# Patient Record
Sex: FEMALE | Race: WHITE | Employment: OTHER | URBAN - METROPOLITAN AREA
[De-identification: names, ages, dates, MRNs, and addresses within clinical notes are randomized per-mention and may not be internally consistent; named-entity substitution may affect disease eponyms.]

---

## 2017-01-25 ENCOUNTER — PATIENT MESSAGE (OUTPATIENT)
Dept: INTERNAL MEDICINE CLINIC | Age: 62
End: 2017-01-25

## 2017-01-25 DIAGNOSIS — E11.9 TYPE 2 DIABETES MELLITUS WITHOUT COMPLICATION, WITH LONG-TERM CURRENT USE OF INSULIN (HCC): Primary | ICD-10-CM

## 2017-01-25 DIAGNOSIS — Z79.4 TYPE 2 DIABETES MELLITUS WITHOUT COMPLICATION, WITH LONG-TERM CURRENT USE OF INSULIN (HCC): Primary | ICD-10-CM

## 2017-01-26 RX ORDER — ESTRADIOL 0.1 MG/G
CREAM VAGINAL
Qty: 42.5 G | Refills: 3 | Status: SHIPPED | OUTPATIENT
Start: 2017-01-26 | End: 2017-08-08 | Stop reason: ALTCHOICE

## 2017-01-31 ENCOUNTER — OFFICE VISIT (OUTPATIENT)
Dept: INTERNAL MEDICINE CLINIC | Age: 62
End: 2017-01-31

## 2017-01-31 VITALS
BODY MASS INDEX: 37.56 KG/M2 | WEIGHT: 220 LBS | HEIGHT: 64 IN | HEART RATE: 84 BPM | DIASTOLIC BLOOD PRESSURE: 68 MMHG | SYSTOLIC BLOOD PRESSURE: 122 MMHG

## 2017-01-31 DIAGNOSIS — F33.42 RECURRENT MAJOR DEPRESSIVE DISORDER, IN FULL REMISSION (HCC): ICD-10-CM

## 2017-01-31 DIAGNOSIS — E78.2 MIXED HYPERLIPIDEMIA: ICD-10-CM

## 2017-01-31 DIAGNOSIS — Z79.4 TYPE 2 DIABETES MELLITUS WITHOUT COMPLICATION, WITH LONG-TERM CURRENT USE OF INSULIN (HCC): Primary | ICD-10-CM

## 2017-01-31 DIAGNOSIS — E11.9 TYPE 2 DIABETES MELLITUS WITHOUT COMPLICATION, WITH LONG-TERM CURRENT USE OF INSULIN (HCC): Primary | ICD-10-CM

## 2017-01-31 DIAGNOSIS — I10 ESSENTIAL HYPERTENSION: ICD-10-CM

## 2017-01-31 PROCEDURE — 99214 OFFICE O/P EST MOD 30 MIN: CPT | Performed by: INTERNAL MEDICINE

## 2017-01-31 RX ORDER — CYCLOBENZAPRINE HCL 10 MG
10 TABLET ORAL NIGHTLY PRN
Qty: 30 TABLET | Refills: 5 | Status: SHIPPED | OUTPATIENT
Start: 2017-01-31 | End: 2017-08-26 | Stop reason: SDUPTHER

## 2017-02-01 ENCOUNTER — PATIENT MESSAGE (OUTPATIENT)
Dept: INTERNAL MEDICINE CLINIC | Age: 62
End: 2017-02-01

## 2017-02-01 LAB
ESTIMATED AVERAGE GLUCOSE: 182.9 MG/DL
HBA1C MFR BLD: 8 %

## 2017-02-06 RX ORDER — ATENOLOL 25 MG/1
TABLET ORAL
Qty: 30 TABLET | Refills: 5 | Status: SHIPPED | OUTPATIENT
Start: 2017-02-06 | End: 2017-09-25 | Stop reason: SDUPTHER

## 2017-03-31 RX ORDER — DULOXETIN HYDROCHLORIDE 60 MG/1
CAPSULE, DELAYED RELEASE ORAL
Qty: 30 CAPSULE | Refills: 5 | Status: SHIPPED | OUTPATIENT
Start: 2017-03-31 | End: 2017-09-23 | Stop reason: SDUPTHER

## 2017-04-03 RX ORDER — INSULIN GLARGINE 100 [IU]/ML
INJECTION, SOLUTION SUBCUTANEOUS
Qty: 15 PEN | Refills: 3 | Status: SHIPPED | OUTPATIENT
Start: 2017-04-03 | End: 2017-08-08 | Stop reason: SDUPTHER

## 2017-04-03 RX ORDER — LIRAGLUTIDE 6 MG/ML
INJECTION SUBCUTANEOUS
Qty: 9 PEN | Refills: 3 | Status: SHIPPED | OUTPATIENT
Start: 2017-04-03 | End: 2017-08-10 | Stop reason: SDUPTHER

## 2017-04-07 ENCOUNTER — TELEPHONE (OUTPATIENT)
Dept: INTERNAL MEDICINE CLINIC | Age: 62
End: 2017-04-07

## 2017-04-10 ENCOUNTER — OFFICE VISIT (OUTPATIENT)
Dept: INTERNAL MEDICINE CLINIC | Age: 62
End: 2017-04-10

## 2017-04-10 VITALS
OXYGEN SATURATION: 96 % | HEART RATE: 78 BPM | HEIGHT: 64 IN | BODY MASS INDEX: 37.94 KG/M2 | DIASTOLIC BLOOD PRESSURE: 76 MMHG | WEIGHT: 222.2 LBS | SYSTOLIC BLOOD PRESSURE: 150 MMHG

## 2017-04-10 DIAGNOSIS — R07.89 CHEST PRESSURE: Primary | ICD-10-CM

## 2017-04-10 DIAGNOSIS — M79.89 SWELLING OF BOTH HANDS: ICD-10-CM

## 2017-04-10 LAB
A/G RATIO: 1.7 (ref 1.1–2.2)
ALBUMIN SERPL-MCNC: 4.3 G/DL (ref 3.4–5)
ALP BLD-CCNC: 128 U/L (ref 40–129)
ALT SERPL-CCNC: 23 U/L (ref 10–40)
ANION GAP SERPL CALCULATED.3IONS-SCNC: 17 MMOL/L (ref 3–16)
AST SERPL-CCNC: 20 U/L (ref 15–37)
BILIRUB SERPL-MCNC: 0.5 MG/DL (ref 0–1)
BUN BLDV-MCNC: 13 MG/DL (ref 7–20)
CALCIUM SERPL-MCNC: 9.4 MG/DL (ref 8.3–10.6)
CHLORIDE BLD-SCNC: 101 MMOL/L (ref 99–110)
CO2: 23 MMOL/L (ref 21–32)
CREAT SERPL-MCNC: 0.7 MG/DL (ref 0.6–1.2)
CREATININE URINE: 77.1 MG/DL (ref 28–259)
GFR AFRICAN AMERICAN: >60
GFR NON-AFRICAN AMERICAN: >60
GLOBULIN: 2.5 G/DL
GLUCOSE BLD-MCNC: 197 MG/DL (ref 70–99)
HCT VFR BLD CALC: 46.4 % (ref 36–48)
HEMOGLOBIN: 14.9 G/DL (ref 12–16)
MCH RBC QN AUTO: 28.1 PG (ref 26–34)
MCHC RBC AUTO-ENTMCNC: 32.2 G/DL (ref 31–36)
MCV RBC AUTO: 87.3 FL (ref 80–100)
PDW BLD-RTO: 14.6 % (ref 12.4–15.4)
PLATELET # BLD: 309 K/UL (ref 135–450)
PMV BLD AUTO: 9.1 FL (ref 5–10.5)
POTASSIUM SERPL-SCNC: 4.6 MMOL/L (ref 3.5–5.1)
PROTEIN PROTEIN: 6 MG/DL
RBC # BLD: 5.32 M/UL (ref 4–5.2)
SODIUM BLD-SCNC: 141 MMOL/L (ref 136–145)
TOTAL PROTEIN: 6.8 G/DL (ref 6.4–8.2)
WBC # BLD: 6.4 K/UL (ref 4–11)

## 2017-04-10 PROCEDURE — 93000 ELECTROCARDIOGRAM COMPLETE: CPT | Performed by: INTERNAL MEDICINE

## 2017-04-10 PROCEDURE — 99214 OFFICE O/P EST MOD 30 MIN: CPT | Performed by: INTERNAL MEDICINE

## 2017-04-26 RX ORDER — CELECOXIB 200 MG/1
CAPSULE ORAL
Qty: 60 CAPSULE | Refills: 4 | Status: SHIPPED | OUTPATIENT
Start: 2017-04-26 | End: 2017-10-01 | Stop reason: SDUPTHER

## 2017-04-26 RX ORDER — ATORVASTATIN CALCIUM 20 MG/1
TABLET, FILM COATED ORAL
Qty: 30 TABLET | Refills: 4 | Status: SHIPPED | OUTPATIENT
Start: 2017-04-26 | End: 2017-09-25 | Stop reason: SDUPTHER

## 2017-04-26 RX ORDER — CANAGLIFLOZIN 300 MG/1
TABLET, FILM COATED ORAL
Qty: 30 TABLET | Refills: 4 | Status: SHIPPED | OUTPATIENT
Start: 2017-04-26 | End: 2017-08-08 | Stop reason: ALTCHOICE

## 2017-05-02 ENCOUNTER — OFFICE VISIT (OUTPATIENT)
Dept: INTERNAL MEDICINE CLINIC | Age: 62
End: 2017-05-02

## 2017-05-02 VITALS
SYSTOLIC BLOOD PRESSURE: 122 MMHG | DIASTOLIC BLOOD PRESSURE: 70 MMHG | HEART RATE: 80 BPM | BODY MASS INDEX: 38.41 KG/M2 | WEIGHT: 225 LBS | HEIGHT: 64 IN

## 2017-05-02 DIAGNOSIS — E11.9 TYPE 2 DIABETES MELLITUS WITHOUT COMPLICATION, WITH LONG-TERM CURRENT USE OF INSULIN (HCC): Primary | ICD-10-CM

## 2017-05-02 DIAGNOSIS — E78.2 MIXED HYPERLIPIDEMIA: ICD-10-CM

## 2017-05-02 DIAGNOSIS — Z79.4 TYPE 2 DIABETES MELLITUS WITHOUT COMPLICATION, WITH LONG-TERM CURRENT USE OF INSULIN (HCC): Primary | ICD-10-CM

## 2017-05-02 DIAGNOSIS — I10 ESSENTIAL HYPERTENSION: ICD-10-CM

## 2017-05-02 PROCEDURE — 99214 OFFICE O/P EST MOD 30 MIN: CPT | Performed by: INTERNAL MEDICINE

## 2017-05-03 LAB
ESTIMATED AVERAGE GLUCOSE: 185.8 MG/DL
HBA1C MFR BLD: 8.1 %

## 2017-05-18 ENCOUNTER — TELEPHONE (OUTPATIENT)
Dept: INTERNAL MEDICINE CLINIC | Age: 62
End: 2017-05-18

## 2017-05-25 ENCOUNTER — OFFICE VISIT (OUTPATIENT)
Dept: INTERNAL MEDICINE CLINIC | Age: 62
End: 2017-05-25

## 2017-05-25 VITALS
TEMPERATURE: 98.2 F | HEART RATE: 112 BPM | WEIGHT: 218.8 LBS | BODY MASS INDEX: 37.36 KG/M2 | HEIGHT: 64 IN | SYSTOLIC BLOOD PRESSURE: 110 MMHG | DIASTOLIC BLOOD PRESSURE: 68 MMHG

## 2017-05-25 DIAGNOSIS — M79.10 MUSCLE ACHE: ICD-10-CM

## 2017-05-25 DIAGNOSIS — R35.0 URINARY FREQUENCY: ICD-10-CM

## 2017-05-25 DIAGNOSIS — E11.9 TYPE 2 DIABETES MELLITUS WITHOUT COMPLICATION, WITH LONG-TERM CURRENT USE OF INSULIN (HCC): Primary | ICD-10-CM

## 2017-05-25 DIAGNOSIS — R68.83 CHILLS: ICD-10-CM

## 2017-05-25 DIAGNOSIS — Z79.4 TYPE 2 DIABETES MELLITUS WITHOUT COMPLICATION, WITH LONG-TERM CURRENT USE OF INSULIN (HCC): Primary | ICD-10-CM

## 2017-05-25 LAB
BILIRUBIN, POC: ABNORMAL
BLOOD URINE, POC: ABNORMAL
CLARITY, POC: ABNORMAL
COLOR, POC: ABNORMAL
GLUCOSE BLD-MCNC: 166 MG/DL
GLUCOSE URINE, POC: >=1000
KETONES, POC: ABNORMAL
LEUKOCYTE EST, POC: ABNORMAL
NITRITE, POC: POSITIVE
PH, POC: 5.5
PROTEIN, POC: ABNORMAL
SPECIFIC GRAVITY, POC: 1.02
UROBILINOGEN, POC: 0.2

## 2017-05-25 PROCEDURE — 82962 GLUCOSE BLOOD TEST: CPT | Performed by: INTERNAL MEDICINE

## 2017-05-25 PROCEDURE — 81002 URINALYSIS NONAUTO W/O SCOPE: CPT | Performed by: INTERNAL MEDICINE

## 2017-05-25 PROCEDURE — 99214 OFFICE O/P EST MOD 30 MIN: CPT | Performed by: INTERNAL MEDICINE

## 2017-05-25 RX ORDER — NITROFURANTOIN 25; 75 MG/1; MG/1
100 CAPSULE ORAL 2 TIMES DAILY
Qty: 10 CAPSULE | Refills: 0 | Status: SHIPPED | OUTPATIENT
Start: 2017-05-25 | End: 2017-05-30

## 2017-05-27 LAB
ORGANISM: ABNORMAL
URINE CULTURE, ROUTINE: ABNORMAL

## 2017-05-30 RX ORDER — OMEPRAZOLE 40 MG/1
CAPSULE, DELAYED RELEASE ORAL
Qty: 30 CAPSULE | Refills: 4 | Status: SHIPPED | OUTPATIENT
Start: 2017-05-30 | End: 2017-10-26 | Stop reason: SDUPTHER

## 2017-05-30 RX ORDER — FLUTICASONE PROPIONATE 50 MCG
2 SPRAY, SUSPENSION (ML) NASAL DAILY
Qty: 3 BOTTLE | Refills: 3 | Status: SHIPPED | OUTPATIENT
Start: 2017-05-30 | End: 2022-05-12 | Stop reason: SDUPTHER

## 2017-06-01 ENCOUNTER — TELEPHONE (OUTPATIENT)
Dept: INTERNAL MEDICINE CLINIC | Age: 62
End: 2017-06-01

## 2017-06-02 DIAGNOSIS — R82.90 ABNORMAL URINALYSIS: Primary | ICD-10-CM

## 2017-06-04 LAB
ORGANISM: ABNORMAL
URINE CULTURE, ROUTINE: ABNORMAL

## 2017-06-05 RX ORDER — CIPROFLOXACIN 250 MG/1
250 TABLET, FILM COATED ORAL 2 TIMES DAILY
Qty: 10 TABLET | Refills: 0 | Status: SHIPPED | OUTPATIENT
Start: 2017-06-05 | End: 2017-06-10

## 2017-06-08 ENCOUNTER — TELEPHONE (OUTPATIENT)
Dept: INTERNAL MEDICINE CLINIC | Age: 62
End: 2017-06-08

## 2017-06-13 ENCOUNTER — TELEPHONE (OUTPATIENT)
Dept: RHEUMATOLOGY | Age: 62
End: 2017-06-13

## 2017-06-13 DIAGNOSIS — R30.0 DYSURIA: Primary | ICD-10-CM

## 2017-06-13 PROCEDURE — 81002 URINALYSIS NONAUTO W/O SCOPE: CPT | Performed by: INTERNAL MEDICINE

## 2017-06-14 DIAGNOSIS — R30.0 DYSURIA: Primary | ICD-10-CM

## 2017-06-14 LAB
BILIRUBIN URINE: NEGATIVE
BLOOD, URINE: NEGATIVE
CLARITY: CLEAR
COLOR: YELLOW
GLUCOSE URINE: NEGATIVE MG/DL
KETONES, URINE: NEGATIVE MG/DL
LEUKOCYTE ESTERASE, URINE: NEGATIVE
MICROSCOPIC EXAMINATION: NORMAL
NITRITE, URINE: NEGATIVE
PH UA: 6
PROTEIN UA: NEGATIVE MG/DL
SPECIFIC GRAVITY UA: 1.02
URINE TYPE: NORMAL
UROBILINOGEN, URINE: 0.2 E.U./DL

## 2017-06-16 LAB — URINE CULTURE, ROUTINE: NORMAL

## 2017-06-28 RX ORDER — ZOLPIDEM TARTRATE 5 MG/1
TABLET ORAL
Qty: 30 TABLET | Refills: 1 | OUTPATIENT
Start: 2017-06-28 | End: 2017-06-30 | Stop reason: SDUPTHER

## 2017-07-05 ENCOUNTER — TELEPHONE (OUTPATIENT)
Dept: INTERNAL MEDICINE CLINIC | Age: 62
End: 2017-07-05

## 2017-07-05 DIAGNOSIS — M25.562 ACUTE PAIN OF LEFT KNEE: Primary | ICD-10-CM

## 2017-07-05 RX ORDER — ZOLPIDEM TARTRATE 5 MG/1
TABLET ORAL
Qty: 30 TABLET | Refills: 1 | Status: SHIPPED | OUTPATIENT
Start: 2017-07-05 | End: 2017-08-28 | Stop reason: SDUPTHER

## 2017-07-17 ENCOUNTER — OFFICE VISIT (OUTPATIENT)
Dept: ORTHOPEDIC SURGERY | Age: 62
End: 2017-07-17

## 2017-07-17 VITALS
HEART RATE: 83 BPM | HEIGHT: 64 IN | DIASTOLIC BLOOD PRESSURE: 79 MMHG | BODY MASS INDEX: 38.41 KG/M2 | SYSTOLIC BLOOD PRESSURE: 134 MMHG | WEIGHT: 225 LBS

## 2017-07-17 DIAGNOSIS — M25.561 PAIN IN BOTH KNEES, UNSPECIFIED CHRONICITY: Primary | ICD-10-CM

## 2017-07-17 DIAGNOSIS — M25.562 PAIN IN BOTH KNEES, UNSPECIFIED CHRONICITY: Primary | ICD-10-CM

## 2017-07-17 DIAGNOSIS — E11.42 DIABETIC POLYNEUROPATHY ASSOCIATED WITH TYPE 2 DIABETES MELLITUS (HCC): ICD-10-CM

## 2017-07-17 DIAGNOSIS — M17.0 BILATERAL PRIMARY OSTEOARTHRITIS OF KNEE: ICD-10-CM

## 2017-07-17 PROCEDURE — 73564 X-RAY EXAM KNEE 4 OR MORE: CPT | Performed by: ORTHOPAEDIC SURGERY

## 2017-07-17 PROCEDURE — 99203 OFFICE O/P NEW LOW 30 MIN: CPT | Performed by: ORTHOPAEDIC SURGERY

## 2017-07-18 RX ORDER — GABAPENTIN 300 MG/1
300 CAPSULE ORAL 3 TIMES DAILY
Qty: 90 CAPSULE | Refills: 1 | Status: SHIPPED | OUTPATIENT
Start: 2017-07-18 | End: 2017-09-22 | Stop reason: SDUPTHER

## 2017-07-20 RX ORDER — CYCLOBENZAPRINE HCL 10 MG
TABLET ORAL
COMMUNITY
Start: 2017-06-13 | End: 2017-08-28 | Stop reason: SDUPTHER

## 2017-08-08 ENCOUNTER — OFFICE VISIT (OUTPATIENT)
Dept: INTERNAL MEDICINE CLINIC | Age: 62
End: 2017-08-08

## 2017-08-08 VITALS
DIASTOLIC BLOOD PRESSURE: 68 MMHG | SYSTOLIC BLOOD PRESSURE: 122 MMHG | BODY MASS INDEX: 38.68 KG/M2 | HEART RATE: 84 BPM | WEIGHT: 226.6 LBS | HEIGHT: 64 IN

## 2017-08-08 DIAGNOSIS — Z79.4 TYPE 2 DIABETES MELLITUS WITHOUT COMPLICATION, WITH LONG-TERM CURRENT USE OF INSULIN (HCC): Primary | ICD-10-CM

## 2017-08-08 DIAGNOSIS — Z12.31 ENCOUNTER FOR SCREENING MAMMOGRAM FOR BREAST CANCER: ICD-10-CM

## 2017-08-08 DIAGNOSIS — F33.41 RECURRENT MAJOR DEPRESSIVE DISORDER, IN PARTIAL REMISSION (HCC): ICD-10-CM

## 2017-08-08 DIAGNOSIS — E11.9 TYPE 2 DIABETES MELLITUS WITHOUT COMPLICATION, WITH LONG-TERM CURRENT USE OF INSULIN (HCC): Primary | ICD-10-CM

## 2017-08-08 DIAGNOSIS — E78.2 MIXED HYPERLIPIDEMIA: ICD-10-CM

## 2017-08-08 DIAGNOSIS — I10 ESSENTIAL HYPERTENSION: ICD-10-CM

## 2017-08-08 DIAGNOSIS — Z12.11 SCREENING FOR COLON CANCER: ICD-10-CM

## 2017-08-08 PROCEDURE — 99214 OFFICE O/P EST MOD 30 MIN: CPT | Performed by: INTERNAL MEDICINE

## 2017-08-09 LAB
ESTIMATED AVERAGE GLUCOSE: 177.2 MG/DL
HBA1C MFR BLD: 7.8 %

## 2017-08-10 RX ORDER — LIRAGLUTIDE 6 MG/ML
INJECTION SUBCUTANEOUS
Qty: 9 PEN | Refills: 3 | Status: SHIPPED | OUTPATIENT
Start: 2017-08-10 | End: 2017-12-28 | Stop reason: SDUPTHER

## 2017-08-11 ENCOUNTER — OFFICE VISIT (OUTPATIENT)
Dept: NEUROLOGY | Age: 62
End: 2017-08-11

## 2017-08-11 VITALS
HEART RATE: 90 BPM | DIASTOLIC BLOOD PRESSURE: 86 MMHG | HEIGHT: 64 IN | SYSTOLIC BLOOD PRESSURE: 144 MMHG | BODY MASS INDEX: 38.93 KG/M2 | WEIGHT: 228 LBS

## 2017-08-11 DIAGNOSIS — M79.2 NEUROPATHIC PAIN: Primary | ICD-10-CM

## 2017-08-11 DIAGNOSIS — E11.42 TYPE 2 DIABETES MELLITUS WITH POLYNEUROPATHY (HCC): ICD-10-CM

## 2017-08-11 PROCEDURE — 99244 OFF/OP CNSLTJ NEW/EST MOD 40: CPT | Performed by: PSYCHIATRY & NEUROLOGY

## 2017-08-28 RX ORDER — ZOLPIDEM TARTRATE 5 MG/1
5 TABLET ORAL NIGHTLY PRN
Qty: 30 TABLET | Refills: 5 | OUTPATIENT
Start: 2017-08-28 | End: 2018-02-28 | Stop reason: SDUPTHER

## 2017-08-28 RX ORDER — CYCLOBENZAPRINE HCL 10 MG
TABLET ORAL
Qty: 30 TABLET | Refills: 2 | Status: SHIPPED | OUTPATIENT
Start: 2017-08-28 | End: 2017-12-03 | Stop reason: SDUPTHER

## 2017-09-05 ENCOUNTER — TELEPHONE (OUTPATIENT)
Dept: INTERNAL MEDICINE CLINIC | Age: 62
End: 2017-09-05

## 2017-09-06 ENCOUNTER — TELEPHONE (OUTPATIENT)
Dept: INTERNAL MEDICINE CLINIC | Age: 62
End: 2017-09-06

## 2017-09-07 RX ORDER — PEN NEEDLE, DIABETIC 30 GX5/16"
1 NEEDLE, DISPOSABLE MISCELLANEOUS DAILY
Qty: 100 EACH | Refills: 3 | Status: CANCELLED | OUTPATIENT
Start: 2017-09-07

## 2017-09-12 ENCOUNTER — PROCEDURE VISIT (OUTPATIENT)
Dept: NEUROLOGY | Age: 62
End: 2017-09-12

## 2017-09-12 ENCOUNTER — OFFICE VISIT (OUTPATIENT)
Dept: NEUROLOGY | Age: 62
End: 2017-09-12

## 2017-09-12 VITALS
SYSTOLIC BLOOD PRESSURE: 134 MMHG | HEIGHT: 64 IN | HEART RATE: 64 BPM | WEIGHT: 228 LBS | BODY MASS INDEX: 38.93 KG/M2 | DIASTOLIC BLOOD PRESSURE: 70 MMHG

## 2017-09-12 DIAGNOSIS — E11.42 TYPE 2 DIABETES MELLITUS WITH POLYNEUROPATHY (HCC): Primary | ICD-10-CM

## 2017-09-12 DIAGNOSIS — M79.2 NEUROPATHIC PAIN: ICD-10-CM

## 2017-09-12 PROCEDURE — 99213 OFFICE O/P EST LOW 20 MIN: CPT | Performed by: PSYCHIATRY & NEUROLOGY

## 2017-09-12 PROCEDURE — 95910 NRV CNDJ TEST 7-8 STUDIES: CPT | Performed by: PSYCHIATRY & NEUROLOGY

## 2017-09-12 PROCEDURE — 95886 MUSC TEST DONE W/N TEST COMP: CPT | Performed by: PSYCHIATRY & NEUROLOGY

## 2017-09-22 RX ORDER — GABAPENTIN 300 MG/1
CAPSULE ORAL
Qty: 90 CAPSULE | Refills: 0 | Status: SHIPPED | OUTPATIENT
Start: 2017-09-22 | End: 2017-10-24 | Stop reason: SDUPTHER

## 2017-09-25 RX ORDER — DULOXETIN HYDROCHLORIDE 60 MG/1
CAPSULE, DELAYED RELEASE ORAL
Qty: 30 CAPSULE | Refills: 5 | Status: SHIPPED | OUTPATIENT
Start: 2017-09-25 | End: 2018-04-02 | Stop reason: SDUPTHER

## 2017-10-02 RX ORDER — CELECOXIB 200 MG/1
CAPSULE ORAL
Qty: 60 CAPSULE | Refills: 5 | Status: SHIPPED | OUTPATIENT
Start: 2017-10-02 | End: 2018-04-20

## 2017-10-16 ENCOUNTER — TELEPHONE (OUTPATIENT)
Dept: OTHER | Facility: CLINIC | Age: 62
End: 2017-10-16

## 2017-10-24 RX ORDER — GABAPENTIN 300 MG/1
CAPSULE ORAL
Qty: 90 CAPSULE | Refills: 0 | Status: SHIPPED | OUTPATIENT
Start: 2017-10-24 | End: 2017-11-24 | Stop reason: SDUPTHER

## 2017-10-26 RX ORDER — OMEPRAZOLE 40 MG/1
CAPSULE, DELAYED RELEASE ORAL
Qty: 30 CAPSULE | Refills: 3 | Status: SHIPPED | OUTPATIENT
Start: 2017-10-26 | End: 2018-02-28 | Stop reason: SDUPTHER

## 2017-11-07 ENCOUNTER — OFFICE VISIT (OUTPATIENT)
Dept: INTERNAL MEDICINE CLINIC | Age: 62
End: 2017-11-07

## 2017-11-07 VITALS
HEIGHT: 64 IN | SYSTOLIC BLOOD PRESSURE: 136 MMHG | DIASTOLIC BLOOD PRESSURE: 80 MMHG | WEIGHT: 235.6 LBS | HEART RATE: 80 BPM | BODY MASS INDEX: 40.22 KG/M2

## 2017-11-07 DIAGNOSIS — Z79.4 TYPE 2 DIABETES MELLITUS WITH DIABETIC POLYNEUROPATHY, WITH LONG-TERM CURRENT USE OF INSULIN (HCC): Primary | ICD-10-CM

## 2017-11-07 DIAGNOSIS — Z11.59 NEED FOR HEPATITIS C SCREENING TEST: ICD-10-CM

## 2017-11-07 DIAGNOSIS — I10 ESSENTIAL HYPERTENSION: ICD-10-CM

## 2017-11-07 DIAGNOSIS — E11.42 TYPE 2 DIABETES MELLITUS WITH DIABETIC POLYNEUROPATHY, WITH LONG-TERM CURRENT USE OF INSULIN (HCC): Primary | ICD-10-CM

## 2017-11-07 DIAGNOSIS — E78.2 MIXED HYPERLIPIDEMIA: ICD-10-CM

## 2017-11-07 DIAGNOSIS — Z12.31 ENCOUNTER FOR SCREENING MAMMOGRAM FOR BREAST CANCER: ICD-10-CM

## 2017-11-07 LAB
CHOLESTEROL, TOTAL: 151 MG/DL (ref 0–199)
HDLC SERPL-MCNC: 56 MG/DL (ref 40–60)
HEPATITIS C ANTIBODY INTERPRETATION: NORMAL
LDL CHOLESTEROL CALCULATED: 52 MG/DL
TRIGL SERPL-MCNC: 217 MG/DL (ref 0–150)
VLDLC SERPL CALC-MCNC: 43 MG/DL

## 2017-11-07 PROCEDURE — 3045F PR MOST RECENT HEMOGLOBIN A1C LEVEL 7.0-9.0%: CPT | Performed by: INTERNAL MEDICINE

## 2017-11-07 PROCEDURE — G8484 FLU IMMUNIZE NO ADMIN: HCPCS | Performed by: INTERNAL MEDICINE

## 2017-11-07 PROCEDURE — 3014F SCREEN MAMMO DOC REV: CPT | Performed by: INTERNAL MEDICINE

## 2017-11-07 PROCEDURE — G0444 DEPRESSION SCREEN ANNUAL: HCPCS | Performed by: INTERNAL MEDICINE

## 2017-11-07 PROCEDURE — G8427 DOCREV CUR MEDS BY ELIG CLIN: HCPCS | Performed by: INTERNAL MEDICINE

## 2017-11-07 PROCEDURE — 1036F TOBACCO NON-USER: CPT | Performed by: INTERNAL MEDICINE

## 2017-11-07 PROCEDURE — 3017F COLORECTAL CA SCREEN DOC REV: CPT | Performed by: INTERNAL MEDICINE

## 2017-11-07 PROCEDURE — G8417 CALC BMI ABV UP PARAM F/U: HCPCS | Performed by: INTERNAL MEDICINE

## 2017-11-07 PROCEDURE — 99214 OFFICE O/P EST MOD 30 MIN: CPT | Performed by: INTERNAL MEDICINE

## 2017-11-07 ASSESSMENT — PATIENT HEALTH QUESTIONNAIRE - PHQ9
8. MOVING OR SPEAKING SO SLOWLY THAT OTHER PEOPLE COULD HAVE NOTICED. OR THE OPPOSITE, BEING SO FIGETY OR RESTLESS THAT YOU HAVE BEEN MOVING AROUND A LOT MORE THAN USUAL: 1
SUM OF ALL RESPONSES TO PHQ9 QUESTIONS 1 & 2: 5
9. THOUGHTS THAT YOU WOULD BE BETTER OFF DEAD, OR OF HURTING YOURSELF: 0
7. TROUBLE CONCENTRATING ON THINGS, SUCH AS READING THE NEWSPAPER OR WATCHING TELEVISION: 3
3. TROUBLE FALLING OR STAYING ASLEEP: 3
4. FEELING TIRED OR HAVING LITTLE ENERGY: 3
6. FEELING BAD ABOUT YOURSELF - OR THAT YOU ARE A FAILURE OR HAVE LET YOURSELF OR YOUR FAMILY DOWN: 2
SUM OF ALL RESPONSES TO PHQ QUESTIONS 1-9: 18
1. LITTLE INTEREST OR PLEASURE IN DOING THINGS: 3
10. IF YOU CHECKED OFF ANY PROBLEMS, HOW DIFFICULT HAVE THESE PROBLEMS MADE IT FOR YOU TO DO YOUR WORK, TAKE CARE OF THINGS AT HOME, OR GET ALONG WITH OTHER PEOPLE: 1
5. POOR APPETITE OR OVEREATING: 1
2. FEELING DOWN, DEPRESSED OR HOPELESS: 2

## 2017-11-07 NOTE — PATIENT INSTRUCTIONS
For the next two weeks:  1. Eat small frequent, healthy meals; 3-4 times daily  2. Take Humalog prior to your meals. 3. Check your glucose levels 4 times daily. 4. Schedule an appointment with Dr. Darcy Rubio. 5. Return in 2 weeks so we can review how this has gone.     Please call the office with any questions or concerns #[phone]

## 2017-11-07 NOTE — LETTER
[facility] Medicine  [address] [place] [address]  Phone: [phone]  Fax: [phone]    Alok Forte MD        November 7, 2017     Patient: Renetta Nash   YOB: 1955   Date of Visit: 11/7/2017       To Whom It May Concern: It is my medical opinion that Ar Solomon requires a disability parking placard for the following reasons:  She has limited walking ability due to a neurologic condition. Duration of need: permanent    If you have any questions or concerns, please don't hesitate to call.     Sincerely,        Alok Forte MD

## 2017-11-07 NOTE — Clinical Note
Jaime Soni, Ms. Gallardo's glucose levels are poorly controlled and sporadic secondary to dietary indiscretion and inconsistency. She is agreeable to seeing you to discuss behavioral modification. She has co morbid depression. Thanks for your help.  Benito Ellison

## 2017-11-10 ENCOUNTER — TELEPHONE (OUTPATIENT)
Dept: INTERNAL MEDICINE CLINIC | Age: 62
End: 2017-11-10

## 2017-11-10 NOTE — TELEPHONE ENCOUNTER
I would like to get Dr. Dietra Gilford input on this after she is seen by Dr. Jose L Bowser. We can revisit her depression at her follow-up visit as well.

## 2017-11-10 NOTE — TELEPHONE ENCOUNTER
Pt calling, for results of A1C. Results where given. Pt states when she was here Tuesday she forgot to ask about possibly getting something in addition to her Cymbalta for her depression. Please advise.

## 2017-11-27 RX ORDER — GABAPENTIN 300 MG/1
CAPSULE ORAL
Qty: 90 CAPSULE | Refills: 0 | Status: SHIPPED | OUTPATIENT
Start: 2017-11-27 | End: 2017-12-28 | Stop reason: SDUPTHER

## 2017-12-04 ENCOUNTER — OFFICE VISIT (OUTPATIENT)
Dept: PSYCHOLOGY | Age: 62
End: 2017-12-04

## 2017-12-04 DIAGNOSIS — F33.1 MODERATE EPISODE OF RECURRENT MAJOR DEPRESSIVE DISORDER (HCC): Primary | ICD-10-CM

## 2017-12-04 PROCEDURE — 90791 PSYCH DIAGNOSTIC EVALUATION: CPT | Performed by: PSYCHOLOGIST

## 2017-12-04 RX ORDER — CYCLOBENZAPRINE HCL 10 MG
TABLET ORAL
Qty: 30 TABLET | Refills: 5 | Status: SHIPPED | OUTPATIENT
Start: 2017-12-04 | End: 2018-12-15 | Stop reason: SDUPTHER

## 2017-12-04 ASSESSMENT — PATIENT HEALTH QUESTIONNAIRE - PHQ9
5. POOR APPETITE OR OVEREATING: 3
SUM OF ALL RESPONSES TO PHQ9 QUESTIONS 1 & 2: 5
6. FEELING BAD ABOUT YOURSELF - OR THAT YOU ARE A FAILURE OR HAVE LET YOURSELF OR YOUR FAMILY DOWN: 2
1. LITTLE INTEREST OR PLEASURE IN DOING THINGS: 2
10. IF YOU CHECKED OFF ANY PROBLEMS, HOW DIFFICULT HAVE THESE PROBLEMS MADE IT FOR YOU TO DO YOUR WORK, TAKE CARE OF THINGS AT HOME, OR GET ALONG WITH OTHER PEOPLE: 2
8. MOVING OR SPEAKING SO SLOWLY THAT OTHER PEOPLE COULD HAVE NOTICED. OR THE OPPOSITE, BEING SO FIGETY OR RESTLESS THAT YOU HAVE BEEN MOVING AROUND A LOT MORE THAN USUAL: 0
2. FEELING DOWN, DEPRESSED OR HOPELESS: 3
4. FEELING TIRED OR HAVING LITTLE ENERGY: 3
7. TROUBLE CONCENTRATING ON THINGS, SUCH AS READING THE NEWSPAPER OR WATCHING TELEVISION: 2
9. THOUGHTS THAT YOU WOULD BE BETTER OFF DEAD, OR OF HURTING YOURSELF: 0
SUM OF ALL RESPONSES TO PHQ QUESTIONS 1-9: 18
3. TROUBLE FALLING OR STAYING ASLEEP: 3
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
## 2017-12-04 NOTE — Clinical Note
Do you mind taking a look over this note and potentially make recommendation(s) to Dr. Topete Orf re: medications. Alternatively, if you'd like to see her in person, I think she would be short term management. Thank you!

## 2017-12-04 NOTE — PATIENT INSTRUCTIONS
1. Go to the pool 1 time per week for 45 minutes. Change into your pool clothes immediately upon returning home. 2. Follow-up with Dr. Jalyn Escalante in 3 weeks, or sooner, if necessary.

## 2017-12-12 PROBLEM — F33.1 MODERATE EPISODE OF RECURRENT MAJOR DEPRESSIVE DISORDER (HCC): Status: ACTIVE | Noted: 2017-12-12

## 2017-12-26 ENCOUNTER — TELEPHONE (OUTPATIENT)
Dept: RHEUMATOLOGY | Age: 62
End: 2017-12-26

## 2017-12-27 RX ORDER — BUPROPION HYDROCHLORIDE 150 MG/1
TABLET, EXTENDED RELEASE ORAL
Qty: 60 TABLET | Refills: 3 | Status: SHIPPED | OUTPATIENT
Start: 2017-12-27 | End: 2018-04-30 | Stop reason: SDUPTHER

## 2017-12-28 RX ORDER — GABAPENTIN 300 MG/1
CAPSULE ORAL
Qty: 90 CAPSULE | Refills: 0 | Status: SHIPPED | OUTPATIENT
Start: 2017-12-28 | End: 2018-01-25 | Stop reason: SDUPTHER

## 2017-12-28 RX ORDER — INSULIN GLARGINE 100 [IU]/ML
INJECTION, SOLUTION SUBCUTANEOUS
Qty: 15 PEN | Refills: 2 | Status: SHIPPED | OUTPATIENT
Start: 2017-12-28 | End: 2018-03-25 | Stop reason: SDUPTHER

## 2017-12-28 RX ORDER — LIRAGLUTIDE 6 MG/ML
INJECTION SUBCUTANEOUS
Qty: 9 PEN | Refills: 3 | Status: SHIPPED | OUTPATIENT
Start: 2017-12-28 | End: 2018-04-30 | Stop reason: SDUPTHER

## 2018-01-18 ENCOUNTER — OFFICE VISIT (OUTPATIENT)
Dept: PSYCHOLOGY | Age: 63
End: 2018-01-18

## 2018-01-18 DIAGNOSIS — F33.1 MODERATE EPISODE OF RECURRENT MAJOR DEPRESSIVE DISORDER (HCC): Primary | ICD-10-CM

## 2018-01-18 PROCEDURE — 90832 PSYTX W PT 30 MINUTES: CPT | Performed by: PSYCHOLOGIST

## 2018-01-18 ASSESSMENT — PATIENT HEALTH QUESTIONNAIRE - PHQ9
SUM OF ALL RESPONSES TO PHQ QUESTIONS 1-9: 12
8. MOVING OR SPEAKING SO SLOWLY THAT OTHER PEOPLE COULD HAVE NOTICED. OR THE OPPOSITE, BEING SO FIGETY OR RESTLESS THAT YOU HAVE BEEN MOVING AROUND A LOT MORE THAN USUAL: 3
4. FEELING TIRED OR HAVING LITTLE ENERGY: 3
3. TROUBLE FALLING OR STAYING ASLEEP: 2
6. FEELING BAD ABOUT YOURSELF - OR THAT YOU ARE A FAILURE OR HAVE LET YOURSELF OR YOUR FAMILY DOWN: 3
2. FEELING DOWN, DEPRESSED OR HOPELESS: 0
5. POOR APPETITE OR OVEREATING: 1
10. IF YOU CHECKED OFF ANY PROBLEMS, HOW DIFFICULT HAVE THESE PROBLEMS MADE IT FOR YOU TO DO YOUR WORK, TAKE CARE OF THINGS AT HOME, OR GET ALONG WITH OTHER PEOPLE: 1
SUM OF ALL RESPONSES TO PHQ9 QUESTIONS 1 & 2: 0
7. TROUBLE CONCENTRATING ON THINGS, SUCH AS READING THE NEWSPAPER OR WATCHING TELEVISION: 0
9. THOUGHTS THAT YOU WOULD BE BETTER OFF DEAD, OR OF HURTING YOURSELF: 0
1. LITTLE INTEREST OR PLEASURE IN DOING THINGS: 0

## 2018-01-18 NOTE — PATIENT INSTRUCTIONS
1. Keep up the good work following the plate method  2. 2 times per week (probably the weekends), go for a swim. 3. Follow-up with Dr. Jimmy Silveira in 4 weeks, or sooner, if necessary.

## 2018-01-18 NOTE — PROGRESS NOTES
Behavioral Health Consultation  Silvio Wynne, Ph.D.  Psychologist  1/18/2018  11:29 AM      Time spent with Patient: 30 minutes  This is patient's second  [facility] appointment. Reason for Consult:    Chief Complaint   Patient presents with    Depression     Referring Provider: Ryan Treadwell MD  [email or web address]  [place], [address]    Feedback given to PCP. S:  Pt seen for f/u of depression and diabetes. Reported mood and sxs have been [place]. \" Doing diabetes education classes, starting to put things into practice. Reported she is loosely following the plate method to reduce starch intake. Has not exercised yet bc of the cold weather. Did report desire to go off insulin completely to achieve rapid weight loss before trip to [place] w granddaughter this spring. [facility] strongly discouraged this bx and provided some education on the dangerousness of this form of weight loss. Pt stated understanding, but that she might do this anyway. [facility] encouraged f/u w PCP Alana Dinh to discuss other methods. Regarding depression, pt reported \"feeling the fog lifting\" since starting on wellbutrin. Reported she is her 's only social interaction, so she feels guilty leaving the house aside from work. Acknowledges this makes weight loss and recovery from depression difficult.      O:  MSE:    Appearance    alert, cooperative  Appetite normal  Sleep disturbance Yes  Fatigue Yes  Loss of pleasure No  Impulsive behavior No  Speech    spontaneous, normal rate and normal volume  Mood    Euthymic  Affect    normal affect  Thought Content    intact  Thought Process    goal directed and coherent  Associations    logical connections  Insight    Fair  Judgment    Fair  Orientation    oriented to person, place, time, and general circumstances  Memory    recent and remote memory intact  Attention/Concentration    intact  Morbid ideation No  Suicide Assessment    no suicidal ideation    History:    Medications:   Current Outpatient  Marital status:      Spouse name: N/A    Number of children: N/A    Years of education: N/A     Occupational History    Not on file. Social History Main Topics    Smoking status: Never Smoker    Smokeless tobacco: Never Used    Alcohol use 0.0 oz/week      Comment: Rare     Drug use: No    Sexual activity: Yes     Partners: Male     Other Topics Concern    Not on file     Social History Narrative    No narrative on file     TOBACCO:   reports that she has never smoked. She has never used smokeless tobacco.  ETOH:   reports that she drinks alcohol. Family History:   Family History   Problem Relation Age of Onset    Cancer Mother      A:  Administered PHQ-9 (see below). Patient endorses moderate symptoms of depression. Denied SI/HI. However, pt did not endorse key depressive sxs. As such, present measure is not indicative of clinical depression. PHQ Scores 1/18/2018 12/4/2017 11/7/2017   PHQ2 Score 0 5 5   PHQ9 Score 12 18 18     Interpretation of Total Score Depression Severity: 1-4 = Minimal depression, 5-9 = Mild depression, 10-14 = Moderate depression, 15-19 = Moderately severe depression, 20-27 = Severe depression      Diagnosis:    Major depressive disorder; recurrent and moderate, in partial remission  R/O anxiety disorder (SLIME or PTSD?)      Diagnosis Date    Asthma     Depression     Diabetes mellitus ([facility] [address]     Hypertension     Hypothyroidism     Obesity     Osteoarthritis     Paradoxical vocal fold motion disorder      Problems with primary support group and Other psychosocial and environmental problems     Plan:  Pt interventions:  Discussed and set plan for behavioral activation, Provided education, Discussed and problem-solved barriers in adhering to behavioral change plan and Motivational Interviewing to increase patient confidence and compliance with adhering to behavioral change plan        Documentation was done using voice recognition dragon software.   Every

## 2018-01-25 RX ORDER — GABAPENTIN 300 MG/1
CAPSULE ORAL
Qty: 90 CAPSULE | Refills: 1 | Status: SHIPPED | OUTPATIENT
Start: 2018-01-25 | End: 2018-04-02 | Stop reason: SDUPTHER

## 2018-02-12 LAB
CREATININE URINE: NORMAL MG/DL
MICROALBUMIN/CREAT 24H UR: 15.9 MG/G{CREAT}

## 2018-02-23 ENCOUNTER — TELEPHONE (OUTPATIENT)
Dept: INTERNAL MEDICINE CLINIC | Age: 63
End: 2018-02-23

## 2018-02-23 DIAGNOSIS — Z12.39 SCREENING FOR BREAST CANCER: ICD-10-CM

## 2018-02-23 DIAGNOSIS — E11.42 TYPE 2 DIABETES MELLITUS WITH DIABETIC POLYNEUROPATHY, WITH LONG-TERM CURRENT USE OF INSULIN (HCC): ICD-10-CM

## 2018-02-23 DIAGNOSIS — Z79.4 TYPE 2 DIABETES MELLITUS WITH DIABETIC POLYNEUROPATHY, WITH LONG-TERM CURRENT USE OF INSULIN (HCC): ICD-10-CM

## 2018-02-23 NOTE — TELEPHONE ENCOUNTER
Dr. Any Holland: This patient has an upcoming appointment with you for Diabetes and Hyperlipidemia. In planning for that visit I have completed the following pre-visit planning:     Pre-Visit Planning Checklist:  Patient contacted: yes  Verified patient by name and date of birth: yes    Health Maintenance items reviewed:    Colon Cancer Screening:  Fit Test discussed. Patient reports that she lost kit that was given to her at her previous appointment in August and would like to receive another kit at her upcoming appointment. Breast Cancer Screening:  Mammogram order pended  Pneumonia Vaccination:  patient would like to discuss at next visit. Patient would like to receive vaccine at her upcoming appointment    Labs and procedures pended:  Hemoglobin A1C , Urine Microalbumin, Renal Panel  and Mammography   Labs and procedures discussed with patient: yes  Reminded patient to check with their insurance company about coverage for lab tests and lab location: yes    Preliminary Medication Reconciliation: was performed. Reminded patient to bring medications to appointment: no    Reminded patient to arrive early: yes    Other notes: Patient will have lab work completed in the office prior to her upcoming appointment. Please complete the med-reconciliation and sign the appropriate labs as soon as possible.       Gideon Clark  Pre-Services Specialist

## 2018-02-28 DIAGNOSIS — Z79.4 TYPE 2 DIABETES MELLITUS WITH DIABETIC POLYNEUROPATHY, WITH LONG-TERM CURRENT USE OF INSULIN (HCC): ICD-10-CM

## 2018-02-28 DIAGNOSIS — E11.42 TYPE 2 DIABETES MELLITUS WITH DIABETIC POLYNEUROPATHY, WITH LONG-TERM CURRENT USE OF INSULIN (HCC): ICD-10-CM

## 2018-02-28 LAB
ALBUMIN SERPL-MCNC: 4.3 G/DL (ref 3.4–5)
ANION GAP SERPL CALCULATED.3IONS-SCNC: 13 MMOL/L (ref 3–16)
BUN BLDV-MCNC: 10 MG/DL (ref 7–20)
CALCIUM SERPL-MCNC: 9.1 MG/DL (ref 8.3–10.6)
CHLORIDE BLD-SCNC: 97 MMOL/L (ref 99–110)
CO2: 26 MMOL/L (ref 21–32)
CREAT SERPL-MCNC: 0.7 MG/DL (ref 0.6–1.2)
GFR AFRICAN AMERICAN: >60
GFR NON-AFRICAN AMERICAN: >60
GLUCOSE BLD-MCNC: 191 MG/DL (ref 70–99)
PHOSPHORUS: 4.4 MG/DL (ref 2.5–4.9)
POTASSIUM SERPL-SCNC: 4.8 MMOL/L (ref 3.5–5.1)
SODIUM BLD-SCNC: 136 MMOL/L (ref 136–145)

## 2018-03-01 LAB
ESTIMATED AVERAGE GLUCOSE: 174.3 MG/DL
HBA1C MFR BLD: 7.7 %

## 2018-03-01 RX ORDER — ZOLPIDEM TARTRATE 5 MG/1
TABLET ORAL
Qty: 30 TABLET | Refills: 4 | Status: SHIPPED | OUTPATIENT
Start: 2018-03-01 | End: 2018-07-29

## 2018-03-05 ENCOUNTER — OFFICE VISIT (OUTPATIENT)
Dept: INTERNAL MEDICINE CLINIC | Age: 63
End: 2018-03-05

## 2018-03-05 VITALS
BODY MASS INDEX: 40.63 KG/M2 | DIASTOLIC BLOOD PRESSURE: 80 MMHG | HEART RATE: 92 BPM | HEIGHT: 64 IN | WEIGHT: 238 LBS | SYSTOLIC BLOOD PRESSURE: 142 MMHG | OXYGEN SATURATION: 96 %

## 2018-03-05 DIAGNOSIS — E11.42 TYPE 2 DIABETES MELLITUS WITH DIABETIC POLYNEUROPATHY, WITH LONG-TERM CURRENT USE OF INSULIN (HCC): ICD-10-CM

## 2018-03-05 DIAGNOSIS — E78.2 MIXED HYPERLIPIDEMIA: ICD-10-CM

## 2018-03-05 DIAGNOSIS — I10 ESSENTIAL HYPERTENSION: ICD-10-CM

## 2018-03-05 DIAGNOSIS — Z79.4 TYPE 2 DIABETES MELLITUS WITH DIABETIC POLYNEUROPATHY, WITH LONG-TERM CURRENT USE OF INSULIN (HCC): ICD-10-CM

## 2018-03-05 DIAGNOSIS — M54.16 LUMBAR RADICULOPATHY: Primary | ICD-10-CM

## 2018-03-05 DIAGNOSIS — R35.0 URINARY FREQUENCY: ICD-10-CM

## 2018-03-05 LAB
BILIRUBIN, POC: NORMAL
BLOOD URINE, POC: NORMAL
CLARITY, POC: NORMAL
COLOR, POC: NORMAL
GLUCOSE URINE, POC: NORMAL
KETONES, POC: NORMAL
LEUKOCYTE EST, POC: NORMAL
NITRITE, POC: NORMAL
PH, POC: 6
PROTEIN, POC: NORMAL
SPECIFIC GRAVITY, POC: 1.02
UROBILINOGEN, POC: 0.2

## 2018-03-05 PROCEDURE — 3045F PR MOST RECENT HEMOGLOBIN A1C LEVEL 7.0-9.0%: CPT | Performed by: INTERNAL MEDICINE

## 2018-03-05 PROCEDURE — 81002 URINALYSIS NONAUTO W/O SCOPE: CPT | Performed by: INTERNAL MEDICINE

## 2018-03-05 PROCEDURE — G8427 DOCREV CUR MEDS BY ELIG CLIN: HCPCS | Performed by: INTERNAL MEDICINE

## 2018-03-05 PROCEDURE — 3017F COLORECTAL CA SCREEN DOC REV: CPT | Performed by: INTERNAL MEDICINE

## 2018-03-05 PROCEDURE — 3014F SCREEN MAMMO DOC REV: CPT | Performed by: INTERNAL MEDICINE

## 2018-03-05 PROCEDURE — G8484 FLU IMMUNIZE NO ADMIN: HCPCS | Performed by: INTERNAL MEDICINE

## 2018-03-05 PROCEDURE — 99214 OFFICE O/P EST MOD 30 MIN: CPT | Performed by: INTERNAL MEDICINE

## 2018-03-05 PROCEDURE — 1036F TOBACCO NON-USER: CPT | Performed by: INTERNAL MEDICINE

## 2018-03-05 PROCEDURE — G8417 CALC BMI ABV UP PARAM F/U: HCPCS | Performed by: INTERNAL MEDICINE

## 2018-03-05 RX ORDER — LISINOPRIL 20 MG/1
20 TABLET ORAL DAILY
Qty: 30 TABLET | Refills: 0 | Status: SHIPPED | OUTPATIENT
Start: 2018-03-05 | End: 2018-03-19 | Stop reason: SDUPTHER

## 2018-03-06 ENCOUNTER — TELEPHONE (OUTPATIENT)
Dept: INTERNAL MEDICINE CLINIC | Age: 63
End: 2018-03-06

## 2018-03-08 RX ORDER — LORAZEPAM 1 MG/1
TABLET ORAL
Qty: 1 TABLET | Refills: 0 | Status: SHIPPED | OUTPATIENT
Start: 2018-03-08 | End: 2018-03-08

## 2018-03-15 ENCOUNTER — TELEPHONE (OUTPATIENT)
Dept: INTERNAL MEDICINE CLINIC | Age: 63
End: 2018-03-15

## 2018-03-15 DIAGNOSIS — M48.062 SPINAL STENOSIS OF LUMBAR REGION WITH NEUROGENIC CLAUDICATION: Primary | ICD-10-CM

## 2018-03-19 ENCOUNTER — OFFICE VISIT (OUTPATIENT)
Dept: INTERNAL MEDICINE CLINIC | Age: 63
End: 2018-03-19

## 2018-03-19 VITALS
DIASTOLIC BLOOD PRESSURE: 80 MMHG | HEIGHT: 64 IN | WEIGHT: 235 LBS | SYSTOLIC BLOOD PRESSURE: 134 MMHG | HEART RATE: 88 BPM | BODY MASS INDEX: 40.12 KG/M2

## 2018-03-19 DIAGNOSIS — L91.8 SKIN TAG: ICD-10-CM

## 2018-03-19 DIAGNOSIS — I10 ESSENTIAL HYPERTENSION: Primary | ICD-10-CM

## 2018-03-19 LAB
ALBUMIN SERPL-MCNC: 4.7 G/DL (ref 3.4–5)
ANION GAP SERPL CALCULATED.3IONS-SCNC: 20 MMOL/L (ref 3–16)
BUN BLDV-MCNC: 12 MG/DL (ref 7–20)
CALCIUM SERPL-MCNC: 9.9 MG/DL (ref 8.3–10.6)
CHLORIDE BLD-SCNC: 99 MMOL/L (ref 99–110)
CO2: 26 MMOL/L (ref 21–32)
CREAT SERPL-MCNC: 0.7 MG/DL (ref 0.6–1.2)
GFR AFRICAN AMERICAN: >60
GFR NON-AFRICAN AMERICAN: >60
GLUCOSE BLD-MCNC: 106 MG/DL (ref 70–99)
PHOSPHORUS: 3.9 MG/DL (ref 2.5–4.9)
POTASSIUM SERPL-SCNC: 4.8 MMOL/L (ref 3.5–5.1)
SODIUM BLD-SCNC: 145 MMOL/L (ref 136–145)

## 2018-03-19 PROCEDURE — 3017F COLORECTAL CA SCREEN DOC REV: CPT | Performed by: INTERNAL MEDICINE

## 2018-03-19 PROCEDURE — G8427 DOCREV CUR MEDS BY ELIG CLIN: HCPCS | Performed by: INTERNAL MEDICINE

## 2018-03-19 PROCEDURE — 1036F TOBACCO NON-USER: CPT | Performed by: INTERNAL MEDICINE

## 2018-03-19 PROCEDURE — G8417 CALC BMI ABV UP PARAM F/U: HCPCS | Performed by: INTERNAL MEDICINE

## 2018-03-19 PROCEDURE — 11200 RMVL SKIN TAGS UP TO&INC 15: CPT | Performed by: INTERNAL MEDICINE

## 2018-03-19 PROCEDURE — 3014F SCREEN MAMMO DOC REV: CPT | Performed by: INTERNAL MEDICINE

## 2018-03-19 PROCEDURE — 99213 OFFICE O/P EST LOW 20 MIN: CPT | Performed by: INTERNAL MEDICINE

## 2018-03-19 PROCEDURE — G8484 FLU IMMUNIZE NO ADMIN: HCPCS | Performed by: INTERNAL MEDICINE

## 2018-03-19 RX ORDER — LISINOPRIL 20 MG/1
20 TABLET ORAL DAILY
Qty: 30 TABLET | Refills: 5 | Status: SHIPPED | OUTPATIENT
Start: 2018-03-19 | End: 2018-10-05 | Stop reason: SDUPTHER

## 2018-03-19 NOTE — PROGRESS NOTES
Chief Complaint   Patient presents with    Hypertension       HPI:  Susy Ridxavier is a 57 y.o. (: 1955) here today   for Hypertension    HTN:  Patient presenting for 2 week follow up of response to lisinopril 20mg. This was added 2 weeks ago due to blood pressure being under sub optimal control in the office. She states she is having no issues with taking the medication. She is tolerating it well. She denies associated symptoms suggestive of uncontrolled HTN. She is also requests removal of a skin tag located on her abdomen. It has been present for months. Social History   Substance Use Topics    Smoking status: Never Smoker    Smokeless tobacco: Never Used    Alcohol use 0.0 oz/week      Comment: Rare         ROS:  CV: Neg for chest pain  RESP: + for dyspnea, stable          OBJECTIVE:    Ht 5' 4\" (1.626 m)   Wt 235 lb (106.6 kg)   BMI 40.34 kg/m²   BP Readings from Last 2 Encounters:   18 (!) 142/80   17 136/80     Wt Readings from Last 3 Encounters:   18 235 lb (106.6 kg)   18 238 lb (108 kg)   17 235 lb 9.6 oz (106.9 kg)       GEN: WN/WD, NAD  CV: regular rate and JMEBOC,9/ systolic murmur at the left sternal border  Resp: normal effort, clear auscultation bilaterally  No peripheral edema   Abd: soft, nontender to palpation. Skin: There is a pink papule the size of a pencil eraser located on the left abdomen.       Lab Results   Component Value Date    CREATININE 0.7 2018    BUN 10 2018     2018    K 4.8 2018    CL 97 (L) 2018    CO2 26 2018      Lab Results   Component Value Date    TSH 2.02 2016      Lab Results   Component Value Date    LABA1C 7.7 2018     Lab Results   Component Value Date    .3 2018      Lab Results   Component Value Date    CHOL 151 2017    CHOL 171 10/28/2016    CHOL 167 2016     Lab Results   Component Value Date    TRIG 217 (H) 2017 TRIG 158 (H) 10/28/2016    TRIG 183 (H) 02/02/2016     Lab Results   Component Value Date    HDL 56 11/07/2017    HDL 65 (H) 10/28/2016    HDL 65 (H) 02/02/2016     Lab Results   Component Value Date    LDLCALC 52 11/07/2017    LDLCALC 74 10/28/2016    LDLCALC 65 02/02/2016     Lab Results   Component Value Date    LABVLDL 43 11/07/2017    LABVLDL 32 10/28/2016    LABVLDL 37 02/02/2016     No results found for: CHOLHDLRATIO       ASSESSMENT/PLAN:    1. Essential hypertension  Well controlled since the addition of lisinopril. I will monitor her potassium and creatinine today with blood work. Continue lisinopril.  - Renal Function Panel    2. Skin tag  She requested skin tag removal today. - 55097 - [place] REMOVAL OF SKIN TAGS, UP TO 15  Verbal informed consent was obtained. Risks including bleeding were discussed. Skin was prepped with alcohol swab. Anesthesia was achieved with 0.5 mL of 2% lidocaine. Using scissors, the skin tag was excised. Hemostasis was achieved with pressure. A bandage was applied. The procedure was tolerated well and without complication. RTO 3 months      Scribe attestation: Verónica Mcdaniel MA, am scribing for and in the presence of Leah Upton MD. Electronically signed by Imani Barrera MA on 3/19/2018 at 9:45 AM      Provider attestation: Daisy Ramirez MD  personally performed the services described in this documentation, as scribed by the user listed above in my presence, and it is both accurate and complete. I agree with the Chief Complaint, ROS, and Past Histories independently gathered by the clinical support staff and the remaining scribed note accurately describes my personal service to the patient.     3/19/2018    10:06 AM

## 2018-03-23 ENCOUNTER — OFFICE VISIT (OUTPATIENT)
Dept: ORTHOPEDIC SURGERY | Age: 63
End: 2018-03-23

## 2018-03-23 VITALS
HEIGHT: 64 IN | WEIGHT: 235 LBS | BODY MASS INDEX: 40.12 KG/M2 | TEMPERATURE: 97.8 F | SYSTOLIC BLOOD PRESSURE: 161 MMHG | HEART RATE: 78 BPM | DIASTOLIC BLOOD PRESSURE: 99 MMHG

## 2018-03-23 DIAGNOSIS — M48.062 SPINAL STENOSIS OF LUMBAR REGION WITH NEUROGENIC CLAUDICATION: ICD-10-CM

## 2018-03-23 DIAGNOSIS — E11.42 DIABETIC POLYNEUROPATHY ASSOCIATED WITH TYPE 2 DIABETES MELLITUS (HCC): Primary | ICD-10-CM

## 2018-03-23 DIAGNOSIS — E66.09 EXOGENOUS OBESITY: ICD-10-CM

## 2018-03-23 PROCEDURE — 3014F SCREEN MAMMO DOC REV: CPT | Performed by: ORTHOPAEDIC SURGERY

## 2018-03-23 PROCEDURE — G8427 DOCREV CUR MEDS BY ELIG CLIN: HCPCS | Performed by: ORTHOPAEDIC SURGERY

## 2018-03-23 PROCEDURE — G8417 CALC BMI ABV UP PARAM F/U: HCPCS | Performed by: ORTHOPAEDIC SURGERY

## 2018-03-23 PROCEDURE — G8484 FLU IMMUNIZE NO ADMIN: HCPCS | Performed by: ORTHOPAEDIC SURGERY

## 2018-03-23 PROCEDURE — 99243 OFF/OP CNSLTJ NEW/EST LOW 30: CPT | Performed by: ORTHOPAEDIC SURGERY

## 2018-03-23 PROCEDURE — 3017F COLORECTAL CA SCREEN DOC REV: CPT | Performed by: ORTHOPAEDIC SURGERY

## 2018-03-23 NOTE — LETTER
DEIDRE INJECTION ORDER    Date:  3/23/18      Patient: Nicho Wei     YOB: 1955    Patient Home Phone: [phone] (home)    Diagnosis: M48.062    Levels: L4-5    Side: Please check  [x]LT     []RT     []RORY     []Midline    Location of Injection: Please check  []Cervical IL Deidre   [x]Lumbar Transforaminal DEIDRE  []Cervical TF Deidre              []Lumbar Interlaminar DEIDRE  []Cervical Facet Injection  [] Lumbar Facet Injection    []Cervical Medial Branch Block []Lumbar Medial Branch Block  []Other:                                              []Lumbar Interlaminar DEIDRE                                                 [] SI Injection       Service Provider or Service Location: Please check  [][facility]/Dr. Rylee Patino    [][facility]  [x]Dr. Mariaelena Rocha    []Dr. Huey Bryant   []Other:     Ordering Physician: Dr Herber Samuel  Signature: Electronically signed by       Comments or Special Instructions: Allergies:    Allergies   Allergen Reactions    Actos [Pioglitazone Hydrochloride]      Causes mouth blisters    Albuterol      YEAST INFECTION IN MOUTH    Dilaudid [Hydromorphone Hcl]     Fluticasone-Salmeterol      YEAST INFECTIONS IN MOUTH    Glyburide      Causes mouth blisters    Metformin      Causes mouth blisters    Symbicort [Budesonide-Formoterol Fumarate]      Causes mouth blisters       First Insurance:  Payor: Yosi Lundberg / Plan: Bonny Holder  / Product Type: *No Product type* /                                ______________________________________________________________________  To be filled in by Scheduling:  Scheduled Date:    Scheduled Time:  Procedure Code:  Pre Cert Information:

## 2018-03-23 NOTE — PROGRESS NOTES
daily 60 tablet 3    cyclobenzaprine (FLEXERIL) 10 MG tablet TAKE ONE TABLET BY MOUTH ONCE NIGHTLY AS NEEDED FOR MUSCLE SPASMS 30 tablet 5    celecoxib (CELEBREX) 200 MG capsule TAKE ONE CAPSULE BY MOUTH TWICE A DAY 60 capsule 5    DULoxetine (CYMBALTA) 60 MG extended release capsule TAKE ONE CAPSULE BY MOUTH DAILY 30 capsule 5    atorvastatin (LIPITOR) 20 MG tablet TAKE ONE TABLET BY MOUTH DAILY 30 tablet 5    Insulin Pen Needle (PEN NEEDLES) 32G X 4 MM MISC USE DAILY 100 each 3    HUMALOG KWIKPEN 100 UNIT/ML pen INJECT 15 UNITS SUBCUTANEOUSLY THREE TIMES A DAY BEFORE MEALS 15 Pen 3    atenolol (TENORMIN) 25 MG tablet TAKE ONE TABLET BY MOUTH DAILY 30 tablet 5    Insulin Pen Needle 31G X 8 MM MISC 1 each by Does not apply route daily 100 each 3    fluticasone (FLONASE) 50 MCG/ACT nasal spray 2 sprays by Nasal route daily 3 Bottle 3    Lancets MISC 1 each by Does not apply route daily 1 3xs daily 100 each 3     No current facility-administered medications for this visit. @FLOW([id number]])@    Physical Exam:    Ht 5' 4\" (1.626 m)   Wt 235 lb (106.6 kg)   BMI 40.34 kg/m²     Pain Score:  (back)    Constitutional: the patient appears to be alert and oriented to person place and time. Neuro-Muscular exam:     Cervical spine: Range of motion is full and there is no tenderness. Upper extremities:  Demonstrate a full free range of motion of the shoulders, elbows, wrists and hands. No gross asymmetry. Motor function is 5/5, sensory intact, and DTR's are 1-2+ bilaterally. Sensory exam is normal.  Pulses are 1+ bilaterally. Shoulders: No evidence of any winging or atrophy. Impingement sign is negative bilaterally. Apprehension sign is negative bilaterally. Elbows: show no evidence of any asymmetry. There is no evidence of any effusion. Range of motion is full with no varus valgus laxity. Wrists and hands: show no evidence of any swelling or asymmetry.  Digits maintain a full range of POC neg     Urobilinogen, UA 0.2     Leukocytes, UA neg     Nitrite, UA neg        RADIOGRAPHS: AP lateral lumbar spine films with flexion extension views taken today in the office demonstrate a grade 1 degenerative spondylolisthesis of L4 on L5. There is mild osteopenia of the bone. There is moderate facet arthropathy bilaterally at L4 5 L5-S1. MRI lumbar spine is reviewed and demonstrates a grade 1 degenerative spondylolisthesis of L4 on L5. There is moderate concentric stenosis of the canal as a result. No results found.   Reviewed by Dr. Tabitha Kenney:      Omid Lopez MD [facility]ySaint [facility]  [facility]  3/23/2018

## 2018-03-26 RX ORDER — INSULIN GLARGINE 100 [IU]/ML
INJECTION, SOLUTION SUBCUTANEOUS
Qty: 15 PEN | Refills: 1 | Status: SHIPPED | OUTPATIENT
Start: 2018-03-26 | End: 2018-05-18 | Stop reason: SDUPTHER

## 2018-03-26 RX ORDER — PEN NEEDLE, DIABETIC 32GX 5/32"
NEEDLE, DISPOSABLE MISCELLANEOUS
Qty: 100 EACH | Refills: 1 | Status: SHIPPED | OUTPATIENT
Start: 2018-03-26 | End: 2018-05-14 | Stop reason: SDUPTHER

## 2018-04-02 RX ORDER — DULOXETIN HYDROCHLORIDE 60 MG/1
CAPSULE, DELAYED RELEASE ORAL
Qty: 30 CAPSULE | Refills: 5 | Status: SHIPPED | OUTPATIENT
Start: 2018-04-02 | End: 2018-10-05 | Stop reason: SDUPTHER

## 2018-04-02 RX ORDER — ATORVASTATIN CALCIUM 20 MG/1
TABLET, FILM COATED ORAL
Qty: 30 TABLET | Refills: 5 | Status: SHIPPED | OUTPATIENT
Start: 2018-04-02 | End: 2018-10-05 | Stop reason: SDUPTHER

## 2018-04-06 ENCOUNTER — OFFICE VISIT (OUTPATIENT)
Dept: ORTHOPEDIC SURGERY | Age: 63
End: 2018-04-06

## 2018-04-06 VITALS
SYSTOLIC BLOOD PRESSURE: 130 MMHG | HEART RATE: 100 BPM | HEIGHT: 64 IN | WEIGHT: 235.01 LBS | BODY MASS INDEX: 40.12 KG/M2 | DIASTOLIC BLOOD PRESSURE: 84 MMHG

## 2018-04-06 DIAGNOSIS — E11.42 DIABETIC POLYNEUROPATHY ASSOCIATED WITH TYPE 2 DIABETES MELLITUS (HCC): ICD-10-CM

## 2018-04-06 DIAGNOSIS — M43.16 SPONDYLOLISTHESIS OF LUMBAR REGION: Primary | ICD-10-CM

## 2018-04-06 DIAGNOSIS — M48.061 LUMBAR STENOSIS WITHOUT NEUROGENIC CLAUDICATION: ICD-10-CM

## 2018-04-06 DIAGNOSIS — M53.3 SACROILIAC JOINT DYSFUNCTION OF BOTH SIDES: ICD-10-CM

## 2018-04-06 PROCEDURE — 3017F COLORECTAL CA SCREEN DOC REV: CPT | Performed by: PHYSICAL MEDICINE & REHABILITATION

## 2018-04-06 PROCEDURE — 99244 OFF/OP CNSLTJ NEW/EST MOD 40: CPT | Performed by: PHYSICAL MEDICINE & REHABILITATION

## 2018-04-06 PROCEDURE — G8427 DOCREV CUR MEDS BY ELIG CLIN: HCPCS | Performed by: PHYSICAL MEDICINE & REHABILITATION

## 2018-04-06 PROCEDURE — 3014F SCREEN MAMMO DOC REV: CPT | Performed by: PHYSICAL MEDICINE & REHABILITATION

## 2018-04-06 PROCEDURE — G8417 CALC BMI ABV UP PARAM F/U: HCPCS | Performed by: PHYSICAL MEDICINE & REHABILITATION

## 2018-04-13 ENCOUNTER — TELEPHONE (OUTPATIENT)
Dept: ORTHOPEDIC SURGERY | Age: 63
End: 2018-04-13

## 2018-04-20 ENCOUNTER — HOSPITAL ENCOUNTER (OUTPATIENT)
Dept: PAIN MANAGEMENT | Age: 63
Discharge: HOME | End: 2018-04-20
Admitting: PHYSICAL MEDICINE & REHABILITATION

## 2018-04-20 VITALS
OXYGEN SATURATION: 97 % | RESPIRATION RATE: 16 BRPM | TEMPERATURE: 96.4 F | DIASTOLIC BLOOD PRESSURE: 81 MMHG | SYSTOLIC BLOOD PRESSURE: 139 MMHG | HEART RATE: 86 BPM

## 2018-04-20 LAB
GLUCOSE BLD-MCNC: 129 MG/DL (ref 70–99)
PERFORMED ON: ABNORMAL

## 2018-04-20 ASSESSMENT — PAIN - FUNCTIONAL ASSESSMENT
PAIN_FUNCTIONAL_ASSESSMENT: 0-10
PAIN_FUNCTIONAL_ASSESSMENT: 0-10

## 2018-04-20 ASSESSMENT — PAIN DESCRIPTION - DESCRIPTORS: DESCRIPTORS: ACHING;SHARP;SHOOTING;THROBBING

## 2018-04-22 DIAGNOSIS — E66.09 EXOGENOUS OBESITY: ICD-10-CM

## 2018-04-22 DIAGNOSIS — M48.062 SPINAL STENOSIS OF LUMBAR REGION WITH NEUROGENIC CLAUDICATION: ICD-10-CM

## 2018-04-22 DIAGNOSIS — E11.42 DIABETIC POLYNEUROPATHY ASSOCIATED WITH TYPE 2 DIABETES MELLITUS (HCC): ICD-10-CM

## 2018-04-23 RX ORDER — INSULIN LISPRO 100 [IU]/ML
INJECTION, SOLUTION INTRAVENOUS; SUBCUTANEOUS
Qty: 15 PEN | Refills: 1 | Status: SHIPPED | OUTPATIENT
Start: 2018-04-23 | End: 2018-07-03 | Stop reason: ALTCHOICE

## 2018-04-30 RX ORDER — LIRAGLUTIDE 6 MG/ML
INJECTION SUBCUTANEOUS
Qty: 9 PEN | Refills: 3 | Status: SHIPPED | OUTPATIENT
Start: 2018-04-30 | End: 2018-09-13 | Stop reason: SDUPTHER

## 2018-05-01 ENCOUNTER — HOSPITAL ENCOUNTER (OUTPATIENT)
Dept: OTHER | Age: 63
Discharge: HOME | End: 2018-05-31
Attending: ORTHOPAEDIC SURGERY | Admitting: ORTHOPAEDIC SURGERY

## 2018-05-03 ENCOUNTER — TELEPHONE (OUTPATIENT)
Dept: ORTHOPEDIC SURGERY | Age: 63
End: 2018-05-03

## 2018-05-08 ENCOUNTER — TELEPHONE (OUTPATIENT)
Dept: ORTHOPEDIC SURGERY | Age: 63
End: 2018-05-08

## 2018-05-14 RX ORDER — PEN NEEDLE, DIABETIC 31 GX5/16"
NEEDLE, DISPOSABLE MISCELLANEOUS
Qty: 100 EACH | Refills: 3 | Status: SHIPPED | OUTPATIENT
Start: 2018-05-14 | End: 2018-12-15 | Stop reason: SDUPTHER

## 2018-05-15 ENCOUNTER — OFFICE VISIT (OUTPATIENT)
Dept: ENDOCRINOLOGY | Age: 63
End: 2018-05-15

## 2018-05-15 VITALS
DIASTOLIC BLOOD PRESSURE: 70 MMHG | WEIGHT: 242.4 LBS | SYSTOLIC BLOOD PRESSURE: 140 MMHG | OXYGEN SATURATION: 99 % | HEIGHT: 64 IN | BODY MASS INDEX: 41.38 KG/M2 | HEART RATE: 96 BPM

## 2018-05-15 DIAGNOSIS — E78.2 MIXED HYPERLIPIDEMIA: ICD-10-CM

## 2018-05-15 DIAGNOSIS — E11.42 TYPE 2 DIABETES MELLITUS WITH POLYNEUROPATHY (HCC): Primary | ICD-10-CM

## 2018-05-15 DIAGNOSIS — I10 ESSENTIAL HYPERTENSION: ICD-10-CM

## 2018-05-15 DIAGNOSIS — Z78.0 POSTMENOPAUSAL: ICD-10-CM

## 2018-05-15 DIAGNOSIS — E11.42 DIABETIC POLYNEUROPATHY ASSOCIATED WITH TYPE 2 DIABETES MELLITUS (HCC): ICD-10-CM

## 2018-05-15 DIAGNOSIS — F33.41 RECURRENT MAJOR DEPRESSIVE DISORDER, IN PARTIAL REMISSION (HCC): ICD-10-CM

## 2018-05-15 PROCEDURE — 99244 OFF/OP CNSLTJ NEW/EST MOD 40: CPT | Performed by: INTERNAL MEDICINE

## 2018-05-15 RX ORDER — EMPAGLIFLOZIN, METFORMIN HYDROCHLORIDE 10; 1000 MG/1; MG/1
TABLET, EXTENDED RELEASE ORAL
Qty: 7 TABLET | Refills: 0 | COMMUNITY
Start: 2018-05-15 | End: 2018-09-21 | Stop reason: SDUPTHER

## 2018-05-15 RX ORDER — FLASH GLUCOSE SENSOR
KIT MISCELLANEOUS
Qty: 1 DEVICE | Refills: 1 | Status: SHIPPED | OUTPATIENT
Start: 2018-05-15

## 2018-05-15 RX ORDER — FLASH GLUCOSE SENSOR
KIT MISCELLANEOUS
Qty: 4 EACH | Refills: 3 | Status: SHIPPED | OUTPATIENT
Start: 2018-05-15 | End: 2018-09-18 | Stop reason: SDUPTHER

## 2018-05-15 ASSESSMENT — PATIENT HEALTH QUESTIONNAIRE - PHQ9
2. FEELING DOWN, DEPRESSED OR HOPELESS: 1
1. LITTLE INTEREST OR PLEASURE IN DOING THINGS: 0
SUM OF ALL RESPONSES TO PHQ9 QUESTIONS 1 & 2: 1
SUM OF ALL RESPONSES TO PHQ QUESTIONS 1-9: 1

## 2018-05-15 ASSESSMENT — ENCOUNTER SYMPTOMS: VISUAL CHANGE: 0

## 2018-05-16 ENCOUNTER — HOSPITAL ENCOUNTER (OUTPATIENT)
Dept: PAIN MANAGEMENT | Age: 63
Discharge: HOME | End: 2018-05-16
Attending: PHYSICAL MEDICINE & REHABILITATION | Admitting: PHYSICAL MEDICINE & REHABILITATION

## 2018-05-16 VITALS
RESPIRATION RATE: 16 BRPM | DIASTOLIC BLOOD PRESSURE: 76 MMHG | TEMPERATURE: 96.1 F | HEIGHT: 64 IN | BODY MASS INDEX: 39.27 KG/M2 | WEIGHT: 230 LBS | OXYGEN SATURATION: 97 % | HEART RATE: 88 BPM | SYSTOLIC BLOOD PRESSURE: 126 MMHG

## 2018-05-16 LAB
GLUCOSE BLD-MCNC: 108 MG/DL (ref 70–99)
PERFORMED ON: ABNORMAL

## 2018-05-16 ASSESSMENT — PAIN - FUNCTIONAL ASSESSMENT: PAIN_FUNCTIONAL_ASSESSMENT: 0-10

## 2018-05-16 ASSESSMENT — PAIN DESCRIPTION - DESCRIPTORS: DESCRIPTORS: ACHING;SHARP

## 2018-05-22 ENCOUNTER — TELEPHONE (OUTPATIENT)
Dept: ENDOCRINOLOGY | Age: 63
End: 2018-05-22

## 2018-05-23 ENCOUNTER — TELEPHONE (OUTPATIENT)
Dept: ENDOCRINOLOGY | Age: 63
End: 2018-05-23

## 2018-05-23 DIAGNOSIS — Z79.4 TYPE 2 DIABETES MELLITUS WITH DIABETIC POLYNEUROPATHY, WITH LONG-TERM CURRENT USE OF INSULIN (HCC): ICD-10-CM

## 2018-05-23 DIAGNOSIS — E11.42 TYPE 2 DIABETES MELLITUS WITH DIABETIC POLYNEUROPATHY, WITH LONG-TERM CURRENT USE OF INSULIN (HCC): ICD-10-CM

## 2018-05-23 PROCEDURE — 95251 CONT GLUC MNTR ANALYSIS I&R: CPT | Performed by: INTERNAL MEDICINE

## 2018-05-25 ENCOUNTER — TELEPHONE (OUTPATIENT)
Dept: ORTHOPEDIC SURGERY | Age: 63
End: 2018-05-25

## 2018-05-25 ENCOUNTER — OFFICE VISIT (OUTPATIENT)
Dept: ORTHOPEDIC SURGERY | Age: 63
End: 2018-05-25

## 2018-05-25 VITALS
DIASTOLIC BLOOD PRESSURE: 84 MMHG | SYSTOLIC BLOOD PRESSURE: 130 MMHG | HEIGHT: 64 IN | WEIGHT: 229.94 LBS | BODY MASS INDEX: 39.26 KG/M2

## 2018-05-25 DIAGNOSIS — M48.061 LUMBAR FORAMINAL STENOSIS: ICD-10-CM

## 2018-05-25 DIAGNOSIS — M54.16 LUMBAR RADICULOPATHY: ICD-10-CM

## 2018-05-25 DIAGNOSIS — M43.16 SPONDYLOLISTHESIS OF LUMBAR REGION: Primary | ICD-10-CM

## 2018-05-25 PROCEDURE — 3017F COLORECTAL CA SCREEN DOC REV: CPT | Performed by: PHYSICAL MEDICINE & REHABILITATION

## 2018-05-25 PROCEDURE — 99213 OFFICE O/P EST LOW 20 MIN: CPT | Performed by: PHYSICAL MEDICINE & REHABILITATION

## 2018-05-25 PROCEDURE — G8427 DOCREV CUR MEDS BY ELIG CLIN: HCPCS | Performed by: PHYSICAL MEDICINE & REHABILITATION

## 2018-05-25 PROCEDURE — 1036F TOBACCO NON-USER: CPT | Performed by: PHYSICAL MEDICINE & REHABILITATION

## 2018-05-25 PROCEDURE — G8417 CALC BMI ABV UP PARAM F/U: HCPCS | Performed by: PHYSICAL MEDICINE & REHABILITATION

## 2018-06-05 ENCOUNTER — TELEPHONE (OUTPATIENT)
Dept: INTERNAL MEDICINE CLINIC | Age: 63
End: 2018-06-05

## 2018-06-07 NOTE — TELEPHONE ENCOUNTER
Attempted to contact patient on 6/7/2018. Result: left message on the patient's voicemail asking patient to return my call. Pre-Visit planning not completed.      Kennth [place]  Patient Services Specialist  [phone]

## 2018-06-13 ENCOUNTER — TELEPHONE (OUTPATIENT)
Dept: ENDOCRINOLOGY | Age: 63
End: 2018-06-13

## 2018-06-22 ENCOUNTER — PAT TELEPHONE (OUTPATIENT)
Dept: PREADMISSION TESTING | Age: 63
End: 2018-06-22

## 2018-06-27 ENCOUNTER — TELEPHONE (OUTPATIENT)
Dept: ORTHOPEDIC SURGERY | Age: 63
End: 2018-06-27

## 2018-07-02 ENCOUNTER — HOSPITAL ENCOUNTER (OUTPATIENT)
Dept: PAIN MANAGEMENT | Age: 63
Discharge: HOME | End: 2018-07-02
Attending: PHYSICAL MEDICINE & REHABILITATION | Admitting: PHYSICAL MEDICINE & REHABILITATION

## 2018-07-02 VITALS
SYSTOLIC BLOOD PRESSURE: 141 MMHG | HEART RATE: 94 BPM | DIASTOLIC BLOOD PRESSURE: 79 MMHG | RESPIRATION RATE: 16 BRPM | OXYGEN SATURATION: 99 %

## 2018-07-02 DIAGNOSIS — E11.42 TYPE 2 DIABETES MELLITUS WITH DIABETIC POLYNEUROPATHY (HCC): ICD-10-CM

## 2018-07-02 LAB
GLUCOSE BLD-MCNC: 144 MG/DL (ref 70–99)
PERFORMED ON: ABNORMAL

## 2018-07-03 ENCOUNTER — OFFICE VISIT (OUTPATIENT)
Dept: INTERNAL MEDICINE CLINIC | Age: 63
End: 2018-07-03

## 2018-07-03 ENCOUNTER — OFFICE VISIT (OUTPATIENT)
Dept: ENDOCRINOLOGY | Age: 63
End: 2018-07-03

## 2018-07-03 VITALS
HEART RATE: 84 BPM | WEIGHT: 223.2 LBS | HEIGHT: 64 IN | DIASTOLIC BLOOD PRESSURE: 78 MMHG | BODY MASS INDEX: 38.1 KG/M2 | SYSTOLIC BLOOD PRESSURE: 122 MMHG

## 2018-07-03 VITALS
HEART RATE: 100 BPM | WEIGHT: 225 LBS | BODY MASS INDEX: 38.62 KG/M2 | SYSTOLIC BLOOD PRESSURE: 124 MMHG | DIASTOLIC BLOOD PRESSURE: 72 MMHG | OXYGEN SATURATION: 98 %

## 2018-07-03 DIAGNOSIS — I10 ESSENTIAL HYPERTENSION: ICD-10-CM

## 2018-07-03 DIAGNOSIS — E11.42 TYPE 2 DIABETES MELLITUS WITH DIABETIC POLYNEUROPATHY, WITH LONG-TERM CURRENT USE OF INSULIN (HCC): Primary | ICD-10-CM

## 2018-07-03 DIAGNOSIS — E78.2 MIXED HYPERLIPIDEMIA: ICD-10-CM

## 2018-07-03 DIAGNOSIS — Z78.0 POSTMENOPAUSAL: ICD-10-CM

## 2018-07-03 DIAGNOSIS — Z13.31 POSITIVE DEPRESSION SCREENING: ICD-10-CM

## 2018-07-03 DIAGNOSIS — E11.42 TYPE 2 DIABETES MELLITUS WITH POLYNEUROPATHY (HCC): Primary | ICD-10-CM

## 2018-07-03 DIAGNOSIS — F33.42 RECURRENT MAJOR DEPRESSIVE DISORDER, IN FULL REMISSION (HCC): ICD-10-CM

## 2018-07-03 DIAGNOSIS — Z79.4 TYPE 2 DIABETES MELLITUS WITH DIABETIC POLYNEUROPATHY, WITH LONG-TERM CURRENT USE OF INSULIN (HCC): Primary | ICD-10-CM

## 2018-07-03 LAB — HBA1C MFR BLD: 7.5 %

## 2018-07-03 PROCEDURE — G8427 DOCREV CUR MEDS BY ELIG CLIN: HCPCS | Performed by: INTERNAL MEDICINE

## 2018-07-03 PROCEDURE — G8417 CALC BMI ABV UP PARAM F/U: HCPCS | Performed by: INTERNAL MEDICINE

## 2018-07-03 PROCEDURE — 99214 OFFICE O/P EST MOD 30 MIN: CPT | Performed by: INTERNAL MEDICINE

## 2018-07-03 PROCEDURE — 3017F COLORECTAL CA SCREEN DOC REV: CPT | Performed by: INTERNAL MEDICINE

## 2018-07-03 PROCEDURE — 83036 HEMOGLOBIN GLYCOSYLATED A1C: CPT | Performed by: INTERNAL MEDICINE

## 2018-07-03 PROCEDURE — 2022F DILAT RTA XM EVC RTNOPTHY: CPT | Performed by: INTERNAL MEDICINE

## 2018-07-03 PROCEDURE — 1036F TOBACCO NON-USER: CPT | Performed by: INTERNAL MEDICINE

## 2018-07-03 PROCEDURE — 3045F PR MOST RECENT HEMOGLOBIN A1C LEVEL 7.0-9.0%: CPT | Performed by: INTERNAL MEDICINE

## 2018-07-03 PROCEDURE — 99213 OFFICE O/P EST LOW 20 MIN: CPT | Performed by: INTERNAL MEDICINE

## 2018-07-03 PROCEDURE — G8431 POS CLIN DEPRES SCRN F/U DOC: HCPCS | Performed by: INTERNAL MEDICINE

## 2018-07-03 RX ORDER — CELECOXIB 200 MG/1
200 CAPSULE ORAL 2 TIMES DAILY PRN
Qty: 180 CAPSULE | Refills: 3 | Status: SHIPPED | OUTPATIENT
Start: 2018-07-03 | End: 2019-07-21 | Stop reason: SDUPTHER

## 2018-07-03 ASSESSMENT — ENCOUNTER SYMPTOMS: VISUAL CHANGE: 0

## 2018-07-03 NOTE — PROGRESS NOTES
Subjective:      Patient ID: Genna Bee is a 57 y.o. female. Chief Complaint   Patient presents with    Diabetes    Hypertension    Hyperlipidemia    Back Pain     got her 2nd epidural yesterday    Shaking     on her left side, a lot of the time she doesn't notice it but other people do, will occasionally go to the right arm, they don't last very long    Discuss Medications     Celebrex with Voltaren Gel vs Diclofenac tablets        HPI  T2DM: she is taking her medication as directed. Since her last visit she saw Dr. Hoda Coronado. Her medications were adjusted, and she is on continuous glucose monitor. She reports glucose ranging in the mid 100's. She is having occasional low glucose levels overnight into the 40's. She is currently taking Jardiance-metformin, glargine, and Victoza. HTN: The patient is tolerating blood pressure medication well and taking them as directed. BP control outside of the office is reported as not monitored. No symptoms concerning for end organ damage are present. HLD: she is taking atorvastatin as directed and tolerating well. Depression: Symptoms are well controlled. Wellbutrin and Cymbalta are helpful. [address] [facility] [address]  She has had 2ESI's for low back pain. She feels she is improving. Social History   Substance Use Topics    Smoking status: Never Smoker    Smokeless tobacco: Never Used    Alcohol use No        Review of Systems  CV: Neg for chest pain  RESP: neg for dyspnea   GI: Reg BM's  : denies urinary problems  NEURO: +intermittent tremor in her upper extremities; mostly LUE  Objective:   Physical Exam  /78   Pulse 84   Ht 5' 4\" (1.626 m)   Wt 223 lb 3.2 oz (101.2 kg)   BMI 38.31 kg/m²    GEN: WN/WD, NAD  CV: regular rate and rhythm, no murmurs rubs or gallops  Resp: normal effort, clear auscultation bilaterally  No peripheral edema   Abd: soft, nontender to palpation.    NEURO: Subtle intention tremor of the BUE; no rigidity, no resting tremor, no

## 2018-07-03 NOTE — PROGRESS NOTES
Bella Horn is a 57 y.o. female is seen  for evaluation and management of type2  Diabetes---since last visit with me she has been checking her finger lard glucose more regularly and taking her medications regularly and has noticed frequent hypoglycemia specially a few hypoglycemic episodes occurred in the middle of night. She has brought are [employer] which I downloaded and reviewed the glycemic pattern and excursion with her in detail today     . Bella Horn was diagnosed with Diabetes mellitus in 2008 aprox   Pt always had issues with hypoglycemia since her childhood so she was under constant surveillance until she was diagnosed with diabetes  . Bella Horn got diabetic education in the past.  Comorbid conditions: Neuropathy    Current diabetic medications include: basaglar and victoza and humalog   She lives a very busy life she works as a director of an [employer] and has a sedentary lifestyle and very busy work hours where she forgets to check her blood sugars before meals and sometimes even omits taking meal boluses. Currently she is planning a trip to [facility] in 2 weeks with her granddaughter  INTERIM:    Diabetes   She presents for her initial diabetic visit. She has type 2 diabetes mellitus. No MedicAlert identification noted. The initial diagnosis of diabetes was made 10 years ago. Her disease course has been worsening. Hypoglycemia symptoms include sweats and tremors. Pertinent negatives for diabetes include no foot ulcerations, no polydipsia, no polyphagia, no polyuria, no visual change, no weakness and no weight loss. There are no hypoglycemic complications. Symptoms are worsening. Diabetic complications include peripheral neuropathy. Risk factors for coronary artery disease include post-menopausal, hypertension, diabetes mellitus and dyslipidemia. Current diabetic treatment includes insulin injections. She is compliant with treatment some of the time.  Her weight is increasing FOR SLEEP APNEA     Social History     Social History    Marital status:      Spouse name: N/A    Number of children: N/A    Years of education: N/A     Occupational History    Not on file.      Social History Main Topics    Smoking status: Never Smoker    Smokeless tobacco: Never Used    Alcohol use No    Drug use: No    Sexual activity: Yes     Partners: Male     Other Topics Concern    Not on file     Social History Narrative    No narrative on file     Family History   Problem Relation Age of Onset    Cancer Mother     Obesity Sister      Current Outpatient Prescriptions   Medication Sig Dispense Refill    celecoxib (CELEBREX) 200 MG capsule Take 1 capsule by mouth 2 times daily as needed for Pain 180 capsule 3    insulin glargine (BASAGLAR KWIKPEN) 100 UNIT/ML injection pen Inject 70 Units into the skin nightly      Continuous Blood Gluc  (FREESTYLE JEREMIAS READER) ENE Use for checking glucose 1 Device 1    Continuous Blood Gluc Sensor (FREESTYLE JEREMIAS SENSOR SYSTEM) MISC To be applied weekly 4 each 3    SYNJARDY XR  MG TB24 Take one tab daily 7 tablet 0    Insulin Pen Needle (PEN NEEDLES 31GX5/16\") 31G X 8 MM MISC USE ONCE DAILY 100 each 3    buPROPion (WELLBUTRIN SR) 150 MG extended release tablet Take 1 tablet by mouth 2 times daily 60 tablet 2    VICTOZA 18 MG/3ML SOPN SC injection DIAL AND INJECT SUBCUTANEOUSLY 1.8MG DAILY 9 pen 3    DULoxetine (CYMBALTA) 60 MG extended release capsule TAKE ONE CAPSULE BY MOUTH DAILY 30 capsule 5    atorvastatin (LIPITOR) 20 MG tablet TAKE ONE TABLET BY MOUTH DAILY 30 tablet 5    gabapentin (NEURONTIN) 300 MG capsule TAKE ONE CAPSULE BY MOUTH THREE TIMES A DAY 90 capsule 2    lisinopril (PRINIVIL;ZESTRIL) 20 MG tablet Take 1 tablet by mouth daily 30 tablet 5    VOLTAREN 1 % GEL APPLY 4 GRAMS TO AFFECTED AREA(S) FOUR TIMES DAILY 500 g 2    zolpidem (AMBIEN) 5 MG tablet TAKE ONE TABLET BY MOUTH EVERY NIGHT AT BEDTIME 30 tablet 4 her blood sugars run really high. Will switch Victoza to Trulicity to improve compliance after her blood glucose stabilize. We also discussed carb counting which she currently is not doing. She will work on these aspects    She is advised to call it after one week of reduction in the dose of basal insulin and I will download her Freestyle date and make further recommendation she is aware of hypoglycemia and has excellent hypoglycemia management skills which were discussed again in detail today she was offered to have glucagon emergency kit as well  Hypoglycemia protocol reviewed in detail with patient Patient was advised to carry glucose tablets she has long-standing history of hypoglycemic episodes so she is fairly attuned to the symptoms and how to correct her hypoglycemia. She notes that since her epidural shot her blood sugars are actually getting better as opposed to running high    Patient was advised that sending of her fingerstick blood glucose logs is crucial in management of her  diabetes. I will adjust the  dose of insulin according to sent data. Health Maintenance   - Tomas Simmons was advised to have annual dilated eye exam     Last Eye Exam: Up to date on eye exam was told she has no retinopathy as per patient   Last Podiatry Exam:  Feet examined by me  May 2018 she has mild peripheral neuropathy   Lipids: Last LDL 52 in November 2017   Microalbumin/Creatinine Ratio: normal February 2018    . Education: Reviewed ABCs of diabetes management (respective goals in parentheses): A1C (<7), blood pressure (<130/80), and cholesterol (LDL <100). -. Compliance at present is estimated to be poor. Efforts to improve compliance (if necessary) will be directed at dietary modifications: advised again .  -. Follow up: I recommend diabetes care be 3 months. 2. Diabetic polyneuropathy associated with type 2 diabetes mellitus ([facility] [address]  She is on gabapentin as per primary care physician    3. Postmenopausal  She had complete hysterectomy done at age 37 for severe endometriosis. She had been on hormone replacement therapy until age of 46. We discussed prevention of osteoporosis in detail today  - DEXA Bone Density Axial Skeleton; Future    4. Recurrent major depressive disorder, in partial remission [facility])  She is on medication as per primary care physician    5. Essential hypertension  Blood pressure was elevated today she feels it was\" white coat hypertension and anxiety of seeing a new doctor. She was advised to check it at home and call our office back or her primary care and if she notes the blood pressure stays high. She denies any chest pain denies any shortness of breath denies any headache    6. Mixed hyperlipidemia  She is on Lipitor and her last LDL is 52 in November 2017. We will check  at follow-up      Reviewed and/or ordered clinical lab results yes   Reviewed and/or ordered radiology tests Yes  Reviewed and/or ordered other diagnostic tests yes   Made a decision to obtain old records yes   Reviewed and summarized old records yes     Tomas Simmons was counseled regarding symptoms of current diagnosis, course and complications of disease if inadequately treated, side effects of medications, diagnosis, treatment options, and prognosis, risks, benefits, complications, and alternatives of treatment, labs, imaging and other studies and treatment targets and goals. She understands instructions and counseling  Total time spent counseling 60 + min  , more than 50 % of this was spent on face to face counseling    These diagnosis were discussed and reviewed with the patient including the advantages of drug therapy. She was counseled at this visit on the following: diabetes complication prevention and foot care. Return in about 3 months (around 10/3/2018).

## 2018-07-05 RX ORDER — OMEPRAZOLE 40 MG/1
CAPSULE, DELAYED RELEASE ORAL
Qty: 30 CAPSULE | Refills: 2 | Status: SHIPPED | OUTPATIENT
Start: 2018-07-05 | End: 2018-10-05 | Stop reason: SDUPTHER

## 2018-07-19 RX ORDER — BUPROPION HYDROCHLORIDE 150 MG/1
150 TABLET, EXTENDED RELEASE ORAL 2 TIMES DAILY
Qty: 60 TABLET | Refills: 2 | Status: SHIPPED | OUTPATIENT
Start: 2018-07-19 | End: 2018-10-05 | Stop reason: SDUPTHER

## 2018-07-23 RX ORDER — GABAPENTIN 300 MG/1
CAPSULE ORAL
Qty: 90 CAPSULE | Refills: 1 | Status: SHIPPED | OUTPATIENT
Start: 2018-07-23 | End: 2018-10-05 | Stop reason: SDUPTHER

## 2018-07-31 DIAGNOSIS — F51.01 PRIMARY INSOMNIA: Primary | ICD-10-CM

## 2018-07-31 RX ORDER — ZOLPIDEM TARTRATE 5 MG/1
5 TABLET ORAL NIGHTLY PRN
Qty: 30 TABLET | Refills: 2 | OUTPATIENT
Start: 2018-07-31 | End: 2018-10-05 | Stop reason: SDUPTHER

## 2018-09-06 RX ORDER — INSULIN GLARGINE 100 [IU]/ML
INJECTION, SOLUTION SUBCUTANEOUS
Qty: 145 PEN | Refills: 2 | Status: SHIPPED | OUTPATIENT
Start: 2018-09-06 | End: 2019-03-17 | Stop reason: SDUPTHER

## 2018-09-13 RX ORDER — LIRAGLUTIDE 6 MG/ML
INJECTION SUBCUTANEOUS
Qty: 9 PEN | Refills: 5 | Status: SHIPPED | OUTPATIENT
Start: 2018-09-13 | End: 2018-09-18 | Stop reason: SDUPTHER

## 2018-09-18 ENCOUNTER — OFFICE VISIT (OUTPATIENT)
Dept: ENDOCRINOLOGY | Age: 63
End: 2018-09-18

## 2018-09-18 VITALS
BODY MASS INDEX: 38.1 KG/M2 | HEART RATE: 89 BPM | SYSTOLIC BLOOD PRESSURE: 130 MMHG | DIASTOLIC BLOOD PRESSURE: 76 MMHG | WEIGHT: 223.2 LBS | HEIGHT: 64 IN

## 2018-09-18 DIAGNOSIS — E78.2 MIXED HYPERLIPIDEMIA: ICD-10-CM

## 2018-09-18 DIAGNOSIS — I10 ESSENTIAL HYPERTENSION: ICD-10-CM

## 2018-09-18 DIAGNOSIS — E11.42 TYPE 2 DIABETES MELLITUS WITH POLYNEUROPATHY (HCC): ICD-10-CM

## 2018-09-18 PROCEDURE — 99214 OFFICE O/P EST MOD 30 MIN: CPT | Performed by: INTERNAL MEDICINE

## 2018-09-18 PROCEDURE — 1036F TOBACCO NON-USER: CPT | Performed by: INTERNAL MEDICINE

## 2018-09-18 PROCEDURE — 2022F DILAT RTA XM EVC RTNOPTHY: CPT | Performed by: INTERNAL MEDICINE

## 2018-09-18 PROCEDURE — 3045F PR MOST RECENT HEMOGLOBIN A1C LEVEL 7.0-9.0%: CPT | Performed by: INTERNAL MEDICINE

## 2018-09-18 PROCEDURE — 3017F COLORECTAL CA SCREEN DOC REV: CPT | Performed by: INTERNAL MEDICINE

## 2018-09-18 PROCEDURE — G8427 DOCREV CUR MEDS BY ELIG CLIN: HCPCS | Performed by: INTERNAL MEDICINE

## 2018-09-18 PROCEDURE — G8417 CALC BMI ABV UP PARAM F/U: HCPCS | Performed by: INTERNAL MEDICINE

## 2018-09-18 RX ORDER — FLASH GLUCOSE SENSOR
KIT MISCELLANEOUS
Qty: 4 EACH | Refills: 3 | Status: SHIPPED | OUTPATIENT
Start: 2018-09-18 | End: 2019-05-19 | Stop reason: SDUPTHER

## 2018-09-18 ASSESSMENT — ENCOUNTER SYMPTOMS: VISUAL CHANGE: 0

## 2018-09-18 NOTE — PROGRESS NOTES
inhibitor/angiotensin II receptor blocker is being taken. She does not see a podiatrist.Eye exam is current. Since last visit she has not been compliant with checking her fingerstick glucose     Weight trend: stable  Prior visit with dietician: no  Current diet: on average, 3 meals per day  Current exercise: rare    Has there been any hospitalization, surgery or major illness since the last visit? No   Has there been any new school, family or social problems since the last visit? No  Has there been any new family history of members with diabetes, heart disease, stroke, or endocrine related problems since the last visit? No    Past Medical History:   Diagnosis Date    Asthma     vocal fold     Depression     Diabetes mellitus (HCC)     Hyperlipidemia     Hypertension     Hypothyroidism     Obesity     Osteoarthritis     Paradoxical vocal fold motion disorder     Postmenopausal 5/15/2018      Patient Active Problem List   Diagnosis    Asthma    HTN (hypertension)    Diabetes mellitus ([address]    Hyperlipidemia    Insomnia    Depression    Neuropathic pain    Type 2 diabetes mellitus with polyneuropathy (HCC)    Moderate episode of recurrent major depressive disorder ([address]    Diabetic polyneuropathy associated with type 2 diabetes mellitus ([address]    Exogenous obesity    Spinal stenosis of lumbar region with neurogenic claudication    Postmenopausal     Past Surgical History:   Procedure Laterality Date    CARPAL TUNNEL RELEASE      RORY    HYSTERECTOMY, TOTAL ABDOMINAL      Age 37    THROAT SURGERY      FOR SLEEP APNEA     Social History     Social History    Marital status:      Spouse name: N/A    Number of children: N/A    Years of education: N/A     Occupational History    Not on file.      Social History Main Topics    Smoking status: Never Smoker    Smokeless tobacco: Never Used    Alcohol use No    Drug use: No    Sexual activity: Yes     Partners: Male     Other Topics SPASMS 30 tablet 5    fluticasone (FLONASE) 50 MCG/ACT nasal spray 2 sprays by Nasal route daily 3 Bottle 3    Lancets MISC 1 each by Does not apply route daily 1 3xs daily 100 each 3    ONE TOUCH ULTRA TEST strip TEST FOUR TIMES A DAY AND AS NEEDED 100 strip 4    gabapentin (NEURONTIN) 300 MG capsule TAKE ONE CAPSULE BY MOUTH THREE TIMES A DAY 90 capsule 1     No current facility-administered medications for this visit.       Allergies   Allergen Reactions    Actos [Pioglitazone Hydrochloride]      Causes mouth blisters    Albuterol      YEAST INFECTION IN MOUTH    Dilaudid [Hydromorphone Hcl]     Fluticasone-Salmeterol      YEAST INFECTIONS IN MOUTH    Glyburide      Causes mouth blisters    Lortab [Hydrocodone-Acetaminophen]      headache    Metformin      Causes mouth blisters    Symbicort [Budesonide-Formoterol Fumarate]      Causes mouth blisters     Family Status   Relation Status    Mother  at age 68    Father  at age 32    Sister (Not Specified)       Review of Systems  Constitutional: no fatigue, no fever, no recent weight gain, no recent weight loss, no changes in appetite  Eyes: no eye pain, no change in vision, no eye redness, no eye irritation, no double vision  Ears, nose, throat: no nasal congestion, no sore throat, no earache, no decrease in hearing, no hoarseness, no dry mouth, no sinus problems, no difficulty swallowing, no neck lumps, no dental problems, no mouth sores, no ringing in ears  Pulmonary: no shortness of breath, no wheezing, no dyspnea on exertion, no cough  Cardiovascular: no chest pain, no lower extremity edema, no orthopnea, no intermittent leg claudication, no palpitations  Gastrointestinal: no abdominal pain, no nausea, no vomiting, no diarrhea, no constipation, no dysphagia, no heartburn, no bloating  Genitourinary: no dysuria, no urinary incontinence, no urinary hesitancy, no urinary frequency, no feelings of urinary urgency, no nocturia  Musculoskeletal: no joint swelling, no joint stiffness, no joint pain, no muscle cramps, no muscle pain, no bone pain, no fractures  Integument/Breast: hair loss, but no skin rashes, no skin lesions, no itching, no dry skin, no breast pain, no breast mass, no skin hives, no skin discoloration, no nipple discharge  Neurological: no numbness, no tingling, no weakness, no confusion, no headaches, no dizziness, no fainting, no tremors, no decrease in memory, no balance problems  Psychiatric: no anxiety, no depression, no insomnia, no change in personality, no emotional problems, no stress  Hematologic/Lymphatic: no tendency for easy bleeding, no swollen lymph nodes, no tendency for easy bruising  Immunology: no seasonal allergies, no frequent infections, no frequent illnesses  Endocrine: no temperature intolerance no hirsutism, no hot flashes    OBJECTIVE:  /76   Pulse 89   Ht 5' 4\" (1.626 m)   Wt 223 lb 3.2 oz (101.2 kg)   BMI 38.31 kg/m²    Wt Readings from Last 3 Encounters:   09/18/18 223 lb 3.2 oz (101.2 kg)   07/03/18 225 lb (102.1 kg)   07/03/18 223 lb 3.2 oz (101.2 kg)       Constitutional: no acute distress, well appearing and well nourished  Psychiatric: oriented to person, place and time, judgement and insight and normal, recent and remote memory and intact and mood and affect are normal  Skin: skin and subcutaneous tissue is normal without mass, normal turgor  Head and Face: examination of head and face revealed no abnormalities  Eyes: no lid or conjunctival swelling, erythema or discharge  Ears/Nose: external inspection of ears and nose revealed no abnormalities, hearing is grossly normal  Oropharynx/Mouth/Face: lips, tongue and gums are normal with no lesions, the voice quality was normal  Neck: neck is supple and symmetric, with midline trachea and no masses, thyroid is normal  Pulmonary: no increased work of breathing or signs of respiratory distress, lungs are clear to Health Maintenance   - Cornelious Leventhal was advised to have annual dilated eye exam     Last Eye Exam: Up to date on eye exam was told she has no retinopathy as per patient   Last Podiatry Exam:  Feet examined by me  May 2018 she has mild peripheral neuropathy   Lipids: Last LDL 52 in November 2017   Microalbumin/Creatinine Ratio: normal February 2018    . Education: Reviewed ABCs of diabetes management (respective goals in parentheses): A1C (<7), blood pressure (<130/80), and cholesterol (LDL <100). -. Compliance at present is estimated to be poor. Efforts to improve compliance (if necessary) will be directed at dietary modifications: advised again .  -. Follow up: I recommend diabetes care be 3 months. 2. Diabetic polyneuropathy associated with type 2 diabetes mellitus ([address]  She is on gabapentin as per primary care physician    3. Postmenopausal  She had complete hysterectomy done at age 37 for severe endometriosis. She had been on hormone replacement therapy until age of 46. We discussed prevention of osteoporosis in detail today  - DEXA Bone Density Axial Skeleton; Future    4. Recurrent major depressive disorder, in partial remission [facility])  She is on medication as per primary care physician    5. Essential hypertension  Blood pressure was elevated today she feels it was\" white coat hypertension and anxiety of seeing a new doctor. She was advised to check it at home and call our office back or her primary care and if she notes the blood pressure stays high. She denies any chest pain denies any shortness of breath denies any headache    6. Mixed hyperlipidemia  She is on Lipitor and her last LDL is 52 in November 2017.   We will check  at follow-up      Reviewed and/or ordered clinical lab results yes   Reviewed and/or ordered radiology tests Yes  Reviewed and/or ordered other diagnostic tests yes   Made a decision to obtain old records yes   Reviewed and summarized old records yes     Chanda Linn

## 2018-09-21 ENCOUNTER — TELEPHONE (OUTPATIENT)
Dept: FAMILY MEDICINE CLINIC | Age: 63
End: 2018-09-21

## 2018-09-21 DIAGNOSIS — Z12.11 SCREENING FOR COLON CANCER: ICD-10-CM

## 2018-09-21 DIAGNOSIS — Z12.39 SCREENING FOR BREAST CANCER: Primary | ICD-10-CM

## 2018-09-21 NOTE — TELEPHONE ENCOUNTER
Dr. Irwin Bloch 10/05/18:    Notes: technically due for 3 month A1C, but she saw her Endo 3 days ago & the open lab orders she wrote for her at that time-she doesn't want drawn yet, but closer to the next ov w/ her. Pt due for colonoscopy, mammogram & pneumonia. Wasn't opposed to any. I will mail out a FIT test, as she lost her last 1. Order pending. Informed her to bring it to the office the day of her appt & it'll be sent off to the lab. Remind her of the mammogram order that will be placed & needs to be done. There are also labs in Care Everywhere [facility]. This patient has an upcoming appointment with you for Diabetes, Hyperlipidemia and Hypertension. In planning for that visit I have completed the following pre-visit planning:     Pre-Visit Planning Checklist:  Patient contacted: yes  Verified patient by name and date of birth: yes    Health Maintenance items reviewed:    Colon Cancer Screening:  Fit Test discussed, instructions reviewed, and kit mailed to patient  Breast Cancer Screening:  Mammogram scheduled for patient on REMIND PT OF ORDER PLACED TO TODAY & TO SCHEDULE   Pneumonia Vaccination:  patient would like to discuss at next visit. Labs and procedures pended: Non-Fasting Mammography   Labs and procedures discussed with patient: yes  Reminded patient to check with their insurance company about coverage for lab tests and lab location: yes    Preliminary Medication Reconciliation: was performed. Reminded patient to arrive early: yes    Please complete the med-reconciliation and sign the appropriate labs as soon as possible.       Otilia Gtz MA  Patient Services Specialist  [phone]

## 2018-10-05 ENCOUNTER — OFFICE VISIT (OUTPATIENT)
Dept: INTERNAL MEDICINE CLINIC | Age: 63
End: 2018-10-05
Payer: COMMERCIAL

## 2018-10-05 VITALS
WEIGHT: 217.2 LBS | HEART RATE: 98 BPM | SYSTOLIC BLOOD PRESSURE: 140 MMHG | HEIGHT: 64 IN | DIASTOLIC BLOOD PRESSURE: 82 MMHG | BODY MASS INDEX: 37.08 KG/M2

## 2018-10-05 DIAGNOSIS — F51.01 PRIMARY INSOMNIA: ICD-10-CM

## 2018-10-05 DIAGNOSIS — E11.42 TYPE 2 DIABETES MELLITUS WITH POLYNEUROPATHY (HCC): Primary | ICD-10-CM

## 2018-10-05 DIAGNOSIS — I10 ESSENTIAL HYPERTENSION: ICD-10-CM

## 2018-10-05 DIAGNOSIS — E78.2 MIXED HYPERLIPIDEMIA: ICD-10-CM

## 2018-10-05 LAB — HBA1C MFR BLD: 7.2 %

## 2018-10-05 PROCEDURE — 1036F TOBACCO NON-USER: CPT | Performed by: INTERNAL MEDICINE

## 2018-10-05 PROCEDURE — 3017F COLORECTAL CA SCREEN DOC REV: CPT | Performed by: INTERNAL MEDICINE

## 2018-10-05 PROCEDURE — G8427 DOCREV CUR MEDS BY ELIG CLIN: HCPCS | Performed by: INTERNAL MEDICINE

## 2018-10-05 PROCEDURE — G8417 CALC BMI ABV UP PARAM F/U: HCPCS | Performed by: INTERNAL MEDICINE

## 2018-10-05 PROCEDURE — 2022F DILAT RTA XM EVC RTNOPTHY: CPT | Performed by: INTERNAL MEDICINE

## 2018-10-05 PROCEDURE — G8484 FLU IMMUNIZE NO ADMIN: HCPCS | Performed by: INTERNAL MEDICINE

## 2018-10-05 PROCEDURE — 3045F PR MOST RECENT HEMOGLOBIN A1C LEVEL 7.0-9.0%: CPT | Performed by: INTERNAL MEDICINE

## 2018-10-05 PROCEDURE — 99214 OFFICE O/P EST MOD 30 MIN: CPT | Performed by: INTERNAL MEDICINE

## 2018-10-05 PROCEDURE — 83036 HEMOGLOBIN GLYCOSYLATED A1C: CPT | Performed by: INTERNAL MEDICINE

## 2018-10-05 RX ORDER — ZOLPIDEM TARTRATE 5 MG/1
5 TABLET ORAL NIGHTLY PRN
Qty: 30 TABLET | Refills: 0 | Status: SHIPPED | OUTPATIENT
Start: 2018-10-05 | End: 2018-12-15 | Stop reason: SDUPTHER

## 2018-10-05 RX ORDER — GABAPENTIN 300 MG/1
CAPSULE ORAL
Qty: 90 CAPSULE | Refills: 5 | Status: SHIPPED | OUTPATIENT
Start: 2018-10-05 | End: 2019-04-17 | Stop reason: SDUPTHER

## 2018-10-05 RX ORDER — LISINOPRIL 20 MG/1
20 TABLET ORAL DAILY
Qty: 30 TABLET | Refills: 5 | Status: SHIPPED | OUTPATIENT
Start: 2018-10-05 | End: 2019-04-15 | Stop reason: SDUPTHER

## 2018-10-05 RX ORDER — OMEPRAZOLE 40 MG/1
CAPSULE, DELAYED RELEASE ORAL
Qty: 30 CAPSULE | Refills: 5 | Status: SHIPPED | OUTPATIENT
Start: 2018-10-05 | End: 2019-06-12 | Stop reason: SDUPTHER

## 2018-10-05 RX ORDER — ATORVASTATIN CALCIUM 20 MG/1
TABLET, FILM COATED ORAL
Qty: 30 TABLET | Refills: 5 | Status: SHIPPED | OUTPATIENT
Start: 2018-10-05 | End: 2019-04-15 | Stop reason: SDUPTHER

## 2018-10-05 RX ORDER — DULOXETIN HYDROCHLORIDE 60 MG/1
CAPSULE, DELAYED RELEASE ORAL
Qty: 30 CAPSULE | Refills: 5 | Status: SHIPPED | OUTPATIENT
Start: 2018-10-05 | End: 2019-04-15 | Stop reason: SDUPTHER

## 2018-10-05 RX ORDER — BUPROPION HYDROCHLORIDE 150 MG/1
150 TABLET, EXTENDED RELEASE ORAL 2 TIMES DAILY
Qty: 60 TABLET | Refills: 5 | Status: SHIPPED | OUTPATIENT
Start: 2018-10-05 | End: 2019-04-15 | Stop reason: SDUPTHER

## 2018-11-19 ENCOUNTER — TELEPHONE (OUTPATIENT)
Dept: ORTHOPEDIC SURGERY | Age: 63
End: 2018-11-19

## 2018-11-19 ENCOUNTER — OFFICE VISIT (OUTPATIENT)
Dept: ORTHOPEDIC SURGERY | Age: 63
End: 2018-11-19
Payer: COMMERCIAL

## 2018-11-19 VITALS
HEIGHT: 64 IN | SYSTOLIC BLOOD PRESSURE: 130 MMHG | WEIGHT: 217.15 LBS | BODY MASS INDEX: 37.07 KG/M2 | DIASTOLIC BLOOD PRESSURE: 84 MMHG

## 2018-11-19 DIAGNOSIS — M54.16 LUMBAR RADICULOPATHY: ICD-10-CM

## 2018-11-19 DIAGNOSIS — M43.16 SPONDYLOLISTHESIS OF LUMBAR REGION: ICD-10-CM

## 2018-11-19 DIAGNOSIS — M48.061 LUMBAR FORAMINAL STENOSIS: Primary | ICD-10-CM

## 2018-11-19 PROCEDURE — 99214 OFFICE O/P EST MOD 30 MIN: CPT | Performed by: PHYSICAL MEDICINE & REHABILITATION

## 2018-11-19 PROCEDURE — G8417 CALC BMI ABV UP PARAM F/U: HCPCS | Performed by: PHYSICAL MEDICINE & REHABILITATION

## 2018-11-19 PROCEDURE — G8427 DOCREV CUR MEDS BY ELIG CLIN: HCPCS | Performed by: PHYSICAL MEDICINE & REHABILITATION

## 2018-11-19 PROCEDURE — 4040F PNEUMOC VAC/ADMIN/RCVD: CPT | Performed by: PHYSICAL MEDICINE & REHABILITATION

## 2018-11-19 PROCEDURE — G8400 PT W/DXA NO RESULTS DOC: HCPCS | Performed by: PHYSICAL MEDICINE & REHABILITATION

## 2018-11-19 PROCEDURE — G8484 FLU IMMUNIZE NO ADMIN: HCPCS | Performed by: PHYSICAL MEDICINE & REHABILITATION

## 2018-11-19 PROCEDURE — 1036F TOBACCO NON-USER: CPT | Performed by: PHYSICAL MEDICINE & REHABILITATION

## 2018-11-19 PROCEDURE — 1123F ACP DISCUSS/DSCN MKR DOCD: CPT | Performed by: PHYSICAL MEDICINE & REHABILITATION

## 2018-11-19 PROCEDURE — 1101F PT FALLS ASSESS-DOCD LE1/YR: CPT | Performed by: PHYSICAL MEDICINE & REHABILITATION

## 2018-11-19 PROCEDURE — 1090F PRES/ABSN URINE INCON ASSESS: CPT | Performed by: PHYSICAL MEDICINE & REHABILITATION

## 2018-11-19 PROCEDURE — 3017F COLORECTAL CA SCREEN DOC REV: CPT | Performed by: PHYSICAL MEDICINE & REHABILITATION

## 2018-11-19 NOTE — LETTER
 Fluticasone-Salmeterol      YEAST INFECTIONS IN MOUTH    Glyburide      Causes mouth blisters    Lortab [Hydrocodone-Acetaminophen]      headache    Metformin      Causes mouth blisters    Symbicort [Budesonide-Formoterol Fumarate]      Causes mouth blisters

## 2018-11-19 NOTE — PROGRESS NOTES
feet.  · Gait & station: She has a difficult time transitioning from sit to stand. She does not ambulate with any assistive device. She has a slow namita and forward flexed posture  · Additional Examinations:  · RIGHT LOWER EXTREMITY: Inspection/examination of the right lower extremity does not show any tenderness, deformity or injury. Range of motion is normal and pain-free. There is no gross instability. There are no rashes, ulcerations or lesions. Strength and tone are normal. No atrophy or abnormal movements are noted. · LEFT LOWER EXTREMITY:  Inspection/examination of the left lower extremity does not show any tenderness, deformity or injury. Range of motion is normal and pain-free. There is no gross instability. There are no rashes, ulcerations or lesions. Strength and tone are normal. No atrophy or abnormal movements are noted. Diagnostic Testing:    MR Lumbar spine shows 3/14/18  Multilevel degenerative changes of the lumbar spine, worst at L4-L5.       Grade 1 degenerative anterolisthesis, disc bulge, and severe facet joint   degenerative changes at L4-L5 contributes to severe spinal canal stenosis and   moderate right neural foraminal narrowing. Results for orders placed or performed in visit on 10/05/18   POCT glycosylated hemoglobin (Hb A1C)   Result Value Ref Range    Hemoglobin A1C 7.2 %     Impression:       1. Lumbar foraminal stenosis    2. Lumbar radiculopathy    3. Spondylolisthesis of lumbar region        Plan:  Clinical Course: Above diagnoses are worsening    I discussed the diagnosis and the treatment options with Blas Driver today. In Summary:  The various treatment options were outlined and discussed with Blas Driver including:  Conservative care options: physical therapy, ice, medications, bracing, and activity modification. The indications for therapeutic injections. The indications for additional imaging/laboratory studies.   The indications for (possible

## 2018-11-21 ENCOUNTER — TELEPHONE (OUTPATIENT)
Dept: ORTHOPEDIC SURGERY | Age: 63
End: 2018-11-21

## 2018-11-21 NOTE — TELEPHONE ENCOUNTER
DOS   12/03/2018  CPT   27460.50     98735.26    [id number]   NPR  DX   M99.83   M54.16   M43.16  OP SX AUTH  [id number]  VALID   12/03/2018    BILATERAL  LEVELS   L4    PROCEDURE   TRANSFORAMINAL EPIDURAL INJECTION  DR. Maggie Coronel

## 2018-12-03 ENCOUNTER — APPOINTMENT (OUTPATIENT)
Dept: GENERAL RADIOLOGY | Age: 63
End: 2018-12-03
Attending: PHYSICAL MEDICINE & REHABILITATION
Payer: COMMERCIAL

## 2018-12-03 ENCOUNTER — HOSPITAL ENCOUNTER (OUTPATIENT)
Age: 63
Setting detail: OUTPATIENT SURGERY
Discharge: HOME | End: 2018-12-03
Attending: PHYSICAL MEDICINE & REHABILITATION | Admitting: PHYSICAL MEDICINE & REHABILITATION
Payer: COMMERCIAL

## 2018-12-03 VITALS
WEIGHT: 209 LBS | SYSTOLIC BLOOD PRESSURE: 139 MMHG | OXYGEN SATURATION: 95 % | DIASTOLIC BLOOD PRESSURE: 78 MMHG | HEART RATE: 92 BPM | RESPIRATION RATE: 18 BRPM | BODY MASS INDEX: 35.68 KG/M2 | HEIGHT: 64 IN

## 2018-12-03 LAB
GLUCOSE BLD-MCNC: 90 MG/DL (ref 70–99)
PERFORMED ON: NORMAL

## 2018-12-03 PROCEDURE — 3209999900 FLUORO FOR SURGICAL PROCEDURES

## 2018-12-03 PROCEDURE — 3610000056 HC PAIN LEVEL 4 BASE (NON-OR): Performed by: PHYSICAL MEDICINE & REHABILITATION

## 2018-12-03 PROCEDURE — 2709999900 HC NON-CHARGEABLE SUPPLY: Performed by: PHYSICAL MEDICINE & REHABILITATION

## 2018-12-03 PROCEDURE — 99152 MOD SED SAME PHYS/QHP 5/>YRS: CPT | Performed by: PHYSICAL MEDICINE & REHABILITATION

## 2018-12-03 PROCEDURE — 2500000003 HC RX 250 WO HCPCS: Performed by: PHYSICAL MEDICINE & REHABILITATION

## 2018-12-03 PROCEDURE — 6360000002 HC RX W HCPCS: Performed by: PHYSICAL MEDICINE & REHABILITATION

## 2018-12-03 RX ORDER — MIDAZOLAM HYDROCHLORIDE 1 MG/ML
INJECTION INTRAMUSCULAR; INTRAVENOUS
Status: COMPLETED | OUTPATIENT
Start: 2018-12-03 | End: 2018-12-03

## 2018-12-03 RX ORDER — LIDOCAINE HYDROCHLORIDE 10 MG/ML
INJECTION, SOLUTION INFILTRATION; PERINEURAL
Status: COMPLETED | OUTPATIENT
Start: 2018-12-03 | End: 2018-12-03

## 2018-12-03 RX ORDER — BUPIVACAINE HYDROCHLORIDE 5 MG/ML
INJECTION, SOLUTION PERINEURAL
Status: COMPLETED | OUTPATIENT
Start: 2018-12-03 | End: 2018-12-03

## 2018-12-03 RX ORDER — METHYLPREDNISOLONE ACETATE 80 MG/ML
INJECTION, SUSPENSION INTRA-ARTICULAR; INTRALESIONAL; INTRAMUSCULAR; SOFT TISSUE
Status: COMPLETED | OUTPATIENT
Start: 2018-12-03 | End: 2018-12-03

## 2018-12-03 ASSESSMENT — PAIN - FUNCTIONAL ASSESSMENT: PAIN_FUNCTIONAL_ASSESSMENT: 0-10

## 2018-12-03 ASSESSMENT — PAIN DESCRIPTION - DESCRIPTORS: DESCRIPTORS: BURNING

## 2018-12-14 ENCOUNTER — OFFICE VISIT (OUTPATIENT)
Dept: INTERNAL MEDICINE CLINIC | Age: 63
End: 2018-12-14
Payer: COMMERCIAL

## 2018-12-14 VITALS
HEART RATE: 80 BPM | WEIGHT: 209.6 LBS | DIASTOLIC BLOOD PRESSURE: 62 MMHG | HEIGHT: 64 IN | SYSTOLIC BLOOD PRESSURE: 100 MMHG | BODY MASS INDEX: 35.78 KG/M2

## 2018-12-14 DIAGNOSIS — Z12.39 BREAST CANCER SCREENING: ICD-10-CM

## 2018-12-14 DIAGNOSIS — M48.062 SPINAL STENOSIS OF LUMBAR REGION WITH NEUROGENIC CLAUDICATION: Primary | ICD-10-CM

## 2018-12-14 DIAGNOSIS — E11.42 TYPE 2 DIABETES MELLITUS WITH POLYNEUROPATHY (HCC): ICD-10-CM

## 2018-12-14 DIAGNOSIS — G89.29 OTHER CHRONIC BACK PAIN: ICD-10-CM

## 2018-12-14 DIAGNOSIS — M54.9 OTHER CHRONIC BACK PAIN: ICD-10-CM

## 2018-12-14 PROCEDURE — 1101F PT FALLS ASSESS-DOCD LE1/YR: CPT | Performed by: NURSE PRACTITIONER

## 2018-12-14 PROCEDURE — 3017F COLORECTAL CA SCREEN DOC REV: CPT | Performed by: NURSE PRACTITIONER

## 2018-12-14 PROCEDURE — 99214 OFFICE O/P EST MOD 30 MIN: CPT | Performed by: NURSE PRACTITIONER

## 2018-12-14 PROCEDURE — 4040F PNEUMOC VAC/ADMIN/RCVD: CPT | Performed by: NURSE PRACTITIONER

## 2018-12-14 PROCEDURE — 2022F DILAT RTA XM EVC RTNOPTHY: CPT | Performed by: NURSE PRACTITIONER

## 2018-12-14 PROCEDURE — G8427 DOCREV CUR MEDS BY ELIG CLIN: HCPCS | Performed by: NURSE PRACTITIONER

## 2018-12-14 PROCEDURE — G8417 CALC BMI ABV UP PARAM F/U: HCPCS | Performed by: NURSE PRACTITIONER

## 2018-12-14 PROCEDURE — G8484 FLU IMMUNIZE NO ADMIN: HCPCS | Performed by: NURSE PRACTITIONER

## 2018-12-14 PROCEDURE — G8400 PT W/DXA NO RESULTS DOC: HCPCS | Performed by: NURSE PRACTITIONER

## 2018-12-14 PROCEDURE — 1090F PRES/ABSN URINE INCON ASSESS: CPT | Performed by: NURSE PRACTITIONER

## 2018-12-14 PROCEDURE — 3045F PR MOST RECENT HEMOGLOBIN A1C LEVEL 7.0-9.0%: CPT | Performed by: NURSE PRACTITIONER

## 2018-12-14 PROCEDURE — 1123F ACP DISCUSS/DSCN MKR DOCD: CPT | Performed by: NURSE PRACTITIONER

## 2018-12-14 PROCEDURE — 1036F TOBACCO NON-USER: CPT | Performed by: NURSE PRACTITIONER

## 2018-12-14 ASSESSMENT — ENCOUNTER SYMPTOMS
BACK PAIN: 1
ABDOMINAL PAIN: 0
COUGH: 0
BLOOD IN STOOL: 0
SHORTNESS OF BREATH: 0
NAUSEA: 0
DIARRHEA: 0
VOMITING: 0
CONSTIPATION: 0
WHEEZING: 0

## 2018-12-15 DIAGNOSIS — F51.01 PRIMARY INSOMNIA: ICD-10-CM

## 2018-12-17 RX ORDER — CYCLOBENZAPRINE HCL 10 MG
TABLET ORAL
Qty: 30 TABLET | Refills: 5 | Status: SHIPPED | OUTPATIENT
Start: 2018-12-17 | End: 2022-05-12 | Stop reason: SDUPTHER

## 2018-12-17 RX ORDER — PEN NEEDLE, DIABETIC 31 GX5/16"
NEEDLE, DISPOSABLE MISCELLANEOUS
Qty: 100 EACH | Refills: 3 | Status: SHIPPED | OUTPATIENT
Start: 2018-12-17 | End: 2019-07-23 | Stop reason: SDUPTHER

## 2018-12-17 RX ORDER — ZOLPIDEM TARTRATE 5 MG/1
TABLET ORAL
Qty: 30 TABLET | Refills: 0 | Status: SHIPPED | OUTPATIENT
Start: 2018-12-17 | End: 2019-03-17

## 2018-12-20 ENCOUNTER — OFFICE VISIT (OUTPATIENT)
Dept: ENDOCRINOLOGY | Age: 63
End: 2018-12-20
Payer: COMMERCIAL

## 2018-12-20 VITALS
DIASTOLIC BLOOD PRESSURE: 72 MMHG | WEIGHT: 211 LBS | OXYGEN SATURATION: 98 % | HEART RATE: 90 BPM | HEIGHT: 64 IN | SYSTOLIC BLOOD PRESSURE: 124 MMHG | BODY MASS INDEX: 36.02 KG/M2

## 2018-12-20 DIAGNOSIS — E66.9 OBESITY (BMI 35.0-39.9 WITHOUT COMORBIDITY): ICD-10-CM

## 2018-12-20 DIAGNOSIS — E11.42 TYPE 2 DIABETES MELLITUS WITH DIABETIC POLYNEUROPATHY, WITH LONG-TERM CURRENT USE OF INSULIN (HCC): Primary | ICD-10-CM

## 2018-12-20 DIAGNOSIS — I10 ESSENTIAL HYPERTENSION: ICD-10-CM

## 2018-12-20 DIAGNOSIS — Z79.4 TYPE 2 DIABETES MELLITUS WITH DIABETIC POLYNEUROPATHY, WITH LONG-TERM CURRENT USE OF INSULIN (HCC): Primary | ICD-10-CM

## 2018-12-20 DIAGNOSIS — E78.2 MIXED HYPERLIPIDEMIA: ICD-10-CM

## 2018-12-20 PROCEDURE — 1090F PRES/ABSN URINE INCON ASSESS: CPT | Performed by: INTERNAL MEDICINE

## 2018-12-20 PROCEDURE — 3045F PR MOST RECENT HEMOGLOBIN A1C LEVEL 7.0-9.0%: CPT | Performed by: INTERNAL MEDICINE

## 2018-12-20 PROCEDURE — 1036F TOBACCO NON-USER: CPT | Performed by: INTERNAL MEDICINE

## 2018-12-20 PROCEDURE — G8417 CALC BMI ABV UP PARAM F/U: HCPCS | Performed by: INTERNAL MEDICINE

## 2018-12-20 PROCEDURE — 1123F ACP DISCUSS/DSCN MKR DOCD: CPT | Performed by: INTERNAL MEDICINE

## 2018-12-20 PROCEDURE — G8400 PT W/DXA NO RESULTS DOC: HCPCS | Performed by: INTERNAL MEDICINE

## 2018-12-20 PROCEDURE — 1101F PT FALLS ASSESS-DOCD LE1/YR: CPT | Performed by: INTERNAL MEDICINE

## 2018-12-20 PROCEDURE — G8484 FLU IMMUNIZE NO ADMIN: HCPCS | Performed by: INTERNAL MEDICINE

## 2018-12-20 PROCEDURE — G8427 DOCREV CUR MEDS BY ELIG CLIN: HCPCS | Performed by: INTERNAL MEDICINE

## 2018-12-20 PROCEDURE — 3017F COLORECTAL CA SCREEN DOC REV: CPT | Performed by: INTERNAL MEDICINE

## 2018-12-20 PROCEDURE — 2022F DILAT RTA XM EVC RTNOPTHY: CPT | Performed by: INTERNAL MEDICINE

## 2018-12-20 PROCEDURE — 4040F PNEUMOC VAC/ADMIN/RCVD: CPT | Performed by: INTERNAL MEDICINE

## 2018-12-20 PROCEDURE — 99215 OFFICE O/P EST HI 40 MIN: CPT | Performed by: INTERNAL MEDICINE

## 2018-12-20 RX ORDER — LIRAGLUTIDE 6 MG/ML
INJECTION, SOLUTION SUBCUTANEOUS
Qty: 1 PEN | Refills: 0 | COMMUNITY
Start: 2018-12-20 | End: 2019-02-12 | Stop reason: CLARIF

## 2018-12-20 ASSESSMENT — ENCOUNTER SYMPTOMS: VISUAL CHANGE: 0

## 2018-12-21 ENCOUNTER — OFFICE VISIT (OUTPATIENT)
Dept: ORTHOPEDIC SURGERY | Age: 63
End: 2018-12-21
Payer: COMMERCIAL

## 2018-12-21 VITALS — HEIGHT: 64 IN | WEIGHT: 211 LBS | BODY MASS INDEX: 36.02 KG/M2

## 2018-12-21 DIAGNOSIS — M48.061 LUMBAR FORAMINAL STENOSIS: ICD-10-CM

## 2018-12-21 DIAGNOSIS — M54.16 LUMBAR RADICULOPATHY: ICD-10-CM

## 2018-12-21 DIAGNOSIS — M48.061 LUMBAR STENOSIS WITHOUT NEUROGENIC CLAUDICATION: Primary | ICD-10-CM

## 2018-12-21 PROCEDURE — 1090F PRES/ABSN URINE INCON ASSESS: CPT | Performed by: PHYSICAL MEDICINE & REHABILITATION

## 2018-12-21 PROCEDURE — 4040F PNEUMOC VAC/ADMIN/RCVD: CPT | Performed by: PHYSICAL MEDICINE & REHABILITATION

## 2018-12-21 PROCEDURE — G8427 DOCREV CUR MEDS BY ELIG CLIN: HCPCS | Performed by: PHYSICAL MEDICINE & REHABILITATION

## 2018-12-21 PROCEDURE — G8484 FLU IMMUNIZE NO ADMIN: HCPCS | Performed by: PHYSICAL MEDICINE & REHABILITATION

## 2018-12-21 PROCEDURE — 99213 OFFICE O/P EST LOW 20 MIN: CPT | Performed by: PHYSICAL MEDICINE & REHABILITATION

## 2018-12-21 PROCEDURE — 1036F TOBACCO NON-USER: CPT | Performed by: PHYSICAL MEDICINE & REHABILITATION

## 2018-12-21 PROCEDURE — 1123F ACP DISCUSS/DSCN MKR DOCD: CPT | Performed by: PHYSICAL MEDICINE & REHABILITATION

## 2018-12-21 PROCEDURE — G8400 PT W/DXA NO RESULTS DOC: HCPCS | Performed by: PHYSICAL MEDICINE & REHABILITATION

## 2018-12-21 PROCEDURE — 3017F COLORECTAL CA SCREEN DOC REV: CPT | Performed by: PHYSICAL MEDICINE & REHABILITATION

## 2018-12-21 PROCEDURE — G8417 CALC BMI ABV UP PARAM F/U: HCPCS | Performed by: PHYSICAL MEDICINE & REHABILITATION

## 2018-12-21 PROCEDURE — 1101F PT FALLS ASSESS-DOCD LE1/YR: CPT | Performed by: PHYSICAL MEDICINE & REHABILITATION

## 2018-12-21 RX ORDER — IBUPROFEN 800 MG/1
800 TABLET ORAL EVERY 6 HOURS PRN
COMMUNITY
End: 2019-08-27

## 2018-12-21 NOTE — PROGRESS NOTES
Follow up: [address] [place] [address]  1955  [id number]      CHIEF COMPLAINT:    Chief Complaint   Patient presents with    Back Pain     f/u R L4 and L L4 TF 12/3. patient explains this past inj was much more painful than the last one, states during and after inj she had severe pain. Had to go to urgent care 12/12/18, she was given steroid shot and pain medication shot as well as perscriptions. HISTORY OF PRESENT ILLNESS:  Ms. Magno Shah is a 70 y.o. female returns for a follow up visit for multiple medical problems. Her current presenting problems are   1. Lumbar stenosis without neurogenic claudication    2. Lumbar foraminal stenosis    3. Lumbar radiculopathy    . As per information/history obtained from the PADT(patient assessment and documentation tool) - She complains of pain in the lower back with radiation to the both legs She rates the pain 3/10 and describes it as aching. Pain is made worse by: movement. She denies side effects from the current pain regimen. Patient reports that since the last follow up visit the physical functioning is better, family/social relationships are better, mood is better and sleep patterns are better, and that the overall functioning is better. Patient denies neurological bowel or bladder. She presents after undergoing bilateral L4 transforaminal epidural steroid injections. She has had relief of her back pain and some leg pain.  She is interested in proceeding with a spinal cord stimulator        Associated signs and symptoms:   Neurogenic bowel or bladder symptoms:  no   Perceived weakness:  no   Difficulty walking:  yes              Past Medical History:   Past Medical History:   Diagnosis Date    Asthma     vocal fold     Depression     Diabetes mellitus ([address]     Hyperlipidemia     Hypertension     Hypothyroidism     Obesity     Osteoarthritis     Paradoxical vocal fold motion disorder     Postmenopausal 5/15/2018      Past Surgical History: Medicine  [facility]             This dictation was performed with a verbal recognition program [facility] ) and it was checked for errors. It is possible that there are still dictated errors within this office note. If so, please bring any errors to my attention for an addendum. All efforts were made to ensure that this office note is accurate.

## 2018-12-26 ENCOUNTER — TELEPHONE (OUTPATIENT)
Dept: ENDOCRINOLOGY | Age: 63
End: 2018-12-26

## 2019-02-12 ENCOUNTER — OFFICE VISIT (OUTPATIENT)
Dept: INTERNAL MEDICINE CLINIC | Age: 64
End: 2019-02-12
Payer: COMMERCIAL

## 2019-02-12 VITALS
SYSTOLIC BLOOD PRESSURE: 110 MMHG | HEART RATE: 88 BPM | DIASTOLIC BLOOD PRESSURE: 72 MMHG | HEIGHT: 64 IN | WEIGHT: 204.4 LBS | BODY MASS INDEX: 34.89 KG/M2

## 2019-02-12 DIAGNOSIS — Z12.39 SCREENING FOR BREAST CANCER: ICD-10-CM

## 2019-02-12 DIAGNOSIS — E78.2 MIXED HYPERLIPIDEMIA: ICD-10-CM

## 2019-02-12 DIAGNOSIS — I10 ESSENTIAL HYPERTENSION: ICD-10-CM

## 2019-02-12 DIAGNOSIS — F33.1 MODERATE EPISODE OF RECURRENT MAJOR DEPRESSIVE DISORDER (HCC): ICD-10-CM

## 2019-02-12 DIAGNOSIS — E11.42 TYPE 2 DIABETES MELLITUS WITH POLYNEUROPATHY (HCC): Primary | ICD-10-CM

## 2019-02-12 DIAGNOSIS — F51.01 PRIMARY INSOMNIA: ICD-10-CM

## 2019-02-12 LAB — HBA1C MFR BLD: 8.6 %

## 2019-02-12 PROCEDURE — 3045F PR MOST RECENT HEMOGLOBIN A1C LEVEL 7.0-9.0%: CPT | Performed by: NURSE PRACTITIONER

## 2019-02-12 PROCEDURE — G8400 PT W/DXA NO RESULTS DOC: HCPCS | Performed by: NURSE PRACTITIONER

## 2019-02-12 PROCEDURE — 1101F PT FALLS ASSESS-DOCD LE1/YR: CPT | Performed by: NURSE PRACTITIONER

## 2019-02-12 PROCEDURE — G8427 DOCREV CUR MEDS BY ELIG CLIN: HCPCS | Performed by: NURSE PRACTITIONER

## 2019-02-12 PROCEDURE — G8484 FLU IMMUNIZE NO ADMIN: HCPCS | Performed by: NURSE PRACTITIONER

## 2019-02-12 PROCEDURE — 4040F PNEUMOC VAC/ADMIN/RCVD: CPT | Performed by: NURSE PRACTITIONER

## 2019-02-12 PROCEDURE — 83036 HEMOGLOBIN GLYCOSYLATED A1C: CPT | Performed by: NURSE PRACTITIONER

## 2019-02-12 PROCEDURE — 1123F ACP DISCUSS/DSCN MKR DOCD: CPT | Performed by: NURSE PRACTITIONER

## 2019-02-12 PROCEDURE — 1036F TOBACCO NON-USER: CPT | Performed by: NURSE PRACTITIONER

## 2019-02-12 PROCEDURE — 99214 OFFICE O/P EST MOD 30 MIN: CPT | Performed by: NURSE PRACTITIONER

## 2019-02-12 PROCEDURE — 1090F PRES/ABSN URINE INCON ASSESS: CPT | Performed by: NURSE PRACTITIONER

## 2019-02-12 PROCEDURE — 2022F DILAT RTA XM EVC RTNOPTHY: CPT | Performed by: NURSE PRACTITIONER

## 2019-02-12 PROCEDURE — G8417 CALC BMI ABV UP PARAM F/U: HCPCS | Performed by: NURSE PRACTITIONER

## 2019-02-12 PROCEDURE — 3017F COLORECTAL CA SCREEN DOC REV: CPT | Performed by: NURSE PRACTITIONER

## 2019-02-12 ASSESSMENT — ENCOUNTER SYMPTOMS
NAUSEA: 0
WHEEZING: 0
VOMITING: 0
SHORTNESS OF BREATH: 0
ABDOMINAL PAIN: 0
COUGH: 0
CONSTIPATION: 0
BLOOD IN STOOL: 0
DIARRHEA: 0

## 2019-02-25 ENCOUNTER — HOSPITAL ENCOUNTER (OUTPATIENT)
Dept: WOMENS IMAGING | Age: 64
Discharge: HOME | End: 2019-02-25
Payer: COMMERCIAL

## 2019-02-25 DIAGNOSIS — Z12.39 SCREENING FOR BREAST CANCER: ICD-10-CM

## 2019-02-25 PROCEDURE — 77063 BREAST TOMOSYNTHESIS BI: CPT

## 2019-02-27 ENCOUNTER — TELEPHONE (OUTPATIENT)
Dept: ORTHOPEDIC SURGERY | Age: 64
End: 2019-02-27

## 2019-03-01 ENCOUNTER — OFFICE VISIT (OUTPATIENT)
Dept: ORTHOPEDIC SURGERY | Age: 64
End: 2019-03-01
Payer: COMMERCIAL

## 2019-03-01 VITALS
BODY MASS INDEX: 34.89 KG/M2 | HEART RATE: 90 BPM | WEIGHT: 204.37 LBS | SYSTOLIC BLOOD PRESSURE: 130 MMHG | DIASTOLIC BLOOD PRESSURE: 86 MMHG | HEIGHT: 64 IN

## 2019-03-01 DIAGNOSIS — M48.061 LUMBAR STENOSIS WITHOUT NEUROGENIC CLAUDICATION: ICD-10-CM

## 2019-03-01 DIAGNOSIS — G89.4 CHRONIC PAIN SYNDROME: ICD-10-CM

## 2019-03-01 DIAGNOSIS — M47.26 OSTEOARTHRITIS OF SPINE WITH RADICULOPATHY, LUMBAR REGION: Primary | ICD-10-CM

## 2019-03-01 PROCEDURE — 1090F PRES/ABSN URINE INCON ASSESS: CPT | Performed by: PHYSICAL MEDICINE & REHABILITATION

## 2019-03-01 PROCEDURE — G8427 DOCREV CUR MEDS BY ELIG CLIN: HCPCS | Performed by: PHYSICAL MEDICINE & REHABILITATION

## 2019-03-01 PROCEDURE — G8484 FLU IMMUNIZE NO ADMIN: HCPCS | Performed by: PHYSICAL MEDICINE & REHABILITATION

## 2019-03-01 PROCEDURE — 1036F TOBACCO NON-USER: CPT | Performed by: PHYSICAL MEDICINE & REHABILITATION

## 2019-03-01 PROCEDURE — 99214 OFFICE O/P EST MOD 30 MIN: CPT | Performed by: PHYSICAL MEDICINE & REHABILITATION

## 2019-03-01 PROCEDURE — 1101F PT FALLS ASSESS-DOCD LE1/YR: CPT | Performed by: PHYSICAL MEDICINE & REHABILITATION

## 2019-03-01 PROCEDURE — G8417 CALC BMI ABV UP PARAM F/U: HCPCS | Performed by: PHYSICAL MEDICINE & REHABILITATION

## 2019-03-01 PROCEDURE — G8400 PT W/DXA NO RESULTS DOC: HCPCS | Performed by: PHYSICAL MEDICINE & REHABILITATION

## 2019-03-01 PROCEDURE — 3017F COLORECTAL CA SCREEN DOC REV: CPT | Performed by: PHYSICAL MEDICINE & REHABILITATION

## 2019-03-01 PROCEDURE — 1123F ACP DISCUSS/DSCN MKR DOCD: CPT | Performed by: PHYSICAL MEDICINE & REHABILITATION

## 2019-03-01 PROCEDURE — 4040F PNEUMOC VAC/ADMIN/RCVD: CPT | Performed by: PHYSICAL MEDICINE & REHABILITATION

## 2019-03-08 ENCOUNTER — TELEPHONE (OUTPATIENT)
Dept: ORTHOPEDIC SURGERY | Age: 64
End: 2019-03-08

## 2019-03-18 RX ORDER — INSULIN GLARGINE 100 [IU]/ML
INJECTION, SOLUTION SUBCUTANEOUS
Qty: 15 PEN | Refills: 3 | Status: SHIPPED | OUTPATIENT
Start: 2019-03-18 | End: 2019-09-09 | Stop reason: SDUPTHER

## 2019-04-15 RX ORDER — ATORVASTATIN CALCIUM 20 MG/1
TABLET, FILM COATED ORAL
Qty: 30 TABLET | Refills: 5 | Status: SHIPPED | OUTPATIENT
Start: 2019-04-15 | End: 2020-01-30 | Stop reason: SDUPTHER

## 2019-04-15 RX ORDER — DULOXETIN HYDROCHLORIDE 60 MG/1
CAPSULE, DELAYED RELEASE ORAL
Qty: 30 CAPSULE | Refills: 5 | Status: SHIPPED | OUTPATIENT
Start: 2019-04-15 | End: 2019-12-09 | Stop reason: SDUPTHER

## 2019-04-15 RX ORDER — LISINOPRIL 20 MG/1
TABLET ORAL
Qty: 30 TABLET | Refills: 5 | Status: SHIPPED | OUTPATIENT
Start: 2019-04-15 | End: 2020-01-07

## 2019-04-15 RX ORDER — BUPROPION HYDROCHLORIDE 150 MG/1
TABLET, EXTENDED RELEASE ORAL
Qty: 60 TABLET | Refills: 5 | Status: SHIPPED | OUTPATIENT
Start: 2019-04-15 | End: 2020-02-04

## 2019-04-16 ENCOUNTER — TELEPHONE (OUTPATIENT)
Dept: INTERNAL MEDICINE CLINIC | Age: 64
End: 2019-04-16

## 2019-04-23 ENCOUNTER — TELEPHONE (OUTPATIENT)
Dept: RHEUMATOLOGY | Age: 64
End: 2019-04-23

## 2019-04-23 NOTE — TELEPHONE ENCOUNTER
PA request received for Victoza 18 mg/3ml pen Injectors however, it looks like the patient may not be taking this medication any more. If patient is taking this medication please add a current script to the med list and send this back to me with a note. Please advise. Thanks.

## 2019-04-23 NOTE — TELEPHONE ENCOUNTER
PA  Submitted for Diclofenac Sodium 1% TD GEL on CMM  Key: [id number] - PA Case ID: [id number] - Rx #: [id number]     Preferred medication is Voltaren 1% Gel. Please contact Pharmacy and have them to run this through as Voltaren gel and not Diclofenac gel. Thanks.

## 2019-04-26 ENCOUNTER — HOSPITAL ENCOUNTER (OUTPATIENT)
Age: 64
Discharge: HOME | End: 2019-04-26
Payer: COMMERCIAL

## 2019-04-26 ENCOUNTER — TELEPHONE (OUTPATIENT)
Dept: ENDOCRINOLOGY | Age: 64
End: 2019-04-26

## 2019-04-26 DIAGNOSIS — G89.4 CHRONIC PAIN SYNDROME: ICD-10-CM

## 2019-04-26 LAB
A/G RATIO: 1.4 (ref 1.1–2.2)
ALBUMIN SERPL-MCNC: 4.3 G/DL (ref 3.4–5)
ALP BLD-CCNC: 135 U/L (ref 40–129)
ALT SERPL-CCNC: 27 U/L (ref 10–40)
ANION GAP SERPL CALCULATED.3IONS-SCNC: 13 MMOL/L (ref 3–16)
AST SERPL-CCNC: 34 U/L (ref 15–37)
BILIRUB SERPL-MCNC: 0.4 MG/DL (ref 0–1)
BUN BLDV-MCNC: 24 MG/DL (ref 7–20)
CALCIUM SERPL-MCNC: 9.7 MG/DL (ref 8.3–10.6)
CHLORIDE BLD-SCNC: 103 MMOL/L (ref 99–110)
CHOLESTEROL, TOTAL: 207 MG/DL (ref 0–199)
CO2: 24 MMOL/L (ref 21–32)
CREAT SERPL-MCNC: 1.8 MG/DL (ref 0.6–1.2)
CREATININE URINE: 171.2 MG/DL (ref 28–259)
GFR AFRICAN AMERICAN: 34
GFR NON-AFRICAN AMERICAN: 28
GLOBULIN: 3 G/DL
GLUCOSE BLD-MCNC: 186 MG/DL (ref 70–99)
HDLC SERPL-MCNC: 51 MG/DL (ref 40–60)
LDL CHOLESTEROL CALCULATED: ABNORMAL MG/DL
LDL CHOLESTEROL DIRECT: 115 MG/DL
MICROALBUMIN UR-MCNC: <1.2 MG/DL
MICROALBUMIN/CREAT UR-RTO: NORMAL MG/G (ref 0–30)
POTASSIUM SERPL-SCNC: 6.2 MMOL/L (ref 3.5–5.1)
SODIUM BLD-SCNC: 140 MMOL/L (ref 136–145)
TOTAL PROTEIN: 7.3 G/DL (ref 6.4–8.2)
TRIGL SERPL-MCNC: 358 MG/DL (ref 0–150)
TSH SERPL DL<=0.05 MIU/L-ACNC: 2.97 UIU/ML (ref 0.27–4.2)
VITAMIN D 25-HYDROXY: 15 NG/ML
VLDLC SERPL CALC-MCNC: ABNORMAL MG/DL

## 2019-04-26 PROCEDURE — 82306 VITAMIN D 25 HYDROXY: CPT

## 2019-04-26 PROCEDURE — 84443 ASSAY THYROID STIM HORMONE: CPT

## 2019-04-26 PROCEDURE — 83036 HEMOGLOBIN GLYCOSYLATED A1C: CPT

## 2019-04-26 PROCEDURE — 80053 COMPREHEN METABOLIC PANEL: CPT

## 2019-04-26 PROCEDURE — 82043 UR ALBUMIN QUANTITATIVE: CPT

## 2019-04-26 PROCEDURE — 36415 COLL VENOUS BLD VENIPUNCTURE: CPT

## 2019-04-26 PROCEDURE — 80061 LIPID PANEL: CPT

## 2019-04-26 PROCEDURE — 82570 ASSAY OF URINE CREATININE: CPT

## 2019-04-27 ENCOUNTER — HOSPITAL ENCOUNTER (EMERGENCY)
Age: 64
Discharge: HOME | End: 2019-04-27
Attending: EMERGENCY MEDICINE
Payer: COMMERCIAL

## 2019-04-27 ENCOUNTER — TELEPHONE (OUTPATIENT)
Dept: ENDOCRINOLOGY | Age: 64
End: 2019-04-27

## 2019-04-27 VITALS
BODY MASS INDEX: 34.54 KG/M2 | WEIGHT: 201.31 LBS | OXYGEN SATURATION: 98 % | DIASTOLIC BLOOD PRESSURE: 60 MMHG | SYSTOLIC BLOOD PRESSURE: 120 MMHG | HEART RATE: 83 BPM | TEMPERATURE: 98.1 F | RESPIRATION RATE: 16 BRPM

## 2019-04-27 DIAGNOSIS — R89.9 ABNORMAL LABORATORY TEST RESULT: Primary | ICD-10-CM

## 2019-04-27 LAB
A/G RATIO: 1.6 (ref 1.1–2.2)
ALBUMIN SERPL-MCNC: 4.4 G/DL (ref 3.4–5)
ALP BLD-CCNC: 135 U/L (ref 40–129)
ALT SERPL-CCNC: 23 U/L (ref 10–40)
ANION GAP SERPL CALCULATED.3IONS-SCNC: 13 MMOL/L (ref 3–16)
AST SERPL-CCNC: 23 U/L (ref 15–37)
BASOPHILS ABSOLUTE: 0 K/UL (ref 0–0.2)
BASOPHILS RELATIVE PERCENT: 0.8 %
BILIRUB SERPL-MCNC: 0.3 MG/DL (ref 0–1)
BILIRUBIN URINE: NEGATIVE
BLOOD, URINE: NEGATIVE
BUN BLDV-MCNC: 24 MG/DL (ref 7–20)
CALCIUM SERPL-MCNC: 8.9 MG/DL (ref 8.3–10.6)
CHLORIDE BLD-SCNC: 105 MMOL/L (ref 99–110)
CLARITY: CLEAR
CO2: 19 MMOL/L (ref 21–32)
COLOR: YELLOW
CREAT SERPL-MCNC: 1.7 MG/DL (ref 0.6–1.2)
EOSINOPHILS ABSOLUTE: 0.1 K/UL (ref 0–0.6)
EOSINOPHILS RELATIVE PERCENT: 2.1 %
ESTIMATED AVERAGE GLUCOSE: 185.8 MG/DL
GFR AFRICAN AMERICAN: 36
GFR NON-AFRICAN AMERICAN: 30
GLOBULIN: 2.8 G/DL
GLUCOSE BLD-MCNC: 245 MG/DL (ref 70–99)
GLUCOSE URINE: >=1000 MG/DL
HBA1C MFR BLD: 8.1 %
HCT VFR BLD CALC: 38.5 % (ref 36–48)
HEMOGLOBIN: 12.5 G/DL (ref 12–16)
KETONES, URINE: NEGATIVE MG/DL
LEUKOCYTE ESTERASE, URINE: NEGATIVE
LYMPHOCYTES ABSOLUTE: 1.7 K/UL (ref 1–5.1)
LYMPHOCYTES RELATIVE PERCENT: 28.3 %
MAGNESIUM: 1.8 MG/DL (ref 1.8–2.4)
MCH RBC QN AUTO: 29.3 PG (ref 26–34)
MCHC RBC AUTO-ENTMCNC: 32.4 G/DL (ref 31–36)
MCV RBC AUTO: 90.4 FL (ref 80–100)
MICROSCOPIC EXAMINATION: ABNORMAL
MONOCYTES ABSOLUTE: 0.1 K/UL (ref 0–1.3)
MONOCYTES RELATIVE PERCENT: 1.3 %
NEUTROPHILS ABSOLUTE: 4.1 K/UL (ref 1.7–7.7)
NEUTROPHILS RELATIVE PERCENT: 67.5 %
NITRITE, URINE: NEGATIVE
PDW BLD-RTO: 13.7 % (ref 12.4–15.4)
PH UA: 5 (ref 5–8)
PLATELET # BLD: 337 K/UL (ref 135–450)
PMV BLD AUTO: 8.8 FL (ref 5–10.5)
POTASSIUM SERPL-SCNC: 4.7 MMOL/L (ref 3.5–5.1)
PROTEIN UA: NEGATIVE MG/DL
RBC # BLD: 4.26 M/UL (ref 4–5.2)
SODIUM BLD-SCNC: 137 MMOL/L (ref 136–145)
SPECIFIC GRAVITY UA: >1.03 (ref 1–1.03)
TOTAL PROTEIN: 7.2 G/DL (ref 6.4–8.2)
URINE REFLEX TO CULTURE: ABNORMAL
URINE TYPE: ABNORMAL
UROBILINOGEN, URINE: 0.2 E.U./DL
WBC # BLD: 6.1 K/UL (ref 4–11)

## 2019-04-27 PROCEDURE — 81003 URINALYSIS AUTO W/O SCOPE: CPT

## 2019-04-27 PROCEDURE — 99283 EMERGENCY DEPT VISIT LOW MDM: CPT

## 2019-04-27 PROCEDURE — 85025 COMPLETE CBC W/AUTO DIFF WBC: CPT

## 2019-04-27 PROCEDURE — 93005 ELECTROCARDIOGRAM TRACING: CPT | Performed by: EMERGENCY MEDICINE

## 2019-04-27 PROCEDURE — 83735 ASSAY OF MAGNESIUM: CPT

## 2019-04-27 PROCEDURE — 80053 COMPREHEN METABOLIC PANEL: CPT

## 2019-04-27 NOTE — ED PROVIDER NOTES
[address] [facility]  eMERGENCY dEPARTMENTeNCOUnter      Pt Name: Diana David  MRN: [id number]  [place] 1955  Date ofevaluation: 4/27/2019  Provider: MD Jamie Rutledge       Chief Complaint   Patient presents with    Abnormal Lab     pt sent in due to abnormal lab of high potassium, pt unsure of what lab value is. patient reports dizziness (vertigo) with ambulation. HISTORY OF PRESENT ILLNESS   (Location/Symptom, Timing/Onset,Context/Setting, Quality, Duration, Modifying Factors, Severity)  Note limiting factors. Diana David is a 70 y.o. female who presents to the emergency department with an abnormal potassium level. The patient apparently had some labs drawn yesterday and her potassium came back at over 6. The patient's primary care physician's office call the patient to come to the ER to have it rechecked. The patient denies any complaints. HPI    NursingNotes were reviewed. REVIEW OF SYSTEMS    (2-9 systems for level 4, 10 or more for level 5)     Review of Systems  Negative for GI  musculoskeletal neurological pulmonary and cardiac complaints and all others reviewed and negative  General Appearance:  Alert, cooperative, no distress, appears stated age. Head:  Normocephalic, without obvious abnormality, atraumatic. Eyes:  conjunctiva/corneas clear, EOM's intact. Sclera anicteric. ENT: Mucous membranes moist.   Neck: Supple, symmetrical, trachea midline, no adenopathy. No jugular venous distention. Lungs:   No Respiratory Distress. No kushmal breathing    Chest Wall:     Heart:  Regular rhythm with no murmurs rubs or gallops    Abdomen:   Soft and benign    Extremities:  Full range of motion. Pulses: Good throughout    Skin:  No rashes or lesions to exposed skin. Neurologic: Alert and oriented X 3. Motor grossly normal.  Speech clear.         Except as noted above the remainder of the review of systems was Take 800 mg by mouth every 6 hours as needed for Pain    INSULIN PEN NEEDLE (PEN NEEDLES 31GX5/16\") 31G X 8 MM MISC    USE ONCE A DAILY    LIRAGLUTIDE (VICTOZA) 18 MG/3ML SOPN SC INJECTION    Inject 1.8 mg into the skin daily    LIRAGLUTIDE-WEIGHT MANAGEMENT 18 MG/3ML SOPN    Take 3 mg daily    LISINOPRIL (PRINIVIL;ZESTRIL) 20 MG TABLET    TAKE ONE TABLET BY MOUTH DAILY    OMEPRAZOLE (PRILOSEC) 40 MG DELAYED RELEASE CAPSULE    TAKE ONE CAPSULE BY MOUTH DAILY    VOLTAREN 1 % GEL    APPLY 4 GRAMS TO AFFECTED AREA(S) FOUR TIMES DAILY    VOLTAREN 1 % GEL    APPLY 4 GRAMS TO AFFECTED AREAS FOUR TIMES A DAY       ALLERGIES     Actos [pioglitazone hydrochloride]; Albuterol; Dilaudid [hydromorphone hcl]; Fluticasone-salmeterol; Glyburide; Lortab [hydrocodone-acetaminophen];  Metformin; and Symbicort [budesonide-formoterol fumarate]    FAMILY HISTORY       Family History   Problem Relation Age of Onset    Cancer Mother     Obesity Sister           SOCIAL HISTORY       Social History     Socioeconomic History    Marital status:      Spouse name: None    Number of children: None    Years of education: None    Highest education level: None   Occupational History    None   Social Needs    Financial resource strain: None    Food insecurity:     Worry: None     Inability: None    Transportation needs:     Medical: None     Non-medical: None   Tobacco Use    Smoking status: Never Smoker    Smokeless tobacco: Never Used   Substance and Sexual Activity    Alcohol use: No     Alcohol/week: 0.0 oz    Drug use: No    Sexual activity: Yes     Partners: Male   Lifestyle    Physical activity:     Days per week: None     Minutes per session: None    Stress: None   Relationships    Social connections:     Talks on phone: None     Gets together: None     Attends Baptism service: None     Active member of club or organization: None     Attends meetings of clubs or organizations: None     Relationship status: None    Intimate partner violence:     Fear of current or ex partner: None     Emotionally abused: None     Physically abused: None     Forced sexual activity: None   Other Topics Concern    None   Social History Narrative    None       SCREENINGS             PHYSICAL EXAM    (up to 7 for level 4, 8 or more for level 5)     ED Triage Vitals   BP Temp Temp Source Pulse Resp SpO2 Height Weight   04/27/19 1215 04/27/19 1215 04/27/19 1215 04/27/19 1215 04/27/19 1215 04/27/19 1215 -- 04/27/19 1213   121/72 98.1 °F (36.7 °C) Infrared 96 15 97 %  201 lb 5 oz (91.3 kg)       Physical Exam    DIAGNOSTIC RESULTS     EKG: All EKG's are interpreted by the Emergency Department Physician who either signs or Co-signsthis chart in the absence of a cardiologist.    Sinus rhythm at a rate of 89 beats a minute with no acute ST elevations or depressions or pathologic Q waves.         Interpretation per the Radiologist below, if available at the time ofthis note:    No orders to display         ED BEDSIDE ULTRASOUND:   Performed by ED Physician - none    LABS:  Labs Reviewed   COMPREHENSIVE METABOLIC PANEL - Abnormal; Notable for the following components:       Result Value    CO2 19 (*)     Glucose 245 (*)     BUN 24 (*)     CREATININE 1.7 (*)     GFR Non- 30 (*)     GFR African American 36 (*)     Alkaline Phosphatase 135 (*)     All other components within normal limits    Narrative:     Performed at:  [facility]  [address], [address]   Phone [phone]   URINE RT REFLEX TO CULTURE - Abnormal; Notable for the following components:    Glucose, Ur >=1000 (*)     All other components within normal limits    Narrative:     Performed at:  [facility]  [address] [facility], [address]   Phone [phone]   CBC WITH AUTO DIFFERENTIAL    Narrative:     Performed at:  [facility]  [address], [place] [address]   Phone [phone]   MAGNESIUM    Narrative:     Performed at:  [facility]  [address], [address]   Phone [phone]       All other labs were within normal range or not returned as of this dictation. EMERGENCY DEPARTMENT COURSE and DIFFERENTIAL DIAGNOSIS/MDM:   Vitals:    Vitals:    04/27/19 1213 04/27/19 1215   BP:  121/72   Pulse:  96   Resp:  15   Temp:  98.1 °F (36.7 °C)   TempSrc:  Infrared   SpO2:  97%   Weight: 201 lb 5 oz (91.3 kg)        The patient has remained stable throughout her hospital course. Repeat laboratory results show a normal potassium. The patient is asymptomatic. I told the patient to follow-up with her doctor about her creatinine and blood sugar and to return for any other problems or emergencies. MDM      REASSESSMENT              CONSULTS:  None    PROCEDURES:  Unless otherwise noted below, none     Procedures    FINAL IMPRESSION      1. Abnormal laboratory test result          DISPOSITION/PLAN   DISPOSITION Decision To Discharge 04/27/2019 01:32:41 PM      PATIENT REFERREDTO:  Urban Denton MD  [address] [place] [address]  [phone]    Call in 1 week  As needed      DISCHARGEMEDICATIONS:  New Prescriptions    No medications on file     Controlled Substances Monitoring:     RX Monitoring 10/5/2018   Attestation The Prescription Monitoring Report for this patient was reviewed today. Chronic Pain Routine Monitoring No signs of potential drug abuse or diversion identified.        (Please note that portions of this note were completed with a voice recognition program.  Efforts were made to edit the dictations but occasionally words are mis-transcribed.)    Natalie Weston MD (electronically signed)  Attending Emergency Physician          Natalie Weston MD  04/27/19 Gerry Odom MD  04/27/19 3348

## 2019-04-27 NOTE — TELEPHONE ENCOUNTER
YAMILE  Patient called today at 6 AM.  I ask her if she has any symptoms, and she complained of dizziness, especially upon standing up. I informed her to go to the emergency room and explained that she has very elevated potassium of 6.2 and abnormal kidney function, which is new problem compared with the previous metabolic panel results. Her creatinine was 1.8;  in March 2018 it was 0.7. Patient understood instructions. I informed patient that I will notify Dr. Lucas Tao and .

## 2019-04-28 LAB
EKG ATRIAL RATE: 89 BPM
EKG DIAGNOSIS: NORMAL
EKG P AXIS: 44 DEGREES
EKG P-R INTERVAL: 120 MS
EKG Q-T INTERVAL: 364 MS
EKG QRS DURATION: 90 MS
EKG QTC CALCULATION (BAZETT): 442 MS
EKG R AXIS: 23 DEGREES
EKG T AXIS: 68 DEGREES
EKG VENTRICULAR RATE: 89 BPM

## 2019-04-28 PROCEDURE — 93010 ELECTROCARDIOGRAM REPORT: CPT | Performed by: INTERNAL MEDICINE

## 2019-04-30 ENCOUNTER — OFFICE VISIT (OUTPATIENT)
Dept: ENDOCRINOLOGY | Age: 64
End: 2019-04-30
Payer: COMMERCIAL

## 2019-04-30 VITALS
HEART RATE: 98 BPM | WEIGHT: 205 LBS | OXYGEN SATURATION: 98 % | DIASTOLIC BLOOD PRESSURE: 62 MMHG | SYSTOLIC BLOOD PRESSURE: 98 MMHG | BODY MASS INDEX: 35 KG/M2 | HEIGHT: 64 IN

## 2019-04-30 DIAGNOSIS — I10 ESSENTIAL HYPERTENSION: ICD-10-CM

## 2019-04-30 DIAGNOSIS — E78.2 MIXED HYPERLIPIDEMIA: ICD-10-CM

## 2019-04-30 DIAGNOSIS — N18.2 STAGE 2 CHRONIC KIDNEY DISEASE: ICD-10-CM

## 2019-04-30 DIAGNOSIS — E11.42 DIABETIC POLYNEUROPATHY ASSOCIATED WITH TYPE 2 DIABETES MELLITUS (HCC): ICD-10-CM

## 2019-04-30 PROCEDURE — 4040F PNEUMOC VAC/ADMIN/RCVD: CPT | Performed by: INTERNAL MEDICINE

## 2019-04-30 PROCEDURE — 1123F ACP DISCUSS/DSCN MKR DOCD: CPT | Performed by: INTERNAL MEDICINE

## 2019-04-30 PROCEDURE — G8400 PT W/DXA NO RESULTS DOC: HCPCS | Performed by: INTERNAL MEDICINE

## 2019-04-30 PROCEDURE — 99215 OFFICE O/P EST HI 40 MIN: CPT | Performed by: INTERNAL MEDICINE

## 2019-04-30 PROCEDURE — 1036F TOBACCO NON-USER: CPT | Performed by: INTERNAL MEDICINE

## 2019-04-30 PROCEDURE — G8417 CALC BMI ABV UP PARAM F/U: HCPCS | Performed by: INTERNAL MEDICINE

## 2019-04-30 PROCEDURE — 2022F DILAT RTA XM EVC RTNOPTHY: CPT | Performed by: INTERNAL MEDICINE

## 2019-04-30 PROCEDURE — 1090F PRES/ABSN URINE INCON ASSESS: CPT | Performed by: INTERNAL MEDICINE

## 2019-04-30 PROCEDURE — 3017F COLORECTAL CA SCREEN DOC REV: CPT | Performed by: INTERNAL MEDICINE

## 2019-04-30 PROCEDURE — G8427 DOCREV CUR MEDS BY ELIG CLIN: HCPCS | Performed by: INTERNAL MEDICINE

## 2019-04-30 PROCEDURE — 3045F PR MOST RECENT HEMOGLOBIN A1C LEVEL 7.0-9.0%: CPT | Performed by: INTERNAL MEDICINE

## 2019-04-30 ASSESSMENT — ENCOUNTER SYMPTOMS: VISUAL CHANGE: 0

## 2019-04-30 NOTE — PROGRESS NOTES
Darren Rob is a 70 y.o. female is seen  for evaluation and management of type 2  Diabetes--- . Darren Rob was diagnosed with Diabetes mellitus in 2008 aprox   Pt always had issues with hypoglycemia since her childhood so she was under constant surveillance until she was diagnosed with diabetes  . Darren Rob got diabetic education in the past.  Comorbid conditions: Neuropathy    INTERIM:    Diabetes   She presents for her follow-up diabetic visit. She has type 2 diabetes mellitus. No MedicAlert identification noted. The initial diagnosis of diabetes was made 10 years ago. Her disease course has been worsening. Hypoglycemia symptoms include sweats and tremors. Pertinent negatives for diabetes include no foot ulcerations, no polydipsia, no polyphagia, no polyuria, no visual change, no weakness and no weight loss. There are no hypoglycemic complications. Symptoms are worsening. Diabetic complications include peripheral neuropathy. Risk factors for coronary artery disease include post-menopausal, hypertension, diabetes mellitus and dyslipidemia. Current diabetic treatment includes insulin injections. She is compliant with treatment some of the time. Her weight is increasing rapidly. She is following a generally unhealthy diet. Meal planning includes avoidance of concentrated sweets. She has had a previous visit with a dietitian. She never participates in exercise. Her home blood glucose trend is fluctuating dramatically. Her breakfast blood glucose is taken between 7-8 am. Her breakfast blood glucose range is generally 140-180 mg/dl. An ACE inhibitor/angiotensin II receptor blocker is being taken. She does not see a podiatrist.Eye exam is current. She  lost almost 20 lbs by not taking her insulin from September to December 2018 her diabetes control worsened she started taking basal  insulin at that time.  Currently tries to avoid insulin due to weight gain although she was advised at her last visit with me that she would need to be on insulin. More recently she is noted to have elevated creatinine on her blood work. She was seen in ER for hyperkalemia which resolved on its own, most likely lab abnormality. Patient was warned that she is on SGLT 2 inhibitors which are linked with euglycemic DKA so she needs to take her insulin more regularly.   She has been under a lot of stress as she has been let go from her job    Weight trend: stable  Prior visit with dietician: no  Current diet: on average, 3 meals per day  Current exercise: rare    She was in the ER after K was 6.2 but repeat in ER was normal   She is not Samson Go    Past Medical History:   Diagnosis Date    Asthma     vocal fold     Depression     Diabetes mellitus ([address]     Hyperlipidemia     Hypertension     Hypothyroidism     Obesity     Osteoarthritis     Paradoxical vocal fold motion disorder     Postmenopausal 5/15/2018    Stage 2 chronic kidney disease 5/7/2019      Patient Active Problem List   Diagnosis    Asthma    HTN (hypertension)    Diabetes mellitus ([address]    Hyperlipidemia    Insomnia    Depression    Neuropathic pain    Type 2 diabetes mellitus with polyneuropathy ([address]    Moderate episode of recurrent major depressive disorder ([address]    Diabetic polyneuropathy associated with type 2 diabetes mellitus ([address]    Exogenous obesity    Spinal stenosis of lumbar region with neurogenic claudication    Postmenopausal    Stage 2 chronic kidney disease     Past Surgical History:   Procedure Laterality Date    CARPAL TUNNEL RELEASE      RORY    HYSTERECTOMY, TOTAL ABDOMINAL      Age 37    NC INJECT ANES/STEROID FORAMEN LUMBAR/SACRAL W IMG GUIDE ,1 LEVEL Bilateral 12/3/2018    BILATERAL L4 LUMBAR TRANSFORAMINAL EPIDURAL STEROID INJECTION WITH FLUOROSCOPY performed by Bharati Rowan MD at [address]     Social History     Socioeconomic History    Marital status:      Spouse name: Not CAPSULE BY MOUTH DAILY 30 capsule 5    buPROPion (WELLBUTRIN SR) 150 MG extended release tablet TAKE ONE TABLET BY MOUTH TWICE A DAY 60 tablet 5    BASAGLAR KWIKPEN 100 UNIT/ML injection pen INJECT 63 UNITS UNDER THE SKIN NIGHTLY. INCREASE DOSE BY 2 UNITS EVERY 3 DAYS UNTIL FASTING BLOOD GLUCOSE IS LESS THAN 150 (Patient taking differently: INJECT 15 UNITS UNDER THE SKIN NIGHTLY.) 15 pen 3    ibuprofen (ADVIL;MOTRIN) 800 MG tablet Take 800 mg by mouth every 6 hours as needed for Pain      Insulin Pen Needle (PEN NEEDLES 31GX5/16\") 31G X 8 MM MISC USE ONCE A DAILY 100 each 3    cyclobenzaprine (FLEXERIL) 10 MG tablet TAKE ONE TABLET BY MOUTH ONCE NIGHTLY AS NEEDED FOR MUSCLE SPASMS 30 tablet 5    Empagliflozin-Metformin HCl ER (SYNJARDY XR)  MG TB24 TAKE ONE TABLET BY MOUTH DAILY 30 tablet 5    omeprazole (PRILOSEC) 40 MG delayed release capsule TAKE ONE CAPSULE BY MOUTH DAILY 30 capsule 5    Continuous Blood Gluc Sensor (FREESTYLE JEREMIAS SENSOR SYSTEM) MISC To be applied weekly 4 each 3    celecoxib (CELEBREX) 200 MG capsule Take 1 capsule by mouth 2 times daily as needed for Pain 180 capsule 3    Continuous Blood Gluc  (FREESTYLE JEREMIAS READER) ENE Use for checking glucose 1 Device 1    VOLTAREN 1 % GEL APPLY 4 GRAMS TO AFFECTED AREA(S) FOUR TIMES DAILY 500 g 2    fluticasone (FLONASE) 50 MCG/ACT nasal spray 2 sprays by Nasal route daily 3 Bottle 3    HUMALOG KWIKPEN 100 UNIT/ML pen Inject 10 units TID 5 pen 3     No current facility-administered medications for this visit.       Allergies   Allergen Reactions    Actos [Pioglitazone Hydrochloride]      Causes mouth blisters    Albuterol      YEAST INFECTION IN MOUTH    Dilaudid [Hydromorphone Hcl]     Fluticasone-Salmeterol      YEAST INFECTIONS IN MOUTH    Glyburide      Causes mouth blisters    Lortab [Hydrocodone-Acetaminophen]      headache    Metformin      Causes mouth blisters    Symbicort [Budesonide-Formoterol Fumarate] Type 2 diabetes mellitus with polyneuropathy (HCC)     She has been taking Victoza 3 mg daily at least 3-4 days a week as saxenda was not approved  -She is advised to resume her basal bolus insulin I had a lengthy discussion discussing her elevated creatinine and worsening kidney function test specially while she is taking Synjardy and higher dose Victoza which both can contrary attributed to dehydration. Patient tells me that she drinks a lot of water  lantus 36 units we had a lengthy discussion about catabolism with lack of insulin   Encouraged healthy lifestyle and dietary restriction   She is on synjardy again advised patient that being on Synjardy puts her at a higher risk of having DKA so she needs to make sure that she keeps herself well hydrated as well as if she notes that she isn't able to eat or drink she needs to stop that medication. I discussed in detail with patient side effects associated with SGLT 2 Inhibitors including   Increased incidence of foot amputations   and Bekah's gangrene which can present with   Genital tenderness  Redness or swelling of the genitalia  Fever above 100.4 F  The FDA has issued a warning so far , after discussing with patient she decided to stay on the current regimen. She had prior reaction to metformin ( mouth sores) Invokana ( UTI ) and Januvia and made her blood sugars run really high. Hypoglycemia protocol reviewed in detail with patient   Patient was advised that sending of her fingerstick blood glucose logs is crucial in management of her  diabetes. I will adjust the  dose of insulin according to sent data.      Health Maintenance   - Susy Travis was advised to have annual dilated eye exam     Last Eye Exam: Up to date on eye exam was told she has no retinopathy as per patient   Last Podiatry Exam:  Feet examined by me  May 2018 she has mild peripheral neuropathy   Lipids: Last LDL 52 in November 2017   Microalbumin/Creatinine Ratio: normal February 2018    . Education: Reviewed ABCs of diabetes management (respective goals in parentheses): A1C (<7), blood pressure (<130/80), and cholesterol (LDL <100). -. Compliance at present is estimated to be poor. Efforts to improve compliance (if necessary) will be directed at dietary modifications: advised again .  -. Follow up: I recommend diabetes care be 3 months. 2. Diabetic polyneuropathy associated with type 2 diabetes mellitus ([address]  She is on gabapentin as per primary care physician    3. Postmenopausal  She had complete hysterectomy done at age 37 for severe endometriosis. She had been on hormone replacement therapy until age of 46. We discussed prevention of osteoporosis in detail today again   - DEXA Bone Density Axial Skeleton; ordered but she has not done it     4. Recurrent major depressive disorder, in partial remission [facility])  She is on medication as per primary care physician    5. Essential hypertension  At target     6. Mixed hyperlipidemia  She is on Lipitor and her last LDL is 51 in 2019   We will check  at follow-up      Reviewed and/or ordered clinical lab results yes   Reviewed and/or ordered radiology tests Yes  Reviewed and/or ordered other diagnostic tests yes   Made a decision to obtain old records yes   Reviewed and summarized old records yes     Darren [place] was counseled regarding symptoms of current diagnosis, course and complications of disease if inadequately treated, side effects of medications, diagnosis, treatment options, and prognosis, risks, benefits, complications, and alternatives of treatment, labs, imaging and other studies and treatment targets and goals. I spent  minutes 45 with patient and more than 50 % of this time was spent discussing and providing counseling and coordinating care of patient's multiple health issue    These diagnosis were discussed and reviewed with the patient including the advantages of drug therapy.  She was counseled at this visit on the following: diabetes complication prevention and foot care. Return in about 3 months (around 7/30/2019).

## 2019-05-03 ENCOUNTER — TELEPHONE (OUTPATIENT)
Dept: ENDOCRINOLOGY | Age: 64
End: 2019-05-03

## 2019-05-03 RX ORDER — INSULIN LISPRO 100 [IU]/ML
INJECTION, SOLUTION INTRAVENOUS; SUBCUTANEOUS
Qty: 5 PEN | Refills: 3 | OUTPATIENT
Start: 2019-05-03 | End: 2020-03-25

## 2019-05-06 ENCOUNTER — HOSPITAL ENCOUNTER (OUTPATIENT)
Age: 64
Discharge: HOME | End: 2019-05-06
Payer: COMMERCIAL

## 2019-05-06 LAB
A/G RATIO: 1.4 (ref 1.1–2.2)
ALBUMIN SERPL-MCNC: 4.1 G/DL (ref 3.4–5)
ALP BLD-CCNC: 127 U/L (ref 40–129)
ALT SERPL-CCNC: 19 U/L (ref 10–40)
ANION GAP SERPL CALCULATED.3IONS-SCNC: 13 MMOL/L (ref 3–16)
AST SERPL-CCNC: 18 U/L (ref 15–37)
BILIRUB SERPL-MCNC: 0.5 MG/DL (ref 0–1)
BUN BLDV-MCNC: 23 MG/DL (ref 7–20)
CALCIUM SERPL-MCNC: 9.6 MG/DL (ref 8.3–10.6)
CHLORIDE BLD-SCNC: 99 MMOL/L (ref 99–110)
CHOLESTEROL, TOTAL: 153 MG/DL (ref 0–199)
CO2: 23 MMOL/L (ref 21–32)
CREAT SERPL-MCNC: 1.8 MG/DL (ref 0.6–1.2)
CREATININE URINE: 136.5 MG/DL (ref 28–259)
ESTIMATED AVERAGE GLUCOSE: 188.6 MG/DL
GFR AFRICAN AMERICAN: 34
GFR NON-AFRICAN AMERICAN: 28
GLOBULIN: 2.9 G/DL
GLUCOSE BLD-MCNC: 196 MG/DL (ref 70–99)
HBA1C MFR BLD: 8.2 %
HDLC SERPL-MCNC: 52 MG/DL (ref 40–60)
LDL CHOLESTEROL CALCULATED: 51 MG/DL
MICROALBUMIN UR-MCNC: <1.2 MG/DL
MICROALBUMIN/CREAT UR-RTO: NORMAL MG/G (ref 0–30)
POTASSIUM SERPL-SCNC: 5.3 MMOL/L (ref 3.5–5.1)
SODIUM BLD-SCNC: 135 MMOL/L (ref 136–145)
TOTAL PROTEIN: 7 G/DL (ref 6.4–8.2)
TRIGL SERPL-MCNC: 250 MG/DL (ref 0–150)
TSH SERPL DL<=0.05 MIU/L-ACNC: 2.91 UIU/ML (ref 0.27–4.2)
VLDLC SERPL CALC-MCNC: 50 MG/DL

## 2019-05-06 PROCEDURE — 80053 COMPREHEN METABOLIC PANEL: CPT

## 2019-05-06 PROCEDURE — 83036 HEMOGLOBIN GLYCOSYLATED A1C: CPT

## 2019-05-06 PROCEDURE — 82043 UR ALBUMIN QUANTITATIVE: CPT

## 2019-05-06 PROCEDURE — 36415 COLL VENOUS BLD VENIPUNCTURE: CPT

## 2019-05-06 PROCEDURE — 84681 ASSAY OF C-PEPTIDE: CPT

## 2019-05-06 PROCEDURE — 80061 LIPID PANEL: CPT

## 2019-05-06 PROCEDURE — 84443 ASSAY THYROID STIM HORMONE: CPT

## 2019-05-06 PROCEDURE — 82570 ASSAY OF URINE CREATININE: CPT

## 2019-05-07 PROBLEM — N18.2 STAGE 2 CHRONIC KIDNEY DISEASE: Status: ACTIVE | Noted: 2019-05-07

## 2019-05-08 LAB — C-PEPTIDE: 4.7 NG/ML (ref 1.1–4.4)

## 2019-05-09 ENCOUNTER — OFFICE VISIT (OUTPATIENT)
Dept: ENDOCRINOLOGY | Age: 64
End: 2019-05-09
Payer: COMMERCIAL

## 2019-05-09 VITALS
SYSTOLIC BLOOD PRESSURE: 122 MMHG | BODY MASS INDEX: 34.66 KG/M2 | WEIGHT: 203 LBS | HEIGHT: 64 IN | OXYGEN SATURATION: 99 % | DIASTOLIC BLOOD PRESSURE: 74 MMHG | HEART RATE: 88 BPM

## 2019-05-09 DIAGNOSIS — N18.2 STAGE 2 CHRONIC KIDNEY DISEASE: ICD-10-CM

## 2019-05-09 DIAGNOSIS — E66.09 EXOGENOUS OBESITY: ICD-10-CM

## 2019-05-09 DIAGNOSIS — I10 ESSENTIAL HYPERTENSION: ICD-10-CM

## 2019-05-09 DIAGNOSIS — E78.2 MIXED HYPERLIPIDEMIA: ICD-10-CM

## 2019-05-09 PROCEDURE — 2022F DILAT RTA XM EVC RTNOPTHY: CPT | Performed by: INTERNAL MEDICINE

## 2019-05-09 PROCEDURE — G8400 PT W/DXA NO RESULTS DOC: HCPCS | Performed by: INTERNAL MEDICINE

## 2019-05-09 PROCEDURE — 4040F PNEUMOC VAC/ADMIN/RCVD: CPT | Performed by: INTERNAL MEDICINE

## 2019-05-09 PROCEDURE — 1036F TOBACCO NON-USER: CPT | Performed by: INTERNAL MEDICINE

## 2019-05-09 PROCEDURE — 3017F COLORECTAL CA SCREEN DOC REV: CPT | Performed by: INTERNAL MEDICINE

## 2019-05-09 PROCEDURE — G8427 DOCREV CUR MEDS BY ELIG CLIN: HCPCS | Performed by: INTERNAL MEDICINE

## 2019-05-09 PROCEDURE — 3045F PR MOST RECENT HEMOGLOBIN A1C LEVEL 7.0-9.0%: CPT | Performed by: INTERNAL MEDICINE

## 2019-05-09 PROCEDURE — 99213 OFFICE O/P EST LOW 20 MIN: CPT | Performed by: INTERNAL MEDICINE

## 2019-05-09 PROCEDURE — 1123F ACP DISCUSS/DSCN MKR DOCD: CPT | Performed by: INTERNAL MEDICINE

## 2019-05-09 PROCEDURE — G8417 CALC BMI ABV UP PARAM F/U: HCPCS | Performed by: INTERNAL MEDICINE

## 2019-05-09 PROCEDURE — 1090F PRES/ABSN URINE INCON ASSESS: CPT | Performed by: INTERNAL MEDICINE

## 2019-05-09 ASSESSMENT — ENCOUNTER SYMPTOMS: VISUAL CHANGE: 0

## 2019-05-09 NOTE — PROGRESS NOTES
Demetrice Alexandre is a 70 y.o. female is seen  for evaluation and management of type 2  Diabetes--- . Demetrice Alexandre was diagnosed with Diabetes mellitus in 2008 aprox   Pt always had issues with hypoglycemia since her childhood so she was under constant surveillance until she was diagnosed with diabetes  . Demetrice Alexandre got diabetic education in the past.  Comorbid conditions: Neuropathy    INTERIM:    Diabetes   She presents for her follow-up diabetic visit. She has type 2 diabetes mellitus. No MedicAlert identification noted. The initial diagnosis of diabetes was made 10 years ago. Her disease course has been worsening. Hypoglycemia symptoms include sweats and tremors. Pertinent negatives for diabetes include no foot ulcerations, no polydipsia, no polyphagia, no polyuria, no visual change, no weakness and no weight loss. There are no hypoglycemic complications. Symptoms are worsening. Diabetic complications include peripheral neuropathy. Risk factors for coronary artery disease include post-menopausal, hypertension, diabetes mellitus and dyslipidemia. Current diabetic treatment includes insulin injections. She is compliant with treatment some of the time. Her weight is increasing rapidly. She is following a generally unhealthy diet. Meal planning includes avoidance of concentrated sweets. She has had a previous visit with a dietitian. She never participates in exercise. Her home blood glucose trend is fluctuating dramatically. Her breakfast blood glucose is taken between 7-8 am. Her breakfast blood glucose range is generally 140-180 mg/dl. An ACE inhibitor/angiotensin II receptor blocker is being taken. She does not see a podiatrist.Eye exam is current. She  lost almost 20 lbs by not taking her insulin from September to December 2018 her diabetes control worsened she started taking basal  insulin after that.  Currently tries to avoid insulin due to weight gain although she was advised at her last visit with me that she would need to be on insulin. More recently she is noted to have elevated creatinine on her blood work. She was seen in ER for hyperkalemia which resolved on its own, most likely lab abnormality. Patient was warned that she is on SGLT 2 inhibitors which are linked with euglycemic DKA so she needs to take her insulin more regularly and keep her fingerstick glucose in a good range.   She has been under a lot of stress as she has been let go from her job  Today she comes back with her  to go over her labs with me in detail    Weight trend: stable  Prior visit with dietician: no  Current diet: on average, 3 meals per day  Current exercise: rare    She was in the ER after K was 6.2 but repeat in ER was normal in April 2019  She denies any nausea vomiting she tends to drink plenty of water any ways    Patient has hyperlipidemia and is on Lipitor her last LDL was at target  Patient also has arthritis and takes Celebrex off-and-on which can also cause kidney damage and also has reflux and is on omeprazole    Past Medical History:   Diagnosis Date    Asthma     vocal fold     Depression     Diabetes mellitus ([address]     Hyperlipidemia     Hypertension     Hypothyroidism     Obesity     Osteoarthritis     Paradoxical vocal fold motion disorder     Postmenopausal 5/15/2018    Stage 2 chronic kidney disease 5/7/2019      Patient Active Problem List   Diagnosis    Asthma    HTN (hypertension)    Diabetes mellitus ([address]    Hyperlipidemia    Insomnia    Depression    Neuropathic pain    Type 2 diabetes mellitus with polyneuropathy ([address]    Moderate episode of recurrent major depressive disorder ([address]    Diabetic polyneuropathy associated with type 2 diabetes mellitus ([address]    Exogenous obesity    Spinal stenosis of lumbar region with neurogenic claudication    Postmenopausal    Stage 2 chronic kidney disease     Past Surgical History:   Procedure Laterality Date    CARPAL TUNNEL RELEASE RORY    HYSTERECTOMY, TOTAL ABDOMINAL      Age 37    [place] INJECT ANES/STEROID FORAMEN LUMBAR/SACRAL W IMG GUIDE ,1 LEVEL Bilateral 12/3/2018    BILATERAL L4 LUMBAR TRANSFORAMINAL EPIDURAL STEROID INJECTION WITH FLUOROSCOPY performed by She Wiggins MD at [address] [place] [address]     Social History     Socioeconomic History    Marital status:      Spouse name: Not on file    Number of children: Not on file    Years of education: Not on file    Highest education level: Not on file   Occupational History    Not on file   Social Needs    Financial resource strain: Not on file    Food insecurity:     Worry: Not on file     Inability: Not on file    Transportation needs:     Medical: Not on file     Non-medical: Not on file   Tobacco Use    Smoking status: Never Smoker    Smokeless tobacco: Never Used   Substance and Sexual Activity    Alcohol use: No     Alcohol/week: 0.0 oz    Drug use: No    Sexual activity: Yes     Partners: Male   Lifestyle    Physical activity:     Days per week: Not on file     Minutes per session: Not on file    Stress: Not on file   Relationships    Social connections:     Talks on phone: Not on file     Gets together: Not on file     Attends [employer] service: Not on file     Active member of club or organization: Not on file     Attends meetings of clubs or organizations: Not on file     Relationship status: Not on file    Intimate partner violence:     Fear of current or ex partner: Not on file     Emotionally abused: Not on file     Physically abused: Not on file     Forced sexual activity: Not on file   Other Topics Concern    Not on file   Social History Narrative    Not on file     Family History   Problem Relation Age of Onset    Cancer Mother     Obesity Sister      Current Outpatient Medications   Medication Sig Dispense Refill    HUMALOG KWIKPEN 100 UNIT/ML pen Inject 10 units TID 5 pen 3    Liraglutide (VICTOZA) 18 MG/3ML SOPN INFECTIONS IN MOUTH    Glyburide      Causes mouth blisters    Lortab [Hydrocodone-Acetaminophen]      headache    Metformin      Causes mouth blisters    Symbicort [Budesonide-Formoterol Fumarate]      Causes mouth blisters     Family Status   Relation Name Status    Mother   at age 68    Father   at age 32    Sister  (Not Specified)       Review of Systems  Constitutional: no fatigue, no fever, no recent weight gain, no recent weight loss, no changes in appetite  Eyes: no eye pain, no change in vision, no eye redness, no eye irritation, no double vision  Ears, nose, throat: no nasal congestion, no sore throat, no earache, no decrease in hearing, no hoarseness, no dry mouth, no sinus problems, no difficulty swallowing, no neck lumps, no dental problems, no mouth sores, no ringing in ears  Pulmonary: no shortness of breath, no wheezing, no dyspnea on exertion, no cough  Cardiovascular: no chest pain, no lower extremity edema, no orthopnea, no intermittent leg claudication, no palpitations  Gastrointestinal: no abdominal pain, no nausea, no vomiting, no diarrhea, no constipation, no dysphagia, no heartburn, no bloating  Genitourinary: no dysuria, no urinary incontinence, no urinary hesitancy, no urinary frequency, no feelings of urinary urgency, no nocturia  Musculoskeletal: no joint swelling, no joint stiffness, no joint pain, no muscle cramps, no muscle pain, no bone pain, no fractures  Integument/Breast: hair loss, but no skin rashes, no skin lesions, no itching, no dry skin, no breast pain, no breast mass, no skin hives, no skin discoloration, no nipple discharge  Neurological: no numbness, no tingling, no weakness, no confusion, no headaches, no dizziness, no fainting, no tremors, no decrease in memory, no balance problems  Psychiatric: no anxiety, no depression, no insomnia, no change in personality, no emotional problems, no stress  Hematologic/Lymphatic: no tendency for easy bleeding, no swollen lymph nodes, no tendency for easy bruising  Immunology: no seasonal allergies, no frequent infections, no frequent illnesses  Endocrine: no temperature intolerance no hirsutism, no hot flashes    OBJECTIVE:  /74   Pulse 88   Ht 5' 4\" (1.626 m)   Wt 203 lb (92.1 kg)   SpO2 99%   BMI 34.84 kg/m²    Wt Readings from Last 3 Encounters:   05/09/19 203 lb (92.1 kg)   04/30/19 205 lb (93 kg)   04/27/19 201 lb 5 oz (91.3 kg)       Constitutional: no acute distress, well appearing and well nourished  Psychiatric: oriented to person, place and time, judgement and insight and normal, recent and remote memory and intact and mood and affect are normal  Skin: skin and subcutaneous tissue is normal without mass, normal turgor  Head and Face: examination of head and face revealed no abnormalities  Eyes: no lid or conjunctival swelling, erythema or discharge  Ears/Nose: external inspection of ears and nose revealed no abnormalities, hearing is grossly normal  Oropharynx/Mouth/Face: lips, tongue and gums are normal with no lesions, the voice quality was normal  Neck: neck is supple and symmetric, with midline trachea and no masses, thyroid is normal  Pulmonary: no increased work of breathing or signs of respiratory distress, lungs are clear to auscultation  Cardiovascular: normal heart rate and rhythm, normal S1 and S2, and pedal pulses and 2+ bilaterally, No edema  Musculoskeletal: normal gait and station and exam of the digits and nails are normal  Neurological: normal coordination and normal general cortical function      Visual inspection:  Deformity/amputation: absent  Skin lesions/pre-ulcerative calluses: absent  Edema: right- negative, left- negative    Sensory exam:  Monofilament sensation: normal  (minimum of 5 random plantar locations tested, avoiding callused areas - > 1 area with absence of sensation is + for neuropathy)    Plus at least one of the following:  Pulses: normal,   Pinprick: Intact  Proprioception: Intact  Vibration (128 Hz): Intact    Lab Results   Component Value Date    LABA1C 8.2 05/06/2019    LABA1C 8.1 04/26/2019    LABA1C 8.6 02/12/2019     Visual inspection:  Deformity/amputation: absent  Skin lesions/pre-ulcerative calluses: absent  Edema: right- negative, left- negative    Sensory exam:  Monofilament sensation: normal  (minimum of 5 random plantar locations tested, avoiding callused areas - > 1 area with absence of sensation is + for neuropathy)    Plus at least one of the following:  Pulses: normal,   Pinprick: Intact  Proprioception: Intact  Vibration (128 Hz): Intact    ASSESSMENT/PLAN:  1. Type 2 diabetes mellitus with polyneuropathy last 8.2    Due to her recent worsening of creatinine I have advised her to stop taking Synjardy altogether also to stop the Victoza and just go on basal bolus insulin. She is very reluctant to do that she would like to continue with 1.8 milligrams of Victoza she assures me that she drinks plenty of water but she will stop the [place] and have repeat blood work done in one week from today she was given orders for that she was advised to call us back to go over the results once he gets the blood work done    -She is advised to resume her basal bolus insulin I had a lengthy discussion discussing her elevated creatinine and worsening kidney function test specially while she is taking Synjardy and higher dose Victoza which both can contribute to dehydration. Patient tells me that she drinks a lot of water  lantus 25 units we had a lengthy discussion about catabolism with lack of insulin   She is advised to increase the Lantus to 28 units daily and take at least 2-4 units of Humalog 10-15 minutes before each meal I reviewed her [facility] with her in detail she is not having any hypoglycemia currently  Encouraged healthy lifestyle and dietary restriction .      She had prior reaction to metformin ( mouth sores) Invokana ( UTI ) and Januvia and made her blood sugars run really high. Hypoglycemia protocol reviewed in detail with patient   Patient was advised that sending of her fingerstick blood glucose logs is crucial in management of her  diabetes. I will adjust the  dose of insulin according to sent data. Health Maintenance   - [place] Hew was advised to have annual dilated eye exam     Last Eye Exam: Up to date on eye exam was told she has no retinopathy as per patient   Last Podiatry Exam:  Feet examined by me  May 2019 she has significant peripheral neuropathy   Lipids: Last LDL 52 in May 2019    Microalbumin/Creatinine Ratio: normal February 2018    . Education: Reviewed ABCs of diabetes management (respective goals in parentheses): A1C (<7), blood pressure (<130/80), and cholesterol (LDL <100). -. Compliance at present is estimated to be poor. Efforts to improve compliance (if necessary) will be directed at dietary modifications: advised again .  -. Follow up: I recommend diabetes care be 3 months. Elevated creatinine  She is advised to repeat the labs in 1 week after hydration and stopping Synjardy. She was also advised to see a nephrologist for her kidney damage    2. Diabetic polyneuropathy associated with type 2 diabetes mellitus ([address]  She is on gabapentin as per primary care physician    3. Postmenopausal  She had complete hysterectomy done at age 37 for severe endometriosis. She had been on hormone replacement therapy until age of 46. We discussed prevention of osteoporosis in detail today again   - DEXA Bone Density Axial Skeleton; ordered but she has not done it     4. Recurrent major depressive disorder, in partial remission [facility])  She is on medication as per primary care physician    5. Essential hypertension  At target     6.  Mixed hyperlipidemia  She is on Lipitor and her last LDL is 51 in 2019   We will check  at follow-up      Reviewed and/or ordered clinical lab results yes   Reviewed and/or ordered radiology tests Yes  Reviewed and/or ordered other diagnostic tests yes   Made a decision to obtain old records yes   Reviewed and summarized old records yes     Ambar Mendoza was counseled regarding symptoms of current diagnosis, course and complications of disease if inadequately treated, side effects of medications, diagnosis, treatment options, and prognosis, risks, benefits, complications, and alternatives of treatment, labs, imaging and other studies and treatment targets and goals. These diagnosis were discussed and reviewed with the patient including the advantages of drug therapy. She was counseled at this visit on the following: diabetes complication prevention and foot care. No follow-ups on file.

## 2019-05-19 DIAGNOSIS — E11.42 TYPE 2 DIABETES MELLITUS WITH POLYNEUROPATHY (HCC): ICD-10-CM

## 2019-05-20 ENCOUNTER — HOSPITAL ENCOUNTER (OUTPATIENT)
Age: 64
Discharge: HOME | End: 2019-05-20
Payer: COMMERCIAL

## 2019-05-20 DIAGNOSIS — I10 ESSENTIAL HYPERTENSION: ICD-10-CM

## 2019-05-20 LAB
A/G RATIO: 1.5 (ref 1.1–2.2)
ALBUMIN SERPL-MCNC: 4.3 G/DL (ref 3.4–5)
ALP BLD-CCNC: 127 U/L (ref 40–129)
ALT SERPL-CCNC: 25 U/L (ref 10–40)
ANION GAP SERPL CALCULATED.3IONS-SCNC: 12 MMOL/L (ref 3–16)
AST SERPL-CCNC: 26 U/L (ref 15–37)
BILIRUB SERPL-MCNC: 0.4 MG/DL (ref 0–1)
BUN BLDV-MCNC: 23 MG/DL (ref 7–20)
CALCIUM SERPL-MCNC: 9.7 MG/DL (ref 8.3–10.6)
CHLORIDE BLD-SCNC: 104 MMOL/L (ref 99–110)
CHOLESTEROL, TOTAL: 189 MG/DL (ref 0–199)
CO2: 24 MMOL/L (ref 21–32)
CREAT SERPL-MCNC: 1.5 MG/DL (ref 0.6–1.2)
ESTIMATED AVERAGE GLUCOSE: 185.8 MG/DL
GFR AFRICAN AMERICAN: 42
GFR NON-AFRICAN AMERICAN: 35
GLOBULIN: 2.8 G/DL
GLUCOSE BLD-MCNC: 164 MG/DL (ref 70–99)
HBA1C MFR BLD: 8.1 %
HDLC SERPL-MCNC: 55 MG/DL (ref 40–60)
LDL CHOLESTEROL CALCULATED: 91 MG/DL
POTASSIUM SERPL-SCNC: 5.3 MMOL/L (ref 3.5–5.1)
SODIUM BLD-SCNC: 140 MMOL/L (ref 136–145)
TOTAL PROTEIN: 7.1 G/DL (ref 6.4–8.2)
TRIGL SERPL-MCNC: 217 MG/DL (ref 0–150)
TSH SERPL DL<=0.05 MIU/L-ACNC: 2.05 UIU/ML (ref 0.27–4.2)
VITAMIN D 25-HYDROXY: 18.3 NG/ML
VLDLC SERPL CALC-MCNC: 43 MG/DL

## 2019-05-20 PROCEDURE — 84443 ASSAY THYROID STIM HORMONE: CPT

## 2019-05-20 PROCEDURE — 80061 LIPID PANEL: CPT

## 2019-05-20 PROCEDURE — 36415 COLL VENOUS BLD VENIPUNCTURE: CPT

## 2019-05-20 PROCEDURE — 80053 COMPREHEN METABOLIC PANEL: CPT

## 2019-05-20 PROCEDURE — 82306 VITAMIN D 25 HYDROXY: CPT

## 2019-05-20 PROCEDURE — 83036 HEMOGLOBIN GLYCOSYLATED A1C: CPT

## 2019-05-20 RX ORDER — FLASH GLUCOSE SENSOR
KIT MISCELLANEOUS
Qty: 3 EACH | Refills: 2 | Status: SHIPPED | OUTPATIENT
Start: 2019-05-20 | End: 2019-09-13

## 2019-05-21 ENCOUNTER — TELEPHONE (OUTPATIENT)
Dept: ENDOCRINOLOGY | Age: 64
End: 2019-05-21

## 2019-05-21 NOTE — TELEPHONE ENCOUNTER
Pt called and would like to discuss lab results from yesterday done @ [facility] or does she need an appt?

## 2019-05-23 ENCOUNTER — TELEPHONE (OUTPATIENT)
Dept: ORTHOPEDIC SURGERY | Age: 64
End: 2019-05-23

## 2019-05-23 NOTE — TELEPHONE ENCOUNTER
Received final appeal correspondence from [facility] for SCS trial. Decision is denied. Scanned to media. Patient will f/u for new plan of care.

## 2019-07-22 RX ORDER — CELECOXIB 200 MG/1
CAPSULE ORAL
Qty: 60 CAPSULE | Refills: 0 | Status: SHIPPED | OUTPATIENT
Start: 2019-07-22 | End: 2019-08-16 | Stop reason: SDUPTHER

## 2019-07-23 ENCOUNTER — TELEPHONE (OUTPATIENT)
Dept: ENDOCRINOLOGY | Age: 64
End: 2019-07-23

## 2019-07-23 DIAGNOSIS — E11.42 TYPE 2 DIABETES MELLITUS WITH POLYNEUROPATHY (HCC): Primary | ICD-10-CM

## 2019-07-23 RX ORDER — FLASH GLUCOSE SENSOR
KIT MISCELLANEOUS
Qty: 6 EACH | Refills: 1 | Status: SHIPPED | OUTPATIENT
Start: 2019-07-23 | End: 2020-01-30 | Stop reason: SDUPTHER

## 2019-07-23 RX ORDER — PEN NEEDLE, DIABETIC 31 GX5/16"
NEEDLE, DISPOSABLE MISCELLANEOUS
Qty: 100 EACH | Refills: 2 | Status: SHIPPED | OUTPATIENT
Start: 2019-07-23 | End: 2020-01-30 | Stop reason: SDUPTHER

## 2019-07-23 RX ORDER — FLASH GLUCOSE SCANNING READER
EACH MISCELLANEOUS
Qty: 1 DEVICE | Refills: 0 | Status: SHIPPED | OUTPATIENT
Start: 2019-07-23 | End: 2019-08-27 | Stop reason: SDUPTHER

## 2019-08-06 ENCOUNTER — OFFICE VISIT (OUTPATIENT)
Dept: ENT CLINIC | Age: 64
End: 2019-08-06
Payer: COMMERCIAL

## 2019-08-06 ENCOUNTER — TELEPHONE (OUTPATIENT)
Dept: ORTHOPEDIC SURGERY | Age: 64
End: 2019-08-06

## 2019-08-06 VITALS — DIASTOLIC BLOOD PRESSURE: 78 MMHG | OXYGEN SATURATION: 98 % | HEART RATE: 78 BPM | SYSTOLIC BLOOD PRESSURE: 158 MMHG

## 2019-08-06 DIAGNOSIS — H61.23 BILATERAL IMPACTED CERUMEN: Primary | ICD-10-CM

## 2019-08-06 PROCEDURE — 1090F PRES/ABSN URINE INCON ASSESS: CPT | Performed by: OTOLARYNGOLOGY

## 2019-08-06 PROCEDURE — G8400 PT W/DXA NO RESULTS DOC: HCPCS | Performed by: OTOLARYNGOLOGY

## 2019-08-06 PROCEDURE — G8427 DOCREV CUR MEDS BY ELIG CLIN: HCPCS | Performed by: OTOLARYNGOLOGY

## 2019-08-06 PROCEDURE — 3017F COLORECTAL CA SCREEN DOC REV: CPT | Performed by: OTOLARYNGOLOGY

## 2019-08-06 PROCEDURE — G8417 CALC BMI ABV UP PARAM F/U: HCPCS | Performed by: OTOLARYNGOLOGY

## 2019-08-06 PROCEDURE — 69210 REMOVE IMPACTED EAR WAX UNI: CPT | Performed by: OTOLARYNGOLOGY

## 2019-08-06 PROCEDURE — 99202 OFFICE O/P NEW SF 15 MIN: CPT | Performed by: OTOLARYNGOLOGY

## 2019-08-06 PROCEDURE — 1123F ACP DISCUSS/DSCN MKR DOCD: CPT | Performed by: OTOLARYNGOLOGY

## 2019-08-06 PROCEDURE — 1036F TOBACCO NON-USER: CPT | Performed by: OTOLARYNGOLOGY

## 2019-08-06 PROCEDURE — 4040F PNEUMOC VAC/ADMIN/RCVD: CPT | Performed by: OTOLARYNGOLOGY

## 2019-08-06 ASSESSMENT — ENCOUNTER SYMPTOMS
FACIAL SWELLING: 0
RHINORRHEA: 0
TROUBLE SWALLOWING: 0
EYES NEGATIVE: 1
ALLERGIC/IMMUNOLOGIC NEGATIVE: 1
RESPIRATORY NEGATIVE: 1
VOICE CHANGE: 0
SINUS PAIN: 0
SINUS PRESSURE: 0
SORE THROAT: 0

## 2019-08-19 ENCOUNTER — HOSPITAL ENCOUNTER (OUTPATIENT)
Dept: ULTRASOUND IMAGING | Age: 64
Discharge: HOME | End: 2019-08-19
Payer: COMMERCIAL

## 2019-08-19 ENCOUNTER — HOSPITAL ENCOUNTER (OUTPATIENT)
Dept: VASCULAR LAB | Age: 64
Discharge: HOME | End: 2019-08-19
Payer: COMMERCIAL

## 2019-08-19 ENCOUNTER — HOSPITAL ENCOUNTER (OUTPATIENT)
Age: 64
Discharge: HOME | End: 2019-08-19
Payer: COMMERCIAL

## 2019-08-19 DIAGNOSIS — N17.9 AKI (ACUTE KIDNEY INJURY) (HCC): ICD-10-CM

## 2019-08-19 DIAGNOSIS — I10 ESSENTIAL HYPERTENSION: ICD-10-CM

## 2019-08-19 LAB
24HR URINE VOLUME (ML): 500 ML
A/G RATIO: 2 (ref 1.1–2.2)
ALBUMIN SERPL-MCNC: 4.7 G/DL (ref 3.4–5)
ALP BLD-CCNC: 117 U/L (ref 40–129)
ALT SERPL-CCNC: 17 U/L (ref 10–40)
ANION GAP SERPL CALCULATED.3IONS-SCNC: 18 MMOL/L (ref 3–16)
AST SERPL-CCNC: 20 U/L (ref 15–37)
BILIRUB SERPL-MCNC: 0.5 MG/DL (ref 0–1)
BUN BLDV-MCNC: 18 MG/DL (ref 7–20)
C3 COMPLEMENT: 165.7 MG/DL (ref 90–180)
C4 COMPLEMENT: 30.6 MG/DL (ref 10–40)
CALCIUM SERPL-MCNC: 10 MG/DL (ref 8.3–10.6)
CHLORIDE BLD-SCNC: 100 MMOL/L (ref 99–110)
CO2: 24 MMOL/L (ref 21–32)
CREAT SERPL-MCNC: 1.5 MG/DL (ref 0.6–1.2)
CREAT SERPL-MCNC: 1.5 MG/DL (ref 0.6–1.2)
CREATININE 24 HOUR URINE: 1 G/24HR (ref 0.6–1.5)
CREATININE CLEARANCE: 42 ML/MIN (ref 88–128)
CREATININE URINE: 277.2 MG/DL (ref 28–259)
GFR AFRICAN AMERICAN: 42
GFR NON-AFRICAN AMERICAN: 35
GLOBULIN: 2.4 G/DL
GLUCOSE BLD-MCNC: 166 MG/DL (ref 70–99)
HCT VFR BLD CALC: 38.9 % (ref 36–48)
HEMOGLOBIN: 12.9 G/DL (ref 12–16)
Lab: 24 HOURS
Lab: 24 HR
MCH RBC QN AUTO: 28.8 PG (ref 26–34)
MCHC RBC AUTO-ENTMCNC: 33.1 G/DL (ref 31–36)
MCV RBC AUTO: 87.2 FL (ref 80–100)
PDW BLD-RTO: 13.2 % (ref 12.4–15.4)
PHOSPHORUS: 4 MG/DL (ref 2.5–4.9)
PLATELET # BLD: 353 K/UL (ref 135–450)
PMV BLD AUTO: 9.2 FL (ref 5–10.5)
POTASSIUM SERPL-SCNC: 4.8 MMOL/L (ref 3.5–5.1)
PROTEIN 24 HOUR URINE: 0.07 G/24HR
PROTEIN PROTEIN: 28 MG/DL
RBC # BLD: 4.46 M/UL (ref 4–5.2)
SODIUM BLD-SCNC: 142 MMOL/L (ref 136–145)
WBC # BLD: 5.6 K/UL (ref 4–11)

## 2019-08-19 PROCEDURE — 85027 COMPLETE CBC AUTOMATED: CPT

## 2019-08-19 PROCEDURE — 84155 ASSAY OF PROTEIN SERUM: CPT

## 2019-08-19 PROCEDURE — 84165 PROTEIN E-PHORESIS SERUM: CPT

## 2019-08-19 PROCEDURE — 36415 COLL VENOUS BLD VENIPUNCTURE: CPT

## 2019-08-19 PROCEDURE — 80053 COMPREHEN METABOLIC PANEL: CPT

## 2019-08-19 PROCEDURE — 86160 COMPLEMENT ANTIGEN: CPT

## 2019-08-19 PROCEDURE — 93975 VASCULAR STUDY: CPT

## 2019-08-19 PROCEDURE — 82575 CREATININE CLEARANCE TEST: CPT

## 2019-08-19 PROCEDURE — 76770 US EXAM ABDO BACK WALL COMP: CPT

## 2019-08-19 PROCEDURE — 84100 ASSAY OF PHOSPHORUS: CPT

## 2019-08-19 PROCEDURE — 82570 ASSAY OF URINE CREATININE: CPT

## 2019-08-19 PROCEDURE — 84156 ASSAY OF PROTEIN URINE: CPT

## 2019-08-21 LAB
ALBUMIN SERPL-MCNC: 3.9 G/DL (ref 3.1–4.9)
ALPHA-1-GLOBULIN: 0.2 G/DL (ref 0.2–0.4)
ALPHA-2-GLOBULIN: 0.9 G/DL (ref 0.4–1.1)
BETA GLOBULIN: 1.2 G/DL (ref 0.9–1.6)
GAMMA GLOBULIN: 0.9 G/DL (ref 0.6–1.8)
SPE/IFE INTERPRETATION: NORMAL
TOTAL PROTEIN: 7.1 G/DL (ref 6.4–8.2)

## 2019-08-27 ENCOUNTER — OFFICE VISIT (OUTPATIENT)
Dept: INTERNAL MEDICINE CLINIC | Age: 64
End: 2019-08-27
Payer: COMMERCIAL

## 2019-08-27 VITALS
WEIGHT: 200 LBS | HEIGHT: 64 IN | DIASTOLIC BLOOD PRESSURE: 72 MMHG | HEART RATE: 88 BPM | BODY MASS INDEX: 34.15 KG/M2 | SYSTOLIC BLOOD PRESSURE: 124 MMHG

## 2019-08-27 DIAGNOSIS — E11.42 TYPE 2 DIABETES MELLITUS WITH DIABETIC POLYNEUROPATHY, WITH LONG-TERM CURRENT USE OF INSULIN (HCC): Primary | ICD-10-CM

## 2019-08-27 DIAGNOSIS — R25.1 TREMOR: ICD-10-CM

## 2019-08-27 DIAGNOSIS — Z79.4 TYPE 2 DIABETES MELLITUS WITH DIABETIC POLYNEUROPATHY, WITH LONG-TERM CURRENT USE OF INSULIN (HCC): Primary | ICD-10-CM

## 2019-08-27 DIAGNOSIS — E78.2 MIXED HYPERLIPIDEMIA: ICD-10-CM

## 2019-08-27 DIAGNOSIS — N18.30 STAGE 3 CHRONIC KIDNEY DISEASE (HCC): ICD-10-CM

## 2019-08-27 DIAGNOSIS — I10 ESSENTIAL HYPERTENSION: ICD-10-CM

## 2019-08-27 PROCEDURE — 1036F TOBACCO NON-USER: CPT | Performed by: INTERNAL MEDICINE

## 2019-08-27 PROCEDURE — 1090F PRES/ABSN URINE INCON ASSESS: CPT | Performed by: INTERNAL MEDICINE

## 2019-08-27 PROCEDURE — 4040F PNEUMOC VAC/ADMIN/RCVD: CPT | Performed by: INTERNAL MEDICINE

## 2019-08-27 PROCEDURE — 1123F ACP DISCUSS/DSCN MKR DOCD: CPT | Performed by: INTERNAL MEDICINE

## 2019-08-27 PROCEDURE — 3045F PR MOST RECENT HEMOGLOBIN A1C LEVEL 7.0-9.0%: CPT | Performed by: INTERNAL MEDICINE

## 2019-08-27 PROCEDURE — 3017F COLORECTAL CA SCREEN DOC REV: CPT | Performed by: INTERNAL MEDICINE

## 2019-08-27 PROCEDURE — G8400 PT W/DXA NO RESULTS DOC: HCPCS | Performed by: INTERNAL MEDICINE

## 2019-08-27 PROCEDURE — G8427 DOCREV CUR MEDS BY ELIG CLIN: HCPCS | Performed by: INTERNAL MEDICINE

## 2019-08-27 PROCEDURE — 99214 OFFICE O/P EST MOD 30 MIN: CPT | Performed by: INTERNAL MEDICINE

## 2019-08-27 PROCEDURE — 2022F DILAT RTA XM EVC RTNOPTHY: CPT | Performed by: INTERNAL MEDICINE

## 2019-08-27 PROCEDURE — G8417 CALC BMI ABV UP PARAM F/U: HCPCS | Performed by: INTERNAL MEDICINE

## 2019-08-27 NOTE — PROGRESS NOTES
input.    - Radha Venegas MD, Neurology, [facility]    5. Stage 3 chronic kidney disease ([facility]  She is asymptomatic. She is seeing nephrology. Continue risk factor modification.     RTO 4 months

## 2019-08-28 LAB
ESTIMATED AVERAGE GLUCOSE: 162.8 MG/DL
HBA1C MFR BLD: 7.3 %

## 2019-09-09 RX ORDER — INSULIN GLARGINE 100 [IU]/ML
INJECTION, SOLUTION SUBCUTANEOUS
Qty: 15 PEN | Refills: 2 | Status: SHIPPED | OUTPATIENT
Start: 2019-09-09 | End: 2020-01-30 | Stop reason: SDUPTHER

## 2019-09-16 RX ORDER — LIRAGLUTIDE 6 MG/ML
INJECTION SUBCUTANEOUS
Qty: 9 PEN | Refills: 0 | Status: SHIPPED | OUTPATIENT
Start: 2019-09-16 | End: 2020-03-30

## 2019-10-16 RX ORDER — LIRAGLUTIDE 6 MG/ML
INJECTION SUBCUTANEOUS
Qty: 9 PEN | Refills: 0 | Status: SHIPPED | OUTPATIENT
Start: 2019-10-16 | End: 2019-12-12 | Stop reason: SDUPTHER

## 2019-11-10 DIAGNOSIS — F51.01 PRIMARY INSOMNIA: ICD-10-CM

## 2019-11-11 RX ORDER — ZOLPIDEM TARTRATE 5 MG/1
TABLET ORAL
Qty: 30 TABLET | Refills: 1 | Status: SHIPPED | OUTPATIENT
Start: 2019-11-11 | End: 2020-02-24

## 2019-12-09 RX ORDER — DULOXETIN HYDROCHLORIDE 60 MG/1
CAPSULE, DELAYED RELEASE ORAL
Qty: 30 CAPSULE | Refills: 5 | Status: SHIPPED | OUTPATIENT
Start: 2019-12-09 | End: 2020-06-18

## 2019-12-09 RX ORDER — LISINOPRIL 20 MG/1
TABLET ORAL
Qty: 30 TABLET | Refills: 0 | OUTPATIENT
Start: 2019-12-09

## 2020-01-07 RX ORDER — LISINOPRIL 20 MG/1
TABLET ORAL
Qty: 30 TABLET | Refills: 5 | Status: SHIPPED | OUTPATIENT
Start: 2020-01-07 | End: 2020-07-15

## 2020-01-30 ENCOUNTER — OFFICE VISIT (OUTPATIENT)
Dept: ENDOCRINOLOGY | Age: 65
End: 2020-01-30
Payer: MEDICARE

## 2020-01-30 VITALS
WEIGHT: 200 LBS | BODY MASS INDEX: 34.15 KG/M2 | SYSTOLIC BLOOD PRESSURE: 120 MMHG | HEART RATE: 82 BPM | DIASTOLIC BLOOD PRESSURE: 70 MMHG | OXYGEN SATURATION: 99 % | HEIGHT: 64 IN

## 2020-01-30 PROCEDURE — 99214 OFFICE O/P EST MOD 30 MIN: CPT | Performed by: INTERNAL MEDICINE

## 2020-01-30 PROCEDURE — 95251 CONT GLUC MNTR ANALYSIS I&R: CPT | Performed by: INTERNAL MEDICINE

## 2020-01-30 RX ORDER — ATORVASTATIN CALCIUM 20 MG/1
TABLET, FILM COATED ORAL
Qty: 30 TABLET | Refills: 5 | Status: SHIPPED | OUTPATIENT
Start: 2020-01-30 | End: 2020-06-29 | Stop reason: SDUPTHER

## 2020-01-30 RX ORDER — FLASH GLUCOSE SENSOR
KIT MISCELLANEOUS
Qty: 6 EACH | Refills: 1 | Status: SHIPPED | OUTPATIENT
Start: 2020-01-30 | End: 2020-09-21

## 2020-01-30 RX ORDER — PEN NEEDLE, DIABETIC 31 GX5/16"
1 NEEDLE, DISPOSABLE MISCELLANEOUS 4 TIMES DAILY
Qty: 100 EACH | Refills: 2 | Status: SHIPPED | OUTPATIENT
Start: 2020-01-30 | End: 2020-09-21

## 2020-01-30 RX ORDER — INSULIN LISPRO 100 [IU]/ML
INJECTION, SOLUTION INTRAVENOUS; SUBCUTANEOUS
Qty: 5 PEN | Refills: 3 | Status: CANCELLED | OUTPATIENT
Start: 2020-01-30

## 2020-01-30 ASSESSMENT — ENCOUNTER SYMPTOMS: VISUAL CHANGE: 0

## 2020-01-30 NOTE — PROGRESS NOTES
of fingerstick glucose for the last 2 to 3 weeks she has been back on Victoza and has noted improvement in her fingerstick glucose  She has been using 21 units of basaglar   According to the  she tends to graze throughout on junk food during the day and does not eat a proper meal    Weight trend: stable  Prior visit with dietician: no  Current diet: on average, 3 meals per day  Current exercise: rare    She was in the ER after K was 6.2 but repeat in ER was normal in April 2019  She denies any nausea vomiting she tends to drink plenty of water any ways    Patient has hyperlipidemia and is on Lipitor her last LDL was at target  Patient also has arthritis and takes Celebrex off-and-on which can also cause kidney damage and also has reflux and is on omeprazole    Past Medical History:   Diagnosis Date    Asthma     vocal fold     Depression     Diabetes mellitus ([address]     Hyperlipidemia     Hypertension     Hypothyroidism     Obesity     Osteoarthritis     Paradoxical vocal fold motion disorder     Postmenopausal 5/15/2018    Stage 2 chronic kidney disease 5/7/2019      Patient Active Problem List   Diagnosis    Asthma    HTN (hypertension)    Diabetes mellitus ([address]    Hyperlipidemia    Insomnia    Depression    Neuropathic pain    Type 2 diabetes mellitus with polyneuropathy ([address]    Moderate episode of recurrent major depressive disorder ([address]    Diabetic polyneuropathy associated with type 2 diabetes mellitus ([address]    Exogenous obesity    Spinal stenosis of lumbar region with neurogenic claudication    Postmenopausal    Stage 3 chronic kidney disease ([address]    Tremor     Past Surgical History:   Procedure Laterality Date    CARPAL TUNNEL RELEASE      RORY    HYSTERECTOMY, TOTAL ABDOMINAL      Age 37    [place] INJECT ANES/STEROID FORAMEN LUMBAR/SACRAL W IMG GUIDE ,1 LEVEL Bilateral 12/3/2018    BILATERAL L4 LUMBAR TRANSFORAMINAL EPIDURAL STEROID INJECTION WITH FLUOROSCOPY performed by daily 100 each 2    atorvastatin (LIPITOR) 20 MG tablet 1 tab daily 30 tablet 5    insulin aspart (NOVOLOG) 100 UNIT/ML injection vial 10 units TID 1 vial 3    lisinopril (PRINIVIL;ZESTRIL) 20 MG tablet TAKE ONE TABLET BY MOUTH DAILY 30 tablet 5    Liraglutide (VICTOZA) 18 MG/3ML SOPN SC injection DIAL AND INJECT SUBCUTANEOUSLY 1.8MG DAILY 9 pen 1    DULoxetine (CYMBALTA) 60 MG extended release capsule TAKE ONE CAPSULE BY MOUTH DAILY 30 capsule 5    celecoxib (CELEBREX) 200 MG capsule TAKE ONE CAPSULE BY MOUTH TWICE A DAY AS NEEDED FOR PAIN 60 capsule 3    VICTOZA 18 MG/3ML SOPN SC injection DIAL AND INJECT SUBCUTANEOUSLY 1.8MG DAILY 9 pen 0    omeprazole (PRILOSEC) 40 MG delayed release capsule TAKE ONE CAPSULE BY MOUTH DAILY 30 capsule 5    HUMALOG KWIKPEN 100 UNIT/ML pen Inject 10 units TID (Patient taking differently: 1-2 Units Indications: daily Inject 10 units TID) 5 pen 3    Liraglutide (VICTOZA) 18 MG/3ML SOPN SC injection Inject 1.8 mg into the skin daily      buPROPion (WELLBUTRIN SR) 150 MG extended release tablet TAKE ONE TABLET BY MOUTH TWICE A DAY 60 tablet 5    cyclobenzaprine (FLEXERIL) 10 MG tablet TAKE ONE TABLET BY MOUTH ONCE NIGHTLY AS NEEDED FOR MUSCLE SPASMS 30 tablet 5    Continuous Blood Gluc  (FREESTYLE JEREMIAS READER) ENE Use for checking glucose 1 Device 1    fluticasone (FLONASE) 50 MCG/ACT nasal spray 2 sprays by Nasal route daily 3 Bottle 3    gabapentin (NEURONTIN) 300 MG capsule TAKE ONE CAPSULE BY MOUTH THREE TIMES A DAY (Patient taking differently: 2 times daily. TAKE ONE CAPSULE BY MOUTH THREE TIMES A DAY) 90 capsule 5     No current facility-administered medications for this visit.       Allergies   Allergen Reactions    Actos [Pioglitazone Hydrochloride]      Causes mouth blisters    Albuterol      YEAST INFECTION IN MOUTH    Dilaudid [Hydromorphone Hcl]     Fluticasone-Salmeterol      YEAST INFECTIONS IN MOUTH    Glyburide      Causes mouth blisters    breakfast hyper glycemia is noted  --10AM to 5 PM : No hypoglycemia observed during this time.   - 5   PM to 8 PM: Post meal hyperglycemia is noted       Average reading 153 mg/dL     Standard Dev 43 mg/dL   % of time <70 mg/dL 0 %   % of time >180  mg/dL 23 %   % of time within range 77 %  Number of hypoglycemia episodes noted: 0     Impression:   CGMS shows overall hyperglycemia and postmeal glycemic excursions noted     Recommendation:      Patient was advised to make the following changes in her diabetic regimen  She was advised to increase her long-acting insulin by 2 units  She will start taking meal boluses according to the carb to insulin ratio of 10-1  Prescription given for insulin

## 2020-01-31 ENCOUNTER — TELEPHONE (OUTPATIENT)
Dept: ENDOCRINOLOGY | Age: 65
End: 2020-01-31

## 2020-01-31 NOTE — TELEPHONE ENCOUNTER
Pt called and states she was seen yesterday and her Deo Embs is not covered but it was sent to the pharmacy anyway  # [phone]

## 2020-02-03 ENCOUNTER — TELEPHONE (OUTPATIENT)
Dept: ENDOCRINOLOGY | Age: 65
End: 2020-02-03

## 2020-02-03 NOTE — TELEPHONE ENCOUNTER
[facility] called needs a new order for the patients Chest pain stress test with new diagnosis codes. The order in the system does not qualify for Medical necessity and Medicare will not cover it and is making the pt pay out of pocket for it.  Once the new order is in the system the pt just needs to know so she can schedule for it

## 2020-02-04 RX ORDER — BUPROPION HYDROCHLORIDE 150 MG/1
TABLET, EXTENDED RELEASE ORAL
Qty: 60 TABLET | Refills: 5 | Status: SHIPPED
Start: 2020-02-04 | End: 2020-06-17 | Stop reason: ALTCHOICE

## 2020-02-06 RX ORDER — GABAPENTIN 300 MG/1
300 CAPSULE ORAL 2 TIMES DAILY
Qty: 90 CAPSULE | Refills: 3 | Status: SHIPPED | OUTPATIENT
Start: 2020-02-06 | End: 2020-08-31

## 2020-02-10 RX ORDER — CELECOXIB 200 MG/1
CAPSULE ORAL
Qty: 60 CAPSULE | Refills: 5 | Status: SHIPPED | OUTPATIENT
Start: 2020-02-10 | End: 2020-09-21

## 2020-02-24 RX ORDER — ZOLPIDEM TARTRATE 5 MG/1
TABLET ORAL
Qty: 30 TABLET | Refills: 1 | Status: SHIPPED | OUTPATIENT
Start: 2020-02-24 | End: 2020-08-17

## 2020-02-25 ENCOUNTER — TELEPHONE (OUTPATIENT)
Dept: ENDOCRINOLOGY | Age: 65
End: 2020-02-25

## 2020-03-02 ENCOUNTER — NURSE TRIAGE (OUTPATIENT)
Dept: OTHER | Facility: CLINIC | Age: 65
End: 2020-03-02

## 2020-03-02 NOTE — TELEPHONE ENCOUNTER
Reason for Disposition   Shoulder pains with exertion (e.g., walking) and pain goes away on resting and not present now    Protocols used: SHOULDER PAIN-ADULT-[place]    Received call from [facility] in [facility]. Patient is reporting the followin). Pain/pressure/discomfort for the past few months in the left shoulder area with radiation to the left arm at times. The pain is intermittent in nature. She is NOT currently having the pain. 2). Dizziness that is intermittent in nature and not currently occurring. When she has dizziness she is noticing a tingle down her right arm that seems worse at night. She has been having the dizziness for the past month. Patient with a history of HTN. Protocol recommendation to be seen today- patient refusing to be evaluated in the ED or UCC and would like an appointment with her PCP. Care advice shared. Call soft transferred to [facility] in [facility] to schedule appointment.

## 2020-03-03 ENCOUNTER — OFFICE VISIT (OUTPATIENT)
Dept: INTERNAL MEDICINE CLINIC | Age: 65
End: 2020-03-03
Payer: MEDICARE

## 2020-03-03 VITALS
SYSTOLIC BLOOD PRESSURE: 126 MMHG | HEIGHT: 64 IN | HEART RATE: 84 BPM | DIASTOLIC BLOOD PRESSURE: 80 MMHG | WEIGHT: 195.6 LBS | BODY MASS INDEX: 33.39 KG/M2

## 2020-03-03 PROCEDURE — G8484 FLU IMMUNIZE NO ADMIN: HCPCS | Performed by: INTERNAL MEDICINE

## 2020-03-03 PROCEDURE — 1123F ACP DISCUSS/DSCN MKR DOCD: CPT | Performed by: INTERNAL MEDICINE

## 2020-03-03 PROCEDURE — 4040F PNEUMOC VAC/ADMIN/RCVD: CPT | Performed by: INTERNAL MEDICINE

## 2020-03-03 PROCEDURE — 99213 OFFICE O/P EST LOW 20 MIN: CPT | Performed by: INTERNAL MEDICINE

## 2020-03-03 PROCEDURE — G8427 DOCREV CUR MEDS BY ELIG CLIN: HCPCS | Performed by: INTERNAL MEDICINE

## 2020-03-03 PROCEDURE — 3017F COLORECTAL CA SCREEN DOC REV: CPT | Performed by: INTERNAL MEDICINE

## 2020-03-03 PROCEDURE — 1036F TOBACCO NON-USER: CPT | Performed by: INTERNAL MEDICINE

## 2020-03-03 PROCEDURE — G8400 PT W/DXA NO RESULTS DOC: HCPCS | Performed by: INTERNAL MEDICINE

## 2020-03-03 PROCEDURE — G8417 CALC BMI ABV UP PARAM F/U: HCPCS | Performed by: INTERNAL MEDICINE

## 2020-03-03 PROCEDURE — 1090F PRES/ABSN URINE INCON ASSESS: CPT | Performed by: INTERNAL MEDICINE

## 2020-03-03 PROCEDURE — 93000 ELECTROCARDIOGRAM COMPLETE: CPT | Performed by: INTERNAL MEDICINE

## 2020-03-03 NOTE — PROGRESS NOTES
CC: Left shoulder pain  HPI:  The patient is presenting with left shoulder pain. Location: left shoulder  Quality: ache   Onset: 9 months ago  Radiation: to the upper arm and neck   Aggravating factors: picking something up  Alleviating factors :aspirin 325mg up to 3 times a day  Severity: 6/10  Associated symptoms: weakness      ROS:  Neg for arm numbness  Neg for chest pain    EXAM:  /80 (Site: Left Upper Arm, Position: Sitting, Cuff Size: Large Adult)   Pulse 84   Ht 5' 4\" (1.626 m)   Wt 195 lb 9.6 oz (88.7 kg)   BMI 33.57 kg/m²    General: Well-nourished and developed  MSK: There is tenderness upon palpation of the proximal biceps tendon and the subacromial space. She has full range of motion about the left shoulder. She develops pain with 90 degrees abduction. There is weakness of the left shoulder with the empty can test.  There is pain with external rotation against resistance. Speeds test is positive for pain in the anterior shoulder. Neuro: 5 out of 5 motor function in the bilateral upper extremities, sensory function intact light touch in the bilateral upper extremities      EKG done in the office today shows NSR, rate 84 bpm, no ST or T wave abnormalities  Impression: WNL    A/P  1. Chronic left shoulder pain  The patient is presenting with progressive chronic left shoulder pain that is consistent with musculoskeletal pain. EKG is within normal limits. Based on her symptoms and examination a cardiac etiology is unlikely and additional cardiac testing is not indicated based on this symptom. I suspect rotator cuff tendinopathy with impingement syndrome and/or proximal biceps tendinitis. I have recommended a trial of conservative management to include NSAIDs and physical therapy. She is agreeable.   We discussed that if she does not improve with PT we will need to consider imaging and orthopedic referral.  - EKG Bianka Reed

## 2020-03-10 NOTE — TELEPHONE ENCOUNTER
Letter in mail from [facility]. Insulin Aspart 100u is not on formulary.  Pt has received a temporary supply

## 2020-03-11 NOTE — TELEPHONE ENCOUNTER
Please advise? Does this need to change if not on formulary? Please advise for new script if needed.

## 2020-03-25 RX ORDER — INSULIN ASPART 100 [IU]/ML
10 INJECTION, SOLUTION INTRAVENOUS; SUBCUTANEOUS
Qty: 5 PEN | Refills: 3 | Status: SHIPPED | OUTPATIENT
Start: 2020-03-25 | End: 2021-03-29

## 2020-03-25 RX ORDER — INSULIN GLARGINE 300 U/ML
21 INJECTION, SOLUTION SUBCUTANEOUS DAILY
Qty: 15 PEN | Refills: 2 | Status: SHIPPED | OUTPATIENT
Start: 2020-03-25 | End: 2021-03-29

## 2020-03-25 NOTE — TELEPHONE ENCOUNTER
Dose: 10 units TID  Strength: 100 units/mL  Route: Subcutaneous         Dose: 21 units QD  Strength: 300 units/mL  Route: Subcutaneous

## 2020-03-30 RX ORDER — LIRAGLUTIDE 6 MG/ML
INJECTION SUBCUTANEOUS
Qty: 2 PEN | Refills: 0 | Status: SHIPPED | OUTPATIENT
Start: 2020-03-30 | End: 2020-06-22

## 2020-03-30 NOTE — TELEPHONE ENCOUNTER
Medication:   Requested Prescriptions     Pending Prescriptions Disp Refills    VICTOZA 18 MG/3ML SOPN SC injection [Pharmacy Med Name: Jefferson Maria 3-ILDEFONSO 18 MG/3 ML PEN] 2 pen 0     Sig: Lizz Faizan 1.8MG DAILY **MUST CALL MD FOR APPOINTMENT         Last appt: 01/30/2020  Next appt: Visit date not found    Last OARRS:   RX Monitoring 10/16/2019   Attestation -   Periodic Controlled Substance Monitoring Possible medication side effects, risk of tolerance/dependence & alternative treatments discussed. ;No signs of potential drug abuse or diversion identified.

## 2020-04-22 RX ORDER — OMEPRAZOLE 40 MG/1
CAPSULE, DELAYED RELEASE ORAL
Qty: 30 CAPSULE | Refills: 4 | Status: SHIPPED | OUTPATIENT
Start: 2020-04-22 | End: 2020-09-21

## 2020-06-16 ENCOUNTER — TELEPHONE (OUTPATIENT)
Dept: INTERNAL MEDICINE CLINIC | Age: 65
End: 2020-06-16

## 2020-06-16 NOTE — TELEPHONE ENCOUNTER
Patient requesting a medication refill.   Medication: Wellbutrin ( requesting it to be once a day 300 mg instead )  Pharmacy:[facility]  Last office visit: 06-  Next office visit: 6/30/2020

## 2020-06-17 RX ORDER — BUPROPION HYDROCHLORIDE 300 MG/1
300 TABLET ORAL EVERY MORNING
Qty: 30 TABLET | Refills: 5 | Status: SHIPPED | OUTPATIENT
Start: 2020-06-17 | End: 2020-12-14 | Stop reason: SDUPTHER

## 2020-06-18 RX ORDER — DULOXETIN HYDROCHLORIDE 60 MG/1
CAPSULE, DELAYED RELEASE ORAL
Qty: 30 CAPSULE | Refills: 2 | Status: SHIPPED | OUTPATIENT
Start: 2020-06-18 | End: 2020-09-14

## 2020-06-29 RX ORDER — ATORVASTATIN CALCIUM 20 MG/1
TABLET, FILM COATED ORAL
Qty: 90 TABLET | Refills: 1 | Status: SHIPPED | OUTPATIENT
Start: 2020-06-29 | End: 2020-06-29

## 2020-06-29 NOTE — TELEPHONE ENCOUNTER
Fax from [address] 90 day refill request for Atorvastatin 20mg    LOV   1-30-20  FOV    none

## 2020-06-30 ENCOUNTER — TELEMEDICINE (OUTPATIENT)
Dept: INTERNAL MEDICINE CLINIC | Age: 65
End: 2020-06-30
Payer: MEDICARE

## 2020-06-30 PROCEDURE — 1090F PRES/ABSN URINE INCON ASSESS: CPT | Performed by: INTERNAL MEDICINE

## 2020-06-30 PROCEDURE — 3017F COLORECTAL CA SCREEN DOC REV: CPT | Performed by: INTERNAL MEDICINE

## 2020-06-30 PROCEDURE — 99214 OFFICE O/P EST MOD 30 MIN: CPT | Performed by: INTERNAL MEDICINE

## 2020-06-30 PROCEDURE — 4040F PNEUMOC VAC/ADMIN/RCVD: CPT | Performed by: INTERNAL MEDICINE

## 2020-06-30 PROCEDURE — G8427 DOCREV CUR MEDS BY ELIG CLIN: HCPCS | Performed by: INTERNAL MEDICINE

## 2020-06-30 PROCEDURE — G8417 CALC BMI ABV UP PARAM F/U: HCPCS | Performed by: INTERNAL MEDICINE

## 2020-06-30 PROCEDURE — 2022F DILAT RTA XM EVC RTNOPTHY: CPT | Performed by: INTERNAL MEDICINE

## 2020-06-30 PROCEDURE — 3046F HEMOGLOBIN A1C LEVEL >9.0%: CPT | Performed by: INTERNAL MEDICINE

## 2020-06-30 PROCEDURE — G8400 PT W/DXA NO RESULTS DOC: HCPCS | Performed by: INTERNAL MEDICINE

## 2020-06-30 PROCEDURE — 1123F ACP DISCUSS/DSCN MKR DOCD: CPT | Performed by: INTERNAL MEDICINE

## 2020-06-30 PROCEDURE — 1036F TOBACCO NON-USER: CPT | Performed by: INTERNAL MEDICINE

## 2020-06-30 ASSESSMENT — PATIENT HEALTH QUESTIONNAIRE - PHQ9
1. LITTLE INTEREST OR PLEASURE IN DOING THINGS: 1
SUM OF ALL RESPONSES TO PHQ QUESTIONS 1-9: 2
SUM OF ALL RESPONSES TO PHQ9 QUESTIONS 1 & 2: 2
SUM OF ALL RESPONSES TO PHQ QUESTIONS 1-9: 2
2. FEELING DOWN, DEPRESSED OR HOPELESS: 1

## 2020-06-30 NOTE — PROGRESS NOTES
05/06/2019    Yen Hall see below 04/26/2019     Lab Results   Component Value Date    LABVLDL 43 05/20/2019    LABVLDL 50 05/06/2019    LABVLDL see below 04/26/2019     No results found for: DARLENE     A/P  1. Type 2 diabetes mellitus with diabetic polyneuropathy, with long-term current use of insulin ([address]  He reports reasonable glycemic control at home. She occasionally needs a fourth dose of gabapentin to control neuropathic symptoms. She was advised that this is okay. She will continue insulin and Victoza. She will come into the office for blood work. - Hemoglobin A1C    2. Essential hypertension  Chronic, well controlled. Blood work ordered. Continue current medications. - Renal Function Panel    3. Mixed hyperlipidemia  Chronic, stable. Continue atorvastatin. - Lipid Panel    4. Recurrent major depressive disorder, in full remission (HCC)  Chronic, well controlled. Continue Cymbalta and Wellbutrin. 5.  She is overdue for colon cancer screening. She has FIT supplies at home. She was encouraged to complete this test.    RTO 4 months for annual wellness visit               Tammy Smyth is a 75 y.o. female being evaluated by a Virtual Visit (video visit) encounter to address concerns as mentioned above. A caregiver was present when appropriate. Due to this being a TeleHealth encounter (During KKZFB-79 public health emergency), evaluation of the following organ systems was limited: Vitals/Constitutional/EENT/Resp/CV/GI//MS/Neuro/Skin/Heme-Lymph-Imm. Pursuant to the emergency declaration under the [facility], [address] [place] [address] authority and the [employer] Act, this Virtual Visit was conducted with patient's (and/or legal guardian's) consent, to reduce the patient's risk of exposure to COVID-19 and provide necessary medical care.   The patient (and/or legal guardian) has also been advised to contact this

## 2020-07-06 ENCOUNTER — OFFICE VISIT (OUTPATIENT)
Dept: NEUROLOGY | Age: 65
End: 2020-07-06
Payer: MEDICARE

## 2020-07-06 ENCOUNTER — HOSPITAL ENCOUNTER (OUTPATIENT)
Age: 65
Discharge: HOME | End: 2020-07-06
Payer: MEDICARE

## 2020-07-06 VITALS
HEIGHT: 64 IN | WEIGHT: 188 LBS | BODY MASS INDEX: 32.1 KG/M2 | DIASTOLIC BLOOD PRESSURE: 79 MMHG | HEART RATE: 92 BPM | SYSTOLIC BLOOD PRESSURE: 143 MMHG

## 2020-07-06 LAB
A/G RATIO: 1.4 (ref 1.1–2.2)
ALBUMIN SERPL-MCNC: 4.3 G/DL (ref 3.4–5)
ALP BLD-CCNC: 131 U/L (ref 40–129)
ALT SERPL-CCNC: 12 U/L (ref 10–40)
ANION GAP SERPL CALCULATED.3IONS-SCNC: 14 MMOL/L (ref 3–16)
AST SERPL-CCNC: 12 U/L (ref 15–37)
BILIRUB SERPL-MCNC: 0.5 MG/DL (ref 0–1)
BUN BLDV-MCNC: 20 MG/DL (ref 7–20)
CALCIUM SERPL-MCNC: 9.5 MG/DL (ref 8.3–10.6)
CHLORIDE BLD-SCNC: 101 MMOL/L (ref 99–110)
CHOLESTEROL, TOTAL: 170 MG/DL (ref 0–199)
CO2: 23 MMOL/L (ref 21–32)
CREAT SERPL-MCNC: 1.5 MG/DL (ref 0.6–1.2)
GFR AFRICAN AMERICAN: 42
GFR NON-AFRICAN AMERICAN: 35
GLOBULIN: 3 G/DL
GLUCOSE BLD-MCNC: 220 MG/DL (ref 70–99)
HCT VFR BLD CALC: 40.9 % (ref 36–48)
HDLC SERPL-MCNC: 52 MG/DL (ref 40–60)
HEMOGLOBIN: 13.5 G/DL (ref 12–16)
LDL CHOLESTEROL CALCULATED: 72 MG/DL
MCH RBC QN AUTO: 29.2 PG (ref 26–34)
MCHC RBC AUTO-ENTMCNC: 32.9 G/DL (ref 31–36)
MCV RBC AUTO: 88.9 FL (ref 80–100)
PDW BLD-RTO: 13.1 % (ref 12.4–15.4)
PHOSPHORUS: 4.7 MG/DL (ref 2.5–4.9)
PLATELET # BLD: 409 K/UL (ref 135–450)
PMV BLD AUTO: 9.5 FL (ref 5–10.5)
POTASSIUM SERPL-SCNC: 5.1 MMOL/L (ref 3.5–5.1)
RBC # BLD: 4.6 M/UL (ref 4–5.2)
SODIUM BLD-SCNC: 138 MMOL/L (ref 136–145)
TOTAL PROTEIN: 7.3 G/DL (ref 6.4–8.2)
TRIGL SERPL-MCNC: 229 MG/DL (ref 0–150)
TSH SERPL DL<=0.05 MIU/L-ACNC: 1.5 UIU/ML (ref 0.27–4.2)
VLDLC SERPL CALC-MCNC: 46 MG/DL
WBC # BLD: 7.8 K/UL (ref 4–11)

## 2020-07-06 PROCEDURE — 1090F PRES/ABSN URINE INCON ASSESS: CPT | Performed by: PSYCHIATRY & NEUROLOGY

## 2020-07-06 PROCEDURE — 1036F TOBACCO NON-USER: CPT | Performed by: PSYCHIATRY & NEUROLOGY

## 2020-07-06 PROCEDURE — G8400 PT W/DXA NO RESULTS DOC: HCPCS | Performed by: PSYCHIATRY & NEUROLOGY

## 2020-07-06 PROCEDURE — 1123F ACP DISCUSS/DSCN MKR DOCD: CPT | Performed by: PSYCHIATRY & NEUROLOGY

## 2020-07-06 PROCEDURE — 83036 HEMOGLOBIN GLYCOSYLATED A1C: CPT

## 2020-07-06 PROCEDURE — 99215 OFFICE O/P EST HI 40 MIN: CPT | Performed by: PSYCHIATRY & NEUROLOGY

## 2020-07-06 PROCEDURE — 4040F PNEUMOC VAC/ADMIN/RCVD: CPT | Performed by: PSYCHIATRY & NEUROLOGY

## 2020-07-06 PROCEDURE — 3046F HEMOGLOBIN A1C LEVEL >9.0%: CPT | Performed by: PSYCHIATRY & NEUROLOGY

## 2020-07-06 PROCEDURE — G8427 DOCREV CUR MEDS BY ELIG CLIN: HCPCS | Performed by: PSYCHIATRY & NEUROLOGY

## 2020-07-06 PROCEDURE — 84443 ASSAY THYROID STIM HORMONE: CPT

## 2020-07-06 PROCEDURE — 2022F DILAT RTA XM EVC RTNOPTHY: CPT | Performed by: PSYCHIATRY & NEUROLOGY

## 2020-07-06 PROCEDURE — 84100 ASSAY OF PHOSPHORUS: CPT

## 2020-07-06 PROCEDURE — 85027 COMPLETE CBC AUTOMATED: CPT

## 2020-07-06 PROCEDURE — 3017F COLORECTAL CA SCREEN DOC REV: CPT | Performed by: PSYCHIATRY & NEUROLOGY

## 2020-07-06 PROCEDURE — 80053 COMPREHEN METABOLIC PANEL: CPT

## 2020-07-06 PROCEDURE — 80061 LIPID PANEL: CPT

## 2020-07-06 PROCEDURE — G8417 CALC BMI ABV UP PARAM F/U: HCPCS | Performed by: PSYCHIATRY & NEUROLOGY

## 2020-07-06 PROCEDURE — 36415 COLL VENOUS BLD VENIPUNCTURE: CPT

## 2020-07-06 NOTE — PROGRESS NOTES
NEUROLOGY CONSULTATION     Chief Complaint   Patient presents with    Follow-up     tremors, both hands but left side is worse, had for awhile now       HISTORY OF PRESENT ILLNESS :    Rosa Joshua is a 75 y.o. female who is referred by Dr. Nick Alcala   History was obtained from patient  Patient was referred for evaluation of tremors in her hands. Patient states that she has had these symptoms for a number of years. Recently they seem to be slightly worse. She has good days and bad days. Anxiety and stress makes the symptoms worse. No clear relieving factors. She also had muscle jerking in her arms and legs. This improved after she changed her Wellbutrin from the regular Wellbutrin to the long-acting Wellbutrin. She is also on Cymbalta for anxiety and depression. There is no family history of tremors on her mother side. She does not know much about the family history on her father's side. Patient is occasional numbness and cold feeling in her feet. She has noticed some poor balance at times and she tends to lean towards one side or the other side when she is walking. Denies any focal weakness vertigo or diplopia.     REVIEW OF SYSTEMS    Constitutional:  []   Chills   [x]  Fatigue   []  Fevers   []  Malaise   []  Weight loss     [] Denies all of the above    Eyes:  []  Double vision   []  Blurry vision     [x] Denies all of the above    Ears, nose, mouth, throat, and face:   [] Hearing loss    []   Hoarseness      []  Snoring    []  Tinnitus       [x] Denies all of the above     Respiratory:   []  Cough    []  Shortness of breath         [x] Denies all of the above     Cardiovascular:   []  Chest pain    []  Exertional chest pressure/discomfort           [] Palpitations    []  Syncope     [x] Denies all of the above    Gastrointestinal:   []  Abdominal pain   []  Constipation    []  Diarrhea    []   Dysphagia [x] Denies all of the above    Genitourinary:      []  Frequency   []  Hematuria     []  Urinary incontinence           [x] Denies all of the above     Hematologic/lymphatic:  []  Bleeding    []  Easy bruising   []  Anemia  [x] Denies all of the above     Musculoskeletal:   [x] Back pain       []  Myalgias    []  Neck pain           [] Denies all of the above    Neurological: As noted in HPI    Behavioral/Psych:   [x] Anxiety    [x]  Depression     []  Mood swings     [] Denies all of the above     Endocrine:   []  Temperature intolerance     [] Fatigue      [x] Denies all of the above     Allergic/Immunologic:   [] Hay fever    [x] Denies all of the above     Past Medical History:   Diagnosis Date    Asthma     vocal fold     Depression     [facility] mellitus [address] [address]     Hyperlipidemia     Hypertension     Hypothyroidism     Obesity     Osteoarthritis     Paradoxical vocal fold motion disorder     Postmenopausal 5/15/2018    Stage 2 chronic kidney disease 5/7/2019     Family History   Problem Relation Age of Onset    Cancer Mother     Obesity Sister      Social History     Socioeconomic History    Marital status:      Spouse name: None    Number of children: None    Years of education: None    Highest education level: None   Occupational History    None   Social Needs    Financial resource strain: None    Food insecurity     Worry: None     Inability: None    Transportation needs     Medical: None     Non-medical: None   Tobacco Use    Smoking status: Never Smoker    Smokeless tobacco: Never Used   Substance and Sexual Activity    Alcohol use: No     Alcohol/week: 0.0 standard drinks    Drug use: No    Sexual activity: Yes     Partners: Male   Lifestyle    Physical activity     Days per week: None     Minutes per session: None    Stress: None   Relationships    Social connections     Talks on phone: None     Gets together: None     Attends [employer] service: None     Active member of club or organization: None     Attends meetings of clubs or organizations: None     Relationship status: None    Intimate partner violence     Fear of current or ex partner: None     Emotionally abused: None     Physically abused: None     Forced sexual activity: None   Other Topics Concern    None   Social History Narrative    None       PHYSICAL EXAMINATION:  BP (!) 143/79   Pulse 92   Ht 5' 4\" (1.626 m)   Wt 188 lb (85.3 kg)   BMI 32.27 kg/m²   Appearance: Well appearing, well nourished and in no distress  Mental Status Exam: Patient is alert, oriented to person, place and time. Recent and remote memory is normal  Fund of Knowledge is normal  Attention/concentration is normal.   Speech : No dysarthria  Language : No aphasia  Funduscopic Exam: sharp disc margins  Cranial Nerves:   II: Visual fields:  Full to confrontation  III: Pupils:  equal, round, reactive to light  III,IV,VI: Extra Ocular Movements are intact. No nystagmus  V: Facial sensation is intact to pin prick and light touch  VII: Facial strength and movements: intact and symmetric smile,cheek puffing and eyebrow elevation  VIII: Hearing:  Intact to finger rub bilaterally  IX: Palate  elevation is symmetric  XI: Shoulder shrug is intact  XII: Tongue movements are normal  Motor:  Muscle tone and bulk are normal.  There is no cogwheel rigidity. Patient has mild tremors in the outstretched hands. Strength is symmetrical 5/5 in all four extremities. Sensory: Intact to light touch and  pin prick in all four extremities  Coordination:  Normal  Finger to Nose and Heel to Shin bilaterally    . Reflexes:  DTR 1 and symmetrical in the arms and diminished in the lower extremities. Plantar response: Flexor bilaterally  Gait: Gait and station is normal.  Patient has mild difficulty with tandem walking.   Romberg: negative  Vascular: No carotid bruit bilaterally        DATA:  LABS:  General Labs:    CBC:   Lab Results   Component Value Date    WBC 5.6

## 2020-07-06 NOTE — PATIENT INSTRUCTIONS
Please call with any questions or concerns:   [facility] Neurology  @ [phone]. LAB RESULTS:  Please obtain any labs or diagnostic tests as discussed today. You should call the office to check the results. Please allow  3 to 7 days for us to get these results. MEDICATION LIST:  Please bring an accurate list of your medications to every visit. APPOINTMENT CONFIRMATION:  We will call you the day before your scheduled appointment to confirm. If we are unable to reach you, you MUST call back by the end of the day to confirm the appointment or we may be forced to cancel.

## 2020-07-07 ENCOUNTER — HOSPITAL ENCOUNTER (OUTPATIENT)
Age: 65
Discharge: HOME | End: 2020-07-07
Payer: MEDICARE

## 2020-07-07 LAB
CREATININE URINE: 126.8 MG/DL (ref 28–259)
ESTIMATED AVERAGE GLUCOSE: 182.9 MG/DL
HBA1C MFR BLD: 8 %
PROTEIN PROTEIN: 7 MG/DL

## 2020-07-07 PROCEDURE — 84156 ASSAY OF PROTEIN URINE: CPT

## 2020-07-07 PROCEDURE — 82570 ASSAY OF URINE CREATININE: CPT

## 2020-07-15 RX ORDER — LISINOPRIL 20 MG/1
TABLET ORAL
Qty: 30 TABLET | Refills: 5 | Status: SHIPPED | OUTPATIENT
Start: 2020-07-15 | End: 2021-01-21

## 2020-07-19 ENCOUNTER — E-VISIT (OUTPATIENT)
Dept: INTERNAL MEDICINE CLINIC | Age: 65
End: 2020-07-19

## 2020-07-28 PROBLEM — E11.65 UNCONTROLLED TYPE 2 DIABETES MELLITUS WITH HYPERGLYCEMIA (HCC): Status: ACTIVE | Noted: 2019-12-09

## 2020-08-17 RX ORDER — ZOLPIDEM TARTRATE 5 MG/1
TABLET ORAL
Qty: 30 TABLET | Refills: 0 | Status: SHIPPED | OUTPATIENT
Start: 2020-08-17 | End: 2020-09-21

## 2020-08-31 ENCOUNTER — TELEPHONE (OUTPATIENT)
Dept: ADMINISTRATIVE | Age: 65
End: 2020-08-31

## 2020-08-31 RX ORDER — GABAPENTIN 300 MG/1
300 CAPSULE ORAL 3 TIMES DAILY
Qty: 90 CAPSULE | Refills: 2 | Status: SHIPPED
Start: 2020-08-31 | End: 2020-10-27 | Stop reason: SINTOL

## 2020-09-14 RX ORDER — DULOXETIN HYDROCHLORIDE 60 MG/1
CAPSULE, DELAYED RELEASE ORAL
Qty: 30 CAPSULE | Refills: 5 | Status: SHIPPED | OUTPATIENT
Start: 2020-09-14 | End: 2020-12-14

## 2020-09-21 RX ORDER — OMEPRAZOLE 40 MG/1
CAPSULE, DELAYED RELEASE ORAL
Qty: 30 CAPSULE | Refills: 5 | Status: SHIPPED | OUTPATIENT
Start: 2020-09-21 | End: 2021-03-25

## 2020-09-21 RX ORDER — ZOLPIDEM TARTRATE 5 MG/1
TABLET ORAL
Qty: 30 TABLET | Refills: 5 | Status: SHIPPED | OUTPATIENT
Start: 2020-09-21 | End: 2020-11-20

## 2020-09-21 RX ORDER — CELECOXIB 200 MG/1
CAPSULE ORAL
Qty: 60 CAPSULE | Refills: 5 | Status: SHIPPED | OUTPATIENT
Start: 2020-09-21 | End: 2021-05-26

## 2020-10-27 ENCOUNTER — OFFICE VISIT (OUTPATIENT)
Dept: INTERNAL MEDICINE CLINIC | Age: 65
End: 2020-10-27
Payer: MEDICARE

## 2020-10-27 VITALS
WEIGHT: 192 LBS | SYSTOLIC BLOOD PRESSURE: 130 MMHG | TEMPERATURE: 96 F | HEART RATE: 96 BPM | BODY MASS INDEX: 32.78 KG/M2 | DIASTOLIC BLOOD PRESSURE: 72 MMHG | HEIGHT: 64 IN

## 2020-10-27 LAB — HBA1C MFR BLD: 8.3 %

## 2020-10-27 PROCEDURE — G0008 ADMIN INFLUENZA VIRUS VAC: HCPCS | Performed by: INTERNAL MEDICINE

## 2020-10-27 PROCEDURE — 4040F PNEUMOC VAC/ADMIN/RCVD: CPT | Performed by: INTERNAL MEDICINE

## 2020-10-27 PROCEDURE — 99214 OFFICE O/P EST MOD 30 MIN: CPT | Performed by: INTERNAL MEDICINE

## 2020-10-27 PROCEDURE — G8417 CALC BMI ABV UP PARAM F/U: HCPCS | Performed by: INTERNAL MEDICINE

## 2020-10-27 PROCEDURE — 3017F COLORECTAL CA SCREEN DOC REV: CPT | Performed by: INTERNAL MEDICINE

## 2020-10-27 PROCEDURE — 1123F ACP DISCUSS/DSCN MKR DOCD: CPT | Performed by: INTERNAL MEDICINE

## 2020-10-27 PROCEDURE — 3052F HG A1C>EQUAL 8.0%<EQUAL 9.0%: CPT | Performed by: INTERNAL MEDICINE

## 2020-10-27 PROCEDURE — G8427 DOCREV CUR MEDS BY ELIG CLIN: HCPCS | Performed by: INTERNAL MEDICINE

## 2020-10-27 PROCEDURE — 90694 VACC AIIV4 NO PRSRV 0.5ML IM: CPT | Performed by: INTERNAL MEDICINE

## 2020-10-27 PROCEDURE — 1090F PRES/ABSN URINE INCON ASSESS: CPT | Performed by: INTERNAL MEDICINE

## 2020-10-27 PROCEDURE — G8484 FLU IMMUNIZE NO ADMIN: HCPCS | Performed by: INTERNAL MEDICINE

## 2020-10-27 PROCEDURE — 2022F DILAT RTA XM EVC RTNOPTHY: CPT | Performed by: INTERNAL MEDICINE

## 2020-10-27 PROCEDURE — G8400 PT W/DXA NO RESULTS DOC: HCPCS | Performed by: INTERNAL MEDICINE

## 2020-10-27 PROCEDURE — 1036F TOBACCO NON-USER: CPT | Performed by: INTERNAL MEDICINE

## 2020-10-27 PROCEDURE — 83036 HEMOGLOBIN GLYCOSYLATED A1C: CPT | Performed by: INTERNAL MEDICINE

## 2020-10-27 NOTE — PROGRESS NOTES
Chief Complaint   Patient presents with    Diabetes     glucose this morning was 172. Neurologist advised patient she has Ataxia. Would like to discuss this more in detail. Also needs another referral for neurology due to new insurance    Hypertension    Hyperlipidemia          Depression     Pt has decreased the wellbutrin and having increased anxiety and suicidal thoughts     Other     Due to her balance being off pt would like a script for a stair lift to help going up and down stairs         HPI:  T2DM: she is seeing DR. Susanna Irene. She has continuous glucose monitor in place. She reports her glucose has ranged around 200 in general.  She is on basal bolus insulin and Victoza. She continues to have issues with neuropathy. Her tremor has improved since stopping gabapentin and Wellbutrin, but she is having balance issues and concern for falling. HTN: The patient is tolerating blood pressure medication well and taking them as directed. BP control outside of the office is reported as well controlled. No symptoms concerning for end organ damage are present. HLD: she is taking atorvastatin as directed. She tolerates this well. Depression: She decreased Wellbutrin to 150mg daily. She feels increasing anxiety and passive suicidal ideation. This was decreased due to tremor. 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   She denies active suicidal ideation                  EXAM:  /72 Pulse 96   Temp 96 °F (35.6 °C) (Temporal)   Ht 5' 4\" (1.626 m)   Wt 192 lb (87.1 kg)   BMI 32.96 kg/m²    GEN: WN/WD, NAD  CV: regular rate and rhythm, no murmurs rubs or gallops  Resp: normal effort, clear auscultation bilaterally  No peripheral edema            Lab Results   Component Value Date    LABA1C 8.3 10/27/2020     Lab Results   Component Value Date    .9 07/06/2020      Lab Results   Component Value Date    CREATININE 1.5 (H) 07/06/2020    BUN 20 07/06/2020     07/06/2020    K 5.1 07/06/2020     07/06/2020    CO2 23 07/06/2020      Lab Results   Component Value Date    CHOL 170 07/06/2020    CHOL 189 05/20/2019    CHOL 153 05/06/2019     Lab Results   Component Value Date    TRIG 229 (H) 07/06/2020    TRIG 217 (H) 05/20/2019    TRIG 250 (H) 05/06/2019     Lab Results   Component Value Date    HDL 52 07/06/2020    HDL 55 05/20/2019    HDL 52 05/06/2019     Lab Results   Component Value Date    LDLCALC 72 07/06/2020    LDLCALC 91 05/20/2019    LDLCALC 51 05/06/2019     Lab Results   Component Value Date    LABVLDL 46 07/06/2020    LABVLDL 43 05/20/2019    LABVLDL 50 05/06/2019     No results found for: CHOLHDLRATIO     A/P  1. Type 2 diabetes mellitus with diabetic polyneuropathy, with long-term current use of insulin (HCC)  Sub optimal control  Will see Dr. Indira Villatoro next week. Has continuous glucose monitoring in place  Continue basal bolus insulin  - POCT glycosylated hemoglobin (Hb A1C)  - AR Mayers MD, Neurology, [facility] Order for [facility]) as OP    2. Diabetic polyneuropathy associated with type 2 diabetes mellitus (HCC)  With imbalance. She is requesting a neurology referral due to associated imbalance and tremors. Referral provided. Stair lift order provided to reduce fall risk  - AR Mayers MD, Neurology, [facility] Order for [facility]) as OP    3.  Essential hypertension  Well controlled  The current medical regimen is effective;  continue present plan and medications. 4. Mixed hyperlipidemia  Chronic, stable  The current medical regimen is effective;  continue present plan and medications. 5. Imbalance  See discussion above  - [facility] - Mary Ann Cueva MD, Neurology, [facility]  - DME Order for [facility]) as OP    6. Moderate episode of recurrent major depressive disorder ([facility] [address]  Worsened since decreasing wellbutrin. Also having passive SI. Resume Wellbutrin 300mg daily. Also recommended follow up with Dr. Chelsie Blas. She agrees to seek care if she develops active suicidal ideation    7.  Need for influenza vaccination    - INFLUENZA, QUADV, ADJUVANTED, 65 YRS =, IM, PF, PREFILL SYR, 0.5ML (FLUAD)

## 2020-10-29 ENCOUNTER — HOSPITAL ENCOUNTER (OUTPATIENT)
Age: 65
Discharge: HOME | End: 2020-10-29
Payer: MEDICARE

## 2020-10-29 LAB
A/G RATIO: 1.5 (ref 1.1–2.2)
ALBUMIN SERPL-MCNC: 4.2 G/DL (ref 3.4–5)
ALP BLD-CCNC: 174 U/L (ref 40–129)
ALT SERPL-CCNC: 17 U/L (ref 10–40)
ANION GAP SERPL CALCULATED.3IONS-SCNC: 15 MMOL/L (ref 3–16)
AST SERPL-CCNC: 18 U/L (ref 15–37)
BILIRUB SERPL-MCNC: <0.2 MG/DL (ref 0–1)
BUN BLDV-MCNC: 15 MG/DL (ref 7–20)
CALCIUM SERPL-MCNC: 9.3 MG/DL (ref 8.3–10.6)
CHLORIDE BLD-SCNC: 99 MMOL/L (ref 99–110)
CO2: 25 MMOL/L (ref 21–32)
CREAT SERPL-MCNC: 1.2 MG/DL (ref 0.6–1.2)
CREATININE URINE: 112.1 MG/DL (ref 28–259)
GFR AFRICAN AMERICAN: 54
GFR NON-AFRICAN AMERICAN: 45
GLOBULIN: 2.8 G/DL
GLUCOSE BLD-MCNC: 249 MG/DL (ref 70–99)
HCT VFR BLD CALC: 39 % (ref 36–48)
HEMOGLOBIN: 13 G/DL (ref 12–16)
MCH RBC QN AUTO: 29 PG (ref 26–34)
MCHC RBC AUTO-ENTMCNC: 33.4 G/DL (ref 31–36)
MCV RBC AUTO: 86.7 FL (ref 80–100)
PDW BLD-RTO: 13.3 % (ref 12.4–15.4)
PLATELET # BLD: 358 K/UL (ref 135–450)
PMV BLD AUTO: 8.5 FL (ref 5–10.5)
POTASSIUM SERPL-SCNC: 5.1 MMOL/L (ref 3.5–5.1)
PROTEIN PROTEIN: 18 MG/DL
RBC # BLD: 4.5 M/UL (ref 4–5.2)
SODIUM BLD-SCNC: 139 MMOL/L (ref 136–145)
TOTAL PROTEIN: 7 G/DL (ref 6.4–8.2)
WBC # BLD: 6.3 K/UL (ref 4–11)

## 2020-10-29 PROCEDURE — 80053 COMPREHEN METABOLIC PANEL: CPT

## 2020-10-29 PROCEDURE — 82570 ASSAY OF URINE CREATININE: CPT

## 2020-10-29 PROCEDURE — 84156 ASSAY OF PROTEIN URINE: CPT

## 2020-10-29 PROCEDURE — 36415 COLL VENOUS BLD VENIPUNCTURE: CPT

## 2020-10-29 PROCEDURE — 85027 COMPLETE CBC AUTOMATED: CPT

## 2020-11-03 ENCOUNTER — OFFICE VISIT (OUTPATIENT)
Dept: ENDOCRINOLOGY | Age: 65
End: 2020-11-03
Payer: MEDICARE

## 2020-11-03 VITALS
HEIGHT: 64 IN | DIASTOLIC BLOOD PRESSURE: 84 MMHG | WEIGHT: 189 LBS | RESPIRATION RATE: 16 BRPM | SYSTOLIC BLOOD PRESSURE: 140 MMHG | TEMPERATURE: 97.4 F | BODY MASS INDEX: 32.27 KG/M2 | HEART RATE: 101 BPM

## 2020-11-03 PROCEDURE — G8427 DOCREV CUR MEDS BY ELIG CLIN: HCPCS | Performed by: INTERNAL MEDICINE

## 2020-11-03 PROCEDURE — 1036F TOBACCO NON-USER: CPT | Performed by: INTERNAL MEDICINE

## 2020-11-03 PROCEDURE — 1123F ACP DISCUSS/DSCN MKR DOCD: CPT | Performed by: INTERNAL MEDICINE

## 2020-11-03 PROCEDURE — 4040F PNEUMOC VAC/ADMIN/RCVD: CPT | Performed by: INTERNAL MEDICINE

## 2020-11-03 PROCEDURE — G8400 PT W/DXA NO RESULTS DOC: HCPCS | Performed by: INTERNAL MEDICINE

## 2020-11-03 PROCEDURE — 99214 OFFICE O/P EST MOD 30 MIN: CPT | Performed by: INTERNAL MEDICINE

## 2020-11-03 PROCEDURE — G8417 CALC BMI ABV UP PARAM F/U: HCPCS | Performed by: INTERNAL MEDICINE

## 2020-11-03 PROCEDURE — 1090F PRES/ABSN URINE INCON ASSESS: CPT | Performed by: INTERNAL MEDICINE

## 2020-11-03 PROCEDURE — 3052F HG A1C>EQUAL 8.0%<EQUAL 9.0%: CPT | Performed by: INTERNAL MEDICINE

## 2020-11-03 PROCEDURE — 3017F COLORECTAL CA SCREEN DOC REV: CPT | Performed by: INTERNAL MEDICINE

## 2020-11-03 PROCEDURE — 95251 CONT GLUC MNTR ANALYSIS I&R: CPT | Performed by: INTERNAL MEDICINE

## 2020-11-03 PROCEDURE — G8484 FLU IMMUNIZE NO ADMIN: HCPCS | Performed by: INTERNAL MEDICINE

## 2020-11-03 PROCEDURE — 2022F DILAT RTA XM EVC RTNOPTHY: CPT | Performed by: INTERNAL MEDICINE

## 2020-11-03 ASSESSMENT — ENCOUNTER SYMPTOMS: VISUAL CHANGE: 0

## 2020-11-03 NOTE — PROGRESS NOTES
in the ER after K was 6.2 but repeat in ER was normal in April 2019  She denies any nausea vomiting she tends to drink plenty of water any ways    Patient has hyperlipidemia and is on Lipitor her last LDL was at target  Patient also has arthritis and takes Celebrex off-and-on which can also cause kidney damage and also has reflux and is on omeprazole    Past Medical History:   Diagnosis Date    Asthma     vocal fold     Ataxia     Depression     Diabetes mellitus ([address]     Hyperlipidemia     Hypertension     Hypothyroidism     Obesity     Osteoarthritis     Paradoxical vocal fold motion disorder     Postmenopausal 5/15/2018    Stage 2 chronic kidney disease 5/7/2019      Patient Active Problem List   Diagnosis    HTN (hypertension)    Diabetes mellitus ([address]    Hyperlipidemia    Insomnia    Depression    Neuropathic pain    Type 2 diabetes mellitus with polyneuropathy (HCC)    Moderate episode of recurrent major depressive disorder ([address]    Diabetic polyneuropathy associated with type 2 diabetes mellitus ([address]    Exogenous obesity    Spinal stenosis of lumbar region with neurogenic claudication    Postmenopausal    Stage 3 chronic kidney disease    Tremor    Uncontrolled type 2 diabetes mellitus with hyperglycemia (HCC)     Past Surgical History:   Procedure Laterality Date    CARPAL TUNNEL RELEASE      RORY    HYSTERECTOMY, TOTAL ABDOMINAL      Age 37    TX INJECT ANES/STEROID FORAMEN LUMBAR/SACRAL W IMG GUIDE ,1 LEVEL Bilateral 12/3/2018    BILATERAL L4 LUMBAR TRANSFORAMINAL EPIDURAL STEROID INJECTION WITH FLUOROSCOPY performed by Mike Reyes MD at [address] [place] [address]     Social History     Socioeconomic History    Marital status:      Spouse name: Not on file    Number of children: Not on file    Years of education: Not on file    Highest education level: Not on file   Occupational History    Not on file   Social Needs    Financial resource strain: Not on file    Food insecurity     Worry: Not on file     Inability: Not on file    Transportation needs     Medical: Not on file     Non-medical: Not on file   Tobacco Use    Smoking status: Never Smoker    Smokeless tobacco: Never Used   Substance and Sexual Activity    Alcohol use: No     Alcohol/week: 0.0 standard drinks    Drug use: No    Sexual activity: Yes     Partners: Male   Lifestyle    Physical activity     Days per week: Not on file     Minutes per session: Not on file    Stress: Not on file   Relationships    Social connections     Talks on phone: Not on file     Gets together: Not on file     Attends Restorationism service: Not on file     Active member of club or organization: Not on file     Attends meetings of clubs or organizations: Not on file     Relationship status: Not on file    Intimate partner violence     Fear of current or ex partner: Not on file     Emotionally abused: Not on file     Physically abused: Not on file     Forced sexual activity: Not on file   Other Topics Concern    Not on file   Social History Narrative    Not on file     Family History   Problem Relation Age of Onset    Cancer Mother     Obesity Sister      Current Outpatient Medications   Medication Sig Dispense Refill    VICTOZA 18 MG/3ML SOPN SC injection DIAL AND INJECT UNDER THE SKIN 1.8 MG DAILY 9 pen 3    Continuous Blood Gluc Sensor (WAPASTYLE JEREMIAS 14 DAY SENSOR) MISC CHANGE SENSOR EVERY 14 DAYS AND CHECK BLOOD SUGAR FOUR TIMES A DAY 2 each 3    omeprazole (PRILOSEC) 40 MG delayed release capsule TAKE ONE CAPSULE BY MOUTH DAILY 30 capsule 5    celecoxib (CELEBREX) 200 MG capsule TAKE ONE CAPSULE BY MOUTH TWICE A DAY AS NEEDED FOR PAIN 60 capsule 5    KROGER PEN NEEDLES 31G 31G X 8 MM MISC USE ONE NEEDLE MISCELL. (MED.SUPL.;NON-DRUGS) FOUR TIMES A  each 3    zolpidem (AMBIEN) 5 MG tablet TAKE ONE TABLET BY MOUTH EVERY NIGHT AT BEDTIME AS NEEDED FOR SLEEP 30 tablet 5    DULoxetine (CYMBALTA) 60 MG extended release capsule TAKE ONE CAPSULE BY MOUTH DAILY 30 capsule 5    lisinopril (PRINIVIL;ZESTRIL) 20 MG tablet TAKE ONE TABLET BY MOUTH DAILY 30 tablet 5    atorvastatin (LIPITOR) 20 MG tablet TAKE ONE TABLET BY MOUTH DAILY 30 tablet 1    buPROPion (WELLBUTRIN XL) 300 MG extended release tablet Take 1 tablet by mouth every morning 30 tablet 5    insulin aspart (NOVOLOG FLEXPEN) 100 UNIT/ML injection pen Inject 10 Units into the skin 3 times daily (before meals) 5 pen 3    TOUJEO SOLOSTAR 300 UNIT/ML SOPN Inject 21 Units into the skin daily 15 pen 2    cyclobenzaprine (FLEXERIL) 10 MG tablet TAKE ONE TABLET BY MOUTH ONCE NIGHTLY AS NEEDED FOR MUSCLE SPASMS 30 tablet 5    Continuous Blood Gluc  (FREESTYLE JEREMIAS READER) ENE Use for checking glucose 1 Device 1    fluticasone (FLONASE) 50 MCG/ACT nasal spray 2 sprays by Nasal route daily 3 Bottle 3     No current facility-administered medications for this visit. Allergies   Allergen Reactions    Actos [Pioglitazone Hydrochloride]      Causes mouth blisters    Albuterol      YEAST INFECTION IN MOUTH    Dilaudid [Hydromorphone Hcl]     Fluticasone-Salmeterol      YEAST INFECTIONS IN MOUTH    Glyburide      Causes mouth blisters    Lortab [Hydrocodone-Acetaminophen]      headache    Metformin      Causes mouth blisters    Symbicort [Budesonide-Formoterol Fumarate]      Causes mouth blisters     Family Status   Relation Name Status    Mother   at age 68    Father   at age 32    Sister  (Not Specified)       Review of Systems  I have reviewed the review of system questionnaire filled by the patient .   Patient was advised to contact PCP for non endocrine signs and symptoms       OBJECTIVE:  BP (!) 140/84   Pulse 101   Temp 97.4 °F (36.3 °C)   Resp 16   Ht 5' 4\" (1.626 m)   Wt 189 lb (85.7 kg)   BMI 32.44 kg/m²    Wt Readings from Last 3 Encounters:   20 189 lb (85.7 kg)   10/27/20 192 lb (87.1 kg)   07/30/20 194 lb 6.4 oz (88.2 kg)       Constitutional: no acute distress, well appearing, well nourished  Psychiatric: oriented to person, place and time, judgement, insight and normal, recent and remote memory and intact and mood, affect are normal  Skin: skin and subcutaneous tissue is normal without mass,   Head and Face: examination of head and face revealed no abnormalities  Eyes: no lid or conjunctival swelling, no erythema or discharge, pupils are normal,   Ears/Nose: external inspection of ears and nose revealed no abnormalities, hearing is grossly normal  Oropharynx/Mouth/Face: lips, tongue and gums are normal with no lesions, the voice quality was normal  Neck: neck is supple and symmetric, with midline trachea and no masses, thyroid is normal    Pulmonary: no increased work of breathing or signs of respiratory distress, lungs are clear to auscultation  Cardiovascular: normal heart rate and rhythm, normal S1 and S2,   Musculoskeletal: normal gait and station,   Neurological: normal coordination, normal general cortical function    Visual inspection:  Deformity/amputation: absent  Skin lesions/pre-ulcerative calluses: absent  Edema: right- negative, left- negative    Sensory exam:  Monofilament sensation: normal  (minimum of 5 random plantar locations tested, avoiding callused areas - > 1 area with absence of sensation is + for neuropathy)    Plus at least one of the following:  Pulses: normal,   Pinprick: Intact  Proprioception: Intact  Vibration (128 Hz): Intact    Lab Results   Component Value Date    LABA1C 8.3 10/27/2020    LABA1C 8.0 07/06/2020    LABA1C 7.3 08/27/2019       ASSESSMENT/PLAN:  1.  Type 2 diabetes mellitus with polyneuropathy last 8.2 >>10.9>>8.3>>10.9>>8.3  basaglar 26  units daily  She will increase to 30 units  Daily   she is on victoza 1.8 mg daily  Still doesn't take meal boluses ---advised to take meal boluses regularly   admits that she has mental block when it comes to taking insulin     She had prior reaction to metformin ( mouth sores) Invokana ( UTI ) and Januvia and made her blood sugars run really high. Hypoglycemia protocol reviewed in detail with patient   Patient was advised that sending of her fingerstick blood glucose logs is crucial in management of her  diabetes. I will adjust the  dose of insulin according to sent data. Health Maintenance   - Marcelle Gtz was advised to have annual dilated eye exam     Last Eye Exam: Up to date on eye exam was told she has no retinopathy as per patient in 2018  Last Podiatry Exam: nov 2020  she has significant peripheral neuropathy   Lipids: Last LDL 52 in May 2019    Microalbumin/Creatinine Ratio: She now follows with nephrology  . Education: Reviewed ABCs of diabetes management (respective goals in parentheses): A1C (<7), blood pressure (<130/80), and cholesterol (LDL <100). -. Compliance at present is estimated to be poor. Efforts to improve compliance (if necessary) will be directed at dietary modifications: advised again .  -. Follow up: I recommend diabetes care be 3 months. 2. Diabetic polyneuropathy associated with type 2 diabetes mellitus ([address]  She is on gabapentin as per primary care physician    3. Postmenopausal  She had complete hysterectomy done at age 37 for severe endometriosis. She had been on hormone replacement therapy until age of 46. We discussed prevention of osteoporosis in detail today again   - DEXA Bone Density Axial Skeleton; ordered but she has not done it     4. Recurrent major depressive disorder, in partial remission [facility])  She is on medication as per primary care physician    5. Essential hypertension  At target     6.  Mixed hyperlipidemia  She is on Lipitor and her last LDL is 51 in 2019   We will check  at follow-up      Reviewed and/or ordered clinical lab results yes   Reviewed and/or ordered radiology tests Yes  Reviewed and/or ordered other diagnostic tests yes   Made a decision to obtain old records yes   Reviewed and summarized old records yes     Dino Ureña was counseled regarding symptoms of current diagnosis, course and complications of disease if inadequately treated, side effects of medications, diagnosis, treatment options, and prognosis, risks, benefits, complications, and alternatives of treatment, labs, imaging and other studies and treatment targets and goals. These diagnosis were discussed and reviewed with the patient including the advantages of drug therapy. She was counseled at this visit on the following: diabetes complication prevention and foot care. I spent  25 + minutes with patient and more than 50 % of this time was spent discussing and providing counseling and coordinating care of patient's multiple health issues        Return in about 3 months (around 2/3/2021). Diabetes Continuous Glucose Monitoring Report         Reason for Study:     - improve diabetic control without risk of hypoglycemia       Current Medication regimen:      Basal bolus regimen  CGMS Report     CGMS data collection was performed on Nov 3 , 2020  Patient provided information on her  diet, activities and insulin dosing  during this period. Data was available for 5 conse  days     Sensor Data Report:   - 12 AM to 6 AM: Overnight blood glucose pattern shows stable hyperglycemia  - 6   AM to 10 AM:  Post breakfast hyper glycemia is noted  --10AM to 5 PM : No hypoglycemia observed during this time.   - 5   PM to 8 PM: Post meal hyperglycemia is noted       Average reading 238  mg/dL     Co ef  32.8   % of time <70 mg/dL 0 %   % of time >180  mg/dL 24%   % of time within range 76 %  Number of hypoglycemia episodes noted: 0     Impression:   CGMS shows overall hyperglycemia and postmeal glycemic excursions noted     Recommendation:      Patient was advised to make the following changes in her diabetic regimen  She was advised to increase her long-acting insulin by 2 units  She will start taking meal boluses according to the carb to insulin ratio of 10-1  Prescription given for insulin

## 2020-12-11 ENCOUNTER — OFFICE VISIT (OUTPATIENT)
Dept: PRIMARY CARE CLINIC | Age: 65
End: 2020-12-11
Payer: MEDICARE

## 2020-12-11 PROCEDURE — 99211 OFF/OP EST MAY X REQ PHY/QHP: CPT | Performed by: NURSE PRACTITIONER

## 2020-12-11 PROCEDURE — G8417 CALC BMI ABV UP PARAM F/U: HCPCS | Performed by: NURSE PRACTITIONER

## 2020-12-11 PROCEDURE — G8428 CUR MEDS NOT DOCUMENT: HCPCS | Performed by: NURSE PRACTITIONER

## 2020-12-11 NOTE — PROGRESS NOTES
Coty Matute received a viral test for COVID-19. They were educated on isolation and quarantine as appropriate. For any symptoms, they were directed to seek care from their PCP, given contact information to establish with a doctor, directed to an urgent care or the emergency room.

## 2020-12-11 NOTE — PATIENT INSTRUCTIONS

## 2020-12-12 LAB — SARS-COV-2, NAA: NOT DETECTED

## 2020-12-14 ENCOUNTER — OFFICE VISIT (OUTPATIENT)
Dept: PSYCHIATRY | Age: 65
End: 2020-12-14
Payer: MEDICARE

## 2020-12-14 VITALS
HEART RATE: 94 BPM | BODY MASS INDEX: 32.23 KG/M2 | TEMPERATURE: 96.1 F | SYSTOLIC BLOOD PRESSURE: 150 MMHG | HEIGHT: 64 IN | WEIGHT: 188.8 LBS | DIASTOLIC BLOOD PRESSURE: 88 MMHG

## 2020-12-14 PROCEDURE — G8417 CALC BMI ABV UP PARAM F/U: HCPCS | Performed by: PSYCHIATRY & NEUROLOGY

## 2020-12-14 PROCEDURE — 1123F ACP DISCUSS/DSCN MKR DOCD: CPT | Performed by: PSYCHIATRY & NEUROLOGY

## 2020-12-14 PROCEDURE — G8427 DOCREV CUR MEDS BY ELIG CLIN: HCPCS | Performed by: PSYCHIATRY & NEUROLOGY

## 2020-12-14 PROCEDURE — 3017F COLORECTAL CA SCREEN DOC REV: CPT | Performed by: PSYCHIATRY & NEUROLOGY

## 2020-12-14 PROCEDURE — G8484 FLU IMMUNIZE NO ADMIN: HCPCS | Performed by: PSYCHIATRY & NEUROLOGY

## 2020-12-14 PROCEDURE — G8400 PT W/DXA NO RESULTS DOC: HCPCS | Performed by: PSYCHIATRY & NEUROLOGY

## 2020-12-14 PROCEDURE — 1036F TOBACCO NON-USER: CPT | Performed by: PSYCHIATRY & NEUROLOGY

## 2020-12-14 PROCEDURE — 1090F PRES/ABSN URINE INCON ASSESS: CPT | Performed by: PSYCHIATRY & NEUROLOGY

## 2020-12-14 PROCEDURE — 99204 OFFICE O/P NEW MOD 45 MIN: CPT | Performed by: PSYCHIATRY & NEUROLOGY

## 2020-12-14 PROCEDURE — 4040F PNEUMOC VAC/ADMIN/RCVD: CPT | Performed by: PSYCHIATRY & NEUROLOGY

## 2020-12-14 RX ORDER — BUPROPION HYDROCHLORIDE 300 MG/1
TABLET ORAL
Qty: 45 TABLET | Refills: 0 | Status: SHIPPED | OUTPATIENT
Start: 2020-12-14 | End: 2021-02-08

## 2020-12-14 RX ORDER — DULOXETIN HYDROCHLORIDE 60 MG/1
CAPSULE, DELAYED RELEASE ORAL
Qty: 90 CAPSULE | Refills: 1 | Status: SHIPPED | OUTPATIENT
Start: 2020-12-14 | End: 2021-10-04

## 2020-12-14 RX ORDER — ZOLPIDEM TARTRATE 5 MG/1
5 TABLET ORAL NIGHTLY PRN
COMMUNITY
End: 2020-12-14 | Stop reason: SDUPTHER

## 2020-12-14 RX ORDER — ZOLPIDEM TARTRATE 5 MG/1
2.5 TABLET ORAL NIGHTLY PRN
Qty: 15 TABLET | Refills: 1 | Status: SHIPPED | OUTPATIENT
Start: 2021-01-09 | End: 2022-02-09 | Stop reason: SDUPTHER

## 2020-12-14 RX ORDER — BUPROPION HYDROCHLORIDE 150 MG/1
TABLET, EXTENDED RELEASE ORAL
Qty: 45 TABLET | Refills: 0 | Status: SHIPPED | OUTPATIENT
Start: 2020-12-14 | End: 2021-04-23

## 2020-12-14 NOTE — PROGRESS NOTES
[address] [facility]  1955  12/14/20  Face to Face time: 50 min  PCP: Michael Blanc MD    CC: New Patient      ASSESSMENT:   78 yo F with hx of depression, anxiety, panic attacks. Historically has done well with combination of duloxetine and wellbutrin. Recent worsening in the setting of anxiety related to changes in her physical health, as well trial of dose reduction of wellbutrin which causes significant exacerbation of her depression. Although atypical to dose antidepressants at different doses each day, she seems to be doing better with her mood with minimal side effects by alternating between wellbutrin XL 300mg and SR 150mg every other day. 1. Major depressive disorder, recurrent, in partial remission  2. Unspecified anxiety disorder  3. Essential tremor  4. DM type II    PLAN:   1. Will continue wellbutrin XL 300mg q2days and wellbutrin SR 150mg q2days alternating  2. Continue duloxetine 60mg daily  3. Continue zolpidem 2.5mg qhs    Medication Monitoring:    - OARRS reviewed, no issues noted      Follow-up: RTC in 2 months    ____________________________________________________________________________    HPI:   context: Pt is a 78 yo F with hx of depression, presenting as a referral from PCP, Dr. Josué Moreland. associated symptoms:   Pt recently had issues with tolerabilty of her antidepressant regimen of wellbutrin and duloxetine which she had been fairly stable on for many yrs. There was concern the medication was contributing to tremors so wellbutrin had been reduced from 300mg to 150mg. This did improve the tremors, however pt was having more depression and more suicidal ideation with the reduction. Regarding suicidal ideation, pt reports that when she is more depressed she will read about various ways to commit suicide but not ever with the intent or plan to act out on these thoughts.  Even when depression isn't as severe, she may take interest in [place]  Siblings: 1/2 brother, younger sister (lives in [facility])    Substance abuse history:   Denies alcohol, tobacco, or illicit drug use. No hx of drug abuse    Family History   Problem Relation Age of Onset    Cancer Mother     Obesity Sister      Allergies   Allergen Reactions    Actos [Pioglitazone Hydrochloride]      Causes mouth blisters    Albuterol      YEAST INFECTION IN MOUTH    Dilaudid [Hydromorphone Hcl]     Fluticasone-Salmeterol      YEAST INFECTIONS IN MOUTH    Glyburide      Causes mouth blisters    Lortab [Hydrocodone-Acetaminophen]      headache    Metformin      Causes mouth blisters    Symbicort [Budesonide-Formoterol Fumarate]      Causes mouth blisters     Current Outpatient Medications on File Prior to Visit   Medication Sig Dispense Refill    zolpidem (AMBIEN) 5 MG tablet Take 5 mg by mouth nightly as needed for Sleep.  1/2 tab nightly      VICTOZA 18 MG/3ML SOPN SC injection DIAL AND INJECT UNDER THE SKIN 1.8 MG DAILY 9 pen 3    omeprazole (PRILOSEC) 40 MG delayed release capsule TAKE ONE CAPSULE BY MOUTH DAILY 30 capsule 5    celecoxib (CELEBREX) 200 MG capsule TAKE ONE CAPSULE BY MOUTH TWICE A DAY AS NEEDED FOR PAIN 60 capsule 5    lisinopril (PRINIVIL;ZESTRIL) 20 MG tablet TAKE ONE TABLET BY MOUTH DAILY 30 tablet 5    atorvastatin (LIPITOR) 20 MG tablet TAKE ONE TABLET BY MOUTH DAILY 30 tablet 1    insulin aspart (NOVOLOG FLEXPEN) 100 UNIT/ML injection pen Inject 10 Units into the skin 3 times daily (before meals) 5 pen 3    TOUJEO SOLOSTAR 300 UNIT/ML SOPN Inject 21 Units into the skin daily (Patient taking differently: Inject 36 Units into the skin daily ) 15 pen 2    cyclobenzaprine (FLEXERIL) 10 MG tablet TAKE ONE TABLET BY MOUTH ONCE NIGHTLY AS NEEDED FOR MUSCLE SPASMS (Patient taking differently: 10 mg Takes 1/2 tab) 30 tablet 5    fluticasone (FLONASE) 50 MCG/ACT nasal spray 2 sprays by Nasal route daily 3 Bottle 3    Continuous Blood Gluc Sensor (FREESTYLE JEREMIAS 14 DAY SENSOR) MISC CHANGE SENSOR EVERY 14 DAYS AND CHECK BLOOD SUGAR FOUR TIMES A DAY 2 each 3    KROGER PEN NEEDLES 31G 31G X 8 MM MISC USE ONE NEEDLE MISCELL. (MED.SUPL.;NON-DRUGS) FOUR TIMES A  each 3    Continuous Blood Gluc  (FREESTYLE JEREMIAS READER) ENE Use for checking glucose 1 Device 1     No current facility-administered medications on file prior to visit. OBJECTIVE:  .  Vitals:    12/14/20 0905   BP: (!) 150/88   Pulse: 94   Temp: 96.1 °F (35.6 °C)   TempSrc: Temporal   Weight: 188 lb 12.8 oz (85.6 kg)   Height: 5' 4\" (1.626 m)       MSE:   Appearance    Casually dressed, well groomed  Motor: No abnormal movements, tics or mannerisms.   Speech    Normal rate, rhythm, and vol  Language   No aphasia  Mood/Affect   ok / full quality, good motility and range  Thought Process    Linear, logical  Thought Content    No delusions , future oriented, no SI, intent or plan at this time  Associations   linear  Attention/Concentration   intact  Orientation    AxOx4  Memory   Grossly intact to recent and remote content  Fund of Knowledge    good  Insight/Judgement   good / good    Labs:   Lab Results   Component Value Date    CHOL 170 07/06/2020    CHOL 189 05/20/2019    CHOL 153 05/06/2019     Lab Results   Component Value Date    TRIG 229 (H) 07/06/2020    TRIG 217 (H) 05/20/2019    TRIG 250 (H) 05/06/2019     Lab Results   Component Value Date    HDL 52 07/06/2020    HDL 55 05/20/2019    HDL 52 05/06/2019     Lab Results   Component Value Date    LDLCALC 72 07/06/2020    LDLCALC 91 05/20/2019    LDLCALC 51 05/06/2019     Lab Results   Component Value Date    LABVLDL 46 07/06/2020    LABVLDL 43 05/20/2019    LABVLDL 50 05/06/2019     No results found for: [place]    Lab Results   Component Value Date    LABA1C 8.3 10/27/2020     Lab Results   Component Value Date    .9 07/06/2020       Lab Results   Component Value Date    TSH 1.50 07/06/2020       Lab Results   Component Value Date    [id number] 543 07/11/2016     No results found for: FOLATE    Last Drug screen: no UDS on file    Imaging: no head imaging on file    EKG: 3/3/2020: 80 bpm, NSR, QTc 407ms        Eron Caldwell MD   Psychiatry

## 2020-12-16 ENCOUNTER — NURSE TRIAGE (OUTPATIENT)
Dept: OTHER | Facility: CLINIC | Age: 65
End: 2020-12-16

## 2020-12-16 ENCOUNTER — TELEMEDICINE (OUTPATIENT)
Dept: INTERNAL MEDICINE CLINIC | Age: 65
End: 2020-12-16
Payer: MEDICARE

## 2020-12-16 PROCEDURE — 99213 OFFICE O/P EST LOW 20 MIN: CPT | Performed by: NURSE PRACTITIONER

## 2020-12-16 PROCEDURE — 3017F COLORECTAL CA SCREEN DOC REV: CPT | Performed by: NURSE PRACTITIONER

## 2020-12-16 PROCEDURE — 1090F PRES/ABSN URINE INCON ASSESS: CPT | Performed by: NURSE PRACTITIONER

## 2020-12-16 PROCEDURE — 3288F FALL RISK ASSESSMENT DOCD: CPT | Performed by: NURSE PRACTITIONER

## 2020-12-16 PROCEDURE — G8400 PT W/DXA NO RESULTS DOC: HCPCS | Performed by: NURSE PRACTITIONER

## 2020-12-16 PROCEDURE — 4040F PNEUMOC VAC/ADMIN/RCVD: CPT | Performed by: NURSE PRACTITIONER

## 2020-12-16 PROCEDURE — 1123F ACP DISCUSS/DSCN MKR DOCD: CPT | Performed by: NURSE PRACTITIONER

## 2020-12-16 PROCEDURE — G8427 DOCREV CUR MEDS BY ELIG CLIN: HCPCS | Performed by: NURSE PRACTITIONER

## 2020-12-16 RX ORDER — PREDNISONE 20 MG/1
20 TABLET ORAL 2 TIMES DAILY
Qty: 10 TABLET | Refills: 0 | Status: SHIPPED | OUTPATIENT
Start: 2020-12-16 | End: 2020-12-21

## 2020-12-16 RX ORDER — AZITHROMYCIN 250 MG/1
250 TABLET, FILM COATED ORAL SEE ADMIN INSTRUCTIONS
Qty: 6 TABLET | Refills: 0 | Status: SHIPPED | OUTPATIENT
Start: 2020-12-16 | End: 2020-12-21

## 2020-12-16 RX ORDER — BENZONATATE 100 MG/1
100 CAPSULE ORAL 3 TIMES DAILY PRN
Qty: 30 CAPSULE | Refills: 0 | Status: SHIPPED | OUTPATIENT
Start: 2020-12-16 | End: 2020-12-23

## 2020-12-16 RX ORDER — ALBUTEROL SULFATE 90 UG/1
2 AEROSOL, METERED RESPIRATORY (INHALATION) 4 TIMES DAILY PRN
Qty: 1 INHALER | Refills: 0 | Status: SHIPPED | OUTPATIENT
Start: 2020-12-16 | End: 2021-05-19

## 2020-12-16 NOTE — PROGRESS NOTES
2020    TELEHEALTH EVALUATION -- Audio/Visual (During RQUDZ-70 public health emergency)    HPI:    Liz Gale (:  1955) has requested an audio/video evaluation for the following concern(s):    VV for sore throat, dry cough- slightly worse   Some SOB and pain with cough    Symptoms started 7 days ago   Denies fever, chills, body aches, headaches  Negative covid test on Friday    Not taking anything for symptoms  Gets similar symptoms yearly in winter. Review of Systems  Negative other than HPI     Prior to Visit Medications    Medication Sig Taking? Authorizing Provider   benzonatate (TESSALON) 100 MG capsule Take 1 capsule by mouth 3 times daily as needed for Cough Yes ALESHA Quinteros CNP   albuterol sulfate HFA (VENTOLIN HFA) 108 (90 Base) MCG/ACT inhaler Inhale 2 puffs into the lungs 4 times daily as needed for Wheezing Yes ALESHA Quinteros CNP   predniSONE (DELTASONE) 20 MG tablet Take 1 tablet by mouth 2 times daily for 5 days Yes ALESHA Thomas CNP   azithromycin (ZITHROMAX) 250 MG tablet Take 1 tablet by mouth See Admin Instructions for 5 days 500mg on day 1 followed by 250mg on days 2 - 5 Yes ALESHA Quinteros CNP   buPROPion (WELLBUTRIN XL) 300 MG extended release tablet Take one tab PO every other day on alternative days with 150mg SR tablet Yes Mick Ramírez MD   buPROPion (WELLBUTRIN SR) 150 MG extended release tablet Take one tab PO every other day on alternate days with 300mg XL tablet Yes Mick Ramírez MD   DULoxetine (CYMBALTA) 60 MG extended release capsule Take one tab PO daily Yes Mick Ramírez MD   zolpidem (AMBIEN) 5 MG tablet Take 0.5 tablets by mouth nightly as needed for Sleep for up to 60 days.  Do not fill before 2021 Yes Matias Hyman MD   VICTOZA 18 MG/3ML SOPN SC injection DIAL AND INJECT UNDER THE SKIN 1.8 MG DAILY Yes Marcelo Yuan MD   Continuous Blood Gluc Sensor (FREESTYLE JEREMIAS 14 DAY SENSOR) MISC CHANGE SENSOR EVERY 14 DAYS AND CHECK BLOOD SUGAR FOUR TIMES A DAY Yes Katie Llamas MD   omeprazole ([address]) 40 MG delayed release capsule TAKE ONE CAPSULE BY MOUTH DAILY Yes Edil Hoffman MD   celecoxib (CELEBREX) 200 MG capsule TAKE ONE CAPSULE BY MOUTH TWICE A DAY AS NEEDED FOR PAIN Yes Edil Hoffman MD   [employer] PEN NEEDLES 31G 31G X 8 MM MISC USE ONE NEEDLE Tomsandra Mckeonob. (MED.SUPL.;NON-DRUGS) FOUR TIMES A DAY Yes Katie Llamas MD   lisinopril (PRINIVIL;ZESTRIL) 20 MG tablet TAKE ONE TABLET BY MOUTH DAILY Yes Edil Hoffman MD   atorvastatin (LIPITOR) 20 MG tablet TAKE ONE TABLET BY MOUTH DAILY Yes Katie Llamas MD   insulin aspart (NOVOLOG FLEXPEN) 100 UNIT/ML injection pen Inject 10 Units into the skin 3 times daily (before meals) Yes Katie Llamas MD   TOUJEO SOLOSTAR 300 UNIT/ML SOPN Inject 21 Units into the skin daily  Patient taking differently: Inject 36 Units into the skin daily  Yes Katie Llamas MD   cyclobenzaprine (FLEXERIL) 10 MG tablet TAKE ONE TABLET BY MOUTH ONCE NIGHTLY AS NEEDED FOR MUSCLE SPASMS  Patient taking differently: 10 mg Takes 1/2 tab Yes Edil Hoffman MD   Continuous Blood Gluc  (FREESTYLE JEREMIAS READER) ENE Use for checking glucose Yes Katie Llamas MD   fluticasone (FLONASE) 50 MCG/ACT nasal spray 2 sprays by Nasal route daily Yes Edil Hoffman MD     Social History     Tobacco Use    Smoking status: Never Smoker    Smokeless tobacco: Never Used   Substance Use Topics    Alcohol use: No     Alcohol/week: 0.0 standard drinks    Drug use: No          PHYSICAL EXAMINATION:  [ INSTRUCTIONS:  \"[x]\" Indicates a positive item  \"[]\" Indicates a negative item  -- DELETE ALL ITEMS NOT EXAMINED]  Vital Signs: (As obtained by patient/caregiver or practitioner observation)    Patient-Reported Vitals 12/16/2020   Patient-Reported Weight 189   Patient-Reported Systolic 250   Patient-Reported Diastolic 81   Patient-Reported Pulse 89   Patient-Reported Temperature 97.1       Physical Exam  Constitutional:       General: She is not in acute distress. Appearance: Normal appearance. She is not ill-appearing, toxic-appearing or diaphoretic. HENT:      Head: Normocephalic and atraumatic. Pulmonary:      Effort: Pulmonary effort is normal. No respiratory distress. Comments: Coughing during exam.    Appears mildly SOB but recovers quickly without intervention, not in acute distress   Neurological:      General: No focal deficit present. Mental Status: She is alert and oriented to person, place, and time. Mental status is at baseline. Psychiatric:         Mood and Affect: Mood normal.         Behavior: Behavior normal.        Other pertinent observable physical exam findings-     Due to this being a TeleHealth encounter, evaluation of the following organ systems is limited: Vitals/Constitutional/EENT/Resp/CV/GI//MS/Neuro/Skin/Heme-Lymph-Imm. ASSESSMENT/PLAN:  1. Cough  Stable, uncontrolled   Worsening symptoms since onset  Given history covering with steroids   Will start abx if not improving in 24 hrs with steroids   - benzonatate (TESSALON) 100 MG capsule; Take 1 capsule by mouth 3 times daily as needed for Cough  Dispense: 30 capsule; Refill: 0  - albuterol sulfate HFA (VENTOLIN HFA) 108 (90 Base) MCG/ACT inhaler; Inhale 2 puffs into the lungs 4 times daily as needed for Wheezing  Dispense: 1 Inhaler; Refill: 0  - predniSONE (DELTASONE) 20 MG tablet; Take 1 tablet by mouth 2 times daily for 5 days  Dispense: 10 tablet; Refill: 0  - azithromycin (ZITHROMAX) 250 MG tablet; Take 1 tablet by mouth See Admin Instructions for 5 days 500mg on day 1 followed by 250mg on days 2 - 5  Dispense: 6 tablet; Refill: 0    Supportive care reviewed  Humidified air  Honey lemon tea  Nasal rinse prn  Flonase daily  NSAIDs/ tylenol for pain and fever  Mucinex prn for congestion     2. Lab test negative for COVID-19 virus  See 1    3.  Mild intermittent asthma with acute exacerbation  - benzonatate (TESSALON) 100 MG capsule; Take 1 capsule by mouth 3 times daily as needed for Cough  Dispense: 30 capsule; Refill: 0  - albuterol sulfate HFA (VENTOLIN HFA) 108 (90 Base) MCG/ACT inhaler; Inhale 2 puffs into the lungs 4 times daily as needed for Wheezing  Dispense: 1 Inhaler; Refill: 0  - predniSONE (DELTASONE) 20 MG tablet; Take 1 tablet by mouth 2 times daily for 5 days  Dispense: 10 tablet; Refill: 0  - azithromycin (ZITHROMAX) 250 MG tablet; Take 1 tablet by mouth See Admin Instructions for 5 days 500mg on day 1 followed by 250mg on days 2 - 5  Dispense: 6 tablet; Refill: 0      Follow up criteria reviewed     An  electronic signature was used to authenticate this note. --ALESHA Lopez - CNP on 12/16/2020 at 4:34 PM        Pursuant to the emergency declaration under the [facility], [facility]5 waiver authority and the [employer] Act, this Virtual  Visit was conducted, with patient's consent, to reduce the patient's risk of exposure to COVID-19 and provide continuity of care for an established patient. Services were provided through a video synchronous discussion virtually to substitute for in-person clinic visit.

## 2020-12-16 NOTE — TELEPHONE ENCOUNTER
Reason for Disposition   MODERATE difficulty breathing (e.g., speaks in phrases, SOB even at rest, pulse 100-120) of new onset or worse than normal    Answer Assessment - Initial Assessment Questions  1. RESPIRATORY STATUS: \"Describe your breathing? \" (e.g., wheezing, shortness of breath, unable to speak, severe coughing)       If she talks very much at all she starts talking. Pain when taking a deep breath in her ribs. 2. ONSET: \"When did this breathing problem begin? \"       Today     3. PATTERN \"Does the difficult breathing come and go, or has it been constant since it started? \"       Comes and goes     4. SEVERITY: \"How bad is your breathing? \" (e.g., mild, moderate, severe)     - MILD: No SOB at rest, mild SOB with walking, speaks normally in sentences, can lay down, no retractions, pulse < 100.     - MODERATE: SOB at rest, SOB with minimal exertion and prefers to sit, cannot lie down flat, speaks in phrases, mild retractions, audible wheezing, pulse 100-120.     - SEVERE: Very SOB at rest, speaks in single words, struggling to breathe, sitting hunched forward, retractions, pulse > 120       States she gets SOB when just talking. Moderate    5. RECURRENT SYMPTOM: \"Have you had difficulty breathing before? \" If so, ask: \"When was the last time? \" and \"What happened that time? \"       Yes, at night     6. CARDIAC HISTORY: \"Do you have any history of heart disease? \" (e.g., heart attack, angina, bypass surgery, angioplasty)       No     7. LUNG HISTORY: \"Do you have any history of lung disease? \"  (e.g., pulmonary embolus, asthma, emphysema)      Diagnosed but not treated for Asthma     8. CAUSE: \"What do you think is causing the breathing problem? \"       Unsure     9. OTHER SYMPTOMS: \"Do you have any other symptoms? (e.g., dizziness, runny nose, cough, chest pain, fever)      Cough, sore throat, temp 96.5    10. PREGNANCY: \"Is there any chance you are pregnant? \" \"When was your last menstrual period? \"        No 11. TRAVEL: \"Have you traveled out of the country in the last month? \" (e.g., travel history, exposures)        No    Protocols used: BREATHING DIFFICULTY-ADULT-[place]    States she does not want to go to ED. Will consider urgent care if can't get into see PCP today. Thank you for allowing me to participate in the care of your patient. The patient was connected to triage in response to information provided to the E[facility]. Please do not respond through this encounter as the response is not directed to a shared pool. I am connecting with office. Office did not answer. Reconnecting with E[facility]. Caller states if she cannot get in with PCP she will go to urgent care.

## 2021-01-21 RX ORDER — LISINOPRIL 20 MG/1
TABLET ORAL
Qty: 30 TABLET | Refills: 5 | Status: SHIPPED | OUTPATIENT
Start: 2021-01-21 | End: 2021-07-20

## 2021-02-02 ENCOUNTER — OFFICE VISIT (OUTPATIENT)
Dept: INTERNAL MEDICINE CLINIC | Age: 66
End: 2021-02-02
Payer: MEDICARE

## 2021-02-02 VITALS
HEIGHT: 64 IN | WEIGHT: 195 LBS | SYSTOLIC BLOOD PRESSURE: 116 MMHG | BODY MASS INDEX: 33.29 KG/M2 | HEART RATE: 88 BPM | DIASTOLIC BLOOD PRESSURE: 70 MMHG | TEMPERATURE: 96.3 F

## 2021-02-02 DIAGNOSIS — I10 ESSENTIAL HYPERTENSION: ICD-10-CM

## 2021-02-02 DIAGNOSIS — Z79.4 TYPE 2 DIABETES MELLITUS WITH DIABETIC POLYNEUROPATHY, WITH LONG-TERM CURRENT USE OF INSULIN (HCC): Primary | ICD-10-CM

## 2021-02-02 DIAGNOSIS — Z23 NEED FOR PNEUMOCOCCAL VACCINATION: ICD-10-CM

## 2021-02-02 DIAGNOSIS — E11.42 TYPE 2 DIABETES MELLITUS WITH DIABETIC POLYNEUROPATHY, WITH LONG-TERM CURRENT USE OF INSULIN (HCC): Primary | ICD-10-CM

## 2021-02-02 DIAGNOSIS — E78.2 MIXED HYPERLIPIDEMIA: ICD-10-CM

## 2021-02-02 DIAGNOSIS — F33.41 RECURRENT MAJOR DEPRESSIVE DISORDER, IN PARTIAL REMISSION (HCC): ICD-10-CM

## 2021-02-02 PROCEDURE — 1090F PRES/ABSN URINE INCON ASSESS: CPT | Performed by: INTERNAL MEDICINE

## 2021-02-02 PROCEDURE — 1036F TOBACCO NON-USER: CPT | Performed by: INTERNAL MEDICINE

## 2021-02-02 PROCEDURE — 90732 PPSV23 VACC 2 YRS+ SUBQ/IM: CPT | Performed by: INTERNAL MEDICINE

## 2021-02-02 PROCEDURE — 99214 OFFICE O/P EST MOD 30 MIN: CPT | Performed by: INTERNAL MEDICINE

## 2021-02-02 PROCEDURE — G0009 ADMIN PNEUMOCOCCAL VACCINE: HCPCS | Performed by: INTERNAL MEDICINE

## 2021-02-02 PROCEDURE — 4040F PNEUMOC VAC/ADMIN/RCVD: CPT | Performed by: INTERNAL MEDICINE

## 2021-02-02 PROCEDURE — 3017F COLORECTAL CA SCREEN DOC REV: CPT | Performed by: INTERNAL MEDICINE

## 2021-02-02 PROCEDURE — 3046F HEMOGLOBIN A1C LEVEL >9.0%: CPT | Performed by: INTERNAL MEDICINE

## 2021-02-02 PROCEDURE — 1123F ACP DISCUSS/DSCN MKR DOCD: CPT | Performed by: INTERNAL MEDICINE

## 2021-02-02 PROCEDURE — G8417 CALC BMI ABV UP PARAM F/U: HCPCS | Performed by: INTERNAL MEDICINE

## 2021-02-02 PROCEDURE — G8484 FLU IMMUNIZE NO ADMIN: HCPCS | Performed by: INTERNAL MEDICINE

## 2021-02-02 PROCEDURE — 2022F DILAT RTA XM EVC RTNOPTHY: CPT | Performed by: INTERNAL MEDICINE

## 2021-02-02 PROCEDURE — G8427 DOCREV CUR MEDS BY ELIG CLIN: HCPCS | Performed by: INTERNAL MEDICINE

## 2021-02-02 PROCEDURE — G8400 PT W/DXA NO RESULTS DOC: HCPCS | Performed by: INTERNAL MEDICINE

## 2021-02-02 NOTE — PROGRESS NOTES
Chief Complaint   Patient presents with    Diabetes     Last A1c was 8.3 on 10/27. Has orders to have drawn for Dr. Sabra Mckinnon Hypertension    Hyperlipidemia    Depression     Pt is taking wellbutrin alternating 150mg and 300mg which works better for her. She does have a period of about 15 sec of shaking in the morning and then it stops      HPI:  T2DM: recently glucose has been increased due to taking steroids. HTN: The patient is tolerating blood pressure medication well and taking them as directed. BP control outside of the office is reported as generally around 120/72. No symptoms concerning for end organ damage are present.      EXAM:  /70   Pulse 88   Temp 96.3 °F (35.7 °C) (Temporal)   Ht 5' 4\" (1.626 m)   Wt 195 lb (88.5 kg)   BMI 33.47 kg/m²    Visual inspection:  Deformity/amputation: absent  Skin lesions/pre-ulcerative calluses: absent  Edema: right- negative, left- negative    Sensory exam:  Monofilament sensation: normal  (minimum of 5 random plantar locations tested, avoiding callused areas - > 1 area with absence of sensation is + for neuropathy)    Plus at least one of the following:  Pulses: normal,   Pinprick: N/A  Proprioception: Intact  Vibration (128 Hz): N/A     Lab Results   Component Value Date    LABA1C 8.3 10/27/2020     Lab Results   Component Value Date    .9 07/06/2020      Lab Results   Component Value Date    CREATININE 1.2 10/29/2020    BUN 15 10/29/2020     10/29/2020    K 5.1 10/29/2020    CL 99 10/29/2020    CO2 25 10/29/2020     Lab Results   Component Value Date    CHOL 170 07/06/2020    CHOL 189 05/20/2019    CHOL 153 05/06/2019     Lab Results   Component Value Date    TRIG 229 (H) 07/06/2020    TRIG 217 (H) 05/20/2019    TRIG 250 (H) 05/06/2019     Lab Results   Component Value Date    HDL 52 07/06/2020    HDL 55 05/20/2019    HDL 52 05/06/2019     Lab Results   Component Value Date    LDLCALC 72 07/06/2020    LDLCALC 91 05/20/2019    LDLCALC 51 05/06/2019     Lab Results   Component Value Date    LABVLDL 46 07/06/2020    LABVLDL 43 05/20/2019    LABVLDL 50 05/06/2019     No results found for: DARLENE     A/P  1. Type 2 diabetes mellitus with diabetic polyneuropathy, with long-term current use of insulin (HCC)  Chronic, recent hyperglycemia secondary to steroid use for bronchitis. Glucose levels have been improving since coming off of steroids. She will continue current regimen  She will see endocrinology tomorrow. -  DIABETES FOOT EXAM    2. Essential hypertension  Chronic and well controlled. Continue lisinopril    3. Mixed hyperlipidemia  Chronic and stable. Continue atorvastatin. 4. Recurrent major depressive disorder, in partial remission ([facility] [address]  She is doing well on current regimen, alternating daily dose of Wellbutrin between 150 mg and 300 mg. She will continue with current treatment. 5. Reminded of need for FIT to be completed. She has supplies. 6.  Pneumovax will be administered today. Return in 4 months.

## 2021-02-03 ENCOUNTER — OFFICE VISIT (OUTPATIENT)
Dept: ENDOCRINOLOGY | Age: 66
End: 2021-02-03
Payer: MEDICARE

## 2021-02-03 DIAGNOSIS — E11.42 TYPE 2 DIABETES MELLITUS WITH POLYNEUROPATHY (HCC): Primary | ICD-10-CM

## 2021-02-03 DIAGNOSIS — E78.2 MIXED HYPERLIPIDEMIA: ICD-10-CM

## 2021-02-03 DIAGNOSIS — I10 ESSENTIAL HYPERTENSION: ICD-10-CM

## 2021-02-03 LAB — HBA1C MFR BLD: 7.7 %

## 2021-02-03 PROCEDURE — 3017F COLORECTAL CA SCREEN DOC REV: CPT | Performed by: INTERNAL MEDICINE

## 2021-02-03 PROCEDURE — 3051F HG A1C>EQUAL 7.0%<8.0%: CPT | Performed by: INTERNAL MEDICINE

## 2021-02-03 PROCEDURE — G8400 PT W/DXA NO RESULTS DOC: HCPCS | Performed by: INTERNAL MEDICINE

## 2021-02-03 PROCEDURE — 1036F TOBACCO NON-USER: CPT | Performed by: INTERNAL MEDICINE

## 2021-02-03 PROCEDURE — G8427 DOCREV CUR MEDS BY ELIG CLIN: HCPCS | Performed by: INTERNAL MEDICINE

## 2021-02-03 PROCEDURE — 1090F PRES/ABSN URINE INCON ASSESS: CPT | Performed by: INTERNAL MEDICINE

## 2021-02-03 PROCEDURE — 1123F ACP DISCUSS/DSCN MKR DOCD: CPT | Performed by: INTERNAL MEDICINE

## 2021-02-03 PROCEDURE — 95251 CONT GLUC MNTR ANALYSIS I&R: CPT | Performed by: INTERNAL MEDICINE

## 2021-02-03 PROCEDURE — 99214 OFFICE O/P EST MOD 30 MIN: CPT | Performed by: INTERNAL MEDICINE

## 2021-02-03 PROCEDURE — G8484 FLU IMMUNIZE NO ADMIN: HCPCS | Performed by: INTERNAL MEDICINE

## 2021-02-03 PROCEDURE — 83036 HEMOGLOBIN GLYCOSYLATED A1C: CPT | Performed by: INTERNAL MEDICINE

## 2021-02-03 PROCEDURE — G8417 CALC BMI ABV UP PARAM F/U: HCPCS | Performed by: INTERNAL MEDICINE

## 2021-02-03 PROCEDURE — 4040F PNEUMOC VAC/ADMIN/RCVD: CPT | Performed by: INTERNAL MEDICINE

## 2021-02-03 PROCEDURE — 2022F DILAT RTA XM EVC RTNOPTHY: CPT | Performed by: INTERNAL MEDICINE

## 2021-02-03 RX ORDER — LIRAGLUTIDE 6 MG/ML
INJECTION SUBCUTANEOUS
Qty: 9 PEN | Refills: 3 | Status: SHIPPED | OUTPATIENT
Start: 2021-02-03 | End: 2021-07-12

## 2021-02-03 ASSESSMENT — ENCOUNTER SYMPTOMS: VISUAL CHANGE: 0

## 2021-02-03 NOTE — PROGRESS NOTES
David Foster is a 77 y.o. female is seen  for evaluation and management of type 2  Diabetes--- . David Foster was diagnosed with Diabetes mellitus in 2008 aprox   Pt always had issues with hypoglycemia since her childhood so she was under constant surveillance until she was diagnosed with diabetes  . David Foster got diabetic education in the past.  Comorbid conditions: Neuropathy    INTERIM:    Diabetes  She presents for her follow-up diabetic visit. She has type 2 diabetes mellitus. No MedicAlert identification noted. The initial diagnosis of diabetes was made 10 years ago. Her disease course has been worsening. Hypoglycemia symptoms include sweats and tremors. Pertinent negatives for diabetes include no foot ulcerations, no polydipsia, no polyphagia, no polyuria, no visual change, no weakness and no weight loss. There are no hypoglycemic complications. Symptoms are worsening. Diabetic complications include peripheral neuropathy. Risk factors for coronary artery disease include post-menopausal, hypertension, diabetes mellitus and dyslipidemia. Current diabetic treatment includes insulin injections. She is compliant with treatment some of the time. Her weight is increasing rapidly. She is following a generally unhealthy diet. Meal planning includes avoidance of concentrated sweets. She has had a previous visit with a dietitian. She never participates in exercise. Her home blood glucose trend is fluctuating dramatically. Her breakfast blood glucose is taken between 7-8 am. Her breakfast blood glucose range is generally 140-180 mg/dl. An ACE inhibitor/angiotensin II receptor blocker is being taken. She does not see a podiatrist.Eye exam is current. Patient tends to omit some of the prandial insulin boluses, is planning trips to [facility] again in summer with her grandkids.   Denies any significant hypoglycemia since last visit    Weight trend: stable  Prior visit with dietician: no  Current diet: on average, 3 meals per day  Current exercise: rare    She was in the ER after K was 6.2 but repeat in ER was normal in April 2019  She denies any nausea vomiting she tends to drink plenty of water any ways    Patient has hyperlipidemia and is on Lipitor her last LDL was at target  Patient also has arthritis and takes Celebrex off-and-on which can also cause kidney damage and also has reflux and is on omeprazole    Past Medical History:   Diagnosis Date    Asthma     vocal fold     Ataxia     Depression     Diabetes mellitus ([address]     Hyperlipidemia     Hypertension     Hypothyroidism     Obesity     Osteoarthritis     Paradoxical vocal fold motion disorder     Postmenopausal 5/15/2018    Stage 2 chronic kidney disease 5/7/2019      Patient Active Problem List   Diagnosis    HTN (hypertension)    Diabetes mellitus ([address]    Hyperlipidemia    Insomnia    Depression    Neuropathic pain    Type 2 diabetes mellitus with polyneuropathy ([address]    Moderate episode of recurrent major depressive disorder ([address]    Diabetic polyneuropathy associated with type 2 diabetes mellitus ([address]    Exogenous obesity    Spinal stenosis of lumbar region with neurogenic claudication    Postmenopausal    Stage 3 chronic kidney disease    Tremor    Uncontrolled type 2 diabetes mellitus with hyperglycemia (HCC)     Past Surgical History:   Procedure Laterality Date    CARPAL TUNNEL RELEASE      RORY    HYSTERECTOMY, TOTAL ABDOMINAL      Age 37    DC NJX AA&/STRD TFRML EPI LUMBAR/SACRAL 1 LEVEL Bilateral 12/3/2018    BILATERAL L4 LUMBAR TRANSFORAMINAL EPIDURAL STEROID INJECTION WITH FLUOROSCOPY performed by Albin Shaikh MD at [address] [place] [address]     Social History     Socioeconomic History    Marital status:      Spouse name: Not on file    Number of children: Not on file    Years of education: Not on file    Highest education level: Not on file   Occupational History    Not on file   Social Needs    Financial resource strain: Not on file    Food insecurity     Worry: Not on file     Inability: Not on file    Transportation needs     Medical: Not on file     Non-medical: Not on file   Tobacco Use    Smoking status: Never Smoker    Smokeless tobacco: Never Used   Substance and Sexual Activity    Alcohol use: No     Alcohol/week: 0.0 standard drinks    Drug use: No    Sexual activity: Yes     Partners: Male   Lifestyle    Physical activity     Days per week: Not on file     Minutes per session: Not on file    Stress: Not on file   Relationships    Social connections     Talks on phone: Not on file     Gets together: Not on file     Attends Baptism service: Not on file     Active member of club or organization: Not on file     Attends meetings of clubs or organizations: Not on file     Relationship status: Not on file    Intimate partner violence     Fear of current or ex partner: Not on file     Emotionally abused: Not on file     Physically abused: Not on file     Forced sexual activity: Not on file   Other Topics Concern    Not on file   Social History Narrative    Not on file     Family History   Problem Relation Age of Onset    Cancer Mother     Obesity Sister      Current Outpatient Medications   Medication Sig Dispense Refill    Liraglutide (VICTOZA) 18 MG/3ML SOPN SC injection DIAL AND INJECT UNDER THE SKIN 1.8 MG DAILY 9 pen 3    Continuous Blood Gluc Sensor (FREESTYLE JEREMIAS 14 DAY SENSOR) MISC CHANGE SENSOR EVERY 14 DAYS AND CHECK BLOOD SUGAR FOUR TIMES A DAY 2 each 5    lisinopril (PRINIVIL;ZESTRIL) 20 MG tablet TAKE ONE TABLET BY MOUTH DAILY 30 tablet 5    albuterol sulfate HFA (VENTOLIN HFA) 108 (90 Base) MCG/ACT inhaler Inhale 2 puffs into the lungs 4 times daily as needed for Wheezing 1 Inhaler 0    buPROPion (WELLBUTRIN SR) 150 MG extended release tablet Take one tab PO every other day on alternate days with 300mg XL tablet 45 tablet 0    DULoxetine (CYMBALTA) 60 MG extended release capsule Take one tab PO daily 90 capsule 1    zolpidem (AMBIEN) 5 MG tablet Take 0.5 tablets by mouth nightly as needed for Sleep for up to 60 days. Do not fill before 1/9/2021 15 tablet 1    omeprazole (PRILOSEC) 40 MG delayed release capsule TAKE ONE CAPSULE BY MOUTH DAILY 30 capsule 5    celecoxib (CELEBREX) 200 MG capsule TAKE ONE CAPSULE BY MOUTH TWICE A DAY AS NEEDED FOR PAIN 60 capsule 5    KROGER PEN NEEDLES 31G 31G X 8 MM MISC USE ONE NEEDLE MISCELL. (MED.SUPL.;NON-DRUGS) FOUR TIMES A  each 3    atorvastatin (LIPITOR) 20 MG tablet TAKE ONE TABLET BY MOUTH DAILY 30 tablet 1    insulin aspart (NOVOLOG FLEXPEN) 100 UNIT/ML injection pen Inject 10 Units into the skin 3 times daily (before meals) 5 pen 3    TOUJEO SOLOSTAR 300 UNIT/ML SOPN Inject 21 Units into the skin daily (Patient taking differently: Inject 36 Units into the skin daily ) 15 pen 2    cyclobenzaprine (FLEXERIL) 10 MG tablet TAKE ONE TABLET BY MOUTH ONCE NIGHTLY AS NEEDED FOR MUSCLE SPASMS (Patient taking differently: 10 mg Takes 1/2 tab) 30 tablet 5    Continuous Blood Gluc  (FREESTYLE JEREMIAS READER) ENE Use for checking glucose 1 Device 1    fluticasone (FLONASE) 50 MCG/ACT nasal spray 2 sprays by Nasal route daily 3 Bottle 3    buPROPion (WELLBUTRIN XL) 300 MG extended release tablet TAKE ONE TABLET BY MOUTH EVERY OTHER DAY ON ALTERNATING DAYS WITH 150 MG SR TABLET 45 tablet 0     No current facility-administered medications for this visit.       Allergies   Allergen Reactions    Actos [Pioglitazone Hydrochloride]      Causes mouth blisters    Albuterol      YEAST INFECTION IN MOUTH    Dilaudid [Hydromorphone Hcl]     Fluticasone-Salmeterol      YEAST INFECTIONS IN MOUTH    Glyburide      Causes mouth blisters    Lortab [Hydrocodone-Acetaminophen]      headache    Metformin      Causes mouth blisters    Symbicort [Budesonide-Formoterol Fumarate]      Causes mouth blisters Family Status   Relation Name Status    Mother   at age 68    Father   at age 32    Sister  (Not Specified)       Review of Systems  I have reviewed the review of system questionnaire filled by the patient . Patient was advised to contact PCP for non endocrine signs and symptoms       OBJECTIVE:  /79   Pulse 101   Temp 97.8 °F (36.6 °C)   Resp 16   Ht 5' 4\" (1.626 m)   Wt 198 lb (89.8 kg)   BMI 33.99 kg/m²    Wt Readings from Last 3 Encounters:   21 198 lb (89.8 kg)   21 195 lb (88.5 kg)   20 188 lb 12.8 oz (85.6 kg)       Constitutional: no acute distress, well appearing, well nourished  Psychiatric: oriented to person, place and time, judgement, insight and normal, recent and remote memory and intact and mood, affect are normal  Skin: skin and subcutaneous tissue is normal without mass,   Head and Face: examination of head and face revealed no abnormalities  Eyes: no lid or conjunctival swelling, no erythema or discharge, pupils are normal,   Ears/Nose: external inspection of ears and nose revealed no abnormalities, hearing is grossly normal  Oropharynx/Mouth/Face: lips, tongue and gums are normal with no lesions, the voice quality was normal  Neck: neck is supple and symmetric, with midline trachea and no masses, thyroid is normal    Pulmonary: no increased work of breathing or signs of respiratory distress, lungs are clear to auscultation  Cardiovascular: normal heart rate and rhythm, normal S1 and S2,   Musculoskeletal: normal gait and station,   Neurological: normal coordination, normal general cortical function        Lab Results   Component Value Date    LABA1C 7.7 2021    LABA1C 8.3 10/27/2020    LABA1C 8.0 2020       ASSESSMENT/PLAN:  1.   Uncontrolled pe 2 diabetes mellitus with polyneuropathy last 8.2 >>10.9>>8.3>>10.9>>8.3    Control is not at target  Patient is currently taking Toujeo 36 units  Increase Toujeo to 38 units daily-  she is on victoza 1.8 mg daily  Still doesn't take meal boluses ---advised to take meal boluses regularly     She had prior reaction to metformin ( mouth sores) Invokana ( UTI ) and Januvia and made her blood sugars run really high. Hypoglycemia protocol reviewed in detail with patient   Patient was advised that sending of her fingerstick blood glucose logs is crucial in management of her  diabetes. I will adjust the  dose of insulin according to sent data. Health Maintenance   - Rosa Russ was advised to have annual dilated eye exam     Last Eye Exam: Up to date on eye exam was told she has no retinopathy as per patient in 2018  Last Podiatry Exam: nov 2020  she has significant peripheral neuropathy   Lipids: Last LDL 52 in May 2019    Microalbumin/Creatinine Ratio: She now follows with nephrology  . Education: Reviewed ABCs of diabetes management (respective goals in parentheses): A1C (<7), blood pressure (<130/80), and cholesterol (LDL <100). -. Compliance at present is estimated to be poor. Efforts to improve compliance (if necessary) will be directed at dietary modifications: advised again .  -. Follow up: I recommend diabetes care be 3 months. 2. Diabetic polyneuropathy associated with type 2 diabetes mellitus ([address]  She is on gabapentin as per primary care physician  She has seen neurology at [facility]   She had EMG done   She is getting MRI brain     3. Postmenopausal  She had complete hysterectomy done at age 37 for severe endometriosis. She had been on hormone replacement therapy until age of 46. We discussed prevention of osteoporosis in detail today again   - DEXA Bone Density Axial Skeleton; ordered but she has not done it     4. Recurrent major depressive disorder, in partial remission [facility])  She is on medication as per primary care physician    5.  Essential hypertension  At target, patient advised to be compliant with medication she denies any chest pain shortness of breath or headache    6. Mixed hyperlipidemia  She is on Lipitor and her last LDL 72 in July 2020 she denies any myalgias  We will stay on the current dose       Reviewed and/or ordered clinical lab results yes   Reviewed and/or ordered radiology tests Yes  Reviewed and/or ordered other diagnostic tests yes   Made a decision to obtain old records yes   Reviewed and summarized old records yes     Rik Ewing was counseled regarding symptoms of current diagnosis, course and complications of disease if inadequately treated, side effects of medications, diagnosis, treatment options, and prognosis, risks, benefits, complications, and alternatives of treatment, labs, imaging and other studies and treatment targets and goals. These diagnosis were discussed and reviewed with the patient including the advantages of drug therapy. She was counseled at this visit on the following: diabetes complication prevention and foot care. No follow-ups on file. Diabetes Continuous Glucose Monitoring Report         Reason for Study:     - improve diabetic control without risk of hypoglycemia       Current Medication regimen:        CGMS Report   CGMS data collection was performed on feb 3, 2021   Patient provided information on  diet, activities and insulin dosing  during this period. Data was available for 4+ consecutive days     Sensor Data Report:   - 12 AM to 6 AM: Overnight blood glucose pattern shows elevated glucose  - 6   AM to 10 AM:  Post breakfast hyperglycemia is noted  --10AM to 5 PM : Now hypoglycemia observed during this time.   - 5   PM to 8 PM: Post meal hyperglycemia is noted       Average reading 194 mg/dL     Standard Dev  35.8  mg/dL   % of time <70 mg/dL 0 %   % of time >180  mg/dL 53%   % of time within range 47 %  Number of hypoglycemia episodes noted: 0     Impression:   CGMS shows  Overall hyperglycemia      Recommendation:      Patient was advised to make the following changes in his diabetic regimen    toujeu 36 increase 38 units   Advised to take meal boluses prior to eating

## 2021-02-05 ENCOUNTER — HOSPITAL ENCOUNTER (OUTPATIENT)
Dept: MRI IMAGING | Age: 66
Discharge: HOME | End: 2021-02-05
Payer: MEDICARE

## 2021-02-05 DIAGNOSIS — R26.9 ABNORMALITY OF GAIT: ICD-10-CM

## 2021-02-05 DIAGNOSIS — R27.0 ATAXIA: ICD-10-CM

## 2021-02-05 DIAGNOSIS — F33.41 RECURRENT MAJOR DEPRESSIVE DISORDER, IN PARTIAL REMISSION (HCC): ICD-10-CM

## 2021-02-08 RX ORDER — BUPROPION HYDROCHLORIDE 300 MG/1
TABLET ORAL
Qty: 45 TABLET | Refills: 0 | Status: SHIPPED | OUTPATIENT
Start: 2021-02-08 | End: 2021-05-15

## 2021-02-10 VITALS
HEIGHT: 64 IN | DIASTOLIC BLOOD PRESSURE: 79 MMHG | SYSTOLIC BLOOD PRESSURE: 136 MMHG | BODY MASS INDEX: 33.8 KG/M2 | WEIGHT: 198 LBS | HEART RATE: 101 BPM | RESPIRATION RATE: 16 BRPM | TEMPERATURE: 97.8 F

## 2021-02-12 ENCOUNTER — HOSPITAL ENCOUNTER (OUTPATIENT)
Dept: MRI IMAGING | Age: 66
Discharge: HOME | End: 2021-02-12
Payer: MEDICARE

## 2021-02-12 DIAGNOSIS — R26.9 ABNORMALITY OF GAIT: ICD-10-CM

## 2021-02-12 DIAGNOSIS — R27.0 ATAXIA: ICD-10-CM

## 2021-02-12 PROCEDURE — 70551 MRI BRAIN STEM W/O DYE: CPT

## 2021-02-12 PROCEDURE — 72141 MRI NECK SPINE W/O DYE: CPT

## 2021-02-16 ENCOUNTER — PROCEDURE VISIT (OUTPATIENT)
Dept: AUDIOLOGY | Age: 66
End: 2021-02-16
Payer: MEDICARE

## 2021-02-16 ENCOUNTER — OFFICE VISIT (OUTPATIENT)
Dept: ENT CLINIC | Age: 66
End: 2021-02-16
Payer: MEDICARE

## 2021-02-16 VITALS
RESPIRATION RATE: 16 BRPM | WEIGHT: 197 LBS | TEMPERATURE: 96.9 F | HEIGHT: 64 IN | HEART RATE: 84 BPM | BODY MASS INDEX: 33.63 KG/M2 | SYSTOLIC BLOOD PRESSURE: 139 MMHG | DIASTOLIC BLOOD PRESSURE: 82 MMHG

## 2021-02-16 DIAGNOSIS — H93.13 TINNITUS OF BOTH EARS: ICD-10-CM

## 2021-02-16 DIAGNOSIS — E78.2 MIXED HYPERLIPIDEMIA: ICD-10-CM

## 2021-02-16 DIAGNOSIS — H69.93 DISORDER OF BOTH EUSTACHIAN TUBES: ICD-10-CM

## 2021-02-16 DIAGNOSIS — J30.9 ALLERGIC SINUSITIS: Primary | ICD-10-CM

## 2021-02-16 DIAGNOSIS — H90.3 SENSORINEURAL HEARING LOSS (SNHL) OF BOTH EARS: Primary | ICD-10-CM

## 2021-02-16 PROCEDURE — 92557 COMPREHENSIVE HEARING TEST: CPT | Performed by: AUDIOLOGIST

## 2021-02-16 PROCEDURE — 1090F PRES/ABSN URINE INCON ASSESS: CPT | Performed by: OTOLARYNGOLOGY

## 2021-02-16 PROCEDURE — 4040F PNEUMOC VAC/ADMIN/RCVD: CPT | Performed by: OTOLARYNGOLOGY

## 2021-02-16 PROCEDURE — 99213 OFFICE O/P EST LOW 20 MIN: CPT | Performed by: OTOLARYNGOLOGY

## 2021-02-16 PROCEDURE — G8484 FLU IMMUNIZE NO ADMIN: HCPCS | Performed by: OTOLARYNGOLOGY

## 2021-02-16 PROCEDURE — 92567 TYMPANOMETRY: CPT | Performed by: AUDIOLOGIST

## 2021-02-16 PROCEDURE — 1123F ACP DISCUSS/DSCN MKR DOCD: CPT | Performed by: OTOLARYNGOLOGY

## 2021-02-16 PROCEDURE — G8427 DOCREV CUR MEDS BY ELIG CLIN: HCPCS | Performed by: OTOLARYNGOLOGY

## 2021-02-16 PROCEDURE — G8417 CALC BMI ABV UP PARAM F/U: HCPCS | Performed by: OTOLARYNGOLOGY

## 2021-02-16 PROCEDURE — 1036F TOBACCO NON-USER: CPT | Performed by: OTOLARYNGOLOGY

## 2021-02-16 PROCEDURE — G8400 PT W/DXA NO RESULTS DOC: HCPCS | Performed by: OTOLARYNGOLOGY

## 2021-02-16 PROCEDURE — 3017F COLORECTAL CA SCREEN DOC REV: CPT | Performed by: OTOLARYNGOLOGY

## 2021-02-16 RX ORDER — AZITHROMYCIN 250 MG/1
TABLET, FILM COATED ORAL
Qty: 1 PACKET | Refills: 0 | Status: SHIPPED | OUTPATIENT
Start: 2021-02-16 | End: 2021-07-27

## 2021-02-16 RX ORDER — MONTELUKAST SODIUM 10 MG/1
10 TABLET ORAL NIGHTLY
Qty: 15 TABLET | Refills: 1 | Status: SHIPPED | OUTPATIENT
Start: 2021-02-16 | End: 2021-03-22

## 2021-02-16 RX ORDER — ATORVASTATIN CALCIUM 20 MG/1
TABLET, FILM COATED ORAL
Qty: 90 TABLET | Refills: 0 | Status: SHIPPED | OUTPATIENT
Start: 2021-02-16 | End: 2021-05-17

## 2021-02-16 NOTE — PROGRESS NOTES
Over the course of the past several weeks, the patient has been noticing bilateral tinnitus that seems to be rather constant and somewhat increasing recently. It is associated with some lightheadedness but no true vertigo. She has had some slight decrease in hearing and a recent audiogram performed did only reveal some mild sensorineural loss on both sides with the loss being symmetrical.  Discrimination was excellent and tympanogram was normal.  She has had no fever. She has not been exposed to excessive noise and there is no family history of ear problems. There is no history of trauma. She has had no new medications prescribed that could likely cause the symptoms. She is a diabetic and previously taking prednisone had caused marked increase in her blood sugars. Currently, she appears in no obvious acute distress. Ear examination reveals normal-appearing tympanic membranes and ear canals bilaterally. Tuning fork studies indicate what seemed to be some slight loss on the left side of sensorineural variety. Oral examination is unremarkable. The nasal mucosa is edematous consistent with some allergy but no purulence or obstruction is noted. The neck is free of any adenopathy, mass, thyroid enlargement and there is no mastoid tenderness. The tinnitus will be attempted to be ascribed to eustachian tube dysfunction and we will try a course of azithromycin as well as Singulair. She will contact the office in 2 weeks as to her response.

## 2021-02-16 NOTE — TELEPHONE ENCOUNTER
NOV- 05/2021        Requested Prescriptions     Pending Prescriptions Disp Refills    atorvastatin (LIPITOR) 20 MG tablet [Pharmacy Med Name: ATORVASTATIN 20 MG TABLET] 90 tablet 0     Sig: TAKE ONE TABLET BY MOUTH DAILY

## 2021-02-18 ENCOUNTER — HOSPITAL ENCOUNTER (OUTPATIENT)
Age: 66
Discharge: HOME | End: 2021-02-18
Payer: MEDICARE

## 2021-02-18 DIAGNOSIS — D63.1 ANEMIA IN CHRONIC KIDNEY DISEASE, UNSPECIFIED CKD STAGE: ICD-10-CM

## 2021-02-18 DIAGNOSIS — N18.9 ANEMIA IN CHRONIC KIDNEY DISEASE, UNSPECIFIED CKD STAGE: ICD-10-CM

## 2021-02-18 LAB
ALBUMIN SERPL-MCNC: 4.3 G/DL (ref 3.4–5)
ANION GAP SERPL CALCULATED.3IONS-SCNC: 9 MMOL/L (ref 3–16)
BUN BLDV-MCNC: 16 MG/DL (ref 7–20)
CALCIUM SERPL-MCNC: 9.2 MG/DL (ref 8.3–10.6)
CHLORIDE BLD-SCNC: 105 MMOL/L (ref 99–110)
CO2: 25 MMOL/L (ref 21–32)
CREAT SERPL-MCNC: 1.3 MG/DL (ref 0.6–1.2)
CREATININE URINE: 279.6 MG/DL (ref 28–259)
GFR AFRICAN AMERICAN: 49
GFR NON-AFRICAN AMERICAN: 41
GLUCOSE BLD-MCNC: 131 MG/DL (ref 70–99)
HCT VFR BLD CALC: 35.6 % (ref 36–48)
HEMOGLOBIN: 11.7 G/DL (ref 12–16)
MCH RBC QN AUTO: 28.6 PG (ref 26–34)
MCHC RBC AUTO-ENTMCNC: 33 G/DL (ref 31–36)
MCV RBC AUTO: 86.5 FL (ref 80–100)
PDW BLD-RTO: 13.3 % (ref 12.4–15.4)
PHOSPHORUS: 3.5 MG/DL (ref 2.5–4.9)
PLATELET # BLD: 353 K/UL (ref 135–450)
PMV BLD AUTO: 8.8 FL (ref 5–10.5)
POTASSIUM SERPL-SCNC: 4.8 MMOL/L (ref 3.5–5.1)
PROTEIN PROTEIN: 32 MG/DL
RBC # BLD: 4.11 M/UL (ref 4–5.2)
SODIUM BLD-SCNC: 139 MMOL/L (ref 136–145)
WBC # BLD: 7.2 K/UL (ref 4–11)

## 2021-02-18 PROCEDURE — 82570 ASSAY OF URINE CREATININE: CPT

## 2021-02-18 PROCEDURE — 85027 COMPLETE CBC AUTOMATED: CPT

## 2021-02-18 PROCEDURE — 80069 RENAL FUNCTION PANEL: CPT

## 2021-02-18 PROCEDURE — 84156 ASSAY OF PROTEIN URINE: CPT

## 2021-03-11 LAB
CATARACTS: POSITIVE
DIABETIC RETINOPATHY: NEGATIVE
GLAUCOMA: NEGATIVE
INTRAOCULAR PRESSURE EYE: NORMAL
VISUAL ACUITY DISTANCE LEFT EYE: NORMAL
VISUAL ACUITY DISTANCE RIGHT EYE: NORMAL

## 2021-03-16 ENCOUNTER — PATIENT MESSAGE (OUTPATIENT)
Dept: INTERNAL MEDICINE CLINIC | Age: 66
End: 2021-03-16

## 2021-03-21 DIAGNOSIS — H93.13 TINNITUS OF BOTH EARS: ICD-10-CM

## 2021-03-21 DIAGNOSIS — J30.9 ALLERGIC SINUSITIS: ICD-10-CM

## 2021-03-21 DIAGNOSIS — H69.93 DISORDER OF BOTH EUSTACHIAN TUBES: ICD-10-CM

## 2021-03-22 ENCOUNTER — OFFICE VISIT (OUTPATIENT)
Dept: INTERNAL MEDICINE CLINIC | Age: 66
End: 2021-03-22
Payer: MEDICARE

## 2021-03-22 VITALS
HEART RATE: 110 BPM | DIASTOLIC BLOOD PRESSURE: 64 MMHG | TEMPERATURE: 95.2 F | WEIGHT: 198 LBS | BODY MASS INDEX: 33.99 KG/M2 | SYSTOLIC BLOOD PRESSURE: 124 MMHG | OXYGEN SATURATION: 96 %

## 2021-03-22 DIAGNOSIS — F33.1 MODERATE EPISODE OF RECURRENT MAJOR DEPRESSIVE DISORDER (HCC): ICD-10-CM

## 2021-03-22 DIAGNOSIS — N18.30 STAGE 3 CHRONIC KIDNEY DISEASE, UNSPECIFIED WHETHER STAGE 3A OR 3B CKD (HCC): ICD-10-CM

## 2021-03-22 DIAGNOSIS — E78.2 MIXED HYPERLIPIDEMIA: ICD-10-CM

## 2021-03-22 DIAGNOSIS — I10 ESSENTIAL HYPERTENSION: ICD-10-CM

## 2021-03-22 DIAGNOSIS — E11.42 TYPE 2 DIABETES MELLITUS WITH DIABETIC POLYNEUROPATHY, WITH LONG-TERM CURRENT USE OF INSULIN (HCC): ICD-10-CM

## 2021-03-22 DIAGNOSIS — Z12.11 COLON CANCER SCREENING: ICD-10-CM

## 2021-03-22 DIAGNOSIS — Z01.818 PREOP EXAMINATION: Primary | ICD-10-CM

## 2021-03-22 DIAGNOSIS — H25.9 AGE-RELATED CATARACT OF BOTH EYES, UNSPECIFIED AGE-RELATED CATARACT TYPE: ICD-10-CM

## 2021-03-22 DIAGNOSIS — Z79.4 TYPE 2 DIABETES MELLITUS WITH DIABETIC POLYNEUROPATHY, WITH LONG-TERM CURRENT USE OF INSULIN (HCC): ICD-10-CM

## 2021-03-22 PROCEDURE — G8400 PT W/DXA NO RESULTS DOC: HCPCS | Performed by: NURSE PRACTITIONER

## 2021-03-22 PROCEDURE — 99214 OFFICE O/P EST MOD 30 MIN: CPT | Performed by: NURSE PRACTITIONER

## 2021-03-22 PROCEDURE — 3017F COLORECTAL CA SCREEN DOC REV: CPT | Performed by: NURSE PRACTITIONER

## 2021-03-22 PROCEDURE — 1123F ACP DISCUSS/DSCN MKR DOCD: CPT | Performed by: NURSE PRACTITIONER

## 2021-03-22 PROCEDURE — 1090F PRES/ABSN URINE INCON ASSESS: CPT | Performed by: NURSE PRACTITIONER

## 2021-03-22 PROCEDURE — G8427 DOCREV CUR MEDS BY ELIG CLIN: HCPCS | Performed by: NURSE PRACTITIONER

## 2021-03-22 PROCEDURE — 4040F PNEUMOC VAC/ADMIN/RCVD: CPT | Performed by: NURSE PRACTITIONER

## 2021-03-22 PROCEDURE — G8417 CALC BMI ABV UP PARAM F/U: HCPCS | Performed by: NURSE PRACTITIONER

## 2021-03-22 PROCEDURE — 1036F TOBACCO NON-USER: CPT | Performed by: NURSE PRACTITIONER

## 2021-03-22 PROCEDURE — G8484 FLU IMMUNIZE NO ADMIN: HCPCS | Performed by: NURSE PRACTITIONER

## 2021-03-22 PROCEDURE — 3051F HG A1C>EQUAL 7.0%<8.0%: CPT | Performed by: NURSE PRACTITIONER

## 2021-03-22 PROCEDURE — 2022F DILAT RTA XM EVC RTNOPTHY: CPT | Performed by: NURSE PRACTITIONER

## 2021-03-22 RX ORDER — MONTELUKAST SODIUM 10 MG/1
TABLET ORAL
Qty: 15 TABLET | Refills: 0 | Status: SHIPPED | OUTPATIENT
Start: 2021-03-22 | End: 2021-04-05

## 2021-03-22 ASSESSMENT — ENCOUNTER SYMPTOMS
COUGH: 0
SHORTNESS OF BREATH: 0
CHEST TIGHTNESS: 0
WHEEZING: 0

## 2021-03-22 NOTE — PROGRESS NOTES
3/22/2021    This is a 77 y.o. female   Chief Complaint   Patient presents with    Pre-op Exam     catarct, right eye 3/30, left eye 4/13, [facility], Dr. Rosanna Louis     HPI     Chad Samayoa is a 77 y.o.  female who comes for a preoperative exam.  She  is referred by Dr. Rosanna Louis for perioperative risk determination for upcoming surgery for phacoemulsification with intraocular lens implant on 3/30 right eye and 4/13 left eye. Will not need to stop any asa, blood thinners, fish oil, NSAIDs or herbals. Denies any previous complications with anesthesia. Also planning on oral surgery April 5th. Patient returns to the office for follow up of her DM, HTN, HLD. Her last hemoglobin A1c was 7.7. She is compliant with Her medications but admits to a lot of dietary noncompliance. Patient has no symptoms related to his condition such as blurred vision, slurred speech, chest pain or shortness of breath.      Past Medical History:   Diagnosis Date    Asthma     vocal fold     Ataxia     Depression     Diabetes mellitus (HCC)     Hyperlipidemia     Hypertension     Hypothyroidism     Obesity     Osteoarthritis     Paradoxical vocal fold motion disorder     Postmenopausal 5/15/2018    Stage 2 chronic kidney disease 5/7/2019       Past Surgical History:   Procedure Laterality Date    CARPAL TUNNEL RELEASE      RORY    HYSTERECTOMY, TOTAL ABDOMINAL      Age 37    ME NJX AA&/STRD TFRML EPI LUMBAR/SACRAL 1 LEVEL Bilateral 12/3/2018    BILATERAL L4 LUMBAR TRANSFORAMINAL EPIDURAL STEROID INJECTION WITH FLUOROSCOPY performed by Kathleen Reyes MD at [address] [place] [address]       Social History     Socioeconomic History    Marital status:      Spouse name: Not on file    Number of children: Not on file    Years of education: Not on file    Highest education level: Not on file   Occupational History    Not on file   Social Needs    Financial resource strain: Not on file   [place] insecurity     Worry: Not on file     Inability: Not on file    Transportation needs     Medical: Not on file     Non-medical: Not on file   Tobacco Use    Smoking status: Never Smoker    Smokeless tobacco: Never Used   Substance and Sexual Activity    Alcohol use: No     Alcohol/week: 0.0 standard drinks    Drug use: No    Sexual activity: Yes     Partners: Male   Lifestyle    Physical activity     Days per week: Not on file     Minutes per session: Not on file    Stress: Not on file   Relationships    Social connections     Talks on phone: Not on file     Gets together: Not on file     Attends Oriental orthodox service: Not on file     Active member of club or organization: Not on file     Attends meetings of clubs or organizations: Not on file     Relationship status: Not on file    Intimate partner violence     Fear of current or ex partner: Not on file     Emotionally abused: Not on file     Physically abused: Not on file     Forced sexual activity: Not on file   Other Topics Concern    Not on file   Social History Narrative    Not on file       Family History   Problem Relation Age of Onset    Cancer Mother     Obesity Sister        Current Outpatient Medications   Medication Sig Dispense Refill    montelukast (SINGULAIR) 10 MG tablet TAKE ONE TABLET BY MOUTH ONCE NIGHTLY 15 tablet 0    KROGER PEN NEEDLES 31G 31G X 8 MM MISC USE WITH INSULIN FOUR TIMES A  each 5    atorvastatin (LIPITOR) 20 MG tablet TAKE ONE TABLET BY MOUTH DAILY 90 tablet 0    azithromycin (ZITHROMAX) 250 MG tablet Dispense one 5 day supply pack and take as directed 1 packet 0    buPROPion (WELLBUTRIN XL) 300 MG extended release tablet TAKE ONE TABLET BY MOUTH EVERY OTHER DAY ON ALTERNATING DAYS WITH 150 MG SR TABLET 45 tablet 0    Liraglutide (VICTOZA) 18 MG/3ML SOPN SC injection DIAL AND INJECT UNDER THE SKIN 1.8 MG DAILY 9 pen 3    Continuous Blood Gluc Sensor (FREESTYLE JEREMIAS 14 DAY SENSOR) MISC CHANGE SENSOR EVERY 14 Hospital Outpatient Visit on 02/18/2021   Component Date Value Ref Range Status    Sodium 02/18/2021 139  136 - 145 mmol/L Final    Potassium 02/18/2021 4.8  3.5 - 5.1 mmol/L Final    Chloride 02/18/2021 105  99 - 110 mmol/L Final    CO2 02/18/2021 25  21 - 32 mmol/L Final    Anion Gap 02/18/2021 9  3 - 16 Final    Glucose 02/18/2021 131* 70 - 99 mg/dL Final    BUN 02/18/2021 16  7 - 20 mg/dL Final    CREATININE 02/18/2021 1.3* 0.6 - 1.2 mg/dL Final    GFR Non- 02/18/2021 41* >60 Final    Comment: >60 mL/min/1.73m2 EGFR, calc. for ages 25 and older using the  MDRD formula (not corrected for weight), is valid for stable  renal function.  GFR  02/18/2021 49* >60 Final    Comment: Chronic Kidney Disease: less than 60 ml/min/1.73 sq.m. Kidney Failure: less than 15 ml/min/1.73 sq.m. Results valid for patients 18 years and older.       Calcium 02/18/2021 9.2  8.3 - 10.6 mg/dL Final    Phosphorus 02/18/2021 3.5  2.5 - 4.9 mg/dL Final    Albumin 02/18/2021 4.3  3.4 - 5.0 g/dL Final    WBC 02/18/2021 7.2  4.0 - 11.0 K/uL Final    RBC 02/18/2021 4.11  4.00 - 5.20 M/uL Final    Hemoglobin 02/18/2021 11.7* 12.0 - 16.0 g/dL Final    Hematocrit 02/18/2021 35.6* 36.0 - 48.0 % Final    MCV 02/18/2021 86.5  80.0 - 100.0 fL Final    MCH 02/18/2021 28.6  26.0 - 34.0 pg Final    MCHC 02/18/2021 33.0  31.0 - 36.0 g/dL Final    RDW 02/18/2021 13.3  12.4 - 15.4 % Final    Platelets 53/88/7787 353  135 - 450 K/uL Final    MPV 02/18/2021 8.8  5.0 - 10.5 fL Final    Protein, Ur 02/18/2021 32.00* <12 mg/dL Final    Creatinine, Ur 02/18/2021 279.6* 28.0 - 259.0 mg/dL Final    Visual Acuity Distance Right Eye 03/11/2021 20/60   Final    Visual Acuity Distance Left Eye 03/11/2021 20/40   Final    Intraocular Pressure Eye 03/11/2021    Final                    Value:19  19      Diabetic Retinopathy 03/11/2021 NEGATIVE   Final    Cataracts 03/11/2021 POSITIVE   Final  Glaucoma 03/11/2021 NEGATIVE   Final     Review of Systems   Constitutional: Negative for chills, fatigue and fever. Respiratory: Negative for cough, chest tightness, shortness of breath and wheezing. Cardiovascular: Negative for chest pain, palpitations and leg swelling. Neurological: Negative for dizziness, tremors, light-headedness and headaches. /64   Pulse 110   Temp 95.2 °F (35.1 °C)   Wt 198 lb (89.8 kg)   SpO2 96%   BMI 33.99 kg/m²      Physical Exam  Vitals signs reviewed. Constitutional:       General: She is not in acute distress. Appearance: Normal appearance. She is well-developed. She is not ill-appearing or diaphoretic. HENT:      Head: Normocephalic and atraumatic. Cardiovascular:      Rate and Rhythm: Normal rate and regular rhythm. Heart sounds: Normal heart sounds. No murmur. Pulmonary:      Effort: Pulmonary effort is normal. No respiratory distress. Breath sounds: Normal breath sounds. No wheezing or rhonchi. Skin:     General: Skin is warm and dry. Neurological:      General: No focal deficit present. Mental Status: She is alert and oriented to person, place, and time. Psychiatric:         Mood and Affect: Mood and affect normal.         Behavior: Behavior normal.       Diagnosis  Assessment and Plan  1. Preop examination  Perioperative risk related to the patient's upcoming surgery is considered low. She is cleared for surgery. DVT prophylaxis per surgery team.  Pre-op exam was completed on 3/22/21. Recent labs reviewed and stable. 2. Age-related cataract of both eyes, unspecified age-related cataract type  Stable, continue with planned surgery     3. Type 2 diabetes mellitus with diabetic polyneuropathy, with long-term current use of insulin (HCC)  Stable, moderately controlled  A1C 7.7    4. Essential hypertension  Stable, controlled on current regimen. 5. Mixed hyperlipidemia  Stable, controlled on current regimen. 6. Moderate episode of recurrent major depressive disorder (HCC)  Stable, controlled on current regimen. 7. Stage 3 chronic kidney disease, unspecified whether stage 3a or 3b CKD  Stable, controlled on current regimen. 8. Colon cancer screening  - POCT Fecal Immunochemical Test (FIT);  Future    F/u when due for CC     Electronically signed by ALESHA Crenshaw CNP on 3/22/2021 at 3:43 PM

## 2021-03-25 RX ORDER — OMEPRAZOLE 40 MG/1
CAPSULE, DELAYED RELEASE ORAL
Qty: 30 CAPSULE | Refills: 5 | Status: SHIPPED | OUTPATIENT
Start: 2021-03-25 | End: 2021-10-04

## 2021-03-29 ENCOUNTER — TELEPHONE (OUTPATIENT)
Dept: ENDOCRINOLOGY | Age: 66
End: 2021-03-29

## 2021-03-29 DIAGNOSIS — E11.42 TYPE 2 DIABETES MELLITUS WITH POLYNEUROPATHY (HCC): ICD-10-CM

## 2021-03-29 RX ORDER — INSULIN ASPART 100 [IU]/ML
INJECTION, SOLUTION INTRAVENOUS; SUBCUTANEOUS
Qty: 5 PEN | Refills: 2 | Status: SHIPPED | OUTPATIENT
Start: 2021-03-29

## 2021-03-29 RX ORDER — INSULIN GLARGINE 300 U/ML
INJECTION, SOLUTION SUBCUTANEOUS
Qty: 9 PEN | Refills: 3 | Status: SHIPPED | OUTPATIENT
Start: 2021-03-29 | End: 2021-12-27

## 2021-03-30 NOTE — TELEPHONE ENCOUNTER
Please advise patient to increase long-acting insulin by 4 units and ensure that she takes the short-acting insulin with meals as she is having high glucose after she eats meals or she should restrict her carbohydrates to less than 30 per meal

## 2021-04-03 DIAGNOSIS — J30.9 ALLERGIC SINUSITIS: ICD-10-CM

## 2021-04-03 DIAGNOSIS — H93.13 TINNITUS OF BOTH EARS: ICD-10-CM

## 2021-04-03 DIAGNOSIS — H69.93 DISORDER OF BOTH EUSTACHIAN TUBES: ICD-10-CM

## 2021-04-05 RX ORDER — MONTELUKAST SODIUM 10 MG/1
TABLET ORAL
Qty: 15 TABLET | Refills: 0 | Status: SHIPPED | OUTPATIENT
Start: 2021-04-05 | End: 2021-04-23

## 2021-04-21 DIAGNOSIS — H69.93 DISORDER OF BOTH EUSTACHIAN TUBES: ICD-10-CM

## 2021-04-21 DIAGNOSIS — J30.9 ALLERGIC SINUSITIS: ICD-10-CM

## 2021-04-21 DIAGNOSIS — H93.13 TINNITUS OF BOTH EARS: ICD-10-CM

## 2021-04-23 DIAGNOSIS — F33.41 RECURRENT MAJOR DEPRESSIVE DISORDER, IN PARTIAL REMISSION (HCC): ICD-10-CM

## 2021-04-23 RX ORDER — MONTELUKAST SODIUM 10 MG/1
TABLET ORAL
Qty: 15 TABLET | Refills: 0 | Status: SHIPPED | OUTPATIENT
Start: 2021-04-23 | End: 2021-05-10

## 2021-04-23 RX ORDER — BUPROPION HYDROCHLORIDE 150 MG/1
TABLET, EXTENDED RELEASE ORAL
Qty: 45 TABLET | Refills: 0 | Status: SHIPPED | OUTPATIENT
Start: 2021-04-23 | End: 2021-10-04

## 2021-04-27 ENCOUNTER — TELEPHONE (OUTPATIENT)
Dept: PHARMACY | Facility: CLINIC | Age: 66
End: 2021-04-27

## 2021-04-27 NOTE — TELEPHONE ENCOUNTER
POPULATION HEALTH CLINICAL PHARMACY: STATIN THERAPY REVIEW  Identified statin use in persons with diabetes care gap per [facility]. Records dated: 04/04/21. Last Office Visit: 02/02/21    Patient also appears to be taking: lisinopril     Patient found in [facility] and is currently eligible for TIP    ASSESSMENT  ACE/ARB ADHERENCE    Per [facility] MT Records:  lisinopril last filled on 04/21/21 for 90 day supply. BP Readings from Last 3 Encounters:   03/22/21 124/64   02/16/21 139/82   02/03/21 136/79     Estimated Creatinine Clearance: 46 mL/min (A) (based on SCr of 1.3 mg/dL (H)). STATIN GAP IDENTIFIED    Per chart review, patient is prescribed atorvastatin 20 mg daily    Per [employer]   atorvastatin last filled on 02/18/21 for a 90 day supply; SIG: TAKE ONE TABLET BY MOUTH DAILY; last picked up on 02/18/21. 0 refills remaining. Billed through [employer] She paid $12.  90 day fill on July 20, 2020 was $3 with [facility]    Lab Results   Component Value Date    CHOL 170 07/06/2020    TRIG 229 (H) 07/06/2020    HDL 52 07/06/2020    LDLCALC 72 07/06/2020    LDLDIRECT 115 (H) 04/26/2019     ALT   Date Value Ref Range Status   10/29/2020 17 10 - 40 U/L Final     AST   Date Value Ref Range Status   10/29/2020 18 15 - 37 U/L Final       The 10-year ASCVD risk score (Donald Snyder, et al., 2013) is: 15.3%    Values used to calculate the score:      Age: 77 years      Sex: Female      Is Non- : No      Diabetic: Yes      Tobacco smoker: No      Systolic Blood Pressure: 520 mmHg      Is BP treated: Yes      HDL Cholesterol: 52 mg/dL      Total Cholesterol: 170 mg/dL     Hyperlipidemia Goal: Patient is prescribed moderate-intensity statin therapy. 2019 ADA Guidelines Age:   [facility]  >/= 36years old:   o No history of ASCVD or 10-year ASCVD risk < 20% - moderate-intensity statin is recommended.      Clay Melendrez, PharmD Candidate [id number]  O: [phone]  Department, toll-free: [phone], option 7

## 2021-05-08 DIAGNOSIS — J30.9 ALLERGIC SINUSITIS: ICD-10-CM

## 2021-05-08 DIAGNOSIS — H69.93 DISORDER OF BOTH EUSTACHIAN TUBES: ICD-10-CM

## 2021-05-08 DIAGNOSIS — H93.13 TINNITUS OF BOTH EARS: ICD-10-CM

## 2021-05-10 RX ORDER — MONTELUKAST SODIUM 10 MG/1
TABLET ORAL
Qty: 15 TABLET | Refills: 0 | Status: SHIPPED | OUTPATIENT
Start: 2021-05-10 | End: 2021-06-01

## 2021-05-15 DIAGNOSIS — F33.41 RECURRENT MAJOR DEPRESSIVE DISORDER, IN PARTIAL REMISSION (HCC): ICD-10-CM

## 2021-05-15 RX ORDER — BUPROPION HYDROCHLORIDE 300 MG/1
TABLET ORAL
Qty: 45 TABLET | Refills: 0 | Status: SHIPPED | OUTPATIENT
Start: 2021-05-15 | End: 2021-08-13

## 2021-05-19 ENCOUNTER — OFFICE VISIT (OUTPATIENT)
Dept: ENDOCRINOLOGY | Age: 66
End: 2021-05-19
Payer: MEDICARE

## 2021-05-19 VITALS
WEIGHT: 191 LBS | SYSTOLIC BLOOD PRESSURE: 145 MMHG | HEIGHT: 64 IN | HEART RATE: 107 BPM | BODY MASS INDEX: 32.61 KG/M2 | DIASTOLIC BLOOD PRESSURE: 79 MMHG

## 2021-05-19 DIAGNOSIS — E11.42 TYPE 2 DIABETES MELLITUS WITH POLYNEUROPATHY (HCC): Primary | ICD-10-CM

## 2021-05-19 LAB — HBA1C MFR BLD: 7.8 %

## 2021-05-19 PROCEDURE — 2022F DILAT RTA XM EVC RTNOPTHY: CPT | Performed by: INTERNAL MEDICINE

## 2021-05-19 PROCEDURE — 95251 CONT GLUC MNTR ANALYSIS I&R: CPT | Performed by: INTERNAL MEDICINE

## 2021-05-19 PROCEDURE — 3017F COLORECTAL CA SCREEN DOC REV: CPT | Performed by: INTERNAL MEDICINE

## 2021-05-19 PROCEDURE — 3051F HG A1C>EQUAL 7.0%<8.0%: CPT | Performed by: INTERNAL MEDICINE

## 2021-05-19 PROCEDURE — 99213 OFFICE O/P EST LOW 20 MIN: CPT | Performed by: INTERNAL MEDICINE

## 2021-05-19 PROCEDURE — 1123F ACP DISCUSS/DSCN MKR DOCD: CPT | Performed by: INTERNAL MEDICINE

## 2021-05-19 PROCEDURE — G8417 CALC BMI ABV UP PARAM F/U: HCPCS | Performed by: INTERNAL MEDICINE

## 2021-05-19 PROCEDURE — 1036F TOBACCO NON-USER: CPT | Performed by: INTERNAL MEDICINE

## 2021-05-19 PROCEDURE — 83036 HEMOGLOBIN GLYCOSYLATED A1C: CPT | Performed by: INTERNAL MEDICINE

## 2021-05-19 PROCEDURE — 4040F PNEUMOC VAC/ADMIN/RCVD: CPT | Performed by: INTERNAL MEDICINE

## 2021-05-19 PROCEDURE — G8427 DOCREV CUR MEDS BY ELIG CLIN: HCPCS | Performed by: INTERNAL MEDICINE

## 2021-05-19 PROCEDURE — G8400 PT W/DXA NO RESULTS DOC: HCPCS | Performed by: INTERNAL MEDICINE

## 2021-05-19 PROCEDURE — 1090F PRES/ABSN URINE INCON ASSESS: CPT | Performed by: INTERNAL MEDICINE

## 2021-05-19 ASSESSMENT — ENCOUNTER SYMPTOMS: VISUAL CHANGE: 0

## 2021-05-19 NOTE — PROGRESS NOTES
Jean Pierre Beltran is a 77 y.o. female is seen  for evaluation and management of type 2  Diabetes--- . Jean Pierre Beltran was diagnosed with Diabetes mellitus in 2008 aprox   Pt always had issues with hypoglycemia since her childhood so she was under constant surveillance until she was diagnosed with diabetes  . Jean Pierre Beltran got diabetic education in the past.  Comorbid conditions: Neuropathy    INTERIM:    Diabetes  She presents for her follow-up diabetic visit. She has type 2 diabetes mellitus. No MedicAlert identification noted. The initial diagnosis of diabetes was made 10 years ago. Her disease course has been worsening. Hypoglycemia symptoms include sweats and tremors. Pertinent negatives for diabetes include no foot ulcerations, no polydipsia, no polyphagia, no polyuria, no visual change, no weakness and no weight loss. There are no hypoglycemic complications. Symptoms are worsening. Diabetic complications include peripheral neuropathy. Risk factors for coronary artery disease include post-menopausal, hypertension, diabetes mellitus and dyslipidemia. Current diabetic treatment includes insulin injections. She is compliant with treatment some of the time. Her weight is increasing rapidly. She is following a generally unhealthy diet. Meal planning includes avoidance of concentrated sweets. She has had a previous visit with a dietitian. She never participates in exercise. Her home blood glucose trend is fluctuating dramatically. Her breakfast blood glucose is taken between 7-8 am. Her breakfast blood glucose range is generally 140-180 mg/dl. An ACE inhibitor/angiotensin II receptor blocker is being taken. She does not see a podiatrist.Eye exam is current.      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  Tends to skip meals to avoid taking meal boluses does complain of balance issues    Weight trend: stable  Prior visit with dietician: no  Current diet: on average, 3 Tobacco Use    Smoking status: Never Smoker    Smokeless tobacco: Never Used   Vaping Use    Vaping Use: Never used   Substance and Sexual Activity    Alcohol use: No     Alcohol/week: 0.0 standard drinks    Drug use: No    Sexual activity: Yes     Partners: Male   Other Topics Concern    Not on file   Social History Narrative    Not on file     Social Determinants of Health     Financial Resource Strain:     Difficulty of Paying Living Expenses:    Food Insecurity:     Worried About Running Out of Food in the Last Year:     Ran Out of Food in the Last Year:    Transportation Needs:     Lack of Transportation (Medical):      Lack of Transportation (Non-Medical):    Physical Activity:     Days of Exercise per Week:     Minutes of Exercise per Session:    Stress:     Feeling of Stress :    Social Connections:     Frequency of Communication with Friends and Family:     Frequency of Social Gatherings with Friends and Family:     Attends Yazidism Services:     Active Member of Clubs or Organizations:     Attends Club or Organization Meetings:     Marital Status:    Intimate Partner Violence:     Fear of Current or Ex-Partner:     Emotionally Abused:     Physically Abused:     Sexually Abused:      Family History   Problem Relation Age of Onset    Cancer Mother     Obesity Sister      Current Outpatient Medications   Medication Sig Dispense Refill    atorvastatin (LIPITOR) 20 MG tablet TAKE ONE TABLET BY MOUTH DAILY 90 tablet 1    buPROPion (WELLBUTRIN XL) 300 MG extended release tablet TAKE ONE TABLET BY MOUTH EVERY OTHER DAY ON ALTERNATING DAYS WITH 150 MG SR TABLET 45 tablet 0    montelukast (SINGULAIR) 10 MG tablet TAKE ONE TABLET BY MOUTH ONCE NIGHTLY 15 tablet 0    buPROPion (WELLBUTRIN SR) 150 MG extended release tablet TAKE ONE TABLET BY MOUTH EVERY OTHER DAY ON ALTERNATING DAYS WITH 300 MG XL TABLET 45 tablet 0    NOVOLOG FLEXPEN 100 UNIT/ML injection pen INJECT 10 UNITS UNDER THE SKIN THREE TIMES A DAY BEFORE MEALS 5 pen 2    Insulin Glargine, 1 Unit Dial, (TOUJEO SOLOSTAR) 300 UNIT/ML SOPN Inject 40 units into the skin daily. 9 pen 3    omeprazole (PRILOSEC) 40 MG delayed release capsule TAKE ONE CAPSULE BY MOUTH DAILY 30 capsule 5    [employer] PEN NEEDLES 31G 31G X 8 MM MISC USE WITH INSULIN FOUR TIMES A  each 5    azithromycin (ZITHROMAX) 250 MG tablet Dispense one 5 day supply pack and take as directed 1 packet 0    Liraglutide (VICTOZA) 18 MG/3ML SOPN SC injection DIAL AND INJECT UNDER THE SKIN 1.8 MG DAILY 9 pen 3    Continuous Blood Gluc Sensor (Quintel TechnologySTYLE JEREMIAS 14 DAY SENSOR) MISC CHANGE SENSOR EVERY 14 DAYS AND CHECK BLOOD SUGAR FOUR TIMES A DAY 2 each 5    lisinopril (PRINIVIL;ZESTRIL) 20 MG tablet TAKE ONE TABLET BY MOUTH DAILY 30 tablet 5    DULoxetine (CYMBALTA) 60 MG extended release capsule Take one tab PO daily 90 capsule 1    celecoxib (CELEBREX) 200 MG capsule TAKE ONE CAPSULE BY MOUTH TWICE A DAY AS NEEDED FOR PAIN 60 capsule 5    cyclobenzaprine (FLEXERIL) 10 MG tablet TAKE ONE TABLET BY MOUTH ONCE NIGHTLY AS NEEDED FOR MUSCLE SPASMS (Patient taking differently: 10 mg Takes 1/2 tab) 30 tablet 5    Continuous Blood Gluc  (FREESTYLE JEREMIAS READER) ENE Use for checking glucose 1 Device 1    fluticasone (FLONASE) 50 MCG/ACT nasal spray 2 sprays by Nasal route daily 3 Bottle 3     No current facility-administered medications for this visit.      Allergies   Allergen Reactions    Actos [Pioglitazone Hydrochloride]      Causes mouth blisters    Albuterol      YEAST INFECTION IN MOUTH    Dilaudid [Hydromorphone Hcl]     Fluticasone-Salmeterol      YEAST INFECTIONS IN MOUTH    Glyburide      Causes mouth blisters    Lortab [Hydrocodone-Acetaminophen]      headache    Metformin      Causes mouth blisters    Symbicort [Budesonide-Formoterol Fumarate]      Causes mouth blisters     Family Status   Relation Name Status    Mother   at age 68  Father   at age 32    Sister  (Not Specified)       Review of Systems  I have reviewed the review of system questionnaire filled by the patient . Patient was advised to contact PCP for non endocrine signs and symptoms       OBJECTIVE:  BP (!) 145/79   Pulse 107   Ht 5' 4\" (1.626 m)   Wt 191 lb (86.6 kg)   BMI 32.79 kg/m²    Wt Readings from Last 3 Encounters:   21 191 lb (86.6 kg)   21 198 lb (89.8 kg)   21 197 lb (89.4 kg)       Constitutional: no acute distress, well appearing, well nourished  Psychiatric: oriented to person, place and time, judgement, insight and normal, recent and remote memory and intact and mood, affect are normal  Skin: skin and subcutaneous tissue is normal without mass,   Head and Face: examination of head and face revealed no abnormalities  Eyes: no lid or conjunctival swelling, no erythema or discharge, pupils are normal,   Ears/Nose: external inspection of ears and nose revealed no abnormalities, hearing is grossly normal  Oropharynx/Mouth/Face: lips, tongue and gums are normal with no lesions, the voice quality was normal  Neck: neck is supple and symmetric, with midline trachea and no masses, thyroid is normal    Pulmonary: no increased work of breathing or signs of respiratory distress, lungs are clear to auscultation  Cardiovascular: normal heart rate and rhythm, normal S1 and S2,   Musculoskeletal: normal gait and station,   Neurological: normal coordination, normal general cortical function      Lab Results   Component Value Date    LABA1C 7.8 2021    LABA1C 7.7 2021    LABA1C 8.3 10/27/2020       ASSESSMENT/PLAN:  1.  Type 2 diabetes mellitus with polyneuropathy last 8.2 >>10.9>>8.3>>10.9>>8.3>>7.7    ---basaglar 44  units daily   she is on victoza 1.8 mg daily  Still doesn't take meal boluses ---advised to take meal boluses regularly and eat   She sometimes goes days without eating   admits that she has mental block when it comes to taking insulin     She had prior reaction to metformin ( mouth sores) Invokana ( UTI ) and Januvia and made her blood sugars run really high. Hypoglycemia protocol reviewed in detail with patient   Patient was advised that sending of her fingerstick blood glucose logs is crucial in management of her  diabetes. I will adjust the  dose of insulin according to sent data. Health Maintenance   - Kelly Pascal was advised to have annual dilated eye exam     Last Eye Exam: Up to date on eye exam was told she has no retinopathy as per patient in [address] [place] [address] 2021   Last Podiatry Exam: nov 2020  she has significant peripheral neuropathy   Lipids: Last LDL 52 in May 2019    Microalbumin/Creatinine Ratio: She now follows with nephrology    . Education: Reviewed ABCs of diabetes management (respective goals in parentheses): A1C (<7), blood pressure (<130/80), and cholesterol (LDL <100). -. Compliance at present is estimated to be poor. Efforts to improve compliance (if necessary) will be directed at dietary modifications: advised again .  -. Follow up: I recommend diabetes care be 3 months. 2. Diabetic polyneuropathy associated with type 2 diabetes mellitus ([address]  She is on gabapentin as per primary care physician    3. Postmenopausal  She had complete hysterectomy done at age 37 for severe endometriosis. She had been on hormone replacement therapy until age of 46. We discussed prevention of osteoporosis in detail today again   - DEXA Bone Density Axial Skeleton; ordered but she has not done it     4. Recurrent major depressive disorder, in partial remission [facility])  She is on medication as per primary care physician    5. Essential hypertension  At target     6.  Mixed hyperlipidemia  She is on Lipitor and her last LDL is 51 in 2019   We will check  at follow-up as she did not get blood work done9      Reviewed and/or ordered clinical lab results yes   Reviewed and/or ordered radiology tests Yes  Reviewed and/or ordered other diagnostic tests yes   Made a decision to obtain old records yes   Reviewed and summarized old records yes     Nirmal Andre was counseled regarding symptoms of current diagnosis, course and complications of disease if inadequately treated, side effects of medications, diagnosis, treatment options, and prognosis, risks, benefits, complications, and alternatives of treatment, labs, imaging and other studies and treatment targets and goals. These diagnosis were discussed and reviewed with the patient including the advantages of drug therapy. She was counseled at this visit on the following: diabetes complication prevention and foot care. Return in about 3 months (around 8/19/2021). Diabetes Continuous Glucose Monitoring Report         Reason for Study:     - improve diabetic control without risk of hypoglycemia       Current Medication regimen:    Basal bolus regimen    CGMS Report     CGMS data collection was performed on may 19,2021   Patient provided information on her  diet, activities and insulin dosing  during this period. Data was available for 4+ conse  days     Sensor Data Report:   - 12 AM to 6 AM: Overnight blood glucose pattern shows stable hyperglycemia  - 6   AM to 10 AM:  Post breakfast hyper glycemia is noted  --10AM to 5 PM : No hypoglycemia observed during this time.   - 5   PM to 8 PM: Post meal hyperglycemia is noted       Average reading 155  mg/dL     Co ef  35.8  % of time <70 mg/dL 1 %   % of time >180  mg/dL 27 %   % of time within range 72 %  Number of hypoglycemia episodes noted: 0     Impression:   CGMS shows overall hyperglycemia and postmeal glycemic excursions noted     Recommendation:      Patient was advised to make the following changes in her diabetic regimen  Stay on same dose lomg acting insulin   She will start taking meal boluses according to the carb to insulin ratio of 10-1

## 2021-05-19 NOTE — TELEPHONE ENCOUNTER
Per [employer], atorvastatin filled 5/17/21 90 day supply, $3.50, through [employer]. No further outreach planned at this time.     For Pharmacy Admin Tracking Only     CPA in place:  No   Gap Closed?: Yes    Time Spent (min): 15

## 2021-05-26 RX ORDER — CELECOXIB 200 MG/1
CAPSULE ORAL
Qty: 60 CAPSULE | Refills: 5 | Status: SHIPPED | OUTPATIENT
Start: 2021-05-26 | End: 2021-10-07

## 2021-05-30 DIAGNOSIS — H93.13 TINNITUS OF BOTH EARS: ICD-10-CM

## 2021-05-30 DIAGNOSIS — H69.93 DISORDER OF BOTH EUSTACHIAN TUBES: ICD-10-CM

## 2021-05-30 DIAGNOSIS — J30.9 ALLERGIC SINUSITIS: ICD-10-CM

## 2021-06-01 RX ORDER — MONTELUKAST SODIUM 10 MG/1
TABLET ORAL
Qty: 15 TABLET | Refills: 0 | Status: SHIPPED | OUTPATIENT
Start: 2021-06-01 | End: 2021-06-25

## 2021-06-25 DIAGNOSIS — H93.13 TINNITUS OF BOTH EARS: ICD-10-CM

## 2021-06-25 DIAGNOSIS — J30.9 ALLERGIC SINUSITIS: ICD-10-CM

## 2021-06-25 DIAGNOSIS — H69.93 DISORDER OF BOTH EUSTACHIAN TUBES: ICD-10-CM

## 2021-06-25 RX ORDER — MONTELUKAST SODIUM 10 MG/1
TABLET ORAL
Qty: 15 TABLET | Refills: 0 | Status: SHIPPED | OUTPATIENT
Start: 2021-06-25 | End: 2021-07-13

## 2021-07-13 DIAGNOSIS — H69.93 DISORDER OF BOTH EUSTACHIAN TUBES: ICD-10-CM

## 2021-07-13 DIAGNOSIS — J30.9 ALLERGIC SINUSITIS: ICD-10-CM

## 2021-07-13 DIAGNOSIS — H93.13 TINNITUS OF BOTH EARS: ICD-10-CM

## 2021-07-13 RX ORDER — MONTELUKAST SODIUM 10 MG/1
TABLET ORAL
Qty: 15 TABLET | Refills: 0 | Status: SHIPPED | OUTPATIENT
Start: 2021-07-13 | End: 2021-07-27

## 2021-07-20 RX ORDER — LISINOPRIL 20 MG/1
TABLET ORAL
Qty: 90 TABLET | Refills: 0 | Status: SHIPPED | OUTPATIENT
Start: 2021-07-20 | End: 2021-10-25

## 2021-07-25 DIAGNOSIS — J30.9 ALLERGIC SINUSITIS: ICD-10-CM

## 2021-07-25 DIAGNOSIS — H69.93 DISORDER OF BOTH EUSTACHIAN TUBES: ICD-10-CM

## 2021-07-25 DIAGNOSIS — H93.13 TINNITUS OF BOTH EARS: ICD-10-CM

## 2021-07-26 ENCOUNTER — PATIENT MESSAGE (OUTPATIENT)
Dept: INTERNAL MEDICINE CLINIC | Age: 66
End: 2021-07-26

## 2021-07-26 NOTE — TELEPHONE ENCOUNTER
If she is actively experiencing chest pain or shortness of breath at this time, I recommend that she go to the emergency room now. Otherwise she should be evaluated in the office tomorrow.

## 2021-07-26 NOTE — TELEPHONE ENCOUNTER
From: Kee Vásquez  To: Destiny Curtis MD  Sent: 7/26/2021 3:18 PM EDT  Subject: Non-Urgent Medical Question    Dr. Jeannine Lama,    I am having a difficult time breathing. If I carry anything or if I am outside it feels like there is a weight in the middle of my chest. I do not know if this is connected but about 6 weeks ago, I fell down a flight of 5 cement steps. 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 They have a video of it. It looks as though I just lost my balance and rolled down the steps. I was bruised but not really hurt. I did not go to see anyone. Could I have hurt something internally? The photo enclosed is of the front of the school. If you zoom in you can see the steps.     Apolinar Izquierdo

## 2021-07-27 ENCOUNTER — OFFICE VISIT (OUTPATIENT)
Dept: INTERNAL MEDICINE CLINIC | Age: 66
End: 2021-07-27
Payer: MEDICARE

## 2021-07-27 VITALS
OXYGEN SATURATION: 96 % | SYSTOLIC BLOOD PRESSURE: 120 MMHG | BODY MASS INDEX: 32.19 KG/M2 | DIASTOLIC BLOOD PRESSURE: 70 MMHG | HEART RATE: 93 BPM | WEIGHT: 193.2 LBS | HEIGHT: 65 IN

## 2021-07-27 DIAGNOSIS — R06.02 SHORTNESS OF BREATH: ICD-10-CM

## 2021-07-27 DIAGNOSIS — R06.09 DYSPNEA ON EXERTION: ICD-10-CM

## 2021-07-27 DIAGNOSIS — R06.09 DYSPNEA ON EXERTION: Primary | ICD-10-CM

## 2021-07-27 DIAGNOSIS — E66.09 EXOGENOUS OBESITY: ICD-10-CM

## 2021-07-27 DIAGNOSIS — E11.42 TYPE 2 DIABETES MELLITUS WITH DIABETIC POLYNEUROPATHY, WITH LONG-TERM CURRENT USE OF INSULIN (HCC): ICD-10-CM

## 2021-07-27 DIAGNOSIS — Z79.4 TYPE 2 DIABETES MELLITUS WITH DIABETIC POLYNEUROPATHY, WITH LONG-TERM CURRENT USE OF INSULIN (HCC): ICD-10-CM

## 2021-07-27 DIAGNOSIS — I10 ESSENTIAL HYPERTENSION: ICD-10-CM

## 2021-07-27 DIAGNOSIS — E78.2 MIXED HYPERLIPIDEMIA: ICD-10-CM

## 2021-07-27 LAB
A/G RATIO: 1.8 (ref 1.1–2.2)
ALBUMIN SERPL-MCNC: 4.8 G/DL (ref 3.4–5)
ALP BLD-CCNC: 151 U/L (ref 40–129)
ALT SERPL-CCNC: 11 U/L (ref 10–40)
ANION GAP SERPL CALCULATED.3IONS-SCNC: 17 MMOL/L (ref 3–16)
AST SERPL-CCNC: 15 U/L (ref 15–37)
BASOPHILS ABSOLUTE: 0.1 K/UL (ref 0–0.2)
BASOPHILS RELATIVE PERCENT: 0.8 %
BILIRUB SERPL-MCNC: 0.3 MG/DL (ref 0–1)
BUN BLDV-MCNC: 41 MG/DL (ref 7–20)
CALCIUM SERPL-MCNC: 10 MG/DL (ref 8.3–10.6)
CHLORIDE BLD-SCNC: 100 MMOL/L (ref 99–110)
CO2: 18 MMOL/L (ref 21–32)
CREAT SERPL-MCNC: 1.9 MG/DL (ref 0.6–1.2)
EOSINOPHILS ABSOLUTE: 0.1 K/UL (ref 0–0.6)
EOSINOPHILS RELATIVE PERCENT: 1.3 %
GFR AFRICAN AMERICAN: 32
GFR NON-AFRICAN AMERICAN: 26
GLOBULIN: 2.6 G/DL
GLUCOSE BLD-MCNC: 110 MG/DL (ref 70–99)
HCT VFR BLD CALC: 36.3 % (ref 36–48)
HEMOGLOBIN: 12.1 G/DL (ref 12–16)
LYMPHOCYTES ABSOLUTE: 2.9 K/UL (ref 1–5.1)
LYMPHOCYTES RELATIVE PERCENT: 34.6 %
MCH RBC QN AUTO: 29.4 PG (ref 26–34)
MCHC RBC AUTO-ENTMCNC: 33.3 G/DL (ref 31–36)
MCV RBC AUTO: 88.3 FL (ref 80–100)
MONOCYTES ABSOLUTE: 0.1 K/UL (ref 0–1.3)
MONOCYTES RELATIVE PERCENT: 1.3 %
NEUTROPHILS ABSOLUTE: 5.1 K/UL (ref 1.7–7.7)
NEUTROPHILS RELATIVE PERCENT: 62 %
PDW BLD-RTO: 13.5 % (ref 12.4–15.4)
PLATELET # BLD: 365 K/UL (ref 135–450)
PMV BLD AUTO: 8.8 FL (ref 5–10.5)
POTASSIUM SERPL-SCNC: 5.1 MMOL/L (ref 3.5–5.1)
RBC # BLD: 4.11 M/UL (ref 4–5.2)
SODIUM BLD-SCNC: 135 MMOL/L (ref 136–145)
TOTAL PROTEIN: 7.4 G/DL (ref 6.4–8.2)
TSH SERPL DL<=0.05 MIU/L-ACNC: 2.05 UIU/ML (ref 0.27–4.2)
WBC # BLD: 8.3 K/UL (ref 4–11)

## 2021-07-27 PROCEDURE — 93000 ELECTROCARDIOGRAM COMPLETE: CPT | Performed by: INTERNAL MEDICINE

## 2021-07-27 PROCEDURE — 99214 OFFICE O/P EST MOD 30 MIN: CPT | Performed by: INTERNAL MEDICINE

## 2021-07-27 PROCEDURE — 3051F HG A1C>EQUAL 7.0%<8.0%: CPT | Performed by: INTERNAL MEDICINE

## 2021-07-27 RX ORDER — MONTELUKAST SODIUM 10 MG/1
TABLET ORAL
Qty: 15 TABLET | Refills: 0 | Status: SHIPPED | OUTPATIENT
Start: 2021-07-27 | End: 2021-08-17 | Stop reason: SDUPTHER

## 2021-07-27 SDOH — ECONOMIC STABILITY: FOOD INSECURITY: WITHIN THE PAST 12 MONTHS, YOU WORRIED THAT YOUR FOOD WOULD RUN OUT BEFORE YOU GOT MONEY TO BUY MORE.: NEVER TRUE

## 2021-07-27 SDOH — ECONOMIC STABILITY: FOOD INSECURITY: WITHIN THE PAST 12 MONTHS, THE FOOD YOU BOUGHT JUST DIDN'T LAST AND YOU DIDN'T HAVE MONEY TO GET MORE.: NEVER TRUE

## 2021-07-27 ASSESSMENT — ENCOUNTER SYMPTOMS
VOMITING: 0
ABDOMINAL PAIN: 0
SPUTUM PRODUCTION: 0
WHEEZING: 0
CHOKING: 0
COUGH: 0
SWOLLEN GLANDS: 0
STRIDOR: 0
RHINORRHEA: 0
CHEST TIGHTNESS: 1
HEMOPTYSIS: 0
SHORTNESS OF BREATH: 1
SORE THROAT: 0

## 2021-07-27 ASSESSMENT — SOCIAL DETERMINANTS OF HEALTH (SDOH): HOW HARD IS IT FOR YOU TO PAY FOR THE VERY BASICS LIKE FOOD, HOUSING, MEDICAL CARE, AND HEATING?: NOT HARD AT ALL

## 2021-07-27 NOTE — PROGRESS NOTES
Marcelle Gtz (:  1955) is a 77 y.o. female,New patient, here for evaluation of the following chief complaint(s):  Respiratory Distress (couple weeks however getting worse.)         ASSESSMENT/PLAN:  1. Dyspnea on exertion   -     EKG 12 Lead  -     XR CHEST STANDARD (2 VW); Future  -     CBC Auto Differential; Future  -     TSH without Reflex; Future  -     Comprehensive Metabolic Panel; Future  -     ECHO (Dobutamine) Pharmacological Stress Test; Future  2. Shortness of breath  3. Type 2 diabetes mellitus with diabetic polyneuropathy, with long-term current use of insulin ([facility] [address]  4. Essential hypertension  5. Mixed hyperlipidemia  6. Exogenous obesity      No follow-ups on file.      Discussed with patient her presentation with this chest tightness associated with shortness of breath with minimal exercise the patient does not have any other associated cardiac symptoms nonetheless given the patient age as well as comorbid condition including the diabetes hypertension hyperlipidemia it is very suspicious and cardiac etiologies have to be ruled out with diastolic in the EKG rule out any underlying arrhythmias or changes in her EKG we will also can have to rule out underlying metabolic disorders disturbances and if all the above is negative cardiac status will be given including  The echo and the stress test    Unfortunately the patient does have complicating factors in the form of obesity which can give her shortness of breath but I feel that her symptoms are significantly out of proportion to the amount and type of dyspnea she would get from the obesity    I did review with the patient most recent lab results as well as imaging that she had for her ataxia that resulted in a fall about a month plus ago and the patient is following up with neurology for that    Patient EKG was done in the office and showed normal sinus rhythm with evidence of a possible old inferior MI but reviewing it with the EKG done on March 20 there was not any significant/acute changes observed at this point I feel that the patient will stress echo to give us a better idea of the cardiac structure and muscle function as well as dynamic changes associated with increased activity     all that was explained to patient in detail she will follow up with her primary physician or myself within the week and decide the next steps of treatment    Subjective   SUBJECTIVE/OBJECTIVE:    Lab Review   Lab Results   Component Value Date     02/18/2021     10/29/2020     07/06/2020    K 4.8 02/18/2021    K 5.1 10/29/2020    K 5.1 07/06/2020    CO2 25 02/18/2021    CO2 25 10/29/2020    CO2 23 07/06/2020    BUN 16 02/18/2021    BUN 15 10/29/2020    BUN 20 07/06/2020    CREATININE 1.3 02/18/2021    CREATININE 1.2 10/29/2020    CREATININE 1.5 07/06/2020    GLUCOSE 131 02/18/2021    GLUCOSE 249 10/29/2020    GLUCOSE 220 07/06/2020    CALCIUM 9.2 02/18/2021    CALCIUM 9.3 10/29/2020    CALCIUM 9.5 07/06/2020     Lab Results   Component Value Date    WBC 7.2 02/18/2021    WBC 6.3 10/29/2020    WBC 7.8 07/06/2020    HGB 11.7 02/18/2021    HGB 13.0 10/29/2020    HGB 13.5 07/06/2020    HCT 35.6 02/18/2021    HCT 39.0 10/29/2020    HCT 40.9 07/06/2020    MCV 86.5 02/18/2021    MCV 86.7 10/29/2020    MCV 88.9 07/06/2020     02/18/2021     10/29/2020     07/06/2020     Lab Results   Component Value Date    CHOL 170 07/06/2020    CHOL 189 05/20/2019    CHOL 153 05/06/2019    TRIG 229 07/06/2020    TRIG 217 05/20/2019    TRIG 250 05/06/2019    HDL 52 07/06/2020    HDL 55 05/20/2019    HDL 52 05/06/2019    HDL 54 09/21/2010    LDLDIRECT 115 04/26/2019       Vitals 7/27/2021 5/19/2021 4/82/7187   SYSTOLIC 353 136 232   DIASTOLIC 70 79 64   Site Left Upper Arm - -   Position Sitting - -   Cuff Size Medium Adult - -   Pulse 93 107 110   Temp - - 95.2   Resp - - -   SpO2 96 - 96   Weight 193 lb 3.2 oz 191 lb 198 lb   Height 5' 4.5\" 5' 4\" - Body mass index 32.65 kg/m2 32.78 kg/m2 -   Some recent data might be hidden       Shortness of Breath  This is a new problem. The current episode started 1 to 4 weeks ago. The problem occurs constantly. Pertinent negatives include no abdominal pain, chest pain, claudication, coryza, ear pain, fever, headaches, hemoptysis, leg swelling, rhinorrhea, sore throat, sputum production, swollen glands, syncope, vomiting or wheezing. The symptoms are aggravated by any activity, weather changes and exercise. The treatment provided no relief. Review of Systems   Constitutional: Negative for fever. HENT: Negative for ear pain, rhinorrhea and sore throat. Respiratory: Positive for chest tightness and shortness of breath. Negative for cough, hemoptysis, sputum production, choking, wheezing and stridor. Cardiovascular: Negative for chest pain, claudication, leg swelling and syncope. Gastrointestinal: Negative for abdominal pain and vomiting. Neurological: Negative for headaches. Objective   Physical Exam  Constitutional:       General: She is not in acute distress. Appearance: Normal appearance. HENT:      Head: Normocephalic and atraumatic. Right Ear: Tympanic membrane normal.      Left Ear: Tympanic membrane normal.      Nose: Nose normal.   Eyes:      Extraocular Movements: Extraocular movements intact. Conjunctiva/sclera: Conjunctivae normal.      Pupils: Pupils are equal, round, and reactive to light. Neck:      Vascular: No carotid bruit. Cardiovascular:      Rate and Rhythm: Normal rate and regular rhythm. Pulses: Normal pulses. Heart sounds: No murmur heard. Pulmonary:      Effort: Pulmonary effort is normal. No respiratory distress. Breath sounds: Normal breath sounds. Abdominal:      General: Abdomen is flat. Bowel sounds are normal. There is no distension. Palpations: Abdomen is soft. Tenderness: There is no abdominal tenderness. Musculoskeletal:         General: No swelling, tenderness or deformity. Cervical back: Normal range of motion and neck supple. No rigidity or tenderness. Right lower leg: No edema. Left lower leg: No edema. Lymphadenopathy:      Cervical: No cervical adenopathy. Skin:     Coloration: Skin is not jaundiced. Findings: No bruising, erythema or lesion. Neurological:      General: No focal deficit present. Mental Status: She is alert and oriented to person, place, and time. Cranial Nerves: No cranial nerve deficit. Motor: No weakness. Gait: Gait normal.            This dictation was generated by voice recognition computer software. Although all attempts are made to edit the dictation for accuracy, there may be errors in the transcription that are not intended. An electronic signature was used to authenticate this note.     --Rabia Ledbetter MD

## 2021-07-27 NOTE — ASSESSMENT & PLAN NOTE
Reviewed patient have resolved I think she would need to be more vigilant and work on her diet exercise and weight reduction once her current presentation has resolved

## 2021-07-28 ENCOUNTER — TELEPHONE (OUTPATIENT)
Dept: INTERNAL MEDICINE CLINIC | Age: 66
End: 2021-07-28

## 2021-07-28 DIAGNOSIS — I10 ESSENTIAL HYPERTENSION: Primary | ICD-10-CM

## 2021-07-29 DIAGNOSIS — I10 ESSENTIAL HYPERTENSION: ICD-10-CM

## 2021-07-29 LAB
ANION GAP SERPL CALCULATED.3IONS-SCNC: 14 MMOL/L (ref 3–16)
BUN BLDV-MCNC: 33 MG/DL (ref 7–20)
CALCIUM SERPL-MCNC: 9 MG/DL (ref 8.3–10.6)
CHLORIDE BLD-SCNC: 104 MMOL/L (ref 99–110)
CO2: 19 MMOL/L (ref 21–32)
CREAT SERPL-MCNC: 1.7 MG/DL (ref 0.6–1.2)
GFR AFRICAN AMERICAN: 36
GFR NON-AFRICAN AMERICAN: 30
GLUCOSE BLD-MCNC: 146 MG/DL (ref 70–99)
POTASSIUM SERPL-SCNC: 5.2 MMOL/L (ref 3.5–5.1)
SODIUM BLD-SCNC: 137 MMOL/L (ref 136–145)

## 2021-08-02 DIAGNOSIS — I10 ESSENTIAL HYPERTENSION: Primary | ICD-10-CM

## 2021-08-02 DIAGNOSIS — I10 ESSENTIAL HYPERTENSION: ICD-10-CM

## 2021-08-02 LAB — MAGNESIUM: 1.8 MG/DL (ref 1.8–2.4)

## 2021-08-08 DIAGNOSIS — J30.9 ALLERGIC SINUSITIS: ICD-10-CM

## 2021-08-08 DIAGNOSIS — H69.93 DISORDER OF BOTH EUSTACHIAN TUBES: ICD-10-CM

## 2021-08-08 DIAGNOSIS — H93.13 TINNITUS OF BOTH EARS: ICD-10-CM

## 2021-08-09 RX ORDER — MONTELUKAST SODIUM 10 MG/1
TABLET ORAL
Qty: 15 TABLET | Refills: 0 | OUTPATIENT
Start: 2021-08-09

## 2021-08-13 DIAGNOSIS — F33.41 RECURRENT MAJOR DEPRESSIVE DISORDER, IN PARTIAL REMISSION (HCC): ICD-10-CM

## 2021-08-13 RX ORDER — BUPROPION HYDROCHLORIDE 300 MG/1
TABLET ORAL
Qty: 45 TABLET | Refills: 0 | Status: SHIPPED | OUTPATIENT
Start: 2021-08-13 | End: 2021-11-11

## 2021-08-17 ENCOUNTER — OFFICE VISIT (OUTPATIENT)
Dept: ENT CLINIC | Age: 66
End: 2021-08-17
Payer: MEDICARE

## 2021-08-17 VITALS
WEIGHT: 193 LBS | SYSTOLIC BLOOD PRESSURE: 143 MMHG | BODY MASS INDEX: 32.62 KG/M2 | HEART RATE: 82 BPM | TEMPERATURE: 97.1 F | DIASTOLIC BLOOD PRESSURE: 71 MMHG

## 2021-08-17 DIAGNOSIS — H90.3 SENSORINEURAL HEARING LOSS (SNHL) OF BOTH EARS: Primary | ICD-10-CM

## 2021-08-17 DIAGNOSIS — H93.13 TINNITUS OF BOTH EARS: ICD-10-CM

## 2021-08-17 DIAGNOSIS — J30.9 ALLERGIC SINUSITIS: ICD-10-CM

## 2021-08-17 DIAGNOSIS — H69.93 DISORDER OF BOTH EUSTACHIAN TUBES: ICD-10-CM

## 2021-08-17 PROCEDURE — 1036F TOBACCO NON-USER: CPT | Performed by: STUDENT IN AN ORGANIZED HEALTH CARE EDUCATION/TRAINING PROGRAM

## 2021-08-17 PROCEDURE — G8400 PT W/DXA NO RESULTS DOC: HCPCS | Performed by: STUDENT IN AN ORGANIZED HEALTH CARE EDUCATION/TRAINING PROGRAM

## 2021-08-17 PROCEDURE — 1123F ACP DISCUSS/DSCN MKR DOCD: CPT | Performed by: STUDENT IN AN ORGANIZED HEALTH CARE EDUCATION/TRAINING PROGRAM

## 2021-08-17 PROCEDURE — 3017F COLORECTAL CA SCREEN DOC REV: CPT | Performed by: STUDENT IN AN ORGANIZED HEALTH CARE EDUCATION/TRAINING PROGRAM

## 2021-08-17 PROCEDURE — G8417 CALC BMI ABV UP PARAM F/U: HCPCS | Performed by: STUDENT IN AN ORGANIZED HEALTH CARE EDUCATION/TRAINING PROGRAM

## 2021-08-17 PROCEDURE — 99213 OFFICE O/P EST LOW 20 MIN: CPT | Performed by: STUDENT IN AN ORGANIZED HEALTH CARE EDUCATION/TRAINING PROGRAM

## 2021-08-17 PROCEDURE — G8427 DOCREV CUR MEDS BY ELIG CLIN: HCPCS | Performed by: STUDENT IN AN ORGANIZED HEALTH CARE EDUCATION/TRAINING PROGRAM

## 2021-08-17 PROCEDURE — 1090F PRES/ABSN URINE INCON ASSESS: CPT | Performed by: STUDENT IN AN ORGANIZED HEALTH CARE EDUCATION/TRAINING PROGRAM

## 2021-08-17 PROCEDURE — 4040F PNEUMOC VAC/ADMIN/RCVD: CPT | Performed by: STUDENT IN AN ORGANIZED HEALTH CARE EDUCATION/TRAINING PROGRAM

## 2021-08-17 RX ORDER — MONTELUKAST SODIUM 10 MG/1
TABLET ORAL
Qty: 30 TABLET | Refills: 3 | Status: SHIPPED | OUTPATIENT
Start: 2021-08-17 | End: 2022-01-19

## 2021-08-17 NOTE — PROGRESS NOTES
[address] VISIT      Patient Name: Finesse Zee Bartow Record Number:  [id number]  Primary Care Physician:  Nicolas Esquivel MD    ChiefComplaint     Chief Complaint   Patient presents with    Sinus Problem     recheck sinuses/allergies, wondering about medication refill for singular. History of Present Illness     Aertha President is an 77 y.o. female previously seen for tinnitus and allergies by  in the past.     Interval History:   Here to establish with ENT since Dr. Ro Marcus has retired. Takes singulair for allergies. Used to take flonase in past. Allergies controlled with just singulair, needs refill. Continues to have ringing in both ears, recently has worsened. High pitched, constant,, nonpulsatile. Bothers her more during the day. Works as a . 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 Uses Background noise at night.      Past Medical History     Past Medical History:   Diagnosis Date    Asthma     vocal fold     Ataxia     Depression     Diabetes mellitus (HCC)     Hyperlipidemia     Hypertension     Hypothyroidism     Obesity     Osteoarthritis     Paradoxical vocal fold motion disorder     Postmenopausal 5/15/2018    Stage 2 chronic kidney disease 5/7/2019       Past Surgical History     Past Surgical History:   Procedure Laterality Date    CARPAL TUNNEL RELEASE      RORY    HYSTERECTOMY, TOTAL ABDOMINAL      Age 37    [place] NJX AA&/STRD TFRML EPI LUMBAR/SACRAL 1 LEVEL Bilateral 12/3/2018    BILATERAL L4 LUMBAR TRANSFORAMINAL EPIDURAL STEROID INJECTION WITH FLUOROSCOPY performed by Mike Reyes MD at [address] [facility]       Family History     Family History   Problem Relation Age of Onset    Cancer Mother     Obesity Sister        Social History     Social History     Tobacco Use    Smoking status: Never Smoker    Smokeless tobacco: Never Used Vaping Use    Vaping Use: Never used   Substance Use Topics    Alcohol use: No     Alcohol/week: 0.0 standard drinks    Drug use: No        Allergies     Allergies   Allergen Reactions    Actos [Pioglitazone Hydrochloride]      Causes mouth blisters    Albuterol      YEAST INFECTION IN MOUTH    Dilaudid [Hydromorphone Hcl]     Fluticasone-Salmeterol      YEAST INFECTIONS IN MOUTH    Glyburide      Causes mouth blisters    Lortab [Hydrocodone-Acetaminophen]      headache    Metformin      Causes mouth blisters    Symbicort [Budesonide-Formoterol Fumarate]      Causes mouth blisters       Medications     Current Outpatient Medications   Medication Sig Dispense Refill    montelukast (SINGULAIR) 10 MG tablet TAKE ONE TABLET BY MOUTH ONCE NIGHTLY 30 tablet 3    buPROPion (WELLBUTRIN XL) 300 MG extended release tablet TAKE ONE TABLET BY MOUTH EVERY OTHER DAY ALTERNATING WITH 150MG SR TABLET 45 tablet 0    lisinopril (PRINIVIL;ZESTRIL) 20 MG tablet TAKE ONE TABLET BY MOUTH DAILY 90 tablet 0    VICTOZA 18 MG/3ML SOPN SC injection DIAL AND INJECT UNDER THE SKIN 1.8 MG DAILY 9 pen 1    celecoxib (CELEBREX) 200 MG capsule TAKE ONE CAPSULE BY MOUTH TWICE A DAY AS NEEDED FOR PAIN 60 capsule 5    atorvastatin (LIPITOR) 20 MG tablet TAKE ONE TABLET BY MOUTH DAILY 90 tablet 1    buPROPion (WELLBUTRIN SR) 150 MG extended release tablet TAKE ONE TABLET BY MOUTH EVERY OTHER DAY ON ALTERNATING DAYS WITH 300 MG XL TABLET 45 tablet 0    NOVOLOG FLEXPEN 100 UNIT/ML injection pen INJECT 10 UNITS UNDER THE SKIN THREE TIMES A DAY BEFORE MEALS 5 pen 2    Insulin Glargine, 1 Unit Dial, (TOUJEO SOLOSTAR) 300 UNIT/ML SOPN Inject 40 units into the skin daily.  9 pen 3    omeprazole (PRILOSEC) 40 MG delayed release capsule TAKE ONE CAPSULE BY MOUTH DAILY 30 capsule 5    KR[employer]ER PEN NEEDLES 31G 31G X 8 MM MISC USE WITH INSULIN FOUR TIMES A  each 5    Continuous Blood Gluc Sensor (FREESTYLE JEREMIAS 14 DAY SENSOR) [facility] [place] SENSOR EVERY 14 DAYS AND CHECK BLOOD SUGAR FOUR TIMES A DAY 2 each 5    DULoxetine (CYMBALTA) 60 MG extended release capsule Take one tab PO daily 90 capsule 1    cyclobenzaprine (FLEXERIL) 10 MG tablet TAKE ONE TABLET BY MOUTH ONCE NIGHTLY AS NEEDED FOR MUSCLE SPASMS (Patient taking differently: 10 mg Takes 1/2 tab) 30 tablet 5    Continuous Blood Gluc  (FREESTYLE JEREMIAS READER) ENE Use for checking glucose 1 Device 1    fluticasone (FLONASE) 50 MCG/ACT nasal spray 2 sprays by Nasal route daily 3 Bottle 3     No current facility-administered medications for this visit. Review of Systems     REVIEW OF SYSTEMS  The following systems were reviewed and revealed the following in addition to any already discussed in the HPI:    CONSTITUTIONAL: no weight loss, no fever, no night sweats, no chills  EYES: no vision changes, no blurry vision  EARS: no changes in hearing, no otalgia  NOSE: no epistaxis, no rhinorrhea  THROAT: No voice changes, no sore throat, no dysphagia    PhysicalExam     Vitals:    08/17/21 1126   BP: (!) 143/71   Site: Right Upper Arm   Position: Sitting   Cuff Size: Medium Adult   Pulse: 82   Temp: 97.1 °F (36.2 °C)   TempSrc: Temporal   Weight: 193 lb (87.5 kg)       PHYSICAL EXAM  BP (!) 143/71 (Site: Right Upper Arm, Position: Sitting, Cuff Size: Medium Adult)   Pulse 82   Temp 97.1 °F (36.2 °C) (Temporal)   Wt 193 lb (87.5 kg)   BMI 32.62 kg/m²     GENERAL: No acute distress, alert and oriented, no hoarseness  EYES: EOMI, Anti-icteric  NOSE: On anterior rhinoscopy there is no epistaxis, nasal mucosa moist and normal appearing, no purulent drainage.    EARS: Normal external appearance; on portable otomicroscopy:     -Ad: External auditory canal without stenosis, tympanic membrane clear, no middle ear effusions or retractions     -As: External auditory canal without stenosis, tympanic membrane clear, no middle ear effusions or retractions  FACE: HB 1/6 bilaterally, symmetric appearing, sensation equal bilaterally  ORAL CAVITY: No masses or lesions visualized or palpated, uvula is midline, moist mucous membranes  NECK: Normal range of motion, no thyromegaly, trachea is midline, no palpable lymphadenopathy or neck masses, no crepitus  CHEST: Normal respiratory effort, breathing comfortably, no retractions  SKIN: No rashes, normal appearing skin, no evidence of skin lesions/tumors  NEURO: Cranial Nerves 2, 3, 4, 5, 6, 7, 11, 12 intact bilaterally     I have performed a head and neck physical exam personally or was physically present during the key or critical portions of the service. Data/Imaging Review     Comprehensive audiological evaluation performed on 2/16/21 was independently reviewed by myself which demonstrates: Au: Mild to moderate SNHL    WRS 87% right, WRS 93% left. Type As tympanogram right. Type A tympanogram left. Assessment and Plan     1. Sensorineural hearing loss (SNHL) of both ears  2. Tinnitus of both ears  - Would benefit from amplification with hearing aids. Provided with hearing aid clearance form as well as copy of audiogram.  - Discussed masking for tinnitus. If he chooses to follow through with hearing aids he should help as well with tinnitus coping.  - Encouraged loud noise avoidance and wearing of hearing protection when exposed. - Recommend surveillance of hearing with yearly audiogram.    3. Disorder of both eustachian tubes  4. Allergic sinusitis  - montelukast (SINGULAIR) 10 MG tablet; TAKE ONE TABLET BY MOUTH ONCE NIGHTLY  Dispense: 30 tablet; Refill: 3      Follow-Up     Return if symptoms worsen or fail to improve. Dr. [place] [id number]  Department of Otolaryngology/Head and Neck Surgery  8/19/21    Medical Decision Making:   The following items were considered in medical decision making:  Independent review of images  Review / order clinical lab tests  Review / order radiology tests  Decision to obtain old records    Portions of this note were dictated using Dragon.  There may be linguistic errors secondary to the use of this program.

## 2021-08-30 ENCOUNTER — HOSPITAL ENCOUNTER (OUTPATIENT)
Dept: GENERAL RADIOLOGY | Age: 66
Discharge: HOME | End: 2021-08-30
Payer: MEDICARE

## 2021-08-30 ENCOUNTER — HOSPITAL ENCOUNTER (OUTPATIENT)
Dept: NON INVASIVE DIAGNOSTICS | Age: 66
Discharge: HOME | End: 2021-08-30
Payer: MEDICARE

## 2021-08-30 ENCOUNTER — HOSPITAL ENCOUNTER (OUTPATIENT)
Age: 66
Discharge: HOME | End: 2021-08-30
Payer: MEDICARE

## 2021-08-30 VITALS — WEIGHT: 195.11 LBS | BODY MASS INDEX: 32.97 KG/M2

## 2021-08-30 DIAGNOSIS — Z79.4 TYPE 2 DIABETES MELLITUS WITH DIABETIC POLYNEUROPATHY, WITH LONG-TERM CURRENT USE OF INSULIN (HCC): ICD-10-CM

## 2021-08-30 DIAGNOSIS — R06.09 DYSPNEA ON EXERTION: ICD-10-CM

## 2021-08-30 DIAGNOSIS — I10 ESSENTIAL HYPERTENSION: ICD-10-CM

## 2021-08-30 DIAGNOSIS — E11.42 TYPE 2 DIABETES MELLITUS WITH DIABETIC POLYNEUROPATHY, WITH LONG-TERM CURRENT USE OF INSULIN (HCC): ICD-10-CM

## 2021-08-30 DIAGNOSIS — R94.39 ABNORMAL STRESS TEST: ICD-10-CM

## 2021-08-30 DIAGNOSIS — E78.2 MIXED HYPERLIPIDEMIA: ICD-10-CM

## 2021-08-30 DIAGNOSIS — R06.02 SOB (SHORTNESS OF BREATH): Primary | ICD-10-CM

## 2021-08-30 DIAGNOSIS — I25.118 CORONARY ARTERY DISEASE INVOLVING NATIVE HEART WITH OTHER FORM OF ANGINA PECTORIS, UNSPECIFIED VESSEL OR LESION TYPE (HCC): Primary | ICD-10-CM

## 2021-08-30 PROCEDURE — 93351 STRESS TTE COMPLETE: CPT

## 2021-08-30 PROCEDURE — 6360000002 HC RX W HCPCS: Performed by: INTERNAL MEDICINE

## 2021-08-30 PROCEDURE — 93320 DOPPLER ECHO COMPLETE: CPT

## 2021-08-30 PROCEDURE — 71046 X-RAY EXAM CHEST 2 VIEWS: CPT

## 2021-08-30 RX ORDER — DOBUTAMINE HYDROCHLORIDE 200 MG/100ML
5 INJECTION INTRAVENOUS ONCE
Status: COMPLETED | OUTPATIENT
Start: 2021-08-30 | End: 2021-08-30

## 2021-08-30 RX ADMIN — DOBUTAMINE HYDROCHLORIDE 10 MCG/KG/MIN: 200 INJECTION INTRAVENOUS at 08:30

## 2021-08-31 ENCOUNTER — TELEPHONE (OUTPATIENT)
Dept: CARDIOLOGY CLINIC | Age: 66
End: 2021-08-31

## 2021-08-31 NOTE — TELEPHONE ENCOUNTER
She is scheduled for cath with [facility] 9/14/21 . This office called to make an office visit due to a referral that was received . Does she need a new patient appt before her cath or should she make an appt for after her cath ?

## 2021-08-31 NOTE — TELEPHONE ENCOUNTER
Spoke with the patient and advised her of the message below . Pt voiced understanding.  Call complete

## 2021-09-04 DIAGNOSIS — E11.42 TYPE 2 DIABETES MELLITUS WITH POLYNEUROPATHY (HCC): ICD-10-CM

## 2021-09-07 RX ORDER — FLASH GLUCOSE SENSOR
KIT MISCELLANEOUS
Qty: 2 EACH | Refills: 5 | Status: SHIPPED | OUTPATIENT
Start: 2021-09-07 | End: 2022-03-07

## 2021-09-09 ENCOUNTER — OFFICE VISIT (OUTPATIENT)
Dept: INTERNAL MEDICINE CLINIC | Age: 66
End: 2021-09-09
Payer: MEDICARE

## 2021-09-09 VITALS
SYSTOLIC BLOOD PRESSURE: 138 MMHG | WEIGHT: 194.2 LBS | OXYGEN SATURATION: 98 % | HEIGHT: 65 IN | BODY MASS INDEX: 32.36 KG/M2 | HEART RATE: 84 BPM | DIASTOLIC BLOOD PRESSURE: 80 MMHG

## 2021-09-09 DIAGNOSIS — E11.65 UNCONTROLLED TYPE 2 DIABETES MELLITUS WITH HYPERGLYCEMIA (HCC): ICD-10-CM

## 2021-09-09 DIAGNOSIS — M79.2 NEUROPATHIC PAIN: ICD-10-CM

## 2021-09-09 DIAGNOSIS — N18.31 STAGE 3A CHRONIC KIDNEY DISEASE (HCC): Primary | ICD-10-CM

## 2021-09-09 DIAGNOSIS — I25.10 CORONARY ARTERY DISEASE INVOLVING NATIVE CORONARY ARTERY OF NATIVE HEART WITHOUT ANGINA PECTORIS: ICD-10-CM

## 2021-09-09 PROCEDURE — 99214 OFFICE O/P EST MOD 30 MIN: CPT | Performed by: INTERNAL MEDICINE

## 2021-09-09 PROCEDURE — 3017F COLORECTAL CA SCREEN DOC REV: CPT | Performed by: INTERNAL MEDICINE

## 2021-09-09 PROCEDURE — 1090F PRES/ABSN URINE INCON ASSESS: CPT | Performed by: INTERNAL MEDICINE

## 2021-09-09 PROCEDURE — 4040F PNEUMOC VAC/ADMIN/RCVD: CPT | Performed by: INTERNAL MEDICINE

## 2021-09-09 PROCEDURE — 1036F TOBACCO NON-USER: CPT | Performed by: INTERNAL MEDICINE

## 2021-09-09 PROCEDURE — G8427 DOCREV CUR MEDS BY ELIG CLIN: HCPCS | Performed by: INTERNAL MEDICINE

## 2021-09-09 PROCEDURE — 2022F DILAT RTA XM EVC RTNOPTHY: CPT | Performed by: INTERNAL MEDICINE

## 2021-09-09 PROCEDURE — G8400 PT W/DXA NO RESULTS DOC: HCPCS | Performed by: INTERNAL MEDICINE

## 2021-09-09 PROCEDURE — 1123F ACP DISCUSS/DSCN MKR DOCD: CPT | Performed by: INTERNAL MEDICINE

## 2021-09-09 PROCEDURE — G8417 CALC BMI ABV UP PARAM F/U: HCPCS | Performed by: INTERNAL MEDICINE

## 2021-09-09 PROCEDURE — 3051F HG A1C>EQUAL 7.0%<8.0%: CPT | Performed by: INTERNAL MEDICINE

## 2021-09-09 NOTE — PROGRESS NOTES
Ken Samuel (:  1955) is a 77 y.o. female,New patient, here for evaluation of the following chief complaint(s):  Follow-up (follow up sob)         ASSESSMENT/PLAN:  1. Stage 3a chronic kidney disease ([facility] [address]  Assessment & Plan:   The patient is advised to hydrate well as well as avoid the use of any NSAIDs at least 3 days prior to her procedure stage III she is well aware of the risks associated with the use of contrast during her procedure  2. Neuropathic pain  3. Coronary artery disease involving native coronary artery of native heart without angina pectoris  Assessment & Plan:   Reviewed the patient her stress echo findings we discussed the pathophysiology of coronary artery disease and her risk factors specifically the diabetes as the main risk factor for her findings    A thorough description of the type of stress that she had and the results obtained and what to expect with the next steps including her angiogram that scheduled for next week  4. Uncontrolled type 2 diabetes mellitus with hyperglycemia ([facility] [address]  Assessment & Plan:   I did discuss with the patient adjustment to her insulin that needs to be done prior to her procedure      Return if symptoms worsen or fail to improve, for As scheduled.          Subjective   SUBJECTIVE/OBJECTIVE:    Lab Review   Lab Results   Component Value Date     2021     2021     2021    K 4.7 2021    K 5.2 2021    K 5.1 2021    CO2 20 2021    CO2 19 2021    CO2 18 2021    BUN 24 2021    BUN 33 2021    BUN 41 2021    CREATININE 1.6 2021    CREATININE 1.7 2021    CREATININE 1.9 2021    GLUCOSE 83 2021    GLUCOSE 146 2021    GLUCOSE 110 2021    CALCIUM 9.3 2021    CALCIUM 9.0 2021    CALCIUM 10.0 2021     Lab Results   Component Value Date    WBC 7.5 2021    WBC 8.3 2021    WBC 7.2 2021    HGB 12.3 2021 HGB 12.1 07/27/2021    HGB 11.7 02/18/2021    HCT 36.2 09/07/2021    HCT 36.3 07/27/2021    HCT 35.6 02/18/2021    MCV 87.4 09/07/2021    MCV 88.3 07/27/2021    MCV 86.5 02/18/2021     09/07/2021     07/27/2021     02/18/2021     Lab Results   Component Value Date    CHOL 170 07/06/2020    CHOL 189 05/20/2019    CHOL 153 05/06/2019    TRIG 229 07/06/2020    TRIG 217 05/20/2019    TRIG 250 05/06/2019    HDL 52 07/06/2020    HDL 55 05/20/2019    HDL 52 05/06/2019    HDL 54 09/21/2010    LDLDIRECT 115 04/26/2019       Vitals 9/9/2021 8/30/2021 8/17/5515   SYSTOLIC 543 - 894   DIASTOLIC 80 - 72   Site - - Left Upper Arm   Position - - Sitting   Cuff Size - - Medium Adult   Pulse 84 - 98   Temp - - -   Resp - - 18   SpO2 98 - -   Weight 194 lb 3.2 oz 195 lb 1.7 oz 195 lb   Height 5' 4.5\" - -   Body mass index 32.82 kg/m2 - -   Some recent data might be hidden       Is here today to discuss the results of her echo she has multiple questions in that regard  The patient still has shortness of breath with activity she does not have any chest pain or anginal symptoms      Review of Systems       Objective   Physical Exam  Vitals and nursing note reviewed. Constitutional:       Appearance: Normal appearance. She is normal weight. Pulmonary:      Effort: Pulmonary effort is normal.   Neurological:      Mental Status: She is alert. Psychiatric:         Mood and Affect: Mood normal.         Behavior: Behavior normal.         Thought Content: Thought content normal.         Judgment: Judgment normal.            This dictation was generated by voice recognition computer software. Although all attempts are made to edit the dictation for accuracy, there may be errors in the transcription that are not intended. An electronic signature was used to authenticate this note.     --Davis Avila MD

## 2021-09-09 NOTE — ASSESSMENT & PLAN NOTE
I did discuss with the patient adjustment to her insulin that needs to be done prior to her procedure

## 2021-09-09 NOTE — ASSESSMENT & PLAN NOTE
Reviewed the patient her stress echo findings we discussed the pathophysiology of coronary artery disease and her risk factors specifically the diabetes as the main risk factor for her findings    A thorough description of the type of stress that she had and the results obtained and what to expect with the next steps including her angiogram that scheduled for next week

## 2021-09-14 ENCOUNTER — HOSPITAL ENCOUNTER (OUTPATIENT)
Dept: CARDIAC CATH/INVASIVE PROCEDURES | Age: 66
Discharge: HOME | End: 2021-09-14
Attending: INTERNAL MEDICINE | Admitting: INTERNAL MEDICINE
Payer: MEDICARE

## 2021-09-14 VITALS
SYSTOLIC BLOOD PRESSURE: 121 MMHG | HEART RATE: 84 BPM | OXYGEN SATURATION: 99 % | DIASTOLIC BLOOD PRESSURE: 60 MMHG | HEIGHT: 64 IN | WEIGHT: 195 LBS | BODY MASS INDEX: 33.29 KG/M2 | TEMPERATURE: 98 F

## 2021-09-14 LAB
ANION GAP SERPL CALCULATED.3IONS-SCNC: 9 MMOL/L (ref 3–16)
BUN BLDV-MCNC: 20 MG/DL (ref 7–20)
CALCIUM SERPL-MCNC: 9.4 MG/DL (ref 8.3–10.6)
CHLORIDE BLD-SCNC: 104 MMOL/L (ref 99–110)
CO2: 24 MMOL/L (ref 21–32)
CREAT SERPL-MCNC: 1.5 MG/DL (ref 0.6–1.2)
EKG ATRIAL RATE: 84 BPM
EKG DIAGNOSIS: NORMAL
EKG P AXIS: 33 DEGREES
EKG P-R INTERVAL: 122 MS
EKG Q-T INTERVAL: 376 MS
EKG QRS DURATION: 88 MS
EKG QTC CALCULATION (BAZETT): 444 MS
EKG R AXIS: 2 DEGREES
EKG T AXIS: 83 DEGREES
EKG VENTRICULAR RATE: 84 BPM
GFR AFRICAN AMERICAN: 42
GFR NON-AFRICAN AMERICAN: 35
GLUCOSE BLD-MCNC: 181 MG/DL (ref 70–99)
HCT VFR BLD CALC: 34.6 % (ref 36–48)
HEMOGLOBIN: 11.9 G/DL (ref 12–16)
LEFT VENTRICULAR EJECTION FRACTION MODE: NORMAL
LV EF: 65 %
MCH RBC QN AUTO: 29.2 PG (ref 26–34)
MCHC RBC AUTO-ENTMCNC: 34.3 G/DL (ref 31–36)
MCV RBC AUTO: 85.2 FL (ref 80–100)
PDW BLD-RTO: 12.7 % (ref 12.4–15.4)
PLATELET # BLD: 349 K/UL (ref 135–450)
PMV BLD AUTO: 8.4 FL (ref 5–10.5)
POTASSIUM SERPL-SCNC: 4.5 MMOL/L (ref 3.5–5.1)
RBC # BLD: 4.06 M/UL (ref 4–5.2)
SODIUM BLD-SCNC: 137 MMOL/L (ref 136–145)
WBC # BLD: 7.4 K/UL (ref 4–11)

## 2021-09-14 PROCEDURE — 93010 ELECTROCARDIOGRAM REPORT: CPT | Performed by: INTERNAL MEDICINE

## 2021-09-14 PROCEDURE — 36415 COLL VENOUS BLD VENIPUNCTURE: CPT

## 2021-09-14 PROCEDURE — 99152 MOD SED SAME PHYS/QHP 5/>YRS: CPT | Performed by: INTERNAL MEDICINE

## 2021-09-14 PROCEDURE — 85027 COMPLETE CBC AUTOMATED: CPT

## 2021-09-14 PROCEDURE — 93458 L HRT ARTERY/VENTRICLE ANGIO: CPT | Performed by: INTERNAL MEDICINE

## 2021-09-14 PROCEDURE — 2709999900 HC NON-CHARGEABLE SUPPLY

## 2021-09-14 PROCEDURE — 99152 MOD SED SAME PHYS/QHP 5/>YRS: CPT

## 2021-09-14 PROCEDURE — 80048 BASIC METABOLIC PNL TOTAL CA: CPT

## 2021-09-14 PROCEDURE — C1894 INTRO/SHEATH, NON-LASER: HCPCS

## 2021-09-14 PROCEDURE — 6360000004 HC RX CONTRAST MEDICATION: Performed by: INTERNAL MEDICINE

## 2021-09-14 PROCEDURE — 93005 ELECTROCARDIOGRAM TRACING: CPT | Performed by: INTERNAL MEDICINE

## 2021-09-14 PROCEDURE — 93458 L HRT ARTERY/VENTRICLE ANGIO: CPT

## 2021-09-14 PROCEDURE — 99153 MOD SED SAME PHYS/QHP EA: CPT

## 2021-09-14 PROCEDURE — 2500000003 HC RX 250 WO HCPCS

## 2021-09-14 PROCEDURE — C1769 GUIDE WIRE: HCPCS

## 2021-09-14 PROCEDURE — 6360000002 HC RX W HCPCS

## 2021-09-14 RX ORDER — SODIUM CHLORIDE 0.9 % (FLUSH) 0.9 %
5-40 SYRINGE (ML) INJECTION EVERY 12 HOURS SCHEDULED
Status: DISCONTINUED | OUTPATIENT
Start: 2021-09-14 | End: 2021-09-14 | Stop reason: HOSPADM

## 2021-09-14 RX ORDER — SODIUM CHLORIDE 0.9 % (FLUSH) 0.9 %
5-40 SYRINGE (ML) INJECTION PRN
Status: DISCONTINUED | OUTPATIENT
Start: 2021-09-14 | End: 2021-09-14 | Stop reason: HOSPADM

## 2021-09-14 RX ORDER — SODIUM CHLORIDE 9 MG/ML
25 INJECTION, SOLUTION INTRAVENOUS PRN
Status: DISCONTINUED | OUTPATIENT
Start: 2021-09-14 | End: 2021-09-14 | Stop reason: HOSPADM

## 2021-09-14 RX ORDER — SODIUM CHLORIDE 9 MG/ML
INJECTION, SOLUTION INTRAVENOUS CONTINUOUS
Status: DISCONTINUED | OUTPATIENT
Start: 2021-09-14 | End: 2021-09-14 | Stop reason: HOSPADM

## 2021-09-14 RX ORDER — FUROSEMIDE 20 MG/1
20 TABLET ORAL DAILY
Qty: 90 TABLET | Refills: 1 | Status: SHIPPED | OUTPATIENT
Start: 2021-09-14 | End: 2022-02-02

## 2021-09-14 RX ADMIN — IOPAMIDOL 60 ML: 755 INJECTION, SOLUTION INTRAVENOUS at 10:08

## 2021-09-14 RX ADMIN — IOPAMIDOL 48 ML: 755 INJECTION, SOLUTION INTRAVENOUS at 08:58

## 2021-09-14 NOTE — OP NOTE
Patient:  Jeffery Causey   :   1955    Procedural Summary  ~Consent:   Obtained written and verbal consent      Risks/benefits explained in detail  ~Procedure:    Left Heart Catheterization  ~Medications:    Procedural sedation with minimal conscious sedation  ~Complications:   None  ~Blood Loss:    <10cc  ~Specimens:    None obtained  ~Pre-sedation re-evaluation: Performed immediately prior to procedure. Medication and Procedural Reconciliation:  An independent trained observer pushed medications at my direction. We monitored the patient's level of consciousness and vital signs/physiologic status throughout the procedure duration (see start and stop times below). Sedation: 2 mg Versed, 100 mcg Fentanyl  Sedation start: 826  Sedation stop: 849    Cardiac Cath LVG:  Anatomy:   LM-nml   LAD-nml  Cx-nml  OM- nml  RCA-dominant  RPDA- nml  LVEF- 65  LVG- nml  LVEDP- 27    Contrast: 48  Flouro Time: 2.9  Access: R Radial a    Impression  ~Coronary Angiography w/ normal coronaries  ~LVG with LVEF of 65 and no regional wall motion abnormalities  ~high lvedp c/w diastolic CHF        Recommendation  ~Aggressive medical treatment and risk factor modification  ~1. Medications reviewed  2. Lasix 20g po daily. Check bmp in 1 week. FU with nephrology  3. No indication for beta blocker, statin or anti platelet therapy  4. Patient has been advised on the importance of regular exercise of at least 20-30 minutes daily alternating with aerobic and isometric activities. 5. Patient counseled about and offered assistance for smoking cessation   6. No indication for cardiac rehab  7.  Follow up with PCP            Shaun Darby MD, MD 2021 8:59 AM

## 2021-09-14 NOTE — PRE SEDATION
Brief Pre-Op Note/Sedation Assessment      Edis Juarez  1955  Cath/NONE      [id number]  8:30 AM    Planned Procedure: Cardiac Catheterization Procedure    Post Procedure Plan: Return to same level of care    Consent: I have discussed with the patient and/or the patient representative the indication, alternatives, and the possible risks and/or complications of the planned procedure and the anesthesia methods. The patient and/or patient representative appear to understand and agree to proceed. Chief Complaint: Chest Pain/Pressure  Anginal Equivalent  Dyspnea on Exertion      Indications for Cath Procedure:  New Onset Angina <= 2 months, Worsening Angina and Suspected CAD  Anginal Classification within 2 weeks:  CCS III - Symptoms with everyday living activities, i.e., moderate limitation  NYHA Heart Failure Class within 2 weeks: No symptoms  Is Cath Lab Visit Valve-related?: No  Surgical Risk: Intermediate  Functional Type: >= 4 METS with symptoms    Anti- Anginal Meds within 2 weeks:   Yes: Beta Blockers, Aspirin and Statin (Any)    Stress or Imaging Studies Performed:  Stress Echo Result: Positive:  anteroseptal, apical and septal Risk/Extent of Ischemia:  High     Vital Signs:  /60   Pulse 84   Temp 98 °F (36.7 °C) (Temporal)   Ht 5' 4\" (1.626 m)   Wt 195 lb (88.5 kg)   SpO2 99%   BMI 33.47 kg/m²     Allergies:   Allergies   Allergen Reactions    Actos [Pioglitazone Hydrochloride]      Causes mouth blisters    Albuterol      YEAST INFECTION IN MOUTH    Dilaudid [Hydromorphone Hcl]     Fluticasone-Salmeterol      YEAST INFECTIONS IN MOUTH    Glyburide      Causes mouth blisters    Lortab [Hydrocodone-Acetaminophen]      headache    Metformin      Causes mouth blisters    Symbicort [Budesonide-Formoterol Fumarate]      Causes mouth blisters       Past Medical History:  Past Medical History:   Diagnosis Date    Asthma     vocal fold     Ataxia     Coronary artery disease involving native coronary artery of native heart without angina pectoris 9/9/2021    Depression     Diabetes mellitus ([facility] [address]     Hyperlipidemia     Hypertension     Hypothyroidism     Obesity     Osteoarthritis     Paradoxical vocal fold motion disorder     Postmenopausal 5/15/2018    Stage 2 chronic kidney disease 5/7/2019         Surgical History:  Past Surgical History:   Procedure Laterality Date    CARPAL TUNNEL RELEASE      RORY    HYSTERECTOMY, TOTAL ABDOMINAL      Age 37    [place] NJX AA&/STRD TFRML EPI LUMBAR/SACRAL 1 LEVEL Bilateral 12/3/2018    BILATERAL L4 LUMBAR TRANSFORAMINAL EPIDURAL STEROID INJECTION WITH FLUOROSCOPY performed by Ever Patino MD at [facility]         Medications:  Current Facility-Administered Medications   Medication Dose Route Frequency Provider Last Rate Last Admin    0.9 % sodium chloride infusion   IntraVENous Continuous Aileen See MD        sodium chloride flush 0.9 % injection 5-40 mL  5-40 mL IntraVENous 2 times per day Aileen See MD        sodium chloride flush 0.9 % injection 5-40 mL  5-40 mL IntraVENous PRN Aileen See MD        0.9 % sodium chloride infusion  25 mL IntraVENous PRN Aileen See MD               Pre-Sedation:    Pre-Sedation Documentation and Exam:  I have personally completed a history, physical exam & review of systems for this patient (see notes). Prior History of Anesthesia Complications:   none    Modified Mallampati:  II (soft palate, uvula, fauces visible)    ASA Classification:  Class 2 - A normal healthy patient with mild systemic disease      Diana Scale:   Activity:  2 - Able to move 4 extremities voluntarily on command  Respiration:  2 - Able to breathe deeply and cough freely  Circulation:  2 - BP+/- 20mmHg of normal  Consciousness:  2 - Fully awake  Oxygen Saturation (color):  2 - Able to maintain oxygen saturation >92% on room air    Sedation/Anesthesia Plan:  Guard the patient's safety and welfare. Minimize physical discomfort and pain. Minimize negative psychological responses to treatment by providing sedation and analgesia and maximize the potential amnesia. Patient to meet pre-procedure discharge plan.     Medication Planned:  midazolam intravenously and fentanyl intravenously    Patient is an appropriate candidate for plan of sedation: yes      Electronically signed by Vero Sidhu MD on 9/14/2021 at 8:30 AM

## 2021-09-14 NOTE — H&P
[facility]   H&P  [phone]      No chief complaint on file. SOB/cp        History of Present Illness:  Neftali Brown is a 77 y.o. patient who presented to the hospital with complaints of chest pain. She has exertional SOB and chest tightness that comes on with minimal exertion and is relieved with rest. She had an abnormal stress test. She is a diabetic with HTN and HLD. I have been asked to provide consultation regarding further management and testing. Past Medical History:   has a past medical history of Asthma, Ataxia, Coronary artery disease involving native coronary artery of native heart without angina pectoris, Depression, Diabetes mellitus ([address] Hyperlipidemia, Hypertension, Hypothyroidism, Obesity, Osteoarthritis, Paradoxical vocal fold motion disorder, Postmenopausal, and Stage 2 chronic kidney disease. Surgical History:   has a past surgical history that includes Carpal tunnel release; Throat surgery; Hysterectomy, total abdominal; and pr njx aa&/strd tfrml epi lumbar/sacral 1 level (Bilateral, 12/3/2018). Social History:   reports that she has never smoked. She has never used smokeless tobacco. She reports that she does not drink alcohol and does not use drugs. Family History:  family history includes Cancer in her mother; Obesity in her sister. Home Medications:  Were reviewed and are listed in nursing record. and/or listed below  Prior to Admission medications    Medication Sig Start Date End Date Taking?  Authorizing Provider   VICTOZA 18 MG/3ML SOPN SC injection DIAL AND INJECT UNDER THE SKIN 1.8 MG DAILY 9/13/21  Yes Francesco Edwards MD   montelukast (SINGULAIR) 10 MG tablet TAKE ONE TABLET BY MOUTH ONCE NIGHTLY 8/17/21  Yes Raiza Diallo DO   lisinopril (PRINIVIL;ZESTRIL) 20 MG tablet TAKE ONE TABLET BY MOUTH DAILY 7/20/21  Yes Trino Clements MD   celecoxib (CELEBREX) 200 MG capsule TAKE ONE CAPSULE BY MOUTH TWICE A DAY AS NEEDED FOR PAIN 5/26/21  Yes ALESHA Tijerina CNP   atorvastatin (LIPITOR) 20 MG tablet TAKE ONE TABLET BY MOUTH DAILY 5/17/21  Yes So Carpenter MD   buPROPion (WELLBUTRIN SR) 150 MG extended release tablet TAKE ONE TABLET BY MOUTH EVERY OTHER DAY ON ALTERNATING DAYS WITH 300 MG XL TABLET 4/23/21  Yes Mick Ramírez MD   NOVOLOG FLEXPEN 100 UNIT/ML injection pen INJECT 10 UNITS UNDER THE SKIN THREE TIMES A DAY BEFORE MEALS  Patient taking differently: 10 u qd prn can do up to tid 3/29/21  Yes So Carpenter MD   omeprazole (PRILOSEC) 40 MG delayed release capsule TAKE ONE CAPSULE BY MOUTH DAILY 3/25/21  Yes ALESHA Tijerina CNP   DULoxetine (CYMBALTA) 60 MG extended release capsule Take one tab PO daily 12/14/20  Yes Mick Ramírez MD   cyclobenzaprine (FLEXERIL) 10 MG tablet TAKE ONE TABLET BY MOUTH ONCE NIGHTLY AS NEEDED FOR MUSCLE SPASMS  Patient taking differently: 10 mg Takes 1/2 tab 12/17/18  Yes Ramona Goldstein MD   fluticasone Sara Grandchild) 50 MCG/ACT nasal spray 2 sprays by Nasal route daily 5/30/17  Yes Ramona Goldstein MD   Continuous Blood Gluc Sensor (FREESTYLE JEREMIAS 14 DAY SENSOR) MISC CHANGE SENSOR EVERY 14 DAYS AND CHECK BLOOD SUGAR FOUR TIMES A DAY 9/7/21   So Carpenter MD   buPROPion (WELLBUTRIN XL) 300 MG extended release tablet TAKE ONE TABLET BY MOUTH EVERY OTHER DAY ALTERNATING WITH 150MG SR TABLET 8/13/21   Mick Ramírez MD   Insulin Glargine, 1 Unit Dial, (TOUJEO SOLOSTAR) 300 UNIT/ML SOPN Inject 40 units into the skin daily. Patient taking differently: Inject 48 units into the skin daily.  3/29/21   So Carpenter MD   [employer] PEN NEEDLES 31G 31G X 8 MM MISC USE WITH INSULIN FOUR TIMES A DAY 3/11/21   So Carpenter MD   Continuous Blood Gluc  (FREESTYLE JEREMIAS READER) ENE Use for checking glucose 5/15/18   So Carpenter MD        Current Medications:  Current Facility-Administered Medications   Medication Dose Route Frequency Provider Last Rate Last Admin    0.9 % sodium chloride infusion   IntraVENous Continuous Cathi Alcazar MD        sodium chloride flush 0.9 % injection 5-40 mL  5-40 mL IntraVENous 2 times per day Cathi Alcazar MD        sodium chloride flush 0.9 % injection 5-40 mL  5-40 mL IntraVENous PRN Cathi Alcazar MD        0.9 % sodium chloride infusion  25 mL IntraVENous PRN Cathi Alcazar MD            Allergies:  Actos [pioglitazone hydrochloride], Albuterol, Dilaudid [hydromorphone hcl], Fluticasone-salmeterol, Glyburide, Lortab [hydrocodone-acetaminophen], Metformin, and Symbicort [budesonide-formoterol fumarate]     Review of Systems:     · Constitutional: there has been no unanticipated weight loss. There's been no change in energy level, sleep pattern, or activity level. · Eyes: No visual changes or diplopia. No scleral icterus. · ENT: No Headaches, hearing loss or vertigo. No mouth sores or sore throat. · Cardiovascular: Reviewed in HPI  · Respiratory: No cough or wheezing, no sputum production. No hematemesis. · Gastrointestinal: No abdominal pain, appetite loss, blood in stools. No change in bowel or bladder habits. · Genitourinary: No dysuria, trouble voiding, or hematuria. · Musculoskeletal:  No gait disturbance, weakness or joint complaints. · Integumentary: No rash or pruritis. · Neurological: No headache, diplopia, change in muscle strength, numbness or tingling. No change in gait, balance, coordination, mood, affect, memory, mentation, behavior. · Psychiatric: No anxiety, no depression. · Endocrine: No malaise, fatigue or temperature intolerance. No excessive thirst, fluid intake, or urination. No tremor. · Hematologic/Lymphatic: No abnormal bruising or bleeding, blood clots or swollen lymph nodes. · Allergic/Immunologic: No nasal congestion or hives.   ·     Physical Examination:    Vitals:    09/14/21 0721   BP: 121/60   Pulse: 84   Temp: 98 °F (36.7 °C)   SpO2: 99%    Weight: 195 lb (88.5 kg)         General Appearance:  Alert, cooperative, no distress, appears stated age   Head:  Normocephalic, without obvious abnormality, atraumatic   Eyes:  PERRL, conjunctiva/corneas clear       Nose: Nares normal, no drainage or sinus tenderness   Throat: Lips, mucosa, and tongue normal   Neck: Supple, symmetrical, trachea midline, no adenopathy, thyroid: not enlarged, symmetric, no tenderness/mass/nodules, no carotid bruit or JVD       Lungs:   Clear to auscultation bilaterally, respirations unlabored   Chest Wall:  No tenderness or deformity   Heart:  Regular rate and rhythm, S1, S2 normal, no murmur, rub or gallop   Abdomen:   Soft, non-tender, bowel sounds active all four quadrants,  no masses, no organomegaly           Extremities: Extremities normal, atraumatic, no cyanosis or edema   Pulses: 2+ and symmetric   Skin: Skin color, texture, turgor normal, no rashes or lesions   Pysch: Normal mood and affect   Neurologic: Normal gross motor and sensory exam.         Labs  CBC:   Lab Results   Component Value Date    WBC 7.4 09/14/2021    RBC 4.06 09/14/2021    HGB 11.9 09/14/2021    HCT 34.6 09/14/2021    MCV 85.2 09/14/2021    RDW 12.7 09/14/2021     09/14/2021     CMP:    Lab Results   Component Value Date     09/14/2021    K 4.5 09/14/2021     09/14/2021    CO2 24 09/14/2021    BUN 20 09/14/2021    CREATININE 1.5 09/14/2021    GFRAA 42 09/14/2021    GFRAA >60 05/08/2013    AGRATIO 1.7 09/07/2021    LABGLOM 35 09/14/2021    GLUCOSE 181 09/14/2021    PROT 7.2 09/07/2021    PROT 7.0 08/10/2012    CALCIUM 9.4 09/14/2021    BILITOT <0.2 09/07/2021    ALKPHOS 136 09/07/2021    AST 11 09/07/2021    ALT 8 09/07/2021     PT/INR:  No results found for: PTINR  No results found for: CKTOTAL, CKMB, CKMBINDEX, TROPONINI    EKG:  I have reviewed EKG with the following interpretation:  Impression:     Normal sinus rhythmInferior infarct , age undeterminedAbnormal             Pt has CAD with following history:  CABG: (sivan   Summary   Left ventricular cavity size is normal with mild concentric left ventricular   hypertrophy. Overall left ventricular systolic function appears normal. Ejection fraction   is visually estimated to be 55-60%. No regional wall motion abnormalities   are noted. Grade I diastolic dysfunction with normal LV filling pressures. The mitral valve leaflets are slightly thickened with normal leaflet   mobility. Trivial mitral regurgitation. Mild mitral annular calcification. Mild aortic regurgitation. Echo   Baseline resting echocardiogram shows normal global LV systolic function   with an ejection fraction of 55-60% and uniform myocardial segmental wall   motion. After stress had hypokinesis of distal septum and apical wall   Following dobutamine there was hypokinesis of the apical wall visualized in   the PLAX, suggestive of ischemia. All testing and labs listed below were personally reviewed. Assessment  Patient Active Problem List   Diagnosis    HTN (hypertension)    Diabetes mellitus ([address]    Hyperlipidemia    Insomnia    Depression    Neuropathic pain    Type 2 diabetes mellitus with polyneuropathy (HCC)    Moderate episode of recurrent major depressive disorder ([address]    Diabetic polyneuropathy associated with type 2 diabetes mellitus ([address]    Exogenous obesity    Spinal stenosis of lumbar region with neurogenic claudication    Postmenopausal    Stage 3 chronic kidney disease (HCC)    Tremor    Uncontrolled type 2 diabetes mellitus with hyperglycemia ([address]    Coronary artery disease involving native coronary artery of native heart without angina pectoris         Plan:    I had the opportunity to review the clinical symptoms and presentation of Sveta Mcmillan. Assessment/Plan:  Active Problems:  Unstable angina: typical chest pain. Positive stress echo. Based on these findings I recommend left heart cath for definitive evaluation of coronary arteries.   Risks, benefits, expectations, and alternative treatments were discussed. Questions appropriately answered. Alfonso Muller agrees to proceed and verbalized understanding. I will address the patient's cardiac risk factors and adjusted pharmacologic treatment as needed. In addition, I have reinforced the need for patient directed risk factor modification. Tobacco use was discussed with the patient and educated on the negative effects. I have asked the patient to not utilize these agents. Thank you for allowing to us to participate in the care or Alfonso Muller. Further evaluation will be based upon the patient's clinical course and testing results. All questions and concerns were addressed to the patient/family. Alternatives to my treatment were discussed. The note was completed using EMR. Every effort was made to ensure accuracy; however, inadvertent computerized transcription errors may be present.     Brianna Pringle MD, MD 9/14/2021 8:22 AM

## 2021-09-29 ENCOUNTER — OFFICE VISIT (OUTPATIENT)
Dept: ENDOCRINOLOGY | Age: 66
End: 2021-09-29
Payer: MEDICARE

## 2021-09-29 VITALS
SYSTOLIC BLOOD PRESSURE: 127 MMHG | DIASTOLIC BLOOD PRESSURE: 73 MMHG | WEIGHT: 197 LBS | RESPIRATION RATE: 16 BRPM | HEART RATE: 104 BPM | BODY MASS INDEX: 33.63 KG/M2 | HEIGHT: 64 IN

## 2021-09-29 DIAGNOSIS — I10 HYPERTENSION, UNSPECIFIED TYPE: ICD-10-CM

## 2021-09-29 DIAGNOSIS — E11.65 UNCONTROLLED TYPE 2 DIABETES MELLITUS WITH HYPERGLYCEMIA (HCC): Primary | ICD-10-CM

## 2021-09-29 DIAGNOSIS — E78.2 MIXED HYPERLIPIDEMIA: ICD-10-CM

## 2021-09-29 PROCEDURE — G8400 PT W/DXA NO RESULTS DOC: HCPCS | Performed by: INTERNAL MEDICINE

## 2021-09-29 PROCEDURE — G8427 DOCREV CUR MEDS BY ELIG CLIN: HCPCS | Performed by: INTERNAL MEDICINE

## 2021-09-29 PROCEDURE — 3051F HG A1C>EQUAL 7.0%<8.0%: CPT | Performed by: INTERNAL MEDICINE

## 2021-09-29 PROCEDURE — 1090F PRES/ABSN URINE INCON ASSESS: CPT | Performed by: INTERNAL MEDICINE

## 2021-09-29 PROCEDURE — 1036F TOBACCO NON-USER: CPT | Performed by: INTERNAL MEDICINE

## 2021-09-29 PROCEDURE — G8417 CALC BMI ABV UP PARAM F/U: HCPCS | Performed by: INTERNAL MEDICINE

## 2021-09-29 PROCEDURE — 2022F DILAT RTA XM EVC RTNOPTHY: CPT | Performed by: INTERNAL MEDICINE

## 2021-09-29 PROCEDURE — 1123F ACP DISCUSS/DSCN MKR DOCD: CPT | Performed by: INTERNAL MEDICINE

## 2021-09-29 PROCEDURE — 4040F PNEUMOC VAC/ADMIN/RCVD: CPT | Performed by: INTERNAL MEDICINE

## 2021-09-29 PROCEDURE — 95251 CONT GLUC MNTR ANALYSIS I&R: CPT | Performed by: INTERNAL MEDICINE

## 2021-09-29 PROCEDURE — 3017F COLORECTAL CA SCREEN DOC REV: CPT | Performed by: INTERNAL MEDICINE

## 2021-09-29 PROCEDURE — 99214 OFFICE O/P EST MOD 30 MIN: CPT | Performed by: INTERNAL MEDICINE

## 2021-09-29 RX ORDER — CIPROFLOXACIN 500 MG/1
500 TABLET, FILM COATED ORAL DAILY
Qty: 3 TABLET | Refills: 0 | Status: SHIPPED | OUTPATIENT
Start: 2021-09-29 | End: 2021-10-02

## 2021-09-29 RX ORDER — SEMAGLUTIDE 1.34 MG/ML
1 INJECTION, SOLUTION SUBCUTANEOUS WEEKLY
Qty: 4 ML | Refills: 5 | Status: SHIPPED | OUTPATIENT
Start: 2021-09-29 | End: 2022-04-12 | Stop reason: SDUPTHER

## 2021-09-29 ASSESSMENT — ENCOUNTER SYMPTOMS: VISUAL CHANGE: 0

## 2021-09-29 NOTE — PROGRESS NOTES
Rohith Mir is a 77 y.o. female is seen  for evaluation and management of type 2  Diabetes--- . Rohith Mir was diagnosed with Diabetes mellitus in 2008 aprox   Pt always had issues with hypoglycemia since her childhood so she was under constant surveillance until she was diagnosed with diabetes  . Rohith Mir got diabetic education in the past.  Comorbid conditions: Neuropathy    INTERIM:    Diabetes  She presents for her follow-up diabetic visit. She has type 2 diabetes mellitus. No MedicAlert identification noted. The initial diagnosis of diabetes was made 10 years ago. Her disease course has been worsening. Hypoglycemia symptoms include sweats and tremors. Pertinent negatives for diabetes include no foot ulcerations, no polydipsia, no polyphagia, no polyuria, no visual change, no weakness and no weight loss. There are no hypoglycemic complications. Symptoms are worsening. Diabetic complications include peripheral neuropathy. Risk factors for coronary artery disease include post-menopausal, hypertension, diabetes mellitus and dyslipidemia. Current diabetic treatment includes insulin injections. She is compliant with treatment some of the time. Her weight is increasing rapidly. She is following a generally unhealthy diet. Meal planning includes avoidance of concentrated sweets. She has had a previous visit with a dietitian. She never participates in exercise. Her home blood glucose trend is fluctuating dramatically. Her breakfast blood glucose is taken between 7-8 am. Her breakfast blood glucose range is generally 140-180 mg/dl. An ACE inhibitor/angiotensin II receptor blocker is being taken. She does not see a podiatrist.Eye exam is current.      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  Tends to skip meals to avoid taking meal boluses does complain of balance issues    Weight trend: stable  Prior visit with dietician: no  Current diet: on average, 3 meals per day  Current exercise: rare    She was in the ER after K was 6.2 but repeat in ER was normal in April 2019  She denies any nausea vomiting she tends to drink plenty of water any ways    Patient has hyperlipidemia and is on Lipitor her last LDL was at target  Patient also has arthritis and takes Celebrex off-and-on which can also cause kidney damage and also has reflux and is on omeprazole    Past Medical History:   Diagnosis Date    Asthma     vocal fold     Ataxia     Coronary artery disease involving native coronary artery of native heart without angina pectoris 9/9/2021    Depression     Diabetes mellitus ([address]     Hyperlipidemia     Hypertension     Hypothyroidism     Obesity     Osteoarthritis     Paradoxical vocal fold motion disorder     Postmenopausal 5/15/2018    Stage 2 chronic kidney disease 5/7/2019      Patient Active Problem List   Diagnosis    HTN (hypertension)    Diabetes mellitus ([address]    Hyperlipidemia    Insomnia    Depression    Neuropathic pain    Type 2 diabetes mellitus with polyneuropathy ([address]    Moderate episode of recurrent major depressive disorder ([address]    Diabetic polyneuropathy associated with type 2 diabetes mellitus ([address]    Exogenous obesity    Spinal stenosis of lumbar region with neurogenic claudication    Postmenopausal    Stage 3 chronic kidney disease (HCC)    Tremor    Uncontrolled type 2 diabetes mellitus with hyperglycemia ([address]    Coronary artery disease involving native coronary artery of native heart without angina pectoris    Positive cardiac stress test     Past Surgical History:   Procedure Laterality Date    CARPAL TUNNEL RELEASE      RORY    HYSTERECTOMY, TOTAL ABDOMINAL      Age 37    ND NJX AA&/STRD TFRML EPI LUMBAR/SACRAL 1 LEVEL Bilateral 12/3/2018    BILATERAL L4 LUMBAR TRANSFORAMINAL EPIDURAL STEROID INJECTION WITH FLUOROSCOPY performed by Xiang Garcia MD at [facility] Social History     Socioeconomic History    Marital status:      Spouse name: Not on file    Number of children: Not on file    Years of education: Not on file    Highest education level: Not on file   Occupational History    Not on file   Tobacco Use    Smoking status: Never Smoker    Smokeless tobacco: Never Used   Vaping Use    Vaping Use: Never used   Substance and Sexual Activity    Alcohol use: No     Alcohol/week: 0.0 standard drinks    Drug use: No    Sexual activity: Yes     Partners: Male   Other Topics Concern    Not on file   Social History Narrative    Not on file     Social Determinants of Health     Financial Resource Strain: Low Risk     Difficulty of Paying Living Expenses: Not hard at all   Food Insecurity: No Food Insecurity    Worried About [address] in the Last Year: Never true    [address] in the Last Year: Never true   Transportation Needs:     Lack of Transportation (Medical):      Lack of Transportation (Non-Medical):    Physical Activity:     Days of Exercise per Week:     Minutes of Exercise per Session:    Stress:     Feeling of Stress :    Social Connections:     Frequency of Communication with Friends and Family:     Frequency of Social Gatherings with Friends and Family:     Attends [employer] Services:     Active Member of Clubs or Organizations:     Attends Club or Organization Meetings:     Marital Status:    Intimate Partner Violence:     Fear of Current or Ex-Partner:     Emotionally Abused:     Physically Abused:     Sexually Abused:      Family History   Problem Relation Age of Onset    Cancer Mother     Obesity Sister      Current Outpatient Medications   Medication Sig Dispense Refill    Semaglutide, 1 MG/DOSE, (OZEMPIC, 1 MG/DOSE,) 4 MG/3ML SOPN Inject 1 mg into the skin once a week 4 mL 5    furosemide (LASIX) 20 MG tablet Take 1 tablet by mouth daily 90 tablet 1    VICTOZA 18 MG/3ML SOPN SC injection DIAL AND INJECT UNDER THE SKIN 1.8 MG DAILY 9 pen 3    Continuous Blood Gluc Sensor (FREESTYLE JEREMIAS 14 DAY SENSOR) MISC CHANGE SENSOR EVERY 14 DAYS AND CHECK BLOOD SUGAR FOUR TIMES A DAY 2 each 5    montelukast (SINGULAIR) 10 MG tablet TAKE ONE TABLET BY MOUTH ONCE NIGHTLY 30 tablet 3    buPROPion (WELLBUTRIN XL) 300 MG extended release tablet TAKE ONE TABLET BY MOUTH EVERY OTHER DAY ALTERNATING WITH 150MG SR TABLET 45 tablet 0    lisinopril (PRINIVIL;ZESTRIL) 20 MG tablet TAKE ONE TABLET BY MOUTH DAILY 90 tablet 0    celecoxib (CELEBREX) 200 MG capsule TAKE ONE CAPSULE BY MOUTH TWICE A DAY AS NEEDED FOR PAIN 60 capsule 5    atorvastatin (LIPITOR) 20 MG tablet TAKE ONE TABLET BY MOUTH DAILY 90 tablet 1    NOVOLOG FLEXPEN 100 UNIT/ML injection pen INJECT 10 UNITS UNDER THE SKIN THREE TIMES A DAY BEFORE MEALS (Patient taking differently: 10 u qd prn can do up to tid) 5 pen 2    Insulin Glargine, 1 Unit Dial, (TOUJEO SOLOSTAR) 300 UNIT/ML SOPN Inject 40 units into the skin daily. (Patient taking differently: Inject 48 units into the skin daily.) 9 pen 3    [employer] PEN NEEDLES 31G 31G X 8 MM MISC USE WITH INSULIN FOUR TIMES A  each 5    cyclobenzaprine (FLEXERIL) 10 MG tablet TAKE ONE TABLET BY MOUTH ONCE NIGHTLY AS NEEDED FOR MUSCLE SPASMS (Patient taking differently: 10 mg Takes 1/2 tab) 30 tablet 5    Continuous Blood Gluc  (FREESTYLE JEREMIAS READER) ENE Use for checking glucose 1 Device 1    fluticasone (FLONASE) 50 MCG/ACT nasal spray 2 sprays by Nasal route daily 3 Bottle 3    DULoxetine (CYMBALTA) 60 MG extended release capsule TAKE ONE CAPSULE BY MOUTH DAILY 90 capsule 0    omeprazole (PRILOSEC) 40 MG delayed release capsule TAKE ONE CAPSULE BY MOUTH DAILY 30 capsule 5    buPROPion (WELLBUTRIN SR) 150 MG extended release tablet TAKE ONE TABLET BY MOUTH EVERY OTHER DAY ALTERNATING WITH THE 300MG XL TABLET 45 tablet 0     No current facility-administered medications for this visit. Neurological: normal coordination, normal general cortical function      Lab Results   Component Value Date    LABA1C 7.8 05/19/2021    LABA1C 7.7 02/03/2021    LABA1C 8.3 10/27/2020       ASSESSMENT/PLAN:    --Type 2 diabetes mellitus with polyneuropathy last 8.2 >>10.9>>8.3>>10.9>>8.3>>7.7    ---basaglar 44  units daily   she is on victoza 1.8 mg daily  Switch to once weekly Ozempic  Samples will be given hopefully we can get to higher doses of GLP-1 agonist  Still doesn't take meal boluses ---advised to take meal boluses regularly and eat at least 1-2 meals a day  She sometimes goes days without eating   admits that she has mental block when it comes to taking insulin     She had prior reaction to metformin ( mouth sores) Invokana ( UTI ) and Januvia and made her blood sugars run really high. Hypoglycemia protocol reviewed in detail with patient   Patient was advised that sending of her fingerstick blood glucose logs is crucial in management of her  diabetes. I will adjust the  dose of insulin according to sent data. Health Maintenance   - Deanna Mcneill was advised to have annual dilated eye exam     Last Eye Exam: Up to date on eye exam was told she has no retinopathy as per patient in [address] [place] [address] 2021   Last Podiatry Exam: nov 2020  she has significant peripheral neuropathy   Lipids: Last LDL 52 in May 2019    Microalbumin/Creatinine Ratio: She now follows with nephrology    . Education: Reviewed ABCs of diabetes management (respective goals in parentheses): A1C (<7), blood pressure (<130/80), and cholesterol (LDL <100). -. Compliance at present is estimated to be poor. Efforts to improve compliance (if necessary) will be directed at dietary modifications: advised again .  -. Follow up: I recommend diabetes care be 3 months.     --- Diabetic polyneuropathy associated with type 2 diabetes mellitus ([address]  She is on gabapentin as per primary care physician    ---- Postmenopausal  She had complete hysterectomy done at age 37 for severe endometriosis. She had been on hormone replacement therapy until age of 46. We discussed prevention of osteoporosis in detail today again   - DEXA Bone Density Axial Skeleton; ordered but she has not done it     --- Recurrent major depressive disorder, in partial remission ([facility]  She is on medication as per primary care physician    --- Essential hypertension  At target     --- Mixed hyperlipidemia  She is on Lipitor and her last LDL is 51 in 2019   We will check  at follow-up as she did not get blood work [address] [facility] [address] and/or ordered clinical lab results yes   Reviewed and/or ordered radiology tests Yes  Reviewed and/or ordered other diagnostic tests yes   Made a decision to obtain old records yes   Reviewed and summarized old records yes     Juan C Locke was counseled regarding symptoms of current diagnosis, course and complications of disease if inadequately treated, side effects of medications, diagnosis, treatment options, and prognosis, risks, benefits, complications, and alternatives of treatment, labs, imaging and other studies and treatment targets and goals. These diagnosis were discussed and reviewed with the patient including the advantages of drug therapy. She was counseled at this visit on the following: diabetes complication prevention and foot care. Return in about 3 months (around 12/29/2021). Diabetes Continuous Glucose Monitoring Report         Reason for Study:     - improve diabetic control without risk of hypoglycemia       Current Medication regimen:    Basal bolus regimen    CGMS Report     CGMS data collection was performed on September 29,2021   Patient provided information on her  diet, activities and insulin dosing  during this period.    Data was available for 4+  days     Sensor Data Report:   - 12 AM to 6 AM: Overnight blood glucose pattern shows stable hyperglycemia  - 6   AM to 10 AM:  Post breakfast hyper glycemia is noted  --10AM to 5 PM : No hypoglycemia observed during this time.   - 5   PM to 8 PM: Post meal hyperglycemia is noted       Average reading 155  mg/dL     Co ef  35.8  % of time <70 mg/dL 1 %   % of time >180  mg/dL 27 %   % of time within range 72 %  Number of hypoglycemia episodes noted: 0     Impression:   CGMS shows overall hyperglycemia and postmeal glycemic excursions noted     Recommendation:      Patient was advised to make the following changes in her diabetic regimen  Stay on same dose long acting insulin   She will start taking meal boluses according to the carb to insulin ratio of 10-1  Switch to Ozempic

## 2021-09-30 ENCOUNTER — TELEPHONE (OUTPATIENT)
Dept: ENDOCRINOLOGY | Age: 66
End: 2021-09-30

## 2021-09-30 NOTE — TELEPHONE ENCOUNTER
Submitted PA for Ozempic (1 MG/DOSE) 4MG/3ML pen-injectors    Key: [id number])   Via CMM STATUS: PENDING

## 2021-09-30 NOTE — TELEPHONE ENCOUNTER
Fax from [facility] stating that a PA has been started for Ozempic 1mg/dose     LOV:  9/29/21    FOV:  2/3/22

## 2021-10-04 RX ORDER — OMEPRAZOLE 40 MG/1
CAPSULE, DELAYED RELEASE ORAL
Qty: 30 CAPSULE | Refills: 5 | Status: SHIPPED | OUTPATIENT
Start: 2021-10-04 | End: 2022-05-12 | Stop reason: SDUPTHER

## 2021-10-05 ENCOUNTER — TELEPHONE (OUTPATIENT)
Dept: ENDOCRINOLOGY | Age: 66
End: 2021-10-05

## 2021-10-05 NOTE — TELEPHONE ENCOUNTER
[employer] needs to have the Ozempic 4mg/3ml changed to something different do to denial of the third party.   Call and advise of different medication

## 2021-10-07 ENCOUNTER — OFFICE VISIT (OUTPATIENT)
Dept: INTERNAL MEDICINE CLINIC | Age: 66
End: 2021-10-07
Payer: MEDICARE

## 2021-10-07 VITALS
WEIGHT: 198.8 LBS | SYSTOLIC BLOOD PRESSURE: 130 MMHG | BODY MASS INDEX: 33.12 KG/M2 | OXYGEN SATURATION: 98 % | HEIGHT: 65 IN | DIASTOLIC BLOOD PRESSURE: 70 MMHG | HEART RATE: 98 BPM

## 2021-10-07 DIAGNOSIS — N18.32 STAGE 3B CHRONIC KIDNEY DISEASE (HCC): Primary | ICD-10-CM

## 2021-10-07 DIAGNOSIS — M48.062 SPINAL STENOSIS OF LUMBAR REGION WITH NEUROGENIC CLAUDICATION: ICD-10-CM

## 2021-10-07 DIAGNOSIS — M48.02 CERVICAL STENOSIS OF SPINAL CANAL: ICD-10-CM

## 2021-10-07 PROCEDURE — G8417 CALC BMI ABV UP PARAM F/U: HCPCS | Performed by: INTERNAL MEDICINE

## 2021-10-07 PROCEDURE — 1090F PRES/ABSN URINE INCON ASSESS: CPT | Performed by: INTERNAL MEDICINE

## 2021-10-07 PROCEDURE — G8400 PT W/DXA NO RESULTS DOC: HCPCS | Performed by: INTERNAL MEDICINE

## 2021-10-07 PROCEDURE — 3017F COLORECTAL CA SCREEN DOC REV: CPT | Performed by: INTERNAL MEDICINE

## 2021-10-07 PROCEDURE — 4040F PNEUMOC VAC/ADMIN/RCVD: CPT | Performed by: INTERNAL MEDICINE

## 2021-10-07 PROCEDURE — G8484 FLU IMMUNIZE NO ADMIN: HCPCS | Performed by: INTERNAL MEDICINE

## 2021-10-07 PROCEDURE — 1123F ACP DISCUSS/DSCN MKR DOCD: CPT | Performed by: INTERNAL MEDICINE

## 2021-10-07 PROCEDURE — G8427 DOCREV CUR MEDS BY ELIG CLIN: HCPCS | Performed by: INTERNAL MEDICINE

## 2021-10-07 PROCEDURE — 1036F TOBACCO NON-USER: CPT | Performed by: INTERNAL MEDICINE

## 2021-10-07 PROCEDURE — 99214 OFFICE O/P EST MOD 30 MIN: CPT | Performed by: INTERNAL MEDICINE

## 2021-10-07 RX ORDER — TRAMADOL HYDROCHLORIDE 50 MG/1
50 TABLET ORAL EVERY 6 HOURS PRN
Qty: 28 TABLET | Refills: 0 | Status: SHIPPED | OUTPATIENT
Start: 2021-10-07 | End: 2021-10-14

## 2021-10-07 NOTE — LETTER
CONTROLLED SUBSTANCE MEDICATION AGREEMENT     Patient Name: Viktoriya Chávez  Patient YOB: 1955   I understand, that controlled substance medications may be used to help better manage my symptoms and to improve my ability to function at home, work and in social settings. However, I also understand that these medications do have risks, which have been discussed with me, including possible development of physical or psychological dependence. I understand that successful treatment requires mutual trust and honesty between me and my provider. I understand and agree that following this Medication Agreement is necessary in continuing my provider-patient relationship and the success of my treatment plan. Explanation from my Provider: Benefits and Goals of Controlled Substance Medications: There are two potential goals for your treatment: (1) decreased pain and suffering (2) improved daily life functions. There are many possible treatments for your chronic condition(s). Alternatives such as physical therapy, yoga, massage, home daily exercise, meditation, relaxation techniques, injections, chiropractic manipulations, surgery, cognitive therapy, hypnosis and many medications that are not habit-forming may be used. Use of controlled substance medications may be helpful, but they are unlikely to resolve all symptoms or restore all function. Explanation from my Provider: Risks of Controlled Substance Medications:  Opioid pain medications: These medications can lead to problems such as addiction/dependence, sedation, lightheadedness/dizziness, memory issues, falls, constipation, nausea, or vomiting. They may also impair the ability to drive or operate machinery. Additionally, these medications may lower testosterone levels, leading to loss of bone strength, stamina and sex drive.   They may cause problems with breathing, sleep apnea and reduced coughing, which is especially dangerous for patients with lung disease. Overdose or dangerous interactions with alcohol and other medications may occur, leading to death. Hyperalgesia may develop, which means patients receiving opioids for the treatment of pain may become more sensitive to certain painful stimuli, and in some cases, experience pain from ordinarily non-painful stimuli. Women between the ages of 14-53 who could become pregnant should carefully weigh the risks and benefits of opioids with their physicians, as these medications increase the risk of pregnancy complications, including miscarriage,  delivery and stillbirth. It is also possible for babies to be born addicted to opioids. Opioid dependence withdrawal symptoms may include; feelings of uneasiness, increased pain, irritability, belly pain, diarrhea, sweats and goose-flesh. Benzodiazepines and non-benzodiazepine sleep medications: These medications can lead to problems such as addiction/dependence, sedation, fatigue, lightheadedness, dizziness, incoordination, falls, depression, hallucinations, and impaired judgment, memory and concentration. The ability to drive and operate machinery may also be affected. Abnormal sleep-related behaviors have been reported, including sleepwalking, driving, making telephone calls, eating, or having sex while not fully awake. These medications can suppress breathing and worsen sleep apnea, particularly when combined with alcohol or other sedating medications, potentially leading to death. Dependence withdrawal symptoms may include tremors, anxiety, hallucinations and seizures. Stimulants:  Common adverse effects include addiction/dependence, increased blood  pressure and heart rate, decreased appetite, nausea, involuntary weight loss, insomnia,                                                                                                                     Initials:_______   irritability, and headaches.   These risks may increase when these medications are combined with other stimulants, such as caffeine pills or energy drinks, certain weight loss supplements and oral decongestants. Dependence withdrawal symptoms may include depressed mood, loss of interest, suicidal thoughts, anxiety, fatigue, appetite changes and agitation. Testosterone replacement therapy:  Potential side effects include increased risk of stroke and heart attack, blood clots, increased blood pressure, increased cholesterol, enlarged prostate, sleep apnea, irritability/aggression and other mood disorders, and decreased fertility. I agree and understand that I and my prescriber have the following rights and responsibilities regarding my treatment plan:     1. MY RIGHTS:  To be informed of my treatment and medication plan. To be an active participant in my health and wellbeing. 2. MY RESPONSIBILITY AND UNDERSTANDING FOR USE OF MEDICATIONS   I will take medications at the dose and frequency as directed. For my safety, I will not increase or change how I take my medications without the recommendation of my healthcare provider.  I will actively participate in any program recommended by my provider which may improve function, including social, physical, psychological programs.  I will not take my medications with alcohol or other drugs not prescribed to me. I understand that drinking alcohol with my medications increases the chances of side effects, including reduced breathing rate and could lead to personal injury when operating machinery.  I understand that if I have a history of substance use disorders, including alcohol or other illicit drugs, that I may be at increased risk of addiction to my medications.  I agree to notify my provider immediately if I should become pregnant so that my treatment plan can be adjusted.    I agree and understand that I shall only receive controlled substance medications from the prescriber that signed this agreement unless there is written agreement among other prescribers of controlled substances outlining the responsibility of the medications being prescribed.  I understand that the if the controlled medication is not helping to achieve goals, the dosage may be tapered and no longer prescribed. 3. MY RESPONSIBILITY FOR COMMUNICATION / PRESCRIPTION RENEWALS   I agree that all controlled substance medications that I take will be prescribed only by my provider. If another healthcare provider prescribes me medication in an emergency, I will notify my provider within seventy-two (72) hours.  I will arrange for refills at the prescribed interval ONLY during regular office hours. I will not ask for refills earlier than agreed, after-hours, on holidays or weekends. Refills may take up to 72 hours for processing and prescriptions to reach the pharmacy.  I will inform my other health care providers that I am taking these medications and of the existence of this [id number]. In the event of an emergency, I will provide the same information to the emergency department prescribers.  I will keep my provider updated on the pharmacy I am using for controlled medication prescription filling. Initials:_______  4. MY RESPONSIBILITY FOR PROTECTING MEDICATIONS   I will protect my prescriptions and medications. I understand that lost or misplaced prescriptions will not be replaced.  I will keep medications only for my own use and will not share them with others. I will keep all medications away from children.  I agree that if my medications are adjusted or discontinued, I will properly dispose of any remaining medications. I understand that I will be required to dispose of any remaining controlled medications as, directed by my prescriber, prior to being provided with any prescriptions for other controlled medications.   Medication drop box locations can be found at: [email or web address]    5. MY RESPONSIBILITY WITH ILLEGAL DRUGS    I will not use illegal or street drugs or another person's prescription medications not prescribed to me.  If there are identified addiction type symptoms, then referral to a program may be provided by my provider and I agree to follow through with this recommendation. 6. MY RESPONSIBILITY FOR COOPERATION WITH INVESTIGATIONS   I understand that my provider will comply with any applicable law and may discuss my use and/or possible misuse/abuse of controlled substances and alcohol, as appropriate, with any health care provider involved in my care, pharmacist, or legal authority.  I authorize my provider and pharmacy to cooperate fully with law enforcement agencies (as permitted by law) in the investigation of any possible misuse, sale, or other diversion of my controlled substances.  I agree to waive any applicable privilege or right of privacy or confidentiality with respect to these authorizations. 7. PROVIDERS RIGHT TO MONITOR FOR SAFETY: PRESCRIPTION MONITORING / DRUG TESTING   I consent to drug/toxicology screening and will submit to a drug screen upon my providers request to assure I am only taking the prescribed drugs for my safety monitoring. I understand that a drug screen is a laboratory test in which a sample of my urine, blood or saliva is checked to see what drugs I have been taking. This may entail an observed urine specimen, which means that a nurse or other health care provider may watch me provide urine, and I will cooperate if I am asked to provide an observed specimen.  I understand that my provider will check a copy of my State Prescription Monitoring Program () Report in order to safely prescribe medications.  Pill Counts: I consent to pill counts when requested.   I may be asked to bring all my prescribed controlled substance medications, in their original bottles, to all of my scheduled appointments. In addition, my provider may ask me to come to the practice at any time for a random pill count. 8. TERMINATION OF THIS AGREEMENT  For my safety, my prescriber has the right to stop prescribing controlled substance medications and may end this agreement. Initials:_______   Conditions that may result in termination of this agreement:  a. I do not show any improvement in pain, or my activity has not improved. b. I develop rapid tolerance or loss of improvement, as described in my treatment plan.  c. I develop significant side effects from the medication. d. My behavior is not consistent with the responsibilities outlined above, thereby causing safety concerns to continue prescribing controlled substance medications. e. I fail to follow the terms of this agreement. f. Other:____________________________       UNDERSTANDING THIS MEDICATION AGREEMENT:    I have read the above and have had all my questions answered. For chronic disease management, I know that my symptoms can be managed with many types of treatments. A chronic medication trial may be part of my treatment, but I must be an active participant in my care. Medication therapy is only one part of my symptom management plan. In some cases, there may be limited scientific evidence to support the chronic use of certain medications to improve symptoms and daily function. Furthermore, in certain circumstances, there may be scientific information that suggests that the use of chronic controlled substances may worsen my symptoms and increase my risk of unintentional death directly related to this medication therapy. I know that if my provider feels my risk from controlled medications is greater than my benefit, I will have my controlled substance medication(s) compassionately lowered or removed altogether.      I further agree to allow this office to contact my HIPAA contact if there are concerns about my safety and use of the controlled medications. I have agreed to use the prescribed controlled substance medications to me as instructed by my provider and as stated in this Medication Agreement. My initial on each page and my signature below shows that I have read each page and I have had the opportunity to ask questions with answers provided by my provider.     Patient Name (Printed): _____________________________________  Patient Signature:  ______________________   Date: _____________    Prescriber Name (Printed): ___________________________________  Prescriber Signature: _____________________  Date: _____________

## 2021-10-07 NOTE — PROGRESS NOTES
Juan C Locke (:  1955) is a 77 y.o. female,New patient, here for evaluation of the following chief complaint(s):  Discuss Medications (kidney dr wants her to be taking off the insaids. Pt is having difficult time)         ASSESSMENT/PLAN:  1. Stage 3b chronic kidney disease ([facility] [address]  Assessment & Plan:   I had a lengthy discussion with patient regarding the effect of NSAIDs on her kidneys and she was very interested in trying any other anti-inflammatory I did explain to her that the only available other anti-inflammatory medications are steroids which are not recommended to be used for long-term or short-term in her case given her other comorbid conditions and that we have to stick with other options for treatment  2. Spinal stenosis of lumbar region with neurogenic claudication  -     traMADol (ULTRAM) 50 MG tablet; Take 1 tablet by mouth every 6 hours as needed for Pain for up to 7 days. Intended supply: 3 days. Take lowest dose possible to manage pain, Disp-28 tablet, R-0Normal  -     Drug Panel-PM-HI Res-UR Interp-A; Future  -     [place] - Matt Angel MD, Pain Management, [facility]  3. Cervical stenosis of spinal canal  -     traMADol (ULTRAM) 50 MG tablet; Take 1 tablet by mouth every 6 hours as needed for Pain for up to 7 days. Intended supply: 3 days. Take lowest dose possible to manage pain, Disp-28 tablet, R-0Normal  -     Drug Panel-PM-HI Res-UR Interp-A;  Bean Angel MD, Pain Management, [facility]  Unfortunately patient does have significant history of arthritic problems with her spine including her cervical and lumbar spine with significant and severe stenosis affecting multiple parts of her spine causing her to have significant claudication and pain the patient has been tried on different medications in the past as well as having injections for her pain she states that her symptoms did not improve significantly with the gabapentin she has not been tried on amitriptyline but I feel that would not be a good agent to use in this patient given her multiple medical condition and her breast baseline status of lightheadedness    At this point we discussed the option of using opiates the patient had some side effects with the hydrocodone before but had tramadol without significant effects I did explain to the patient the difference in acute and chronic pain management and the need to do urine toxicology screening as well as signing a pain management agreement and having the opinion of a pain management specialist in order for her to be switched over from acute to chronic pain regimen and she is agreeable to do so    I feel that the patient symptomatology has affected her lifestyle significantly and she is not able to do a lot of her daily activity 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          Subjective   SUBJECTIVE/OBJECTIVE:    Lab Review   Lab Results   Component Value Date     09/27/2021     09/14/2021     09/07/2021    K 4.8 09/27/2021    K 4.5 09/14/2021    K 4.7 09/07/2021    CO2 23 09/27/2021    CO2 24 09/14/2021    CO2 20 09/07/2021    BUN 19 09/27/2021    BUN 20 09/14/2021    BUN 24 09/07/2021    CREATININE 1.6 09/27/2021    CREATININE 1.5 09/14/2021    CREATININE 1.6 09/07/2021    GLUCOSE 150 09/27/2021    GLUCOSE 181 09/14/2021    GLUCOSE 83 09/07/2021    CALCIUM 9.3 09/27/2021    CALCIUM 9.4 09/14/2021    CALCIUM 9.3 09/07/2021     Lab Results   Component Value Date    WBC 7.4 09/14/2021    WBC 7.5 09/07/2021    WBC 8.3 07/27/2021    HGB 11.9 09/14/2021    HGB 12.3 09/07/2021    HGB 12.1 07/27/2021    HCT 34.6 09/14/2021    HCT 36.2 09/07/2021    HCT 36.3 07/27/2021    MCV 85.2 09/14/2021    MCV 87.4 09/07/2021    MCV 88.3 07/27/2021     09/14/2021     09/07/2021     07/27/2021     Lab Results   Component Value Date    CHOL 170 07/06/2020 CHOL 189 05/20/2019    CHOL 153 05/06/2019    TRIG 229 07/06/2020    TRIG 217 05/20/2019    TRIG 250 05/06/2019    HDL 52 07/06/2020    HDL 55 05/20/2019    HDL 52 05/06/2019    HDL 54 09/21/2010    LDLDIRECT 115 04/26/2019       Vitals 10/7/2021 10/4/2021 3/45/5533   SYSTOLIC 380 901 369   DIASTOLIC 70 72 73   Site - Right Upper Arm -   Position - Sitting -   Cuff Size - Medium Adult -   Pulse 98 101 104   Temp - - -   Resp - 16 16   SpO2 98 98 -   Weight 198 lb 12.8 oz 196 lb 197 lb   Height 5' 4.5\" - 5' 4\"   Body mass index 33.59 kg/m2 - 33.81 kg/m2   Some recent data might be hidden       Patient is here today to discuss her pain management    Patient has been seen by nephrology and her kidney function is fluctuating and it has worsened recently with a GFR of 32 she has been advised by her nephrologist to stop taking all her NSAIDs which she used for a long time now including Celebrex the patient try to wean herself off and for the last 2 days she has not been on it and she has been suffering from quite a bit of pain in her back neck shoulders and lower extremities      Review of Systems       Objective   Physical Exam  Constitutional:       General: She is in acute distress. Appearance: She is obese. She is not ill-appearing, toxic-appearing or diaphoretic. Neurological:      Mental Status: She is alert. Psychiatric:         Mood and Affect: Mood normal.         Behavior: Behavior normal.         Thought Content: Thought content normal.         Judgment: Judgment normal.            This dictation was generated by voice recognition computer software. Although all attempts are made to edit the dictation for accuracy, there may be errors in the transcription that are not intended. An electronic signature was used to authenticate this note.     --Leland Zuniga MD

## 2021-10-07 NOTE — ASSESSMENT & PLAN NOTE
I had a lengthy discussion with patient regarding the effect of NSAIDs on her kidneys and she was very interested in trying any other anti-inflammatory I did explain to her that the only available other anti-inflammatory medications are steroids which are not recommended to be used for long-term or short-term in her case given her other comorbid conditions and that we have to stick with other options for treatment

## 2021-10-07 NOTE — LETTER
[facility]  [address] [address]  Phone: [phone]  Fax: [phone]    Florida Clark MD        October 7, 2021     Patient: Martha Irwin   YOB: 1955   Date of Visit: 10/7/2021       To Whom it May Concern:    Raad Khoury was seen in my clinic on 10/7/2021. Please excuse patient from participating in jury duty due to multiple secondary medical condition. If you have any questions or concerns, please don't hesitate to call.     Sincerely,         Florida Clark MD

## 2021-10-25 RX ORDER — LISINOPRIL 20 MG/1
TABLET ORAL
Qty: 90 TABLET | Refills: 1 | Status: SHIPPED | OUTPATIENT
Start: 2021-10-25 | End: 2022-05-12

## 2021-11-11 DIAGNOSIS — F33.41 RECURRENT MAJOR DEPRESSIVE DISORDER, IN PARTIAL REMISSION (HCC): ICD-10-CM

## 2021-11-11 RX ORDER — BUPROPION HYDROCHLORIDE 300 MG/1
TABLET ORAL
Qty: 45 TABLET | Refills: 0 | Status: SHIPPED | OUTPATIENT
Start: 2021-11-11 | End: 2022-05-12 | Stop reason: SDUPTHER

## 2022-01-03 RX ORDER — DULOXETIN HYDROCHLORIDE 60 MG/1
CAPSULE, DELAYED RELEASE ORAL
Qty: 90 CAPSULE | Refills: 0 | Status: SHIPPED | OUTPATIENT
Start: 2022-01-03 | End: 2022-04-26 | Stop reason: SDUPTHER

## 2022-01-10 ENCOUNTER — TELEPHONE (OUTPATIENT)
Dept: INTERNAL MEDICINE CLINIC | Age: 67
End: 2022-01-10

## 2022-01-10 DIAGNOSIS — R05.9 COUGH: Primary | ICD-10-CM

## 2022-01-10 DIAGNOSIS — R05.9 COUGH: ICD-10-CM

## 2022-01-11 LAB — SARS-COV-2: DETECTED

## 2022-01-19 DIAGNOSIS — H93.13 TINNITUS OF BOTH EARS: ICD-10-CM

## 2022-01-19 DIAGNOSIS — J30.9 ALLERGIC SINUSITIS: ICD-10-CM

## 2022-01-19 DIAGNOSIS — H69.93 DISORDER OF BOTH EUSTACHIAN TUBES: ICD-10-CM

## 2022-01-19 RX ORDER — MONTELUKAST SODIUM 10 MG/1
TABLET ORAL
Qty: 30 TABLET | Refills: 3 | Status: SHIPPED | OUTPATIENT
Start: 2022-01-19 | End: 2022-05-12 | Stop reason: SDUPTHER

## 2022-02-02 ENCOUNTER — OFFICE VISIT (OUTPATIENT)
Dept: ENDOCRINOLOGY | Age: 67
End: 2022-02-02
Payer: MEDICARE

## 2022-02-02 VITALS
WEIGHT: 181 LBS | DIASTOLIC BLOOD PRESSURE: 69 MMHG | BODY MASS INDEX: 30.9 KG/M2 | HEART RATE: 87 BPM | RESPIRATION RATE: 16 BRPM | HEIGHT: 64 IN | SYSTOLIC BLOOD PRESSURE: 112 MMHG

## 2022-02-02 DIAGNOSIS — E66.09 EXOGENOUS OBESITY: ICD-10-CM

## 2022-02-02 DIAGNOSIS — I10 HYPERTENSION, UNSPECIFIED TYPE: ICD-10-CM

## 2022-02-02 DIAGNOSIS — E11.42 TYPE 2 DIABETES MELLITUS WITH POLYNEUROPATHY (HCC): Primary | ICD-10-CM

## 2022-02-02 DIAGNOSIS — E78.2 MIXED HYPERLIPIDEMIA: ICD-10-CM

## 2022-02-02 LAB — HBA1C MFR BLD: 6.6 %

## 2022-02-02 PROCEDURE — G8484 FLU IMMUNIZE NO ADMIN: HCPCS | Performed by: INTERNAL MEDICINE

## 2022-02-02 PROCEDURE — 3017F COLORECTAL CA SCREEN DOC REV: CPT | Performed by: INTERNAL MEDICINE

## 2022-02-02 PROCEDURE — 3044F HG A1C LEVEL LT 7.0%: CPT | Performed by: INTERNAL MEDICINE

## 2022-02-02 PROCEDURE — G8417 CALC BMI ABV UP PARAM F/U: HCPCS | Performed by: INTERNAL MEDICINE

## 2022-02-02 PROCEDURE — G8427 DOCREV CUR MEDS BY ELIG CLIN: HCPCS | Performed by: INTERNAL MEDICINE

## 2022-02-02 PROCEDURE — 1123F ACP DISCUSS/DSCN MKR DOCD: CPT | Performed by: INTERNAL MEDICINE

## 2022-02-02 PROCEDURE — 1036F TOBACCO NON-USER: CPT | Performed by: INTERNAL MEDICINE

## 2022-02-02 PROCEDURE — 95251 CONT GLUC MNTR ANALYSIS I&R: CPT | Performed by: INTERNAL MEDICINE

## 2022-02-02 PROCEDURE — 2022F DILAT RTA XM EVC RTNOPTHY: CPT | Performed by: INTERNAL MEDICINE

## 2022-02-02 PROCEDURE — 83036 HEMOGLOBIN GLYCOSYLATED A1C: CPT | Performed by: INTERNAL MEDICINE

## 2022-02-02 PROCEDURE — 4040F PNEUMOC VAC/ADMIN/RCVD: CPT | Performed by: INTERNAL MEDICINE

## 2022-02-02 PROCEDURE — 1090F PRES/ABSN URINE INCON ASSESS: CPT | Performed by: INTERNAL MEDICINE

## 2022-02-02 PROCEDURE — 99214 OFFICE O/P EST MOD 30 MIN: CPT | Performed by: INTERNAL MEDICINE

## 2022-02-02 PROCEDURE — G8400 PT W/DXA NO RESULTS DOC: HCPCS | Performed by: INTERNAL MEDICINE

## 2022-02-02 RX ORDER — TRAMADOL HYDROCHLORIDE 50 MG/1
50 TABLET ORAL EVERY 6 HOURS PRN
COMMUNITY

## 2022-02-02 ASSESSMENT — ENCOUNTER SYMPTOMS: VISUAL CHANGE: 0

## 2022-02-02 NOTE — PROGRESS NOTES
Jordan Venegas is a 74 y.o. female is seen  for evaluation and management of type 2  Diabetes--- . Jordan Venegas was diagnosed with Diabetes mellitus in 2008 aprox   Pt always had issues with hypoglycemia since her childhood so she was under constant surveillance until she was diagnosed with diabetes  . Jordan Venegas got diabetic education in the past.  Comorbid conditions: Neuropathy    INTERIM:    Diabetes  She presents for her follow-up diabetic visit. She has type 2 diabetes mellitus. No MedicAlert identification noted. The initial diagnosis of diabetes was made 10 years ago. Her disease course has been worsening. Hypoglycemia symptoms include sweats and tremors. Pertinent negatives for diabetes include no foot ulcerations, no polydipsia, no polyphagia, no polyuria, no visual change, no weakness and no weight loss. There are no hypoglycemic complications. Symptoms are worsening. Diabetic complications include peripheral neuropathy. Risk factors for coronary artery disease include post-menopausal, hypertension, diabetes mellitus and dyslipidemia. Current diabetic treatment includes insulin injections. She is compliant with treatment some of the time. Her weight is increasing rapidly. She is following a generally unhealthy diet. Meal planning includes avoidance of concentrated sweets. She has had a previous visit with a dietitian. She never participates in exercise. Her home blood glucose trend is fluctuating dramatically. Her breakfast blood glucose is taken between 7-8 am. Her breakfast blood glucose range is generally 140-180 mg/dl. An ACE inhibitor/angiotensin II receptor blocker is being taken. She does not see a podiatrist.Eye exam is current.      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  She feels that she lost weight due to Kimit Laurence she also was switched from celebrex to [place]     Weight trend: stable  Prior visit with dietician: no  Current diet: on average, 3 meals per day  Current exercise: rare    She was in the ER after K was 6.2 but repeat in ER was normal in April 2019  She denies any nausea vomiting she tends to drink plenty of water any ways    Patient has hyperlipidemia and is on Lipitor her last LDL was at target  Patient also has arthritis and takes Celebrex off-and-on which can also cause kidney damage and also has reflux and is on omeprazole    Past Medical History:   Diagnosis Date    Asthma     vocal fold     Ataxia     Cervical stenosis of spinal canal 10/7/2021    Coronary artery disease involving native coronary artery of native heart without angina pectoris 9/9/2021    Depression     Diabetes mellitus ([address]     Hyperlipidemia     Hypertension     Hypothyroidism     Obesity     Osteoarthritis     Paradoxical vocal fold motion disorder     Postmenopausal 5/15/2018    Stage 2 chronic kidney disease 5/7/2019      Patient Active Problem List   Diagnosis    HTN (hypertension)    Diabetes mellitus ([address]    Hyperlipidemia    Insomnia    Depression    Neuropathic pain    Type 2 diabetes mellitus with polyneuropathy (HCC)    Moderate episode of recurrent major depressive disorder ([address]    Diabetic polyneuropathy associated with type 2 diabetes mellitus ([address]    Exogenous obesity    Spinal stenosis of lumbar region with neurogenic claudication    Postmenopausal    Stage 3 chronic kidney disease (HCC)    Tremor    Uncontrolled type 2 diabetes mellitus with hyperglycemia ([address]    Coronary artery disease involving native coronary artery of native heart without angina pectoris    Positive cardiac stress test    Cervical stenosis of spinal canal     Past Surgical History:   Procedure Laterality Date    CARPAL TUNNEL RELEASE      RORY    HYSTERECTOMY, TOTAL ABDOMINAL      Age 37    TN NJX AA&/STRD TFRML EPI LUMBAR/SACRAL 1 LEVEL Bilateral 12/3/2018    BILATERAL L4 LUMBAR TRANSFORAMINAL EPIDURAL STEROID INJECTION WITH FLUOROSCOPY performed by Kelvin Unger MD at [facility]     Social History     Socioeconomic History    Marital status:      Spouse name: Not on file    Number of children: Not on file    Years of education: Not on file    Highest education level: Not on file   Occupational History    Not on file   Tobacco Use    Smoking status: Never Smoker    Smokeless tobacco: Never Used   Vaping Use    Vaping Use: Never used   Substance and Sexual Activity    Alcohol use: No     Alcohol/week: 0.0 standard drinks    Drug use: No    Sexual activity: Yes     Partners: Male   Other Topics Concern    Not on file   Social History Narrative    Not on file     Social Determinants of Health     Financial Resource Strain: Low Risk     Difficulty of Paying Living Expenses: Not hard at all   Food Insecurity: No Food Insecurity    Worried About [facility] in the Last Year: Never true    [address] [place] in the Last Year: Never true   Transportation Needs:     Lack of Transportation (Medical): Not on file    Lack of Transportation (Non-Medical):  Not on file   Physical Activity:     Days of Exercise per Week: Not on file    Minutes of Exercise per Session: Not on file   Stress:     Feeling of Stress : Not on file   Social Connections:     Frequency of Communication with Friends and Family: Not on file    Frequency of Social Gatherings with Friends and Family: Not on file    Attends [employer] Services: Not on file    Active Member of Clubs or Organizations: Not on file    Attends Club or Organization Meetings: Not on file    Marital Status: Not on file   Intimate Partner Violence:     Fear of Current or Ex-Partner: Not on file    Emotionally Abused: Not on file    Physically Abused: Not on file    Sexually Abused: Not on file   Housing Stability:     Unable to Pay for Housing in the Last Year: Not on file    Number of [place] in the Last Year: Not on file    Unstable Housing in the Last Year: Not on file     Family History   Problem Relation Age of Onset    Cancer Mother     Obesity Sister      Current Outpatient Medications   Medication Sig Dispense Refill    traMADol (ULTRAM) 50 MG tablet Take 50 mg by mouth every 6 hours as needed for Pain.  4 tabs every 6 hours      montelukast (SINGULAIR) 10 MG tablet TAKE ONE TABLET BY MOUTH ONCE NIGHTLY 30 tablet 3    DULoxetine (CYMBALTA) 60 MG extended release capsule TAKE ONE CAPSULE BY MOUTH DAILY 90 capsule 0    Insulin Glargine, 1 Unit Dial, (TOUJEO SOLOSTAR) 300 UNIT/ML SOPN INJECT 40 UNITS UNDER THE SKIN DAILY 9 pen 3    KROGER PEN NEEDLES 31G 31G X 8 MM MISC USE FOUR TIMES A  each 5    atorvastatin (LIPITOR) 20 MG tablet TAKE ONE TABLET BY MOUTH DAILY 90 tablet 1    buPROPion (WELLBUTRIN XL) 300 MG extended release tablet TAKE ONE TABLET BY MOUTH EVERY OTHER DAY ALTERNATING WITH 150MG SR TABLET 45 tablet 0    lisinopril (PRINIVIL;ZESTRIL) 20 MG tablet TAKE ONE TABLET BY MOUTH DAILY (Patient taking differently: 0.5 tab daily) 90 tablet 1    omeprazole (PRILOSEC) 40 MG delayed release capsule TAKE ONE CAPSULE BY MOUTH DAILY 30 capsule 5    buPROPion (WELLBUTRIN SR) 150 MG extended release tablet TAKE ONE TABLET BY MOUTH EVERY OTHER DAY ALTERNATING WITH THE 300MG XL TABLET 45 tablet 0    Semaglutide, 1 MG/DOSE, (OZEMPIC, 1 MG/DOSE,) 4 MG/3ML SOPN Inject 1 mg into the skin once a week 4 mL 5    Continuous Blood Gluc Sensor (Modular RoboticsSTYLE JEREMIAS 14 DAY SENSOR) MISC CHANGE SENSOR EVERY 14 DAYS AND CHECK BLOOD SUGAR FOUR TIMES A DAY 2 each 5    NOVOLOG FLEXPEN 100 UNIT/ML injection pen INJECT 10 UNITS UNDER THE SKIN THREE TIMES A DAY BEFORE MEALS (Patient taking differently: 10 u qd prn can do up to tid) 5 pen 2    cyclobenzaprine (FLEXERIL) 10 MG tablet TAKE ONE TABLET BY MOUTH ONCE NIGHTLY AS NEEDED FOR MUSCLE SPASMS (Patient taking differently: 10 mg Takes 1/2 tab) 30 tablet 5    Continuous Blood Gluc  (FREESTYLE JEREMIAS READER) ENE Use for checking glucose 1 Device 1    fluticasone (FLONASE) 50 MCG/ACT nasal spray 2 sprays by Nasal route daily 3 Bottle 3     No current facility-administered medications for this visit. Allergies   Allergen Reactions    Actos [Pioglitazone Hydrochloride]      Causes mouth blisters    Albuterol      YEAST INFECTION IN MOUTH    Dilaudid [Hydromorphone Hcl]     Fluticasone-Salmeterol      YEAST INFECTIONS IN MOUTH    Glyburide      Causes mouth blisters    Lortab [Hydrocodone-Acetaminophen]      headache    Metformin      Causes mouth blisters    Symbicort [Budesonide-Formoterol Fumarate]      Causes mouth blisters     Family Status   Relation Name Status    Mother   at age 68    Father   at age 32    Sister  (Not Specified)       Review of Systems  I have reviewed the review of system questionnaire filled by the patient .   Patient was advised to contact PCP for non endocrine signs and symptoms       OBJECTIVE:  /69   Pulse 87   Resp 16   Ht 5' 4\" (1.626 m)   Wt 181 lb (82.1 kg)   BMI 31.07 kg/m²    Wt Readings from Last 3 Encounters:   22 181 lb (82.1 kg)   10/07/21 198 lb 12.8 oz (90.2 kg)   10/04/21 196 lb (88.9 kg)       Constitutional: no acute distress, well appearing, well nourished  Psychiatric: oriented to person, place and time, judgement, insight and normal, recent and remote memory and intact and mood, affect are normal  Skin: skin and subcutaneous tissue is normal without mass,   Head and Face: examination of head and face revealed no abnormalities  Eyes: no lid or conjunctival swelling, no erythema or discharge, pupils are normal,   Ears/Nose: external inspection of ears and nose revealed no abnormalities, hearing is grossly normal  Oropharynx/Mouth/Face: lips, tongue and gums are normal with no lesions, the voice quality was normal  Neck: neck is supple and symmetric, with midline trachea and no masses, thyroid is normal    Pulmonary: no increased work of breathing or signs of respiratory distress, lungs are clear to auscultation  Cardiovascular: normal heart rate and rhythm, normal S1 and S2,   Musculoskeletal: normal gait and station,   Neurological: normal coordination, normal general cortical function      Lab Results   Component Value Date    LABA1C 6.6 02/02/2022    LABA1C 7.8 05/19/2021    LABA1C 7.7 02/03/2021       ASSESSMENT/PLAN:    --Type 2 diabetes mellitus with polyneuropathy last 8.2 >>10.9>>8.3>>10.9>>8.3>>7.7      She has lost 18 lbs since last visit   ---toujeo 40  units daily ---reduce toujeo to 36 units daily   she is on ozempic 1 mg weekly Switch to once weekly Ozempic  Samples will be given hopefully we can get to higher doses of GLP-1 agonist  Still doesn't take meal boluses regularly     She had prior reaction to metformin ( mouth sores) Invokana ( UTI ) and Januvia and made her blood sugars run really high. Hypoglycemia protocol reviewed in detail with patient   Patient was advised that sending of her fingerstick blood glucose logs is crucial in management of her  diabetes. I will adjust the  dose of insulin according to sent data. Health Maintenance   - Yanely Wu was advised to have annual dilated eye exam     Last Eye Exam: Up to date on eye exam was told she has no retinopathy as per patient in [address] [place] [address] 2021   Last Podiatry Exam: nov 2020  she has significant peripheral neuropathy   Lipids: Last LDL 52 in May 2019    Microalbumin/Creatinine Ratio: She now follows with nephrology    . Education: Reviewed ABCs of diabetes management (respective goals in parentheses): A1C (<7), blood pressure (<130/80), and cholesterol (LDL <100). -. Compliance at present is estimated to be poor. Efforts to improve compliance (if necessary) will be directed at dietary modifications: advised again .  -. Follow up: I recommend diabetes care be 3 months.     --- Diabetic polyneuropathy associated with type 2 diabetes mellitus ([facility]  She is on gabapentin as per primary care physician    ---- Postmenopausal  She had complete hysterectomy done at age 37 for severe endometriosis. She had been on hormone replacement therapy until age of 46. We discussed prevention of osteoporosis in detail today again   - DEXA Bone Density Axial Skeleton; ordered but she has not done it     --- Recurrent major depressive disorder, in partial remission ([facility]  She is on medication as per primary care physician    --- Essential hypertension  At target     --- Mixed hyperlipidemia  She is on Lipitor and her last LDL is 51 in 2019   We will check  at follow-up as she did not get blood work [address] and/or ordered clinical lab results yes   Reviewed and/or ordered radiology tests Yes  Reviewed and/or ordered other diagnostic tests yes   Made a decision to obtain old records yes   Reviewed and summarized old records yes     Juan Pablo Higgins was counseled regarding symptoms of current diagnosis, course and complications of disease if inadequately treated, side effects of medications, diagnosis, treatment options, and prognosis, risks, benefits, complications, and alternatives of treatment, labs, imaging and other studies and treatment targets and goals. These diagnosis were discussed and reviewed with the patient including the advantages of drug therapy. She was counseled at this visit on the following: diabetes complication prevention and foot care. Return in about 3 months (around 5/2/2022). Diabetes Continuous Glucose Monitoring Report         Reason for Study:     - improve diabetic control without risk of hypoglycemia       Current Medication regimen:    Basal bolus regimen    CGMS Report     CGMS data collection was performed on 2/2/22   Patient provided information on her  diet, activities and insulin dosing  during this period.    Data was available for 7 days     Sensor Data Report:   - 12 AM to 6 AM: Overnight blood glucose pattern shows stable hyperglycemia  - 6   AM to 10 AM:  Post breakfast hyper glycemia is noted  --10AM to 5 PM : No hypoglycemia observed during this time.   - 5   PM to 8 PM: Post meal minimal hyperglycemia is noted       Average reading 120  mg/dL     Co ef  Violeta 30.8  % of time <70 mg/dL 5 %   % of time >180  mg/dL  7  %   % of time within range 88 %  Number of hypoglycemia episodes noted: 0     Impression:   CGMS shows overall hyperglycemia and postmeal glycemic excursions noted     Recommendation:      Patient was advised to make the following changes in her diabetic regimen  Reduce lantus to 36 units  She will start taking meal boluses according to the carb to insulin ratio of 10-1   Ozempic 1 mg weekly

## 2022-02-07 DIAGNOSIS — F33.41 RECURRENT MAJOR DEPRESSIVE DISORDER, IN PARTIAL REMISSION (HCC): ICD-10-CM

## 2022-02-08 ENCOUNTER — TELEPHONE (OUTPATIENT)
Dept: INTERNAL MEDICINE CLINIC | Age: 67
End: 2022-02-08

## 2022-02-08 DIAGNOSIS — F33.41 RECURRENT MAJOR DEPRESSIVE DISORDER, IN PARTIAL REMISSION (HCC): ICD-10-CM

## 2022-02-08 RX ORDER — BUPROPION HYDROCHLORIDE 150 MG/1
TABLET, EXTENDED RELEASE ORAL
Qty: 45 TABLET | Refills: 0 | Status: SHIPPED | OUTPATIENT
Start: 2022-02-08 | End: 2022-05-12 | Stop reason: SDUPTHER

## 2022-02-08 NOTE — TELEPHONE ENCOUNTER
Pt calling was on Ambien and then Tramadol and had to stop the Ambien because of the Tramadol----she can barely sleep 3 hrs at a time---doesn't know what to do---needs the Tramadol for her pain but just found out that sleeplessness is a side effect of this medicine---Please call pt. Thanks.

## 2022-02-09 RX ORDER — ZOLPIDEM TARTRATE 5 MG/1
5 TABLET ORAL NIGHTLY PRN
Qty: 30 TABLET | Refills: 1 | Status: SHIPPED | OUTPATIENT
Start: 2022-02-09 | End: 2022-04-10

## 2022-02-09 NOTE — TELEPHONE ENCOUNTER
If the patient is taking the half tablet without any significant side effects she can go ahead and try to take the full tablet like she used to do before and will monitor for any issues or problems

## 2022-02-09 NOTE — TELEPHONE ENCOUNTER
Called pt to advise. Patient states she is in need of refill for ambien.  She would like this to go to [address] [place] [address]

## 2022-03-06 DIAGNOSIS — E11.42 TYPE 2 DIABETES MELLITUS WITH POLYNEUROPATHY (HCC): ICD-10-CM

## 2022-03-07 RX ORDER — FLASH GLUCOSE SENSOR
KIT MISCELLANEOUS
Qty: 2 EACH | Refills: 5 | Status: SHIPPED | OUTPATIENT
Start: 2022-03-07 | End: 2022-09-08

## 2022-03-15 DIAGNOSIS — N18.31 CHRONIC KIDNEY DISEASE (CKD) STAGE G3A/A1, MODERATELY DECREASED GLOMERULAR FILTRATION RATE (GFR) BETWEEN 45-59 ML/MIN/1.73 SQUARE METER AND ALBUMINURIA CREATININE RATIO LESS THAN 30 MG/G (HCC): ICD-10-CM

## 2022-03-15 LAB
A/G RATIO: 2 (ref 1.1–2.2)
ALBUMIN SERPL-MCNC: 4.6 G/DL (ref 3.4–5)
ALP BLD-CCNC: 126 U/L (ref 40–129)
ALT SERPL-CCNC: 14 U/L (ref 10–40)
ANION GAP SERPL CALCULATED.3IONS-SCNC: 16 MMOL/L (ref 3–16)
AST SERPL-CCNC: 14 U/L (ref 15–37)
BILIRUB SERPL-MCNC: <0.2 MG/DL (ref 0–1)
BUN BLDV-MCNC: 20 MG/DL (ref 7–20)
CALCIUM SERPL-MCNC: 9.6 MG/DL (ref 8.3–10.6)
CHLORIDE BLD-SCNC: 100 MMOL/L (ref 99–110)
CO2: 20 MMOL/L (ref 21–32)
CREAT SERPL-MCNC: 1.4 MG/DL (ref 0.6–1.2)
CREATININE URINE: 243.7 MG/DL (ref 28–259)
GFR AFRICAN AMERICAN: 45
GFR NON-AFRICAN AMERICAN: 37
GLUCOSE BLD-MCNC: 134 MG/DL (ref 70–99)
HCT VFR BLD CALC: 36.7 % (ref 36–48)
HEMOGLOBIN: 12.1 G/DL (ref 12–16)
MCH RBC QN AUTO: 28.4 PG (ref 26–34)
MCHC RBC AUTO-ENTMCNC: 33 G/DL (ref 31–36)
MCV RBC AUTO: 86 FL (ref 80–100)
PDW BLD-RTO: 13.8 % (ref 12.4–15.4)
PLATELET # BLD: 395 K/UL (ref 135–450)
PMV BLD AUTO: 8.1 FL (ref 5–10.5)
POTASSIUM SERPL-SCNC: 5 MMOL/L (ref 3.5–5.1)
PROTEIN PROTEIN: 19 MG/DL
PROTEIN/CREAT RATIO: 0.1 MG/DL
RBC # BLD: 4.26 M/UL (ref 4–5.2)
SODIUM BLD-SCNC: 136 MMOL/L (ref 136–145)
TOTAL PROTEIN: 6.9 G/DL (ref 6.4–8.2)
WBC # BLD: 7.2 K/UL (ref 4–11)

## 2022-03-21 RX ORDER — FUROSEMIDE 20 MG/1
TABLET ORAL
Qty: 90 TABLET | Refills: 1 | Status: SHIPPED | OUTPATIENT
Start: 2022-03-21 | End: 2022-05-12

## 2022-03-21 NOTE — TELEPHONE ENCOUNTER
Last OV:  [facility]osp en[facility] 9-14-21  Last labs: 3-15-22  Appt scheduled : none  Last refill;  Please advise

## 2022-03-25 ENCOUNTER — HOSPITAL ENCOUNTER (OUTPATIENT)
Dept: CT IMAGING | Age: 67
Discharge: HOME | End: 2022-03-25
Payer: MEDICARE

## 2022-03-25 DIAGNOSIS — M47.812 SPONDYLOSIS OF CERVICAL REGION WITHOUT MYELOPATHY OR RADICULOPATHY: ICD-10-CM

## 2022-03-25 PROCEDURE — 72131 CT LUMBAR SPINE W/O DYE: CPT

## 2022-03-25 PROCEDURE — 72125 CT NECK SPINE W/O DYE: CPT

## 2022-03-25 PROCEDURE — 72128 CT CHEST SPINE W/O DYE: CPT

## 2022-03-28 ENCOUNTER — TELEMEDICINE (OUTPATIENT)
Dept: INTERNAL MEDICINE CLINIC | Age: 67
End: 2022-03-28
Payer: MEDICARE

## 2022-03-28 ENCOUNTER — NURSE TRIAGE (OUTPATIENT)
Dept: OTHER | Facility: CLINIC | Age: 67
End: 2022-03-28

## 2022-03-28 DIAGNOSIS — R05.9 COUGH: Primary | ICD-10-CM

## 2022-03-28 PROCEDURE — 99213 OFFICE O/P EST LOW 20 MIN: CPT | Performed by: INTERNAL MEDICINE

## 2022-03-28 PROCEDURE — 1036F TOBACCO NON-USER: CPT | Performed by: INTERNAL MEDICINE

## 2022-03-28 PROCEDURE — G8427 DOCREV CUR MEDS BY ELIG CLIN: HCPCS | Performed by: INTERNAL MEDICINE

## 2022-03-28 PROCEDURE — 1090F PRES/ABSN URINE INCON ASSESS: CPT | Performed by: INTERNAL MEDICINE

## 2022-03-28 PROCEDURE — G8417 CALC BMI ABV UP PARAM F/U: HCPCS | Performed by: INTERNAL MEDICINE

## 2022-03-28 PROCEDURE — 4040F PNEUMOC VAC/ADMIN/RCVD: CPT | Performed by: INTERNAL MEDICINE

## 2022-03-28 PROCEDURE — 1123F ACP DISCUSS/DSCN MKR DOCD: CPT | Performed by: INTERNAL MEDICINE

## 2022-03-28 PROCEDURE — 3017F COLORECTAL CA SCREEN DOC REV: CPT | Performed by: INTERNAL MEDICINE

## 2022-03-28 PROCEDURE — G8400 PT W/DXA NO RESULTS DOC: HCPCS | Performed by: INTERNAL MEDICINE

## 2022-03-28 PROCEDURE — G8484 FLU IMMUNIZE NO ADMIN: HCPCS | Performed by: INTERNAL MEDICINE

## 2022-03-28 RX ORDER — AMOXICILLIN AND CLAVULANATE POTASSIUM 875; 125 MG/1; MG/1
1 TABLET, FILM COATED ORAL 2 TIMES DAILY
Qty: 20 TABLET | Refills: 0 | Status: SHIPPED | OUTPATIENT
Start: 2022-03-28 | End: 2022-04-07

## 2022-03-28 ASSESSMENT — PATIENT HEALTH QUESTIONNAIRE - PHQ9
SUM OF ALL RESPONSES TO PHQ QUESTIONS 1-9: 0
SUM OF ALL RESPONSES TO PHQ QUESTIONS 1-9: 0
SUM OF ALL RESPONSES TO PHQ QUESTIONS 1-9: 8
SUM OF ALL RESPONSES TO PHQ9 QUESTIONS 1 & 2: 0
6. FEELING BAD ABOUT YOURSELF - OR THAT YOU ARE A FAILURE OR HAVE LET YOURSELF OR YOUR FAMILY DOWN: 1
SUM OF ALL RESPONSES TO PHQ QUESTIONS 1-9: 0
7. TROUBLE CONCENTRATING ON THINGS, SUCH AS READING THE NEWSPAPER OR WATCHING TELEVISION: 0
SUM OF ALL RESPONSES TO PHQ QUESTIONS 1-9: 8
5. POOR APPETITE OR OVEREATING: 0
1. LITTLE INTEREST OR PLEASURE IN DOING THINGS: 0
2. FEELING DOWN, DEPRESSED OR HOPELESS: 0
1. LITTLE INTEREST OR PLEASURE IN DOING THINGS: 0
9. THOUGHTS THAT YOU WOULD BE BETTER OFF DEAD, OR OF HURTING YOURSELF: 0
SUM OF ALL RESPONSES TO PHQ QUESTIONS 1-9: 8
8. MOVING OR SPEAKING SO SLOWLY THAT OTHER PEOPLE COULD HAVE NOTICED. OR THE OPPOSITE, BEING SO FIGETY OR RESTLESS THAT YOU HAVE BEEN MOVING AROUND A LOT MORE THAN USUAL: 1
10. IF YOU CHECKED OFF ANY PROBLEMS, HOW DIFFICULT HAVE THESE PROBLEMS MADE IT FOR YOU TO DO YOUR WORK, TAKE CARE OF THINGS AT HOME, OR GET ALONG WITH OTHER PEOPLE: 0
2. FEELING DOWN, DEPRESSED OR HOPELESS: 0
SUM OF ALL RESPONSES TO PHQ9 QUESTIONS 1 & 2: 0
4. FEELING TIRED OR HAVING LITTLE ENERGY: 3
SUM OF ALL RESPONSES TO PHQ QUESTIONS 1-9: 8
3. TROUBLE FALLING OR STAYING ASLEEP: 3
SUM OF ALL RESPONSES TO PHQ QUESTIONS 1-9: 0

## 2022-03-28 NOTE — TELEPHONE ENCOUNTER
Received call from [facility] with Red Flag Complaint. Subjective: Caller states \"I have had this cough for 2 weeks and it wont go away. I cant talk without provoking the cough. I tested for COVID last week and was negative. \"     Current Symptoms:   Frequent cough- white green phlegm  Cough exac with talking or deep breaths  Cough fits provoke SOB  Heavy feeling to chest  Nasal discharge    Onset: 2 weeks ago; gradual    Associated Symptoms: NA    Pain Severity: denies, \"heavy\" feeling chest    Temperature: denies  What has been tried: nyquil, dayquil    LMP: NA Pregnant: NA    Recommended disposition: See in Office Today    Advised to utilize [facility] if no available [facility]    Care advice provided, patient verbalizes understanding; denies any other questions or concerns; instructed to call back for any new or worsening symptoms. Patient/Caller agrees with recommended disposition; writer provided warm transfer to [facility] for appointment scheduling     Attention Provider: Thank you for allowing me to participate in the care of your patient. The patient was connected to triage in response to information provided to the ECC/PSC. Please do not respond through this encounter as the response is not directed to a shared pool.       Reason for Disposition   MILD difficulty breathing (e.g., minimal/no SOB at rest, SOB with walking, pulse <100) and still present when not coughing    Protocols used: COUGH-ADULT-OH

## 2022-03-28 NOTE — PROGRESS NOTES
Chief Complaint   Patient presents with    Cough     HPI:  Cough for two weeks. Congestion in head and throat. Cough is non productive. Negative COVID home test.  Since onset, symptoms are not improving. She is getting partial relief with Nyquil and Dayquil        neg fever      EXAM  GEN: appears ill, but not in distress      A/P     Diagnosis Orders   1. Cough      2 week history of cough, head congestion, headache c/w sinusitis. Given the duration of symptoms without improvement, and antibiotic is indicated. Augmentin 875mg BID for ten days prescribed. Potential side effects discussed. Advised to contact the office if worsening or not improving over the course of this week. She will need a follow up for chronic disease management as well. Nory Argueta, was evaluated through a synchronous (real-time) audio-video encounter. The patient (or guardian if applicable) is aware that this is a billable service, which includes applicable co-pays. This Virtual Visit was conducted with patient's (and/or legal guardian's) consent. The visit was conducted pursuant to the emergency declaration under the [address] [place] [address], [address] [place] [address] authority and the [employer] General Act. Patient identification was verified, and a caregiver was present when appropriate. The patient was located at home in a state where the provider was licensed to provide care. Total time spent for this encounter: Not billed by time    --Jayla Akhtar MD on 3/28/2022 at 11:04 AM    An electronic signature was used to authenticate this note.

## 2022-04-12 ENCOUNTER — TELEPHONE (OUTPATIENT)
Dept: ENDOCRINOLOGY | Age: 67
End: 2022-04-12

## 2022-04-12 DIAGNOSIS — E11.42 TYPE 2 DIABETES MELLITUS WITH POLYNEUROPATHY (HCC): Primary | ICD-10-CM

## 2022-04-12 RX ORDER — SEMAGLUTIDE 1.34 MG/ML
1 INJECTION, SOLUTION SUBCUTANEOUS WEEKLY
Qty: 9 ML | Refills: 1 | Status: SHIPPED | OUTPATIENT
Start: 2022-04-12 | End: 2022-05-02 | Stop reason: SDUPTHER

## 2022-04-12 NOTE — TELEPHONE ENCOUNTER
Patient called and is wondering why her Ozempic is not able to be filled at the pharmacy. She was told to contact her  doctor. Patient is requesting a call back.

## 2022-04-26 RX ORDER — DULOXETIN HYDROCHLORIDE 60 MG/1
CAPSULE, DELAYED RELEASE ORAL
Qty: 90 CAPSULE | Refills: 1 | Status: SHIPPED | OUTPATIENT
Start: 2022-04-26 | End: 2022-10-24

## 2022-04-29 ENCOUNTER — PATIENT MESSAGE (OUTPATIENT)
Dept: ENDOCRINOLOGY | Age: 67
End: 2022-04-29

## 2022-04-29 DIAGNOSIS — E11.42 TYPE 2 DIABETES MELLITUS WITH POLYNEUROPATHY (HCC): ICD-10-CM

## 2022-04-29 NOTE — TELEPHONE ENCOUNTER
Patient called back is wanting her prescription refilled and also sent to a new  pharmacy who will fill the script for a much less cost. Patient will call back with pharmacy information later.

## 2022-04-29 NOTE — TELEPHONE ENCOUNTER
Attempted to call patient. Phone answers saying Rajni Lindsay as if it is a voicemail and then hangs up.

## 2022-05-02 DIAGNOSIS — E11.42 TYPE 2 DIABETES MELLITUS WITH DIABETIC POLYNEUROPATHY, WITH LONG-TERM CURRENT USE OF INSULIN (HCC): Primary | ICD-10-CM

## 2022-05-02 DIAGNOSIS — Z79.4 TYPE 2 DIABETES MELLITUS WITH DIABETIC POLYNEUROPATHY, WITH LONG-TERM CURRENT USE OF INSULIN (HCC): Primary | ICD-10-CM

## 2022-05-02 RX ORDER — SEMAGLUTIDE 1.34 MG/ML
1 INJECTION, SOLUTION SUBCUTANEOUS WEEKLY
Qty: 9 ML | Refills: 1 | Status: SHIPPED | OUTPATIENT
Start: 2022-05-02

## 2022-05-02 NOTE — TELEPHONE ENCOUNTER
From: Daria Crespo  Sent: 5/1/2022 1:10 PM EDT  To: [facility] Clinical Staff  Subject: Sonia Love    Would you make that Ozempic script for 2 ml not 1?  Tiki Barrera

## 2022-05-03 DIAGNOSIS — E11.42 TYPE 2 DIABETES MELLITUS WITH DIABETIC POLYNEUROPATHY, WITH LONG-TERM CURRENT USE OF INSULIN (HCC): ICD-10-CM

## 2022-05-03 DIAGNOSIS — Z79.4 TYPE 2 DIABETES MELLITUS WITH DIABETIC POLYNEUROPATHY, WITH LONG-TERM CURRENT USE OF INSULIN (HCC): ICD-10-CM

## 2022-05-03 LAB
CHOLESTEROL, FASTING: 188 MG/DL (ref 0–199)
HDLC SERPL-MCNC: 58 MG/DL (ref 40–60)
LDL CHOLESTEROL CALCULATED: 86 MG/DL
TRIGLYCERIDE, FASTING: 222 MG/DL (ref 0–150)
VLDLC SERPL CALC-MCNC: 44 MG/DL

## 2022-05-11 SDOH — HEALTH STABILITY: PHYSICAL HEALTH: ON AVERAGE, HOW MANY DAYS PER WEEK DO YOU ENGAGE IN MODERATE TO STRENUOUS EXERCISE (LIKE A BRISK WALK)?: 0 DAYS

## 2022-05-11 ASSESSMENT — PATIENT HEALTH QUESTIONNAIRE - PHQ9
SUM OF ALL RESPONSES TO PHQ9 QUESTIONS 1 & 2: 0
2. FEELING DOWN, DEPRESSED OR HOPELESS: 0
SUM OF ALL RESPONSES TO PHQ QUESTIONS 1-9: 0
1. LITTLE INTEREST OR PLEASURE IN DOING THINGS: 0
SUM OF ALL RESPONSES TO PHQ QUESTIONS 1-9: 0

## 2022-05-11 ASSESSMENT — LIFESTYLE VARIABLES
HOW OFTEN DO YOU HAVE A DRINK CONTAINING ALCOHOL: 1
HOW OFTEN DO YOU HAVE A DRINK CONTAINING ALCOHOL: NEVER

## 2022-05-12 ENCOUNTER — OFFICE VISIT (OUTPATIENT)
Dept: INTERNAL MEDICINE CLINIC | Age: 67
End: 2022-05-12
Payer: MEDICARE

## 2022-05-12 VITALS
OXYGEN SATURATION: 99 % | WEIGHT: 174 LBS | BODY MASS INDEX: 30.83 KG/M2 | HEART RATE: 78 BPM | HEIGHT: 63 IN | DIASTOLIC BLOOD PRESSURE: 74 MMHG | SYSTOLIC BLOOD PRESSURE: 136 MMHG

## 2022-05-12 DIAGNOSIS — E11.42 TYPE 2 DIABETES MELLITUS WITH POLYNEUROPATHY (HCC): ICD-10-CM

## 2022-05-12 DIAGNOSIS — Z12.31 ENCOUNTER FOR SCREENING MAMMOGRAM FOR BREAST CANCER: ICD-10-CM

## 2022-05-12 DIAGNOSIS — I25.10 CORONARY ARTERY DISEASE INVOLVING NATIVE CORONARY ARTERY OF NATIVE HEART WITHOUT ANGINA PECTORIS: ICD-10-CM

## 2022-05-12 DIAGNOSIS — Z00.00 INITIAL MEDICARE ANNUAL WELLNESS VISIT: Primary | ICD-10-CM

## 2022-05-12 DIAGNOSIS — M48.062 SPINAL STENOSIS OF LUMBAR REGION WITH NEUROGENIC CLAUDICATION: ICD-10-CM

## 2022-05-12 DIAGNOSIS — Z12.12 SCREENING FOR COLORECTAL CANCER: ICD-10-CM

## 2022-05-12 DIAGNOSIS — E78.2 MIXED HYPERLIPIDEMIA: ICD-10-CM

## 2022-05-12 DIAGNOSIS — N18.31 STAGE 3A CHRONIC KIDNEY DISEASE (HCC): ICD-10-CM

## 2022-05-12 DIAGNOSIS — Z12.11 SCREENING FOR COLORECTAL CANCER: ICD-10-CM

## 2022-05-12 DIAGNOSIS — J30.9 ALLERGIC SINUSITIS: ICD-10-CM

## 2022-05-12 DIAGNOSIS — Z78.0 ASYMPTOMATIC MENOPAUSAL STATE: ICD-10-CM

## 2022-05-12 DIAGNOSIS — K21.9 GASTROESOPHAGEAL REFLUX DISEASE, UNSPECIFIED WHETHER ESOPHAGITIS PRESENT: ICD-10-CM

## 2022-05-12 DIAGNOSIS — F33.41 RECURRENT MAJOR DEPRESSIVE DISORDER, IN PARTIAL REMISSION (HCC): ICD-10-CM

## 2022-05-12 PROBLEM — N18.30 CHRONIC RENAL DISEASE, STAGE III (HCC): Status: ACTIVE | Noted: 2022-05-12

## 2022-05-12 PROCEDURE — G0438 PPPS, INITIAL VISIT: HCPCS | Performed by: NURSE PRACTITIONER

## 2022-05-12 PROCEDURE — 3017F COLORECTAL CA SCREEN DOC REV: CPT | Performed by: NURSE PRACTITIONER

## 2022-05-12 PROCEDURE — 3044F HG A1C LEVEL LT 7.0%: CPT | Performed by: NURSE PRACTITIONER

## 2022-05-12 PROCEDURE — 4040F PNEUMOC VAC/ADMIN/RCVD: CPT | Performed by: NURSE PRACTITIONER

## 2022-05-12 PROCEDURE — 1123F ACP DISCUSS/DSCN MKR DOCD: CPT | Performed by: NURSE PRACTITIONER

## 2022-05-12 RX ORDER — OMEPRAZOLE 40 MG/1
CAPSULE, DELAYED RELEASE ORAL
Qty: 90 CAPSULE | Refills: 3 | Status: SHIPPED | OUTPATIENT
Start: 2022-05-12

## 2022-05-12 RX ORDER — ATORVASTATIN CALCIUM 20 MG/1
TABLET, FILM COATED ORAL
Qty: 90 TABLET | Refills: 1 | Status: SHIPPED | OUTPATIENT
Start: 2022-05-12

## 2022-05-12 RX ORDER — CYCLOBENZAPRINE HCL 10 MG
5 TABLET ORAL NIGHTLY PRN
Qty: 30 TABLET | Refills: 0 | Status: SHIPPED | OUTPATIENT
Start: 2022-05-12 | End: 2022-07-25

## 2022-05-12 RX ORDER — LISINOPRIL 20 MG/1
TABLET ORAL
Qty: 90 TABLET | Refills: 1 | Status: ON HOLD
Start: 2022-05-12 | End: 2022-06-24 | Stop reason: HOSPADM

## 2022-05-12 RX ORDER — FLUTICASONE PROPIONATE 50 MCG
2 SPRAY, SUSPENSION (ML) NASAL DAILY
Qty: 3 EACH | Refills: 3 | Status: SHIPPED | OUTPATIENT
Start: 2022-05-12

## 2022-05-12 RX ORDER — MONTELUKAST SODIUM 10 MG/1
TABLET ORAL
Qty: 90 TABLET | Refills: 3 | Status: SHIPPED | OUTPATIENT
Start: 2022-05-12

## 2022-05-12 RX ORDER — BUPROPION HYDROCHLORIDE 150 MG/1
TABLET, EXTENDED RELEASE ORAL
Qty: 45 TABLET | Refills: 0 | Status: SHIPPED | OUTPATIENT
Start: 2022-05-12 | End: 2022-07-25

## 2022-05-12 RX ORDER — BUPROPION HYDROCHLORIDE 300 MG/1
TABLET ORAL
Qty: 45 TABLET | Refills: 3 | Status: SHIPPED | OUTPATIENT
Start: 2022-05-12

## 2022-05-12 ASSESSMENT — PATIENT HEALTH QUESTIONNAIRE - PHQ9
8. MOVING OR SPEAKING SO SLOWLY THAT OTHER PEOPLE COULD HAVE NOTICED. OR THE OPPOSITE, BEING SO FIGETY OR RESTLESS THAT YOU HAVE BEEN MOVING AROUND A LOT MORE THAN USUAL: 0
7. TROUBLE CONCENTRATING ON THINGS, SUCH AS READING THE NEWSPAPER OR WATCHING TELEVISION: 0
SUM OF ALL RESPONSES TO PHQ QUESTIONS 1-9: 0
4. FEELING TIRED OR HAVING LITTLE ENERGY: 0
9. THOUGHTS THAT YOU WOULD BE BETTER OFF DEAD, OR OF HURTING YOURSELF: 0
SUM OF ALL RESPONSES TO PHQ QUESTIONS 1-9: 0
SUM OF ALL RESPONSES TO PHQ QUESTIONS 1-9: 0
6. FEELING BAD ABOUT YOURSELF - OR THAT YOU ARE A FAILURE OR HAVE LET YOURSELF OR YOUR FAMILY DOWN: 0
3. TROUBLE FALLING OR STAYING ASLEEP: 0
5. POOR APPETITE OR OVEREATING: 0
SUM OF ALL RESPONSES TO PHQ9 QUESTIONS 1 & 2: 0
2. FEELING DOWN, DEPRESSED OR HOPELESS: 0
SUM OF ALL RESPONSES TO PHQ QUESTIONS 1-9: 0
1. LITTLE INTEREST OR PLEASURE IN DOING THINGS: 0
10. IF YOU CHECKED OFF ANY PROBLEMS, HOW DIFFICULT HAVE THESE PROBLEMS MADE IT FOR YOU TO DO YOUR WORK, TAKE CARE OF THINGS AT HOME, OR GET ALONG WITH OTHER PEOPLE: 0

## 2022-05-12 ASSESSMENT — LIFESTYLE VARIABLES: HOW OFTEN DO YOU HAVE A DRINK CONTAINING ALCOHOL: NEVER

## 2022-05-12 NOTE — PROGRESS NOTES
Medicare Annual Wellness Visit    Anthony Kirk is here for Medicare AWV and Hypertension    Assessment & Plan   Initial Medicare annual wellness visit  AWV complete today. HM reviewed  Type 2 diabetes mellitus with polyneuropathy (HCC)  Chronic, previous A1c was controlled  Patient seeing endocrinology and doing well with treatment plan  Recurrent major depressive disorder, in partial remission ([facility])  Chronic, stable, controlled. Continue taking duloxetine and alternating Wellbutrin  -     buPROPion (WELLBUTRIN XL) 300 MG extended release tablet; TAKE ONE TABLET BY MOUTH EVERY OTHER DAY ALTERNATING WITH 150MG SR TABLET, Disp-45 tablet, R-3Normal  -     buPROPion (WELLBUTRIN SR) 150 MG extended release tablet; TAKE ONE TABLET BY MOUTH EVERY OTHER DAY ALTERNATING WITH 300 MG XL, Disp-45 tablet, R-0Normal  Allergic sinusitis   Chronic, stable, controlled. Continue Flonase and Singulair  -     fluticasone (FLONASE) 50 MCG/ACT nasal spray; 2 sprays by Nasal route daily, Disp-3 each, R-3Normal  -     montelukast (SINGULAIR) 10 MG tablet; TAKE ONE TABLET BY MOUTH ONCE NIGHTLY, Disp-90 tablet, R-3Normal  Stage 3a chronic kidney disease (HCC)  Chronic, stable, controlled. Continue seeing nephrology  Most recent blood work is stable  Spinal stenosis of lumbar region with neurogenic claudication  Chronic, stable, controlled and continue seeing pain management  Coronary artery disease involving native coronary artery of native heart without angina pectoris  Chronic, controlled on atorvastatin 20 mg daily  Gastroesophageal reflux disease, unspecified whether esophagitis present  Chronic, stable, controlled. -     omeprazole (PRILOSEC) 40 MG delayed release capsule; TAKE ONE CAPSULE BY MOUTH DAILY, Disp-90 capsule, R-3Normal  Asymptomatic menopausal state  -     DEXA Bone Density Axial Skeleton;  Future  Encounter for screening mammogram for breast cancer  -     SUJATHA Digital Screen Bilateral; Future  Screening for colorectal cancer  -     POCT FECAL IMMUNOCHEMICAL TEST (FIT); Future      Recommendations for Preventive Services Due: see orders and patient instructions/AVS.  Recommended screening schedule for the next 5-10 years is provided to the patient in written form: see Patient Instructions/AVS.     Return for Medicare Annual Wellness Visit in 1 year. Subjective   The following acute and/or chronic problems were also addressed today:    AWV today and needs refills of medications. T2DM - last A1c was 6.6, seeing Dr. Amparo Venegas with endocrinology. Glucose is better on ozempic but is currently out of it, has some coming. GERD - cannot come off PPI without symptoms. CKD stage III - has been stable, not taking any nsaids. Seeing Dr. Len Robin. Seeing pain pain management for chronic and ongoing pain    Patient's complete Health Risk Assessment and screening values have been reviewed and are found in Flowsheets. The following problems were reviewed today and where indicated follow up appointments were made and/or referrals ordered.     Positive Risk Factor Screenings with Interventions:    Fall Risk:  Do you feel unsteady or are you worried about falling? : (!) yes  2 or more falls in past year?: (!) yes  Fall with injury in past year?: (!) yes     Fall Risk Interventions:    · Patient declines any further evaluation/treatment for this issue              General Health and ACP:  General  In general, how would you say your health is?: Good  In the past 7 days, have you experienced any of the following: New or Increased Pain, New or Increased Fatigue, Loneliness, Social Isolation, Stress or Anger?: No  Do you get the social and emotional support that you need?: Yes  Do you have a Living Will?: Yes    Advance Directives     Power of  Living Will ACP-Advance Directive ACP-Power of     Not on File Not on File Not on File Not on File      General Health Risk Interventions:  · No Living Will: ACP documents already completed- patient asked to provide copy to the office    Health Habits/Nutrition:     Physical Activity: Inactive    Days of Exercise per Week: 0 days    Minutes of Exercise per Session: 0 min     Have you lost any weight without trying in the past 3 months?: No  Body mass index: (!) 30.82  Have you seen the dentist within the past year?: Yes    Health Habits/Nutrition Interventions:  · Inadequate physical activity:  educational materials provided to promote increased physical activity    Hearing/Vision:  Do you or your family notice any trouble with your hearing that hasn't been managed with hearing aids?: (!) Yes  Do you have difficulty driving, watching TV, or doing any of your daily activities because of your eyesight?: No  Have you had an eye exam within the past year?: Yes  No exam data present    Hearing/Vision Interventions:  · Hearing concerns:  saw audiology - but not ready to have earing aids yet. ADLs:  In the past 7 days, did you need help from others to perform any of the following everyday activities: Eating, dressing, grooming, bathing, toileting, or walking/balance?: (!) Yes  Select all that apply: (!) Walking/Balance  In the past 7 days, did you need help from others to take care of any of the following: Laundry, housekeeping, banking/finances, shopping, telephone use, food preparation, transportation, or taking medications?: No    ADL Interventions:  · Patient declines any further evaluation/treatment for this issue          Objective   Vitals:    05/12/22 1441 05/12/22 1443 05/12/22 1510   BP: (!) 144/78 (!) 144/76 136/74   Pulse: 78     SpO2: 99%     Weight: 174 lb (78.9 kg)     Height: 5' 3\" (1.6 m)        Body mass index is 30.82 kg/m².           Physical Exam        Allergies   Allergen Reactions    Actos [Pioglitazone Hydrochloride]      Causes mouth blisters    Albuterol      YEAST INFECTION IN MOUTH    Dilaudid [Hydromorphone Hcl]     Fluticasone-Salmeterol YEAST INFECTIONS IN MOUTH    Glyburide      Causes mouth blisters    Lortab [Hydrocodone-Acetaminophen]      headache    Metformin      Causes mouth blisters    Symbicort [Budesonide-Formoterol Fumarate]      Causes mouth blisters     Prior to Visit Medications    Medication Sig Taking? Authorizing Provider   atorvastatin (LIPITOR) 20 MG tablet TAKE ONE TABLET BY MOUTH DAILY Yes Dennise Pires MD   lisinopril (PRINIVIL;ZESTRIL) 20 MG tablet TAKE ONE TABLET BY MOUTH DAILY Yes ALESHA Hampton CNP   omeprazole (PRILOSEC) 40 MG delayed release capsule TAKE ONE CAPSULE BY MOUTH DAILY Yes ALESHA Hampton CNP   fluticasone (FLONASE) 50 MCG/ACT nasal spray 2 sprays by Nasal route daily Yes ALESHA Hampton CNP   cyclobenzaprine (FLEXERIL) 10 MG tablet Take 0.5 tablets by mouth nightly as needed for Muscle spasms Takes 1/2 tab Yes ALESHA Hampton CNP   buPROPion (WELLBUTRIN XL) 300 MG extended release tablet TAKE ONE TABLET BY MOUTH EVERY OTHER DAY ALTERNATING WITH 150MG SR TABLET Yes ALESHA Hampton CNP   buPROPion (WELLBUTRIN SR) 150 MG extended release tablet TAKE ONE TABLET BY MOUTH EVERY OTHER DAY ALTERNATING WITH 300 MG XL Yes ALESHA Hernandez CNP   montelukast (SINGULAIR) 10 MG tablet TAKE ONE TABLET BY MOUTH ONCE NIGHTLY Yes ALESHA Hampton CNP   Semaglutide, 1 MG/DOSE, (OZEMPIC, 1 MG/DOSE,) 4 MG/3ML SOPN Inject 1 mg into the skin once a week Yes Dennise Pires MD   DULoxetine (CYMBALTA) 60 MG extended release capsule TAKE ONE CAPSULE BY MOUTH DAILY Yes Markell Ojeda MD   Continuous Blood Gluc Sensor (FREESTYLE JEREMIAS 14 DAY SENSOR) MISC CHANGE SENSOR EVERY 14 DAYS AND CHECK BLOOD SUGAR FOUR TIMES A DAY Yes Dennise Pires MD   traMADol (ULTRAM) 50 MG tablet Take 50 mg by mouth every 6 hours as needed for Pain.  4 tabs every 6 hours Yes Historical Provider, MD   Insulin Glargine, 1 Unit Dial, (TOUJEO SOLOSTAR) 300 UNIT/ML SOPN INJECT 40 UNITS UNDER THE SKIN DAILY Yes Leonel Montana MD   [employer] PEN NEEDLES 31G 31G X 8 MM MISC USE FOUR TIMES A DAY Yes Leonel Montana MD   NOVOLOG FLEXPEN 100 UNIT/ML injection pen INJECT 10 UNITS UNDER THE SKIN THREE TIMES A DAY BEFORE MEALS  Patient taking differently: 10 u qd prn can do up to tid Yes Leonel Montana MD   Continuous Blood Gluc  (FREESTYLE JEREMIAS READER) ENE Use for checking glucose Yes Leonel Montana MD       CareTeam (Including outside providers/suppliers regularly involved in providing care):   Patient Care Team:  Jhonny Latif MD as PCP - General (Internal Medicine)  Jhonny Latif [facility] PCP [facility]  Yina Quinones MD as Consulting Physician (Nephrology)    Reviewed and updated this visit:  Tobacco  Allergies  Meds  Med Hx  Surg Hx  Soc Hx  Fam Hx

## 2022-05-12 NOTE — PATIENT INSTRUCTIONS
Personalized Preventive Plan for Kee Vásquez - 5/12/2022  Medicare offers a range of preventive health benefits. Some of the tests and screenings are paid in full while other may be subject to a deductible, co-insurance, and/or copay. Some of these benefits include a comprehensive review of your medical history including lifestyle, illnesses that may run in your family, and various assessments and screenings as appropriate. After reviewing your medical record and screening and assessments performed today your provider may have ordered immunizations, labs, imaging, and/or referrals for you. A list of these orders (if applicable) as well as your Preventive Care list are included within your After Visit Summary for your review. Other Preventive Recommendations:    · A preventive eye exam performed by an eye specialist is recommended every 1-2 years to screen for glaucoma; cataracts, macular degeneration, and other eye disorders. · A preventive dental visit is recommended every 6 months. · Try to get at least 150 minutes of exercise per week or 10,000 steps per day on a pedometer . · Order or download the FREE \"Exercise & Physical Activity: Your Everyday Guide\" from [employer]. Call 2-958.726.9283 or search [employer] online. · You need 6863-3396 mg of calcium and 6395-1634 IU of vitamin D per day. It is possible to meet your calcium requirement with diet alone, but a vitamin D supplement is usually necessary to meet this goal.  · When exposed to the sun, use a sunscreen that protects against both UVA and UVB radiation with an SPF of 30 or greater. Reapply every 2 to 3 hours or after sweating, drying off with a towel, or swimming. · Always wear a seat belt when traveling in a car. Always wear a helmet when riding a bicycle or motorcycle.

## 2022-06-23 ENCOUNTER — APPOINTMENT (OUTPATIENT)
Dept: GENERAL RADIOLOGY | Age: 67
End: 2022-06-23
Payer: MEDICARE

## 2022-06-23 ENCOUNTER — OFFICE VISIT (OUTPATIENT)
Dept: INTERNAL MEDICINE CLINIC | Age: 67
End: 2022-06-23
Payer: MEDICARE

## 2022-06-23 ENCOUNTER — HOSPITAL ENCOUNTER (OUTPATIENT)
Age: 67
Setting detail: OBSERVATION
Discharge: HOME | End: 2022-06-24
Attending: INTERNAL MEDICINE | Admitting: INTERNAL MEDICINE
Payer: MEDICARE

## 2022-06-23 VITALS
SYSTOLIC BLOOD PRESSURE: 100 MMHG | HEART RATE: 96 BPM | WEIGHT: 169.6 LBS | DIASTOLIC BLOOD PRESSURE: 56 MMHG | OXYGEN SATURATION: 98 % | BODY MASS INDEX: 30.04 KG/M2

## 2022-06-23 DIAGNOSIS — N18.30 STAGE 3 CHRONIC KIDNEY DISEASE, UNSPECIFIED WHETHER STAGE 3A OR 3B CKD (HCC): ICD-10-CM

## 2022-06-23 DIAGNOSIS — I25.10 CORONARY ARTERY DISEASE INVOLVING NATIVE CORONARY ARTERY OF NATIVE HEART WITHOUT ANGINA PECTORIS: Primary | ICD-10-CM

## 2022-06-23 DIAGNOSIS — I10 PRIMARY HYPERTENSION: ICD-10-CM

## 2022-06-23 DIAGNOSIS — M48.02 CERVICAL STENOSIS OF SPINAL CANAL: ICD-10-CM

## 2022-06-23 DIAGNOSIS — E11.42 DIABETIC POLYNEUROPATHY ASSOCIATED WITH TYPE 2 DIABETES MELLITUS (HCC): ICD-10-CM

## 2022-06-23 PROBLEM — R07.9 CHEST PAIN: Status: ACTIVE | Noted: 2022-06-23

## 2022-06-23 LAB
A/G RATIO: 1.9 (ref 1.1–2.2)
ALBUMIN SERPL-MCNC: 4.7 G/DL (ref 3.4–5)
ALP BLD-CCNC: 151 U/L (ref 40–129)
ALT SERPL-CCNC: 14 U/L (ref 10–40)
ANION GAP SERPL CALCULATED.3IONS-SCNC: 13 MMOL/L (ref 3–16)
AST SERPL-CCNC: 15 U/L (ref 15–37)
BASOPHILS ABSOLUTE: 0.1 K/UL (ref 0–0.2)
BASOPHILS RELATIVE PERCENT: 1 %
BILIRUB SERPL-MCNC: 0.3 MG/DL (ref 0–1)
BUN BLDV-MCNC: 30 MG/DL (ref 7–20)
CALCIUM SERPL-MCNC: 9.6 MG/DL (ref 8.3–10.6)
CHLORIDE BLD-SCNC: 101 MMOL/L (ref 99–110)
CO2: 21 MMOL/L (ref 21–32)
CREAT SERPL-MCNC: 1.5 MG/DL (ref 0.6–1.2)
D DIMER: <0.27 UG/ML FEU (ref 0–0.6)
EKG ATRIAL RATE: 96 BPM
EKG DIAGNOSIS: NORMAL
EKG P AXIS: 48 DEGREES
EKG P-R INTERVAL: 138 MS
EKG Q-T INTERVAL: 354 MS
EKG QRS DURATION: 82 MS
EKG QTC CALCULATION (BAZETT): 447 MS
EKG R AXIS: -13 DEGREES
EKG T AXIS: 56 DEGREES
EKG VENTRICULAR RATE: 96 BPM
EOSINOPHILS ABSOLUTE: 0.1 K/UL (ref 0–0.6)
EOSINOPHILS RELATIVE PERCENT: 1 %
GFR AFRICAN AMERICAN: 42
GFR NON-AFRICAN AMERICAN: 34
GLUCOSE BLD-MCNC: 186 MG/DL (ref 70–99)
GLUCOSE BLD-MCNC: 191 MG/DL (ref 70–99)
HCT VFR BLD CALC: 39.6 % (ref 36–48)
HEMOGLOBIN: 13.1 G/DL (ref 12–16)
LYMPHOCYTES ABSOLUTE: 2.6 K/UL (ref 1–5.1)
LYMPHOCYTES RELATIVE PERCENT: 31.3 %
MCH RBC QN AUTO: 27.7 PG (ref 26–34)
MCHC RBC AUTO-ENTMCNC: 32.9 G/DL (ref 31–36)
MCV RBC AUTO: 84.1 FL (ref 80–100)
MONOCYTES ABSOLUTE: 0.3 K/UL (ref 0–1.3)
MONOCYTES RELATIVE PERCENT: 3.3 %
NEUTROPHILS ABSOLUTE: 5.3 K/UL (ref 1.7–7.7)
NEUTROPHILS RELATIVE PERCENT: 63.4 %
PDW BLD-RTO: 13.4 % (ref 12.4–15.4)
PERFORMED ON: ABNORMAL
PLATELET # BLD: 392 K/UL (ref 135–450)
PMV BLD AUTO: 8.4 FL (ref 5–10.5)
POTASSIUM REFLEX MAGNESIUM: 4.6 MMOL/L (ref 3.5–5.1)
PRO-BNP: 42 PG/ML (ref 0–124)
RBC # BLD: 4.71 M/UL (ref 4–5.2)
SODIUM BLD-SCNC: 135 MMOL/L (ref 136–145)
TOTAL PROTEIN: 7.2 G/DL (ref 6.4–8.2)
TROPONIN: <0.01 NG/ML
TROPONIN: <0.01 NG/ML
WBC # BLD: 8.3 K/UL (ref 4–11)

## 2022-06-23 PROCEDURE — 83880 ASSAY OF NATRIURETIC PEPTIDE: CPT

## 2022-06-23 PROCEDURE — 6370000000 HC RX 637 (ALT 250 FOR IP): Performed by: NURSE PRACTITIONER

## 2022-06-23 PROCEDURE — G0378 HOSPITAL OBSERVATION PER HR: HCPCS

## 2022-06-23 PROCEDURE — 85379 FIBRIN DEGRADATION QUANT: CPT

## 2022-06-23 PROCEDURE — 2022F DILAT RTA XM EVC RTNOPTHY: CPT | Performed by: INTERNAL MEDICINE

## 2022-06-23 PROCEDURE — 3017F COLORECTAL CA SCREEN DOC REV: CPT | Performed by: INTERNAL MEDICINE

## 2022-06-23 PROCEDURE — G8417 CALC BMI ABV UP PARAM F/U: HCPCS | Performed by: INTERNAL MEDICINE

## 2022-06-23 PROCEDURE — 6370000000 HC RX 637 (ALT 250 FOR IP): Performed by: INTERNAL MEDICINE

## 2022-06-23 PROCEDURE — 3044F HG A1C LEVEL LT 7.0%: CPT | Performed by: INTERNAL MEDICINE

## 2022-06-23 PROCEDURE — 93005 ELECTROCARDIOGRAM TRACING: CPT | Performed by: INTERNAL MEDICINE

## 2022-06-23 PROCEDURE — 85025 COMPLETE CBC W/AUTO DIFF WBC: CPT

## 2022-06-23 PROCEDURE — 1123F ACP DISCUSS/DSCN MKR DOCD: CPT | Performed by: INTERNAL MEDICINE

## 2022-06-23 PROCEDURE — 84484 ASSAY OF TROPONIN QUANT: CPT

## 2022-06-23 PROCEDURE — 93000 ELECTROCARDIOGRAM COMPLETE: CPT | Performed by: INTERNAL MEDICINE

## 2022-06-23 PROCEDURE — 6360000002 HC RX W HCPCS: Performed by: INTERNAL MEDICINE

## 2022-06-23 PROCEDURE — 99215 OFFICE O/P EST HI 40 MIN: CPT | Performed by: INTERNAL MEDICINE

## 2022-06-23 PROCEDURE — G8400 PT W/DXA NO RESULTS DOC: HCPCS | Performed by: INTERNAL MEDICINE

## 2022-06-23 PROCEDURE — G8427 DOCREV CUR MEDS BY ELIG CLIN: HCPCS | Performed by: INTERNAL MEDICINE

## 2022-06-23 PROCEDURE — 93010 ELECTROCARDIOGRAM REPORT: CPT | Performed by: INTERNAL MEDICINE

## 2022-06-23 PROCEDURE — 36415 COLL VENOUS BLD VENIPUNCTURE: CPT

## 2022-06-23 PROCEDURE — 1036F TOBACCO NON-USER: CPT | Performed by: INTERNAL MEDICINE

## 2022-06-23 PROCEDURE — 99285 EMERGENCY DEPT VISIT HI MDM: CPT

## 2022-06-23 PROCEDURE — 96372 THER/PROPH/DIAG INJ SC/IM: CPT

## 2022-06-23 PROCEDURE — 80053 COMPREHEN METABOLIC PANEL: CPT

## 2022-06-23 PROCEDURE — 71045 X-RAY EXAM CHEST 1 VIEW: CPT

## 2022-06-23 PROCEDURE — 2580000003 HC RX 258: Performed by: INTERNAL MEDICINE

## 2022-06-23 PROCEDURE — 1090F PRES/ABSN URINE INCON ASSESS: CPT | Performed by: INTERNAL MEDICINE

## 2022-06-23 RX ORDER — INSULIN GLARGINE 100 [IU]/ML
32 INJECTION, SOLUTION SUBCUTANEOUS DAILY
Status: DISCONTINUED | OUTPATIENT
Start: 2022-06-24 | End: 2022-06-24 | Stop reason: HOSPADM

## 2022-06-23 RX ORDER — ONDANSETRON 2 MG/ML
4 INJECTION INTRAMUSCULAR; INTRAVENOUS EVERY 6 HOURS PRN
Status: DISCONTINUED | OUTPATIENT
Start: 2022-06-23 | End: 2022-06-24 | Stop reason: HOSPADM

## 2022-06-23 RX ORDER — ONDANSETRON 4 MG/1
4 TABLET, ORALLY DISINTEGRATING ORAL EVERY 8 HOURS PRN
Status: DISCONTINUED | OUTPATIENT
Start: 2022-06-23 | End: 2022-06-24 | Stop reason: HOSPADM

## 2022-06-23 RX ORDER — SODIUM CHLORIDE 9 MG/ML
INJECTION, SOLUTION INTRAVENOUS PRN
Status: DISCONTINUED | OUTPATIENT
Start: 2022-06-23 | End: 2022-06-24 | Stop reason: HOSPADM

## 2022-06-23 RX ORDER — ACETAMINOPHEN 325 MG/1
650 TABLET ORAL EVERY 6 HOURS PRN
Status: DISCONTINUED | OUTPATIENT
Start: 2022-06-23 | End: 2022-06-24 | Stop reason: HOSPADM

## 2022-06-23 RX ORDER — SODIUM CHLORIDE 0.9 % (FLUSH) 0.9 %
5-40 SYRINGE (ML) INJECTION EVERY 12 HOURS SCHEDULED
Status: DISCONTINUED | OUTPATIENT
Start: 2022-06-23 | End: 2022-06-24 | Stop reason: HOSPADM

## 2022-06-23 RX ORDER — DEXTROSE MONOHYDRATE 50 MG/ML
100 INJECTION, SOLUTION INTRAVENOUS PRN
Status: DISCONTINUED | OUTPATIENT
Start: 2022-06-23 | End: 2022-06-24 | Stop reason: HOSPADM

## 2022-06-23 RX ORDER — LISINOPRIL 20 MG/1
20 TABLET ORAL DAILY
Status: DISCONTINUED | OUTPATIENT
Start: 2022-06-23 | End: 2022-06-24

## 2022-06-23 RX ORDER — ENOXAPARIN SODIUM 100 MG/ML
40 INJECTION SUBCUTANEOUS NIGHTLY
Status: DISCONTINUED | OUTPATIENT
Start: 2022-06-23 | End: 2022-06-24 | Stop reason: HOSPADM

## 2022-06-23 RX ORDER — ACETAMINOPHEN 650 MG/1
650 SUPPOSITORY RECTAL EVERY 6 HOURS PRN
Status: DISCONTINUED | OUTPATIENT
Start: 2022-06-23 | End: 2022-06-24 | Stop reason: HOSPADM

## 2022-06-23 RX ORDER — MORPHINE SULFATE 10 MG/1
10 CAPSULE, EXTENDED RELEASE ORAL DAILY
COMMUNITY

## 2022-06-23 RX ORDER — POLYETHYLENE GLYCOL 3350 17 G/17G
17 POWDER, FOR SOLUTION ORAL DAILY PRN
Status: DISCONTINUED | OUTPATIENT
Start: 2022-06-23 | End: 2022-06-24 | Stop reason: HOSPADM

## 2022-06-23 RX ORDER — INSULIN LISPRO 100 [IU]/ML
10 INJECTION, SOLUTION INTRAVENOUS; SUBCUTANEOUS
Status: DISCONTINUED | OUTPATIENT
Start: 2022-06-24 | End: 2022-06-24 | Stop reason: HOSPADM

## 2022-06-23 RX ORDER — DULOXETIN HYDROCHLORIDE 60 MG/1
60 CAPSULE, DELAYED RELEASE ORAL DAILY
Status: DISCONTINUED | OUTPATIENT
Start: 2022-06-23 | End: 2022-06-24 | Stop reason: HOSPADM

## 2022-06-23 RX ORDER — TRAMADOL HYDROCHLORIDE 50 MG/1
50 TABLET ORAL EVERY 6 HOURS PRN
Status: DISCONTINUED | OUTPATIENT
Start: 2022-06-23 | End: 2022-06-24 | Stop reason: HOSPADM

## 2022-06-23 RX ORDER — ATORVASTATIN CALCIUM 20 MG/1
20 TABLET, FILM COATED ORAL DAILY
Status: DISCONTINUED | OUTPATIENT
Start: 2022-06-23 | End: 2022-06-24 | Stop reason: HOSPADM

## 2022-06-23 RX ORDER — PANTOPRAZOLE SODIUM 40 MG/1
40 TABLET, DELAYED RELEASE ORAL
Status: DISCONTINUED | OUTPATIENT
Start: 2022-06-24 | End: 2022-06-24 | Stop reason: HOSPADM

## 2022-06-23 RX ORDER — SODIUM CHLORIDE 0.9 % (FLUSH) 0.9 %
5-40 SYRINGE (ML) INJECTION PRN
Status: DISCONTINUED | OUTPATIENT
Start: 2022-06-23 | End: 2022-06-24 | Stop reason: HOSPADM

## 2022-06-23 RX ORDER — ZOLPIDEM TARTRATE 5 MG/1
5 TABLET ORAL NIGHTLY PRN
COMMUNITY
End: 2022-07-25

## 2022-06-23 RX ADMIN — ATORVASTATIN CALCIUM 20 MG: 20 TABLET, FILM COATED ORAL at 21:46

## 2022-06-23 RX ADMIN — ENOXAPARIN SODIUM 40 MG: 100 INJECTION SUBCUTANEOUS at 21:46

## 2022-06-23 RX ADMIN — LISINOPRIL 20 MG: 20 TABLET ORAL at 21:46

## 2022-06-23 RX ADMIN — TRAMADOL HYDROCHLORIDE 50 MG: 50 TABLET, COATED ORAL at 23:26

## 2022-06-23 RX ADMIN — SODIUM CHLORIDE, PRESERVATIVE FREE 10 ML: 5 INJECTION INTRAVENOUS at 21:46

## 2022-06-23 RX ADMIN — DULOXETINE HYDROCHLORIDE 60 MG: 60 CAPSULE, DELAYED RELEASE ORAL at 21:46

## 2022-06-23 ASSESSMENT — ENCOUNTER SYMPTOMS
NAUSEA: 0
SHORTNESS OF BREATH: 1
ORTHOPNEA: 0

## 2022-06-23 ASSESSMENT — PAIN SCALES - GENERAL
PAINLEVEL_OUTOF10: 1
PAINLEVEL_OUTOF10: 2
PAINLEVEL_OUTOF10: 0

## 2022-06-23 ASSESSMENT — PAIN - FUNCTIONAL ASSESSMENT: PAIN_FUNCTIONAL_ASSESSMENT: 0-10

## 2022-06-23 ASSESSMENT — PAIN DESCRIPTION - LOCATION
LOCATION: BACK
LOCATION: CHEST

## 2022-06-23 NOTE — H&P
HOSPITALISTS HISTORY AND PHYSICAL    6/23/2022 5:13 PM    Patient Information:  Surekha Lea is a 74 y.o. female [id number]  PCP:  Benito Pang MD (Tel: [phone] )    Chief complaint:    Chief Complaint   Patient presents with    Chest Pain     Chest pressure/tightness x several years, worse over last several weeks. Spoke with doctor today who advised patient to come to emergency room. History of Present Illness:  Daria Crespo is a 74 y.o. female went to [facility] and came back on Friday and [place] patient had midsternal chest pressure 7 out of 10 along with shortness of breath and diffuse diaphoresis patient fainted. Was advised to go to the ED but did not flew back to [facility] and since then has been having exertional dyspnea and chest pain associate with diaphoresis. Patient does have a history of diabetes and hypertension did have a negative cath September 2021. Non-smokernon drinker    REVIEW OF SYSTEMS:   Constitutional: Negative for fever,chills or night sweats  ENT: Negative for rhinorrhea, epistaxis, hoarseness, sore throat. Respiratory: see above  Cardiovascular:see above  Gastrointestinal: Negative for nausea, vomiting, diarrhea  Genitourinary: Negative for polyuria, dysuria   Hematologic/Lymphatic: Negative for bleeding tendency, easy bruising  Musculoskeletal: Negative for myalgias and arthralgias  Neurologic: Negative for confusion,dysarthria. Skin: Negative for itching,rash  Psychiatric: Negative for depression,anxiety, agitation. Endocrine: Negative for polydipsia,polyuria,heat /cold intolerance.     Past Medical History:   has a past medical history of Asthma, Ataxia, Cervical stenosis of spinal canal, Coronary artery disease involving native coronary artery of native heart without angina pectoris, Depression, Diabetes mellitus ([facility] [address] Hyperlipidemia, Hypertension, Hypothyroidism, Obesity, Osteoarthritis, Paradoxical vocal fold motion disorder, Postmenopausal, and Stage 2 chronic kidney disease. Past Surgical History:   has a past surgical history that includes Carpal tunnel release; Throat surgery; Hysterectomy, total abdominal; and pr njx aa&/strd tfrml epi lumbar/sacral 1 level (Bilateral, 12/3/2018). Medications:  No current facility-administered medications on file prior to encounter. Current Outpatient Medications on File Prior to Encounter   Medication Sig Dispense Refill    morphine (SHANIKA) 10 MG extended release capsule Take 10 mg by mouth daily. Unsure of dose and to start today, will take only at HS      zolpidem (AMBIEN) 5 MG tablet Take 5 mg by mouth nightly as needed for Sleep. [facility] [place] [facility]       atorvastatin (LIPITOR) 20 MG tablet TAKE ONE TABLET BY MOUTH DAILY 90 tablet 1    lisinopril (PRINIVIL;ZESTRIL) 20 MG tablet TAKE ONE TABLET BY MOUTH DAILY 90 tablet 1    omeprazole (PRILOSEC) 40 MG delayed release capsule TAKE ONE CAPSULE BY MOUTH DAILY 90 capsule 3    fluticasone (FLONASE) 50 MCG/ACT nasal spray 2 sprays by Nasal route daily 3 each 3    cyclobenzaprine (FLEXERIL) 10 MG tablet Take 0.5 tablets by mouth nightly as needed for Muscle spasms Takes 1/2 tab 30 tablet 0    buPROPion (WELLBUTRIN XL) 300 MG extended release tablet TAKE ONE TABLET BY MOUTH EVERY OTHER DAY ALTERNATING WITH 150MG SR TABLET 45 tablet 3    buPROPion (WELLBUTRIN SR) 150 MG extended release tablet TAKE ONE TABLET BY MOUTH EVERY OTHER DAY ALTERNATING WITH 300 MG XL 45 tablet 0    montelukast (SINGULAIR) 10 MG tablet TAKE ONE TABLET BY MOUTH ONCE NIGHTLY 90 tablet 3    Semaglutide, 1 MG/DOSE, (OZEMPIC, 1 MG/DOSE,) 4 MG/3ML SOPN Inject 1 mg into the skin once a week 9 mL 1    DULoxetine (CYMBALTA) 60 MG extended release capsule TAKE ONE CAPSULE BY MOUTH DAILY 90 capsule 1    Continuous Blood Gluc Sensor (FREESTYLE JEREMIAS 14 DAY SENSOR) MISC CHANGE SENSOR EVERY 14 DAYS AND CHECK BLOOD SUGAR FOUR TIMES A DAY 2 each 5    traMADol (ULTRAM) 50 MG tablet Take 50 mg by mouth every 6 hours as needed for Pain. bid      Insulin Glargine, 1 Unit Dial, (TOUJEO SOLOSTAR) 300 UNIT/ML SOPN INJECT 40 UNITS UNDER THE SKIN DAILY 9 pen 3    [employer] PEN NEEDLES 31G 31G X 8 MM MISC USE FOUR TIMES A  each 5    NOVOLOG FLEXPEN 100 UNIT/ML injection pen INJECT 10 UNITS UNDER THE SKIN THREE TIMES A DAY BEFORE MEALS 5 pen 2    Continuous Blood Gluc  (FREESTYLE JEREMIAS READER) ENE Use for checking glucose 1 Device 1       Allergies: Allergies   Allergen Reactions    Actos [Pioglitazone Hydrochloride]      Causes mouth blisters    Albuterol      YEAST INFECTION IN MOUTH    Dilaudid [Hydromorphone Hcl]     Fluticasone-Salmeterol      YEAST INFECTIONS IN MOUTH    Glyburide      Causes mouth blisters    Lortab [Hydrocodone-Acetaminophen]      headache    Metformin      Causes mouth blisters    Symbicort [Budesonide-Formoterol Fumarate]      Causes mouth blisters        Social History:  Patient Lives at home   reports that she has never smoked. She has never used smokeless tobacco. She reports that she does not drink alcohol and does not use drugs. Family History:  family history includes Cancer in her mother; Obesity in her sister. ,     Physical Exam:  /72   Pulse 91   Temp 97.9 °F (36.6 °C) (Oral)   Resp 16   Ht 5' 3\" (1.6 m)   Wt 170 lb 14.4 oz (77.5 kg)   SpO2 98%   BMI 30.27 kg/m²     General appearance:  Appears comfortable. AAOx3  HEENT: atraumatic, Pupils equal, muscous membranes moist, no masses appreciated  Cardiovascular: Regular rate and rhythm no murmurs appreciated  Respiratory: CTAB no wheezing  Gastrointestinal: Abdomen soft, non-tender, BS+  EXT: no edema  Neurology: no gross focal deficts  Psychiatry: Appropriate affect.  Not agitated  Skin: Warm, dry, no rashes appreciated    Labs:  CBC:   Lab Results   Component Value Date    WBC 8.3 06/23/2022    RBC 4.71 06/23/2022    HGB 13.1 06/23/2022    HCT 39.6 06/23/2022    MCV 84.1 06/23/2022    MCH 27.7 06/23/2022    MCHC 32.9 06/23/2022    RDW 13.4 06/23/2022     06/23/2022    MPV 8.4 06/23/2022     BMP:    Lab Results   Component Value Date     06/23/2022    K 4.6 06/23/2022     06/23/2022    CO2 21 06/23/2022    BUN 30 06/23/2022    CREATININE 1.5 06/23/2022    CALCIUM 9.6 06/23/2022    GFRAA 42 06/23/2022    GFRAA >60 05/08/2013    LABGLOM 34 06/23/2022    GLUCOSE 191 06/23/2022     XR CHEST PORTABLE   Final Result   No acute process. Recent imaging reviewed    Problem List  Principal Problem:    Chest pain  Resolved Problems:    * No resolved hospital problems. *        Assessment/Plan:   Chest pain and exertional dyspnea  - trend troponins  - cards consult    Syncope  - tele  - echo    dm2 lantus and lispro    ckd3 monitr    Htn: home meds      DVT prophylaxis lovenox  Code status full code    Please note that some part of this chart was generated using Dragon dictation software. Although every effort was made to ensure the accuracy of this automated transcription, some errors in transcription may have occurred inadvertently. If you may need any clarification, please do not hesitate to contact me through [facility].        Brodie Francois MD    6/23/2022 5:13 PM

## 2022-06-23 NOTE — ED PROVIDER NOTES
CORTES. I have evaluated this patient. My supervising physician was available for consultation. Chief Complaint:   Chief Complaint   Patient presents with    Chest Pain     Chest pressure/tightness x several years, worse over last several weeks. Spoke with doctor today who advised patient to come to emergency room. ED Course & Medical Decision Making  74 y.o. female presenting with chest pain. -CBC/chem: creat 1.5.   -Trop: negative  - BNP: 42  -Chest xr: lungs clear     -EKG: NSR.     MDM: Worsening chest pain on exertion over the past few weeks. Labs and vitals are stable in the ED, but needs cardiology evaluation due to worsening chest pain on exertion and decreasing exercise tolerance. Final Clinical Impression & Plan:  Chest pain on exertion   - Admit    ---------------------------------------------------------------------------------------------------------------  HPI:  PMH significant for HTN, DM, CKD    Presenting with worsening chest pain on exertion. Traveled to [facility] recently, had syncopal episode in the airport on the way back. While in [facility], noticed having to rest frequently due to chest pain when walking. Previously could walk much longer distances. In ED today, denies chest pain at rest, n/v/abd/back pain. No cough or sore throat. Is this patient to be included in the SEP-1 Core Measure due to severe sepsis or septic shock? No   Exclusion criteria - the patient is NOT to be included for SEP-1 Core Measure due to:   Infection is not suspected      Medical Hx: Past medical history reviewed, and pertinent for:     Past Medical History:   Diagnosis Date    Asthma     vocal fold     Ataxia     Cervical stenosis of spinal canal 10/7/2021    Coronary artery disease involving native coronary artery of native heart without angina pectoris 9/9/2021    Depression     Diabetes mellitus ([facility] [address]     Hyperlipidemia     Hypertension     Hypothyroidism     Obesity     Osteoarthritis     Paradoxical vocal fold motion disorder     Postmenopausal 5/15/2018    Stage 2 chronic kidney disease 5/7/2019       Patient Active Problem List   Diagnosis    HTN (hypertension)    Diabetes mellitus ([address]    Hyperlipidemia    Insomnia    Depression    Neuropathic pain    Type 2 diabetes mellitus with polyneuropathy (HCC)    Moderate episode of recurrent major depressive disorder ([address]    Diabetic polyneuropathy associated with type 2 diabetes mellitus ([address]    Exogenous obesity    Spinal stenosis of lumbar region with neurogenic claudication    Postmenopausal    Stage 3 chronic kidney disease (HCC)    Tremor    Uncontrolled type 2 diabetes mellitus with hyperglycemia ([address]    Coronary artery disease involving native coronary artery of native heart without angina pectoris    Positive cardiac stress test    Cervical stenosis of spinal canal    Chronic renal disease, stage III (HCC) [644860]    Chest pain         Surgical Hx:   Past surgical history reviewed, and pertinent for:      Past Surgical History:   Procedure Laterality Date    CARPAL TUNNEL RELEASE      RORY    HYSTERECTOMY, TOTAL ABDOMINAL (CERVIX REMOVED)      Age 37    SC NJX AA&/STRD TFRML EPI LUMBAR/SACRAL 1 LEVEL Bilateral 12/3/2018    BILATERAL L4 LUMBAR TRANSFORAMINAL EPIDURAL STEROID INJECTION WITH FLUOROSCOPY performed by Francois Rizvi MD at [address] [place] [address]       Social Hx:       Social History     Socioeconomic History    Marital status:      Spouse name: Not on file    Number of children: Not on file    Years of education: Not on file    Highest education level: Not on file   Occupational History    Not on file   Tobacco Use    Smoking status: Never Smoker    Smokeless tobacco: Never Used   Vaping Use    Vaping Use: Never used   Substance and Sexual Activity    Alcohol use: No     Alcohol/week: 0.0 standard drinks    Drug use: No    Sexual activity: 20 MG tablet TAKE ONE TABLET BY MOUTH DAILY  Qty: 90 tablet, Refills: 1      omeprazole (PRILOSEC) 40 MG delayed release capsule TAKE ONE CAPSULE BY MOUTH DAILY  Qty: 90 capsule, Refills: 3    Associated Diagnoses: Gastroesophageal reflux disease, unspecified whether esophagitis present      fluticasone (FLONASE) 50 MCG/ACT nasal spray 2 sprays by Nasal route daily  Qty: 3 each, Refills: 3    Associated Diagnoses: Allergic sinusitis      cyclobenzaprine (FLEXERIL) 10 MG tablet Take 0.5 tablets by mouth nightly as needed for Muscle spasms Takes 1/2 tab  Qty: 30 tablet, Refills: 0    Associated Diagnoses: Spinal stenosis of lumbar region with neurogenic claudication      buPROPion (WELLBUTRIN XL) 300 MG extended release tablet TAKE ONE TABLET BY MOUTH EVERY OTHER DAY ALTERNATING WITH 150MG SR TABLET  Qty: 45 tablet, Refills: 3    Associated Diagnoses: Recurrent major depressive disorder, in partial remission (HCC)      buPROPion (WELLBUTRIN SR) 150 MG extended release tablet TAKE ONE TABLET BY MOUTH EVERY OTHER DAY ALTERNATING WITH 300 MG XL  Qty: 45 tablet, Refills: 0    Associated Diagnoses: Recurrent major depressive disorder, in partial remission (HCC)      montelukast (SINGULAIR) 10 MG tablet TAKE ONE TABLET BY MOUTH ONCE NIGHTLY  Qty: 90 tablet, Refills: 3    Associated Diagnoses:  Allergic sinusitis      Semaglutide, 1 MG/DOSE, (OZEMPIC, 1 MG/DOSE,) 4 MG/3ML SOPN Inject 1 mg into the skin once a week  Qty: 9 mL, Refills: 1    Associated Diagnoses: Type 2 diabetes mellitus with polyneuropathy (HCC)      DULoxetine (CYMBALTA) 60 MG extended release capsule TAKE ONE CAPSULE BY MOUTH DAILY  Qty: 90 capsule, Refills: 1      Continuous Blood Gluc Sensor (FREESTYLE JEREMIAS 14 DAY SENSOR) MISC CHANGE SENSOR EVERY 14 DAYS AND CHECK BLOOD SUGAR FOUR TIMES A DAY  Qty: 2 each, Refills: 5    Associated Diagnoses: Type 2 diabetes mellitus with polyneuropathy (HCC)      traMADol (ULTRAM) 50 MG tablet Take 50 mg by mouth every 6 hours as needed for Pain. bid      Insulin Glargine, 1 Unit Dial, (TOUJEO SOLOSTAR) 300 UNIT/ML SOPN INJECT 40 UNITS UNDER THE SKIN DAILY  Qty: 9 pen, Refills: 3    Associated Diagnoses: Type 2 diabetes mellitus with polyneuropathy (HCC)      [employer] PEN NEEDLES 31G 31G X 8 MM MISC USE FOUR TIMES A DAY  Qty: 100 each, Refills: 5    Associated Diagnoses: Type 2 diabetes mellitus with polyneuropathy (HCC)      NOVOLOG FLEXPEN 100 UNIT/ML injection pen INJECT 10 UNITS UNDER THE SKIN THREE TIMES A DAY BEFORE MEALS  Qty: 5 pen, Refills: 2    Associated Diagnoses: Type 2 diabetes mellitus with polyneuropathy (HCC)      Continuous Blood Gluc  (FREESTYLE JEREMIAS READER) ENE Use for checking glucose  Qty: 1 Device, Refills: 1    Associated Diagnoses: Type 2 diabetes mellitus with polyneuropathy (HCC)             Allergies:  Actos [pioglitazone hydrochloride], Albuterol, Dilaudid [hydromorphone hcl], Fluticasone-salmeterol, Glyburide, Lortab [hydrocodone-acetaminophen], Metformin, and Symbicort [budesonide-formoterol fumarate]    ROS:  10pt review of systems was performed and was negative except as noted in HPI     Imaging:  XR CHEST PORTABLE   Final Result   No acute process.              Labs:  Results for orders placed or performed during the hospital encounter of 06/23/22   CBC with Auto Differential   Result Value Ref Range    WBC 8.3 4.0 - 11.0 K/uL    RBC 4.71 4.00 - 5.20 M/uL    Hemoglobin 13.1 12.0 - 16.0 g/dL    Hematocrit 39.6 36.0 - 48.0 %    MCV 84.1 80.0 - 100.0 fL    MCH 27.7 26.0 - 34.0 pg    MCHC 32.9 31.0 - 36.0 g/dL    RDW 13.4 12.4 - 15.4 %    Platelets 497 958 - 319 K/uL    MPV 8.4 5.0 - 10.5 fL    Neutrophils % 63.4 %    Lymphocytes % 31.3 %    Monocytes % 3.3 %    Eosinophils % 1.0 %    Basophils % 1.0 %    Neutrophils Absolute 5.3 1.7 - 7.7 K/uL    Lymphocytes Absolute 2.6 1.0 - 5.1 K/uL    Monocytes Absolute 0.3 0.0 - 1.3 K/uL    Eosinophils Absolute 0.1 0.0 - 0.6 K/uL    Basophils Absolute 0.1 0.0 - 0.2 K/uL   Comprehensive Metabolic Panel w/ Reflex to MG   Result Value Ref Range    Sodium 135 (L) 136 - 145 mmol/L    Potassium reflex Magnesium 4.6 3.5 - 5.1 mmol/L    Chloride 101 99 - 110 mmol/L    CO2 21 21 - 32 mmol/L    Anion Gap 13 3 - 16    Glucose 191 (H) 70 - 99 mg/dL    BUN 30 (H) 7 - 20 mg/dL    CREATININE 1.5 (H) 0.6 - 1.2 mg/dL    GFR Non- 34 (A) >60    GFR  42 (A) >60    Calcium 9.6 8.3 - 10.6 mg/dL    Total Protein 7.2 6.4 - 8.2 g/dL    Albumin 4.7 3.4 - 5.0 g/dL    Albumin/Globulin Ratio 1.9 1.1 - 2.2    Total Bilirubin 0.3 0.0 - 1.0 mg/dL    Alkaline Phosphatase 151 (H) 40 - 129 U/L    ALT 14 10 - 40 U/L    AST 15 15 - 37 U/L   Troponin   Result Value Ref Range    Troponin <0.01 <0.01 ng/mL   Brain Natriuretic Peptide   Result Value Ref Range    Pro-BNP 42 0 - 124 pg/mL   Troponin   Result Value Ref Range    Troponin <0.01 <0.01 ng/mL   D-Dimer, Quantitative   Result Value Ref Range    D-Dimer, Quant <0.27 0.00 - 0.60 ug/mL FEU   EKG 12 Lead   Result Value Ref Range    Ventricular Rate 96 BPM    Atrial Rate 96 BPM    P-R Interval 138 ms    QRS Duration 82 ms    Q-T Interval 354 ms    QTc Calculation (Bazett) 447 ms    P Axis 48 degrees    R Axis -13 degrees    T Axis 56 degrees    Diagnosis       Normal sinus rhythmConfirmed by Haresh Gongora ([id number]) on 6/23/2022 3:55:59 PM       Screenings:     [place] Coma Scale  Eye Opening: Spontaneous  Best Verbal Response: Oriented  Best Motor Response: Obeys commands  Claudio Coma Scale Score: 15              Physical Exam:  Vitals: /78   Pulse 77   Temp 97.3 °F (36.3 °C) (Oral)   Resp 14   Ht 5' 3\" (1.6 m)   Wt 170 lb 14.4 oz (77.5 kg)   SpO2 100%   BMI 30.27 kg/m²    General: awake, alert, no apparent distress  Pupils: equal, reactive  Eyes: EOM intact, conjunctiva clear, no discharge  Head: Non-traumatic  Neck: Supple  Mouth: Moist, no oral lesions, no tonsillar enlargement  Heart: Rate as noted, regular rhythm, no murmur or rubs. Chest/Lungs: CTAB, no wheezes or crackles  Abdomen: soft, nondistended, no tenderness to palpation   Back: No midline tenderness. No CVA tenderness  Extremities:  2+ distal pulses bilateral UE and LE, no tenderness of calves, no edema  Neuro: Moving all extremities, no facial droop, no slurred speech, answers questions appropriately. Cranial nerves II to XII intact. Skin: Warm.  No visible rash, lesions, or bruising           CAMRON Oro        [place], [place]  06/23/22 1901

## 2022-06-23 NOTE — PROGRESS NOTES
Aretha President (:  1955) is a 74 y.o. female,New patient, here for evaluation of the following chief complaint(s):  Chest Pain (refused hospital during return from vacation. dehydration. bp irregular.), Chest Pain (lifting 9lb puppy causes pain/), Insomnia (not sleeping 1-2 hours at a time. ongoing and has sleep issa to show. 2022), Diabetes (40s 50s at HS. seeing with meter nightly lows.), and Medication Problem (nephrologist and diabetic MD medications concerns with choosing whch ones to take.)         ASSESSMENT/PLAN:  1. Coronary artery disease involving native coronary artery of native heart without angina pectoris  -     EKG 12 Lead  2. Cervical stenosis of spinal canal  3. Diabetic polyneuropathy associated with type 2 diabetes mellitus ([facility] [address]  4. Primary hypertension  5.  Stage 3 chronic kidney disease, unspecified whether stage 3a or 3b CKD (HCC)  The patient clearly has activation of her underlying coronary artery disease becoming unstable again back in September she had an evaluation and she was continued on medical management but given the significant increase in her symptomatology I feel the patient will need to be reevaluated again an EKG will be done today to decide if we should send her by ambulance old or with her  to the emergency room for further evaluation and possible admission    Patient has been seen by pain management today and they cut her tramadol and they are considering adding morphine for her pain control    Patient blood pressure is significantly lower today this could be multifactorial including hydration status medications or the activation of her coronary artery disease and that is increasing her risk profile and make it more likely that she will need admission    As noted patient has been seen on her way back in [place] and they called paramedics for her because she was not doing well they wanted to admit her but the patient declined I did explain to the patient that that was very dangerous and she was jyotsna that nothing happened but she needs to be evaluated in the surgical center and now to the hospital for further evaluation and possibly more intervention            Return for As scheduled. Subjective   SUBJECTIVE/OBJECTIVE:    Lab Review   Lab Results   Component Value Date     03/15/2022     01/05/2022     09/27/2021    K 5.0 03/15/2022    K 4.6 01/05/2022    K 4.8 09/27/2021    CO2 20 03/15/2022    CO2 23 01/05/2022    CO2 23 09/27/2021    BUN 20 03/15/2022    BUN 25 01/05/2022    BUN 19 09/27/2021    CREATININE 1.4 03/15/2022    CREATININE 1.8 01/05/2022    CREATININE 1.6 09/27/2021    GLUCOSE 134 03/15/2022    GLUCOSE 58 01/05/2022    GLUCOSE 150 09/27/2021    CALCIUM 9.6 03/15/2022    CALCIUM 10.3 01/05/2022    CALCIUM 9.3 09/27/2021     Lab Results   Component Value Date    WBC 7.2 03/15/2022    WBC 7.9 01/05/2022    WBC 7.4 09/14/2021    HGB 12.1 03/15/2022    HGB 12.5 01/05/2022    HGB 11.9 09/14/2021    HCT 36.7 03/15/2022    HCT 37.7 01/05/2022    HCT 34.6 09/14/2021    MCV 86.0 03/15/2022    MCV 86.0 01/05/2022    MCV 85.2 09/14/2021     03/15/2022     01/05/2022     09/14/2021     Lab Results   Component Value Date    CHOL 170 07/06/2020    CHOL 189 05/20/2019    CHOL 153 05/06/2019    TRIG 229 07/06/2020    TRIG 217 05/20/2019    TRIG 250 05/06/2019    HDL 58 05/03/2022    HDL 52 07/06/2020    HDL 55 05/20/2019    HDL 54 09/21/2010    LDLDIRECT 115 04/26/2019       Vitals 6/23/2022 5/12/2022 7/71/2251   SYSTOLIC 189 867 831   DIASTOLIC 56 74 76   Site - - -   Position - - -   Cuff Size - - -   Pulse 96 - -   Temp - - -   Resp - - -   SpO2 98 - -   Weight 169 lb 9.6 oz - -   Height - - -   Body mass index - - -   Some recent data might be hidden       Chest Pain   This is a recurrent problem. The current episode started more than 1 year ago. The pain is at a severity of 6/10.  The quality of the pain is described as heavy. Associated symptoms include diaphoresis and shortness of breath. Pertinent negatives include no dizziness, leg pain, lower extremity edema, malaise/fatigue, nausea, orthopnea, palpitations or PND. Diabetes  Pertinent negatives for hypoglycemia include no dizziness. Associated symptoms include chest pain. Hypertension  This is a chronic problem. The current episode started more than 1 year ago. The problem has been waxing and waning since onset. The problem is controlled. Associated symptoms include chest pain and shortness of breath. Pertinent negatives include no malaise/fatigue, orthopnea, palpitations, peripheral edema or PND. Review of Systems   Constitutional: Positive for diaphoresis. Negative for malaise/fatigue. Respiratory: Positive for shortness of breath. Cardiovascular: Positive for chest pain. Negative for palpitations, orthopnea and PND. Gastrointestinal: Negative for nausea. Neurological: Negative for dizziness. Objective   Physical Exam  Vitals and nursing note reviewed. Constitutional:       General: She is not in acute distress. Appearance: Normal appearance. HENT:      Head: Normocephalic and atraumatic. Right Ear: Tympanic membrane normal.      Left Ear: Tympanic membrane normal.      Nose: Nose normal.   Eyes:      Extraocular Movements: Extraocular movements intact. Conjunctiva/sclera: Conjunctivae normal.      Pupils: Pupils are equal, round, and reactive to light. Neck:      Vascular: No carotid bruit. Cardiovascular:      Rate and Rhythm: Normal rate and regular rhythm. Pulses: Normal pulses. Heart sounds: No murmur heard. Pulmonary:      Effort: Pulmonary effort is normal. No respiratory distress. Breath sounds: Normal breath sounds. Abdominal:      General: Abdomen is flat. Bowel sounds are normal. There is no distension. Palpations: Abdomen is soft. Tenderness: There is no abdominal tenderness. Musculoskeletal:         General: No swelling, tenderness or deformity. Cervical back: Normal range of motion and neck supple. No rigidity or tenderness. Right lower leg: No edema. Left lower leg: No edema. Lymphadenopathy:      Cervical: No cervical adenopathy. Skin:     Coloration: Skin is not jaundiced. Findings: No bruising, erythema or lesion. Neurological:      General: No focal deficit present. Mental Status: She is alert and oriented to person, place, and time. Cranial Nerves: No cranial nerve deficit. Motor: No weakness. Gait: Gait normal.            This dictation was generated by voice recognition computer software. Although all attempts are made to edit the dictation for accuracy, there may be errors in the transcription that are not intended. An electronic signature was used to authenticate this note.     --Myke Yates MD

## 2022-06-23 NOTE — ACP (ADVANCE CARE PLANNING)
Advanced Care Planning Note. Purpose of Encounter: Advanced care planning in light of hospitalization  Parties In Attendance: Patient,    Decisional Capacity: Yes  Subjective: Patient  understand that this conversation is to address long term care goal  Objective: Admitted to hospital with exertional dyspnea chest pain and syncopal episode  Goals of Care Determination: Patient would pursue CPR and Intubation if required. No tracheostomy or long term ventilation if required.      Code Status: full code  Time spent on Advanced care Plannin minutes  Advanced Care Planning Documents: documented patient's wishes, would like  Richard Peer  to make medical decisions if unable to make decisions    Jayden Doyle MD  2022 5:15 PM

## 2022-06-24 VITALS
WEIGHT: 168.65 LBS | DIASTOLIC BLOOD PRESSURE: 69 MMHG | RESPIRATION RATE: 15 BRPM | HEIGHT: 63 IN | OXYGEN SATURATION: 100 % | HEART RATE: 87 BPM | TEMPERATURE: 98.4 F | BODY MASS INDEX: 29.88 KG/M2 | SYSTOLIC BLOOD PRESSURE: 124 MMHG

## 2022-06-24 PROBLEM — I42.1 HOCM (HYPERTROPHIC OBSTRUCTIVE CARDIOMYOPATHY) (HCC): Status: ACTIVE | Noted: 2022-06-24

## 2022-06-24 PROBLEM — R55 VASOVAGAL SYNCOPE: Status: ACTIVE | Noted: 2022-06-24

## 2022-06-24 LAB
ANION GAP SERPL CALCULATED.3IONS-SCNC: 13 MMOL/L (ref 3–16)
BASOPHILS ABSOLUTE: 0.1 K/UL (ref 0–0.2)
BASOPHILS RELATIVE PERCENT: 0.9 %
BUN BLDV-MCNC: 28 MG/DL (ref 7–20)
CALCIUM SERPL-MCNC: 9.4 MG/DL (ref 8.3–10.6)
CHLORIDE BLD-SCNC: 104 MMOL/L (ref 99–110)
CO2: 21 MMOL/L (ref 21–32)
CREAT SERPL-MCNC: 1.7 MG/DL (ref 0.6–1.2)
EOSINOPHILS ABSOLUTE: 0.1 K/UL (ref 0–0.6)
EOSINOPHILS RELATIVE PERCENT: 1.8 %
GFR AFRICAN AMERICAN: 36
GFR NON-AFRICAN AMERICAN: 30
GLUCOSE BLD-MCNC: 144 MG/DL (ref 70–99)
GLUCOSE BLD-MCNC: 161 MG/DL (ref 70–99)
GLUCOSE BLD-MCNC: 167 MG/DL (ref 70–99)
HCT VFR BLD CALC: 37.6 % (ref 36–48)
HEMOGLOBIN: 12.3 G/DL (ref 12–16)
LV EF: 70 %
LVEF MODALITY: NORMAL
LYMPHOCYTES ABSOLUTE: 2.9 K/UL (ref 1–5.1)
LYMPHOCYTES RELATIVE PERCENT: 39.5 %
MCH RBC QN AUTO: 27.7 PG (ref 26–34)
MCHC RBC AUTO-ENTMCNC: 32.6 G/DL (ref 31–36)
MCV RBC AUTO: 85.1 FL (ref 80–100)
MONOCYTES ABSOLUTE: 0.3 K/UL (ref 0–1.3)
MONOCYTES RELATIVE PERCENT: 3.5 %
NEUTROPHILS ABSOLUTE: 4 K/UL (ref 1.7–7.7)
NEUTROPHILS RELATIVE PERCENT: 54.3 %
PDW BLD-RTO: 13.5 % (ref 12.4–15.4)
PERFORMED ON: ABNORMAL
PERFORMED ON: ABNORMAL
PLATELET # BLD: 317 K/UL (ref 135–450)
PMV BLD AUTO: 8.6 FL (ref 5–10.5)
POTASSIUM REFLEX MAGNESIUM: 4.4 MMOL/L (ref 3.5–5.1)
RBC # BLD: 4.42 M/UL (ref 4–5.2)
SODIUM BLD-SCNC: 138 MMOL/L (ref 136–145)
TROPONIN: <0.01 NG/ML
WBC # BLD: 7.4 K/UL (ref 4–11)

## 2022-06-24 PROCEDURE — 97161 PT EVAL LOW COMPLEX 20 MIN: CPT

## 2022-06-24 PROCEDURE — 84484 ASSAY OF TROPONIN QUANT: CPT

## 2022-06-24 PROCEDURE — 93306 TTE W/DOPPLER COMPLETE: CPT

## 2022-06-24 PROCEDURE — G0378 HOSPITAL OBSERVATION PER HR: HCPCS

## 2022-06-24 PROCEDURE — 85025 COMPLETE CBC W/AUTO DIFF WBC: CPT

## 2022-06-24 PROCEDURE — 80048 BASIC METABOLIC PNL TOTAL CA: CPT

## 2022-06-24 PROCEDURE — 2580000003 HC RX 258: Performed by: INTERNAL MEDICINE

## 2022-06-24 PROCEDURE — 99214 OFFICE O/P EST MOD 30 MIN: CPT | Performed by: INTERNAL MEDICINE

## 2022-06-24 PROCEDURE — 6370000000 HC RX 637 (ALT 250 FOR IP): Performed by: INTERNAL MEDICINE

## 2022-06-24 PROCEDURE — 36415 COLL VENOUS BLD VENIPUNCTURE: CPT

## 2022-06-24 PROCEDURE — 97530 THERAPEUTIC ACTIVITIES: CPT

## 2022-06-24 PROCEDURE — 97116 GAIT TRAINING THERAPY: CPT

## 2022-06-24 RX ORDER — METOPROLOL SUCCINATE 50 MG/1
50 TABLET, EXTENDED RELEASE ORAL DAILY
Status: CANCELLED | OUTPATIENT
Start: 2022-06-24

## 2022-06-24 RX ORDER — METOPROLOL SUCCINATE 25 MG/1
25 TABLET, EXTENDED RELEASE ORAL DAILY
Status: DISCONTINUED | OUTPATIENT
Start: 2022-06-24 | End: 2022-06-24 | Stop reason: HOSPADM

## 2022-06-24 RX ORDER — METOPROLOL SUCCINATE 25 MG/1
25 TABLET, EXTENDED RELEASE ORAL DAILY
Qty: 30 TABLET | Refills: 0 | Status: SHIPPED | OUTPATIENT
Start: 2022-06-24 | End: 2022-08-01

## 2022-06-24 RX ORDER — MORPHINE SULFATE 15 MG/1
15 TABLET, FILM COATED, EXTENDED RELEASE ORAL NIGHTLY
Status: DISCONTINUED | OUTPATIENT
Start: 2022-06-24 | End: 2022-06-24 | Stop reason: HOSPADM

## 2022-06-24 RX ORDER — LISINOPRIL 10 MG/1
10 TABLET ORAL DAILY
Status: DISCONTINUED | OUTPATIENT
Start: 2022-06-25 | End: 2022-06-24 | Stop reason: HOSPADM

## 2022-06-24 RX ORDER — LISINOPRIL 10 MG/1
10 TABLET ORAL DAILY
Qty: 30 TABLET | Refills: 0 | Status: SHIPPED | OUTPATIENT
Start: 2022-06-25 | End: 2022-08-11

## 2022-06-24 RX ADMIN — PANTOPRAZOLE SODIUM 40 MG: 40 TABLET, DELAYED RELEASE ORAL at 05:19

## 2022-06-24 RX ADMIN — DULOXETINE HYDROCHLORIDE 60 MG: 60 CAPSULE, DELAYED RELEASE ORAL at 08:28

## 2022-06-24 RX ADMIN — SODIUM CHLORIDE, PRESERVATIVE FREE 10 ML: 5 INJECTION INTRAVENOUS at 08:28

## 2022-06-24 RX ADMIN — INSULIN GLARGINE 32 UNITS: 100 INJECTION, SOLUTION SUBCUTANEOUS at 08:28

## 2022-06-24 RX ADMIN — LISINOPRIL 20 MG: 20 TABLET ORAL at 08:28

## 2022-06-24 ASSESSMENT — PAIN SCALES - GENERAL: PAINLEVEL_OUTOF10: 0

## 2022-06-24 NOTE — PROGRESS NOTES
[facility] [address] Department   Phone: [phone]    Physical Therapy    [x] Initial Evaluation            [] Daily Treatment Note         [] Discharge Summary      Patient: Leonora Mohr   : 1955   MRN: [id number]   Date of Service:  2022  Admitting Diagnosis: Chest pain  Current Admission Summary: Leonora Mohr is a 74 y.o. female went to [facility] and came back on Friday and [place] airport patient had midsternal chest pressure 7 out of 10 along with shortness of breath and diffuse diaphoresis patient fainted. Was advised to go to the ED but did not flew back to [place] and since then has been having exertional dyspnea and chest pain associate with diaphoresis. Patient does have a history of diabetes and hypertension did have a negative cath 2021  Past Medical History:  has a past medical history of Asthma, Ataxia, Cervical stenosis of spinal canal, Coronary artery disease involving native coronary artery of native heart without angina pectoris, Depression, Diabetes mellitus ([address] Hyperlipidemia, Hypertension, Hypothyroidism, Obesity, Osteoarthritis, Paradoxical vocal fold motion disorder, Postmenopausal, and Stage 2 chronic kidney disease. Past Surgical History:  has a past surgical history that includes Carpal tunnel release; Throat surgery; Hysterectomy, total abdominal; and pr njx aa&/strd tfrml epi lumbar/sacral 1 level (Bilateral, 12/3/2018). Discharge Recommendations: Leonora Mohr scored a 23/24 on the AM-PAC short mobility form. Current research shows that an AM-PAC score of 18 or greater is typically associated with a discharge to the patient's home setting. Based on the patient's AM-PAC score and their current functional mobility deficits, it is recommended that the patient have 2-3 sessions per week of Physical Therapy at d/c to increase the patient's independence.   At this time, this patient demonstrates the endurance and safety to discharge home with (OP services) and a follow up treatment frequency of 2-3x/wk. Please see assessment section for further patient specific details. If patient discharges prior to next session this note will serve as a discharge summary. Please see below for the latest assessment towards goals. DME Required For Discharge: no DME required at discharge  Precautions/Restrictions: high fall risk  Weight Bearing Restrictions: no restrictions  [] Right Upper Extremity  [] Left Upper Extremity [] Right Lower Extremity  [] Left Lower Extremity     Required Braces/Orthotics: no braces required   [] Right  [] Left  Positional Restrictions:no positional restrictions    Pre-Admission Information   Lives With: spouse    Type of Home: house  Home Layout: tri-level, bedroom/bathroom upstairs, 12 stairs to 2nd level with R HR  Home Access: level entry  Bathroom Layout: tub/shower unit, handicap height toilet, . Comment: walk-in tub  Toilet Height: elevated height  Bathroom Equipment: grab bars in shower, hand held shower head  Home Equipment: rollator - 4 wheeled walker, single point cane, . Comment: 3WW, Up-walker, adjustable bed  Transfer Assistance: Independent without use of device  Ambulation Assistance:Independent without use of device  ADL Assistance: independent with all ADL's  IADL Assistance: requires assistance with all homemaking tasks  Active :        [x] Yes  [] No  Hand Dominance: [] Left  [] Right  Current Employment: part time employment.   Occupation:   Hobbies:   Recent Falls: No recent falls    Examination   Vision:   Vision Gross Assessment: Impaired and Vision Corrective Device: wears glasses for reading  Hearing:   WFL  Posture:   Fair  Sensation:   WFL (reports some numbness and tingling occasionally to BLE)  Proprioception:    WFL (reports that at times, BLE will \"get tangled up\")  Tone:   Normotonic  Coordination Testing:   WFL    ROM:   (B) LE AROM WFL  Strength: (B) LE strength grossly WFL  Decision Making: low complexity  Clinical Presentation: stable      Subjective  General: Pt supine in bed upon arrival and agreeable to PT evla. Pain: 0/10  Pain Interventions: not applicable       Functional Mobility  Bed Mobility  Supine to Sit: Independent  Sit to Supine: Independent  Scooting: Independent  Comments:  Transfers  Sit to stand transfer: modified independent  Stand to sit transfer: modified independent  Comments:  Ambulation  Surface:level surface  Assistive Device: no device  Assistance: stand by assistance, contact guard assistance  Distance: ~300 ft  Gait Mechanics: Pt ambulates with decreased step length, slow namita, mild drift to R, use of counter for external support, no LOB. Comments:    Stair Mobility  Stair mobility not completed on this date. Comments:  Wheelchair Mobility:  No w/c mobility completed on this date.   Comments:  Balance  Static Sitting Balance: good: independent with functional balance in unsupported position  Dynamic Sitting Balance: good: independent with functional balance in unsupported position  Static Standing Balance: fair (+): maintains balance at SBA/supervision without use of UE support  Dynamic Standing Balance: fair (+): maintains balance at SBA/supervision without use of UE support  Comments:    Other Therapeutic Interventions    Functional Outcomes  -PAC Inpatient Mobility Raw Score : 23              Cognition  WFL  Orientation:    alert and oriented x 4  Command Following:   [facility]    Education  Barriers To Learning: none  Patient Education: patient educated on goals, PT role and benefits, plan of care, general safety, discharge recommendations  Learning Assessment:  patient verbalizes and demonstrates understanding    Assessment  Activity Tolerance: Good  Impairments Requiring Therapeutic Intervention: decreased functional mobility, decreased endurance, decreased sensation, decreased balance  Prognosis: excellent  Clinical Assessment: Pt presents as below her baseline function and would benefit from skilled PT services to promote safe return to PLOF. Safety Interventions: patient left in chair, call light within reach, gait belt, patient at risk for falls, nurse notified and patient up ad henry     Plan  Frequency: 1-2 x/per week  Current Treatment Recommendations: strengthening, ROM, balance training, functional mobility training, transfer training, gait training, stair training, endurance training, patient/caregiver education and safety education    Goals  Patient Goals:  To return home   Short Term Goals:  Time Frame: Prior to D/C  Patient will complete bed mobility at Independent   Patient will complete transfers at Independent   Patient will ambulate 150 ft with use of LRAD at [facility]  Patient will ascend/descend 4 stairs with (R) ascending handrail at supervision    Therapy Session Time      Individual Group Co-treatment   Time In 1118       Time Out 1214       Minutes 56         Timed Code Treatment Minutes:  41 Minutes  Total Treatment Minutes:  56     Electronically Signed By: Vicente Messer, PT   Vicente Messer PT, DPT, [id number]

## 2022-06-24 NOTE — CONSULTS
[address] [facility]  [phone]      Chief Complaint   Patient presents with    Chest Pain     Chest pressure/tightness x several years, worse over last several weeks. Spoke with doctor today who advised patient to come to emergency room. History of Present Illness:  Armida Page is a 74 y.o. patient who presented to the hospital with complaints of syncope. She was traveling home from a long flight when she had lightheadedness in the airport with subsequent syncope and collapse. She did manage to get on her return flight home. She notices chest heaviness and dizziness when she exerts herself. . I have been asked to provide consultation regarding further management and testing. Past Medical History:   has a past medical history of Asthma, Ataxia, Cervical stenosis of spinal canal, Coronary artery disease involving native coronary artery of native heart without angina pectoris, Depression, Diabetes mellitus ([address] Hyperlipidemia, Hypertension, Hypothyroidism, Obesity, Osteoarthritis, Paradoxical vocal fold motion disorder, Postmenopausal, and Stage 2 chronic kidney disease. Surgical History:   has a past surgical history that includes Carpal tunnel release; Throat surgery; Hysterectomy, total abdominal; and pr njx aa&/strd tfrml epi lumbar/sacral 1 level (Bilateral, 12/3/2018). Social History:   reports that she has never smoked. She has never used smokeless tobacco. She reports that she does not drink alcohol and does not use drugs. Family History:  family history includes Cancer in her mother; Obesity in her sister. Home Medications:  Were reviewed and are listed in nursing record. and/or listed below  Prior to Admission medications    Medication Sig Start Date End Date Taking?  Authorizing Provider   lisinopril (PRINIVIL;ZESTRIL) 10 MG tablet Take 1 tablet by mouth daily 6/25/22  Yes ALESHA Roa - CNP   metoprolol succinate (TOPROL XL) 25 MG extended release tablet Take 1 tablet by mouth daily 6/24/22  Yes Loraine FLEMING ALESHA Chiu CNP   morphine (SHANIKA) 10 MG extended release capsule Take 10 mg by mouth daily. Unsure of dose and to start today, will take only at [facility]    Historical Provider, MD   zolpidem (AMBIEN) 5 MG tablet Take 5 mg by mouth nightly as needed for Sleep. Between [place] and flexeril. Historical Provider, MD   atorvastatin (LIPITOR) 20 MG tablet TAKE ONE TABLET BY MOUTH DAILY 5/12/22   Ashley Warner MD   omeprazole (PRILOSEC) 40 MG delayed release capsule TAKE ONE CAPSULE BY MOUTH DAILY 5/12/22   ALESHA Murcia CNP   fluticasone (FLONASE) 50 MCG/ACT nasal spray 2 sprays by Nasal route daily 5/12/22   ALESHA Murcia CNP   cyclobenzaprine (FLEXERIL) 10 MG tablet Take 0.5 tablets by mouth nightly as needed for Muscle spasms Takes 1/2 tab 5/12/22   ALESHA Murcia CNP   buPROPion (WELLBUTRIN XL) 300 MG extended release tablet TAKE ONE TABLET BY MOUTH EVERY OTHER DAY ALTERNATING WITH 150MG SR TABLET 5/12/22   ALESHA Murcia CNP   buPROPion (WELLBUTRIN SR) 150 MG extended release tablet TAKE ONE TABLET BY MOUTH EVERY OTHER DAY ALTERNATING WITH 300 MG XL 5/12/22   ALESHA Murcia CNP   montelukast (SINGULAIR) 10 MG tablet TAKE ONE TABLET BY MOUTH ONCE NIGHTLY 5/12/22   ALESHA Murcia CNP   Semaglutide, 1 MG/DOSE, (OZEMPIC, 1 MG/DOSE,) 4 MG/3ML SOPN Inject 1 mg into the skin once a week 5/2/22   Ashley Warner MD   DULoxetine (CYMBALTA) 60 MG extended release capsule TAKE ONE CAPSULE BY MOUTH DAILY 4/26/22   Nicolas Esquivel MD   Continuous Blood Gluc Sensor (FREESTYLE JEREMIAS 14 DAY SENSOR) MISC CHANGE SENSOR EVERY 14 DAYS AND CHECK BLOOD SUGAR FOUR TIMES A DAY 3/7/22   Ashley Warner MD   traMADol (ULTRAM) 50 MG tablet Take 50 mg by mouth every 6 hours as needed for Pain.  bid    Historical Provider, MD   Insulin Glargine, 1 Unit Dial, (TOUJEO SOLOSTAR) 300 UNIT/ML SOPN INJECT 40 UNITS UNDER THE SKIN DAILY 12/27/21   Arjun Boucher MD   [employer] PEN NEEDLES 31G 31G X 8 MM MISC USE FOUR TIMES A DAY 11/22/21   Arjun Boucher MD   NOVOLOG FLEXPEN 100 UNIT/ML injection pen INJECT 10 UNITS UNDER THE SKIN THREE TIMES A DAY BEFORE MEALS 3/29/21   Arjun Boucher MD   Continuous Blood Gluc  (FREESTYLE JEREMIAS READER) ENE Use for checking glucose 5/15/18   Arjun Boucher MD        Current Medications:  Current Facility-Administered Medications   Medication Dose Route Frequency Provider Last Rate Last Admin    morphine (MS CONTIN) extended release tablet 15 mg  15 mg Oral Nightly Nelia Florence APRN - CNP        metoprolol succinate (TOPROL XL) extended release tablet 25 mg  25 mg Oral Daily MD [facility] 6/25/2022] lisinopril (PRINIVIL;ZESTRIL) tablet 10 mg  10 mg Oral Daily Comfort Jeronimo MD        atorvastatin (LIPITOR) tablet 20 mg  20 mg Oral Daily Berna Schilling MD   20 mg at 06/23/22 2146    DULoxetine (CYMBALTA) extended release capsule 60 mg  60 mg Oral Daily Berna Schilling MD   60 mg at 06/24/22 0828    insulin glargine (LANTUS) injection vial 32 Units  32 Units SubCUTAneous Daily Berna Schilling MD   32 Units at 06/24/22 0828    insulin lispro (HUMALOG) injection vial 10 Units  10 Units SubCUTAneous TID WC Berna Schilling MD        pantoprazole (PROTONIX) tablet 40 mg  40 mg Oral QAM AC Berna Schilling MD   40 mg at 06/24/22 0519    sodium chloride flush 0.9 % injection 5-40 mL  5-40 mL IntraVENous 2 times per day Berna Schilling MD   10 mL at 06/24/22 0828    sodium chloride flush 0.9 % injection 5-40 mL  5-40 mL IntraVENous PRN Berna Schilling MD        0.9 % sodium chloride infusion   IntraVENous PRN Berna Schilling MD        enoxaparin (LOVENOX) injection 40 mg  40 mg SubCUTAneous Nightly Berna Schilling MD   40 mg at 06/23/22 2146    ondansetron (ZOFRAN-ODT) disintegrating tablet 4 mg  4 mg Oral Q8H PRN Berna Schilling MD        Or   Tata Loving ondansetron (ZOFRAN) injection 4 mg  4 mg IntraVENous Q6H PRN Efe Cole MD        polyethylene glycol (GLYCOLAX) packet 17 g  17 g Oral Daily PRN Efe Cole MD        acetaminophen (TYLENOL) tablet 650 mg  650 mg Oral Q6H PRN Efe Cole MD        Or   Rosemarie Me acetaminophen (TYLENOL) suppository 650 mg  650 mg Rectal Q6H PRN Efe Cole MD        perflutren lipid microspheres (DEFINITY) injection 1.65 mg  1.5 mL IntraVENous ONCE PRN Efe Cole MD        dextrose bolus 10% 125 mL  125 mL IntraVENous PRN Efe Cole MD        Or    dextrose bolus 10% 250 mL  250 mL IntraVENous PRN Efe Cole MD        glucagon (rDNA) injection 1 mg  1 mg IntraMUSCular PRN Efe Cole MD        dextrose 5 % solution  100 mL/hr IntraVENous PRN Efe Cole MD        traMADol Marcelyn Kidney) tablet 50 mg  50 mg Oral Q6H PRN ALESHA Winters - CNP   50 mg at 06/23/22 5342        Allergies:  Actos [pioglitazone hydrochloride], Albuterol, Dilaudid [hydromorphone hcl], Fluticasone-salmeterol, Glyburide, Lortab [hydrocodone-acetaminophen], Metformin, and Symbicort [budesonide-formoterol fumarate]     Review of Systems:     · Constitutional: there has been no unanticipated weight loss. There's been no change in energy level, sleep pattern, or activity level. · Eyes: No visual changes or diplopia. No scleral icterus. · ENT: No Headaches, hearing loss or vertigo. No mouth sores or sore throat. · Cardiovascular: Reviewed in HPI  · Respiratory: No cough or wheezing, no sputum production. No hematemesis. · Gastrointestinal: No abdominal pain, appetite loss, blood in stools. No change in bowel or bladder habits. · Genitourinary: No dysuria, trouble voiding, or hematuria. · Musculoskeletal:  No gait disturbance, weakness or joint complaints. · Integumentary: No rash or pruritis. · Neurological: No headache, diplopia, change in muscle strength, numbness or tingling.  No change in gait, balance, coordination, mood, affect, memory, mentation, behavior. · Psychiatric: No anxiety, no depression. · Endocrine: No malaise, fatigue or temperature intolerance. No excessive thirst, fluid intake, or urination. No tremor. · Hematologic/Lymphatic: No abnormal bruising or bleeding, blood clots or swollen lymph nodes. · Allergic/Immunologic: No nasal congestion or hives.   ·     Physical Examination:    Vitals:    06/24/22 1335   BP: 124/69   Pulse: 87   Resp: 15   Temp: 98.4 °F (36.9 °C)   SpO2: 100%    Weight: 168 lb 10.4 oz (76.5 kg)         General Appearance:  Alert, cooperative, no distress, appears stated age   Head:  Normocephalic, without obvious abnormality, atraumatic   Eyes:  PERRL, conjunctiva/corneas clear       Nose: Nares normal, no drainage or sinus tenderness   Throat: Lips, mucosa, and tongue normal   Neck: Supple, symmetrical, trachea midline, no adenopathy, thyroid: not enlarged, symmetric, no tenderness/mass/nodules, no carotid bruit or JVD       Lungs:   Clear to auscultation bilaterally, respirations unlabored   Chest Wall:  No tenderness or deformity   Heart:  Regular rate and rhythm, S1, S2 normal, +systolic LSB murmur, rub or gallop   Abdomen:   Soft, non-tender, bowel sounds active all four quadrants,  no masses, no organomegaly           Extremities: Extremities normal, atraumatic, no cyanosis or edema   Pulses: 2+ and symmetric   Skin: Skin color, texture, turgor normal, no rashes or lesions   Pysch: Normal mood and affect   Neurologic: Normal gross motor and sensory exam.         Labs  CBC:   Lab Results   Component Value Date    WBC 7.4 06/24/2022    RBC 4.42 06/24/2022    HGB 12.3 06/24/2022    HCT 37.6 06/24/2022    MCV 85.1 06/24/2022    RDW 13.5 06/24/2022     06/24/2022     CMP:    Lab Results   Component Value Date     06/24/2022    K 4.4 06/24/2022     06/24/2022    CO2 21 06/24/2022    BUN 28 06/24/2022    CREATININE 1.7 06/24/2022    GFRAA 36 06/24/2022    GFRAA >60 05/08/2013    AGRATIO 1.9 06/23/2022    LABGLOM 30 06/24/2022    GLUCOSE 161 06/24/2022    PROT 7.2 06/23/2022    PROT 7.0 08/10/2012    CALCIUM 9.4 06/24/2022    BILITOT 0.3 06/23/2022    ALKPHOS 151 06/23/2022    AST 15 06/23/2022    ALT 14 06/23/2022     PT/INR:  No results found for: PTINR  Lab Results   Component Value Date    TROPONINI <0.01 06/24/2022       EKG:  I have reviewed EKG with the following interpretation:  Impression:  Normal sinus rhythmConfirmed     Pt has CAD with following history:  CABG: (date/details),   Valve replacement (date/details)   GXT/Myoview/Lexiscan: (date)  (results)   Stress/echo: (date) (result)   CATH/PCI:  (date)- (results)  - (# and type of stents) -   Cardiac Cath LVG:  Anatomy:   LM-nml   LAD-nml  Cx-nml  OM- nml  RCA-dominant  RPDA- nml  LVEF- 65  LVG- nml  LVEDP- 27     Contrast: 48  Flouro Time: 2.9  Access: R Radial a     ECHO: (date) results EF    %. Hyperdynamic left ventricular systolic function with an ejection fraction   estimated at greater than 70%. No obvious regional wall motion abnormalities. There is a late peaking gradient recorded across the LV outflow tract   consistent with dynamic obstruction with a peak gradient of 25 mmHg at rest   and with valsalva maneuver. There is mild tricuspid regurgitation with a RVSP estimation of 22 mmHg. Grade I diastolic dysfunction with normal LV filling pressures. Avg.   E/e'=13.0. Consider hypertrophic cardiomyopathy or hyperdynamic state if clinically   indicated. PETER (date) (results)  EP procedures/studies/ablation:  (specific data). Device information:  Event monitor: (date/results)    All testing and labs listed below were personally reviewed.     Assessment  Patient Active Problem List   Diagnosis    HTN (hypertension)    Diabetes mellitus [facility] [address]    Hyperlipidemia    Insomnia    Depression    Neuropathic pain    Type 2 diabetes mellitus with polyneuropathy ([facility] [address]    Moderate episode of recurrent major depressive disorder ([facility] [address]    Diabetic polyneuropathy associated with type 2 diabetes mellitus ([facility] [address]    Exogenous obesity    Spinal stenosis of lumbar region with neurogenic claudication    Postmenopausal    Stage 3 chronic kidney disease (HCC)    Tremor    Uncontrolled type 2 diabetes mellitus with hyperglycemia ([facility] [address]    Coronary artery disease involving native coronary artery of native heart without angina pectoris    Positive cardiac stress test    Cervical stenosis of spinal canal    Chronic renal disease, stage III [facility]) [[id number]]    Chest pain         Plan:    I had the opportunity to review the clinical symptoms and presentation of Alfonso Muller. Assessment/Plan:  Principal Problem:    Chest pain  Plan: no chest pain. Syncope: likely from mild HOCM and dehydration    HOCM: hyperdynamic LV function with dynamic obstruction of the LVOT seen on echo. Symptomatic. Unclear if this is genetic mutation or just from HTN. Recommend toprol xl and increase dose as tolerated. Recommend hydration. Will check 30 day monitor and genetic testing as outpatient    HTN: decrease lisinopril to 10    HLD: cont statin    Ok to discharge home, FU as outpatient with cardiology. I will address the patient's cardiac risk factors and adjusted pharmacologic treatment as needed. In addition, I have reinforced the need for patient directed risk factor modification. Tobacco use was discussed with the patient and educated on the negative effects. I have asked the patient to not utilize these agents. Thank you for allowing to us to participate in the care or Alfonso Muller. Further evaluation will be based upon the patient's clinical course and testing results. All questions and concerns were addressed to the patient/family. Alternatives to my treatment were discussed. The note was completed using EMR.  Every effort was made to ensure accuracy; however, inadvertent computerized transcription errors may be present.     Lai Zepeda MD, MD 6/24/2022 3:19 PM

## 2022-06-24 NOTE — DISCHARGE SUMMARY
Hospital Medicine Discharge Summary    Patient ID: Sunni Blancas      Patient's PCP: Ricki Agosto MD    Admit Date: 6/23/2022     Discharge Date: 6/24/2022      Admitting Physician: Moe Durham MD     Discharge Physician: ALESHA Merritt - CNP     Discharge Diagnoses  Chest pain and exertional dyspnea  Syncope  Mild HOCM  Orthostatic hypotension  History of type 2 diabetes, CKD 3, hypertension      Hospital Course: Sunni Blancas is a 74 y.o. female went to [facility] and came [facility] on [facility] had midsternal chest pressure 7 out of 10 along with shortness of breath and diffuse diaphoresis patient fainted. Was advised to go to the ED but did not flew back to [place] and since then has been having exertional dyspnea and chest pain associate with diaphoresis. Patient does have a history of diabetes and hypertension did have a negative cath September 2021.       Chest pain and exertional dyspnea  -Cardiology evaluated. Chest pain was resolved. No further treatment or testing. Syncope  -Per cardiology likely from mild HOCM and dehydration. Echo showed hyperdynamic LV function with dynamic obstruction of the LVOT. Recommend Toprol-XL and increase dose as tolerated. Recommend hydration. Check 30-day monitor and genetic testing as outpatient. Orthostatic hypotension  -Lisinopril dose decreased to 10 mg  -Added LEONORA hose, which greatly improved orthostasis and patient tolerated much better with only minimal dizziness. History of type 2 diabetes, CKD 3, hypertension      Patient stated they felt well and wanted to go home. Patient denied chest pain, shortness of breath, palpitations, abdominal pain, nausea vomiting, diarrhea, dysuria, headache lightheadedness or dizziness. Appetite good. Voiding without difficulty. Reviewed plan of care with patient, verbalized understanding and agreement. Denied further questions or needs.       Physical Exam Performed:     /69   Pulse 87   Temp 98.4 °F (36.9 °C) (Temporal)   Resp 15   Ht 5' 3\" (1.6 m)   Wt 168 lb 10.4 oz (76.5 kg)   SpO2 100%   BMI 29.88 kg/m²       General appearance:  No apparent distress, appears stated age and cooperative. HEENT:  Normal cephalic, atraumatic without obvious deformity. Pupils equal, round, and reactive to light. Extra ocular muscles intact. Conjunctivae/corneas clear. Neck: Supple, with full range of motion. No jugular venous distention. Trachea midline. Respiratory:  Normal respiratory effort. Clear to auscultation, bilaterally without Rales/Wheezes/Rhonchi. Cardiovascular:  Regular rate and rhythm with normal S1/S2 without murmurs, rubs or gallops. Abdomen: Soft, non-tender, non-distended with normal bowel sounds. Musculoskeletal:  No clubbing, cyanosis or edema bilaterally. Full range of motion without deformity. Skin: Skin color, texture, turgor normal.  No rashes or lesions. Neurologic:  Neurovascularly intact without any focal sensory/motor deficits. Cranial nerves: II-XII intact, grossly non-focal.  Psychiatric:  Alert and oriented, thought content appropriate, normal insight  Capillary Refill: Brisk,< 3 seconds   Peripheral Pulses: +2 palpable, equal bilaterally       Labs:  For convenience and continuity at follow-up the following most recent labs are provided:      CBC:    Lab Results   Component Value Date/Time    WBC 7.4 06/24/2022 05:13 AM    HGB 12.3 06/24/2022 05:13 AM    HCT 37.6 06/24/2022 05:13 AM     06/24/2022 05:13 AM       Renal:    Lab Results   Component Value Date/Time     06/28/2022 09:24 AM    K 5.2 06/29/2022 03:01 PM    K 4.4 06/24/2022 05:13 AM     06/28/2022 09:24 AM    CO2 23 06/28/2022 09:24 AM    BUN 24 06/28/2022 09:24 AM    CREATININE 1.4 06/28/2022 09:24 AM    CALCIUM 10.0 06/28/2022 09:24 AM    PHOS 3.5 02/18/2021 12:45 PM         Significant Diagnostic Studies    Radiology:   XR CHEST PORTABLE Final Result   No acute process. Consults:     IP CONSULT TO CARDIOLOGY    Disposition: Home    Condition at Discharge: Stable    Discharge Instructions/Follow-up:      Follow up with pcp in 1 week  Follow up with cardiologist as directed     Code Status:  Prior     Activity: activity as tolerated    Diet: cardiac diet      Discharge Medications:     Discharge Medication List as of 6/24/2022  4:37 PM           Details   metoprolol succinate (TOPROL XL) 25 MG extended release tablet Take 1 tablet by mouth daily, Disp-30 tablet, R-0Normal              Details   lisinopril (PRINIVIL;ZESTRIL) 10 MG tablet Take 1 tablet by mouth daily, Disp-30 tablet, R-0Normal              Details   morphine (SHANIKA) 10 MG extended release capsule Take 10 mg by mouth daily. Unsure of dose and to start today, will take only at 258 Curalate      zolpidem (AMBIEN) 5 MG tablet Take 5 mg by mouth nightly as needed for Sleep. [facility] [place] [facility]. Historical Med      atorvastatin (LIPITOR) 20 MG tablet TAKE ONE TABLET BY MOUTH DAILY, Disp-90 tablet, R-1Normal      omeprazole (PRILOSEC) 40 MG delayed release capsule TAKE ONE CAPSULE BY MOUTH DAILY, Disp-90 capsule, R-3Normal      fluticasone (FLONASE) 50 MCG/ACT nasal spray 2 sprays by Nasal route daily, Disp-3 each, R-3Normal      cyclobenzaprine (FLEXERIL) 10 MG tablet Take 0.5 tablets by mouth nightly as needed for Muscle spasms Takes 1/2 tab, Disp-30 tablet, R-0Normal      buPROPion (WELLBUTRIN XL) 300 MG extended release tablet TAKE ONE TABLET BY MOUTH EVERY OTHER DAY ALTERNATING WITH 150MG SR TABLET, Disp-45 tablet, R-3Normal      buPROPion (WELLBUTRIN SR) 150 MG extended release tablet TAKE ONE TABLET BY MOUTH EVERY OTHER DAY ALTERNATING WITH 300 MG XL, Disp-45 tablet, R-0Normal      montelukast (SINGULAIR) 10 MG tablet TAKE ONE TABLET BY MOUTH ONCE NIGHTLY, Disp-90 tablet, R-3Normal      Semaglutide, 1 MG/DOSE, (OZEMPIC, 1 MG/DOSE,) 4 MG/3ML SOPN Inject 1 mg into the skin once a week, Disp-9 mL, R-1Print      DULoxetine (CYMBALTA) 60 MG extended release capsule TAKE ONE CAPSULE BY MOUTH DAILY, Disp-90 capsule, R-1Normal      Continuous Blood Gluc Sensor (FREESTYLE JEREMIAS 14 DAY SENSOR) MISC CHANGE SENSOR EVERY 14 DAYS AND CHECK BLOOD SUGAR FOUR TIMES A DAY, Disp-2 each, R-5, DAWNormal      traMADol (ULTRAM) 50 MG tablet Take 50 mg by mouth every 6 hours as needed for Pain. bidHistorical Med      Insulin Glargine, 1 Unit Dial, (TOUJEO SOLOSTAR) 300 UNIT/ML SOPN INJECT 40 UNITS UNDER THE SKIN DAILY, Disp-9 pen, R-3Normal      [employer] PEN NEEDLES 31G 31G X 8 MM MISC Disp-100 each, R-5, Normal      NOVOLOG FLEXPEN 100 UNIT/ML injection pen INJECT 10 UNITS UNDER THE SKIN THREE TIMES A DAY BEFORE MEALS, Disp-5 pen, R-2Normal      Continuous Blood Gluc  (FREESTYLE JEREMIAS READER) ENE Use for checking glucose, Disp-1 Device, R-1Normal             Time Spent on discharge is more than 30 minutes in the examination, evaluation, counseling and review of medications and discharge plan. Signed: ALESHA Richards - CNP   6/30/2022    The patient was seen and examined on day of discharge and this discharge summary is in conjunction with any daily progress note from day of discharge. Thank you Radha Duff MD for the opportunity to be involved in this patient's care. If you have any questions or concerns please feel free to contact me at [phone]. NOTE:  This report was transcribed using voice recognition software. Every effort was made to ensure accuracy; however, inadvertent computerized transcription errors may be present.

## 2022-06-24 NOTE — CARE COORDINATION
Discharge Planning Note;  Patient's  Chart reviewed for discharge planning needs; admitted from home , A&O, independent  and it appears that patient has minimal needs for discharge at this time. Discussed with patients nurse and requested that case management be notified if discharge needs are identified. Patient has active insurance with [facility] [place] [facility]    Dr Jay Jay Ratliff, is listed as the PCP     Case management will continue to follow progress and update discharge plan as needed.

## 2022-06-24 NOTE — PROGRESS NOTES
Data- discharge order received, pt verbalized agreement to discharge, disposition to previous residence, no needs for HHC/DME. Action- discharge instructions prepared and given to pt, pt verbalized understanding. Medication information packet given r/t NEW and/or CHANGED prescriptions emphasizing name/purpose/side effects, pt verbalized understanding. Discharge instruction summary: Diet- cardiac , Activity- as tolerated, Primary Care Physician as follows: Ricki Agosto [phone] f/u appointment in one week, immunizations reviewed and updated, prescription medications filled meds to beds. Inpatient surgical procedure precautions reviewed: none CHF Education reviewed. Pt/ Family has had a total of 60 minutes CHF education this admission encounter. 1. WEIGHT: Admit Weight: 170 lb 14.4 oz (77.5 kg) (06/23/22 1450)        Today  Weight: 168 lb 10.4 oz (76.5 kg) (06/24/22 0500)       2. O2 SAT.: SpO2: 100 % (06/24/22 1335)    Response- Pt belongings gathered, IV removed. Disposition is home (no HHC/DME needs), transported with , taken to lobby via w/c w/ RN, no complications.

## 2022-06-28 ENCOUNTER — OFFICE VISIT (OUTPATIENT)
Dept: INTERNAL MEDICINE CLINIC | Age: 67
End: 2022-06-28
Payer: MEDICARE

## 2022-06-28 VITALS
BODY MASS INDEX: 30.33 KG/M2 | HEART RATE: 74 BPM | SYSTOLIC BLOOD PRESSURE: 112 MMHG | WEIGHT: 171.2 LBS | OXYGEN SATURATION: 99 % | DIASTOLIC BLOOD PRESSURE: 70 MMHG

## 2022-06-28 DIAGNOSIS — I42.1 HOCM (HYPERTROPHIC OBSTRUCTIVE CARDIOMYOPATHY) (HCC): Primary | ICD-10-CM

## 2022-06-28 DIAGNOSIS — N18.30 STAGE 3 CHRONIC KIDNEY DISEASE, UNSPECIFIED WHETHER STAGE 3A OR 3B CKD (HCC): ICD-10-CM

## 2022-06-28 DIAGNOSIS — Z09 HOSPITAL DISCHARGE FOLLOW-UP: ICD-10-CM

## 2022-06-28 DIAGNOSIS — I10 PRIMARY HYPERTENSION: ICD-10-CM

## 2022-06-28 DIAGNOSIS — E11.42 TYPE 2 DIABETES MELLITUS WITH POLYNEUROPATHY (HCC): ICD-10-CM

## 2022-06-28 PROBLEM — R07.9 CHEST PAIN: Status: RESOLVED | Noted: 2022-06-23 | Resolved: 2022-06-28

## 2022-06-28 LAB
ANION GAP SERPL CALCULATED.3IONS-SCNC: 13 MMOL/L (ref 3–16)
BUN BLDV-MCNC: 24 MG/DL (ref 7–20)
CALCIUM SERPL-MCNC: 10 MG/DL (ref 8.3–10.6)
CHLORIDE BLD-SCNC: 101 MMOL/L (ref 99–110)
CO2: 23 MMOL/L (ref 21–32)
CREAT SERPL-MCNC: 1.4 MG/DL (ref 0.6–1.2)
GFR AFRICAN AMERICAN: 45
GFR NON-AFRICAN AMERICAN: 37
GLUCOSE BLD-MCNC: 168 MG/DL (ref 70–99)
POTASSIUM SERPL-SCNC: 5.5 MMOL/L (ref 3.5–5.1)
SODIUM BLD-SCNC: 137 MMOL/L (ref 136–145)

## 2022-06-28 PROCEDURE — 1111F DSCHRG MED/CURRENT MED MERGE: CPT | Performed by: INTERNAL MEDICINE

## 2022-06-28 PROCEDURE — 1090F PRES/ABSN URINE INCON ASSESS: CPT | Performed by: INTERNAL MEDICINE

## 2022-06-28 PROCEDURE — 3017F COLORECTAL CA SCREEN DOC REV: CPT | Performed by: INTERNAL MEDICINE

## 2022-06-28 PROCEDURE — 1036F TOBACCO NON-USER: CPT | Performed by: INTERNAL MEDICINE

## 2022-06-28 PROCEDURE — G8427 DOCREV CUR MEDS BY ELIG CLIN: HCPCS | Performed by: INTERNAL MEDICINE

## 2022-06-28 PROCEDURE — 83036 HEMOGLOBIN GLYCOSYLATED A1C: CPT | Performed by: INTERNAL MEDICINE

## 2022-06-28 PROCEDURE — G8417 CALC BMI ABV UP PARAM F/U: HCPCS | Performed by: INTERNAL MEDICINE

## 2022-06-28 PROCEDURE — 2022F DILAT RTA XM EVC RTNOPTHY: CPT | Performed by: INTERNAL MEDICINE

## 2022-06-28 PROCEDURE — 3044F HG A1C LEVEL LT 7.0%: CPT | Performed by: INTERNAL MEDICINE

## 2022-06-28 PROCEDURE — G8400 PT W/DXA NO RESULTS DOC: HCPCS | Performed by: INTERNAL MEDICINE

## 2022-06-28 PROCEDURE — 1123F ACP DISCUSS/DSCN MKR DOCD: CPT | Performed by: INTERNAL MEDICINE

## 2022-06-28 PROCEDURE — 99214 OFFICE O/P EST MOD 30 MIN: CPT | Performed by: INTERNAL MEDICINE

## 2022-06-28 ASSESSMENT — ENCOUNTER SYMPTOMS
SHORTNESS OF BREATH: 0
BLOOD IN STOOL: 0
CONSTIPATION: 0
COUGH: 0
COLOR CHANGE: 0
ABDOMINAL PAIN: 0
SINUS PAIN: 0
CHEST TIGHTNESS: 0
WHEEZING: 0

## 2022-06-28 NOTE — ASSESSMENT & PLAN NOTE
Reviewed with the patient the use of her antihypertensive medications and the secondary uses like reduction of the contractility of the heart as well as kidney protection for the time being we will get a continue on the current medications but if her pressure stays on the low level so we may need to cut her lisinopril to 5 mg

## 2022-06-28 NOTE — PROGRESS NOTES
Coty Matute (:  1955) is a 74 y.o. female,New patient, here for evaluation of the following chief complaint(s):  Follow-Up from Hospital (follow up.hydro thryobic cardio myopathy. ER from [facility] [id number] visit.)         ASSESSMENT/PLAN:  1. HOCM (hypertrophic obstructive cardiomyopathy) ([facility] [address]  Assessment & Plan:  Hospitalized patient had an echo that showed the presumptive diagnosis of HOCM at this point the patient is to follow-up with cardiology to decide if any further intervention is needed she is on a beta-blocker now and her symptomatology is better  Orders:  -     DME Order for Patient Lift as OP  2. Stage 3 chronic kidney disease, unspecified whether stage 3a or [facility]b [facility]  -     Basic Metabolic Panel; Future  3. Primary hypertension  Assessment & Plan:  Reviewed with the patient the use of her antihypertensive medications and the secondary uses like reduction of the contractility of the heart as well as kidney protection for the time being we will get a continue on the current medications but if her pressure stays on the low level so we may need to cut her lisinopril to 5 mg  4. Type 2 diabetes mellitus with polyneuropathy (HCC)  -     POCT glycosylated hemoglobin (Hb A1C)  -     Hemoglobin A1C; Future      Return if symptoms worsen or fail to improve, for As scheduled.          Subjective   SUBJECTIVE/OBJECTIVE:    Lab Review   Lab Results   Component Value Date     2022     2022     03/15/2022    K 4.4 2022    K 4.6 2022    K 5.0 03/15/2022    K 4.6 2022    K 4.8 2021    CO2 21 2022    CO2 21 2022    CO2 20 03/15/2022    BUN 28 2022    BUN 30 2022    BUN 20 03/15/2022    CREATININE 1.7 2022    CREATININE 1.5 2022    CREATININE 1.4 03/15/2022    GLUCOSE 161 2022    GLUCOSE 191 2022    GLUCOSE 134 03/15/2022    CALCIUM 9.4 2022    CALCIUM 9.6 2022    CALCIUM 9.6 03/15/2022     Lab Results   Component Value Date    WBC 7.4 06/24/2022    WBC 8.3 06/23/2022    WBC 7.2 03/15/2022    HGB 12.3 06/24/2022    HGB 13.1 06/23/2022    HGB 12.1 03/15/2022    HCT 37.6 06/24/2022    HCT 39.6 06/23/2022    HCT 36.7 03/15/2022    MCV 85.1 06/24/2022    MCV 84.1 06/23/2022    MCV 86.0 03/15/2022     06/24/2022     06/23/2022     03/15/2022     Lab Results   Component Value Date    CHOL 170 07/06/2020    CHOL 189 05/20/2019    CHOL 153 05/06/2019    TRIG 229 07/06/2020    TRIG 217 05/20/2019    TRIG 250 05/06/2019    HDL 58 05/03/2022    HDL 52 07/06/2020    HDL 55 05/20/2019    HDL 54 09/21/2010    LDLDIRECT 115 04/26/2019       Vitals 6/28/2022 6/24/2022 3/30/7528   SYSTOLIC 657 491 297   DIASTOLIC 70 69 71   Site - - -   Position - - -   Cuff Size - - -   Pulse 74 87 -   Temp - 98.4 97.3   Resp - 15 -   SpO2 99 100 99   Weight 171 lb 3.2 oz - -   Height - - -   Body mass index - - -   Pain Level - - 0   Some recent data might be hidden       Congestive Heart Failure  Presents for follow-up visit. Pertinent negatives include no abdominal pain, chest pain, fatigue, nocturia, orthopnea, palpitations, shortness of breath or unexpected weight change. Review of Systems   Constitutional: Negative for activity change, appetite change, fatigue and unexpected weight change. HENT: Negative for congestion, ear pain and sinus pain. Respiratory: Negative for cough, chest tightness, shortness of breath and wheezing. Cardiovascular: Negative for chest pain and palpitations. Gastrointestinal: Negative for abdominal pain, blood in stool and constipation. Endocrine: Negative for cold intolerance, heat intolerance and polyuria. Genitourinary: Negative for dysuria, frequency, nocturia and urgency. Musculoskeletal: Negative for arthralgias and myalgias. Skin: Negative for color change and rash. Neurological: Negative for weakness and headaches.    Hematological: Negative for adenopathy. Does not bruise/bleed easily. Psychiatric/Behavioral: Negative for agitation, dysphoric mood and sleep disturbance. Objective   Physical Exam  Vitals and nursing note reviewed. Constitutional:       General: She is not in acute distress. Appearance: Normal appearance. HENT:      Head: Normocephalic and atraumatic. Right Ear: Tympanic membrane normal.      Left Ear: Tympanic membrane normal.      Nose: Nose normal.   Eyes:      Extraocular Movements: Extraocular movements intact. Conjunctiva/sclera: Conjunctivae normal.      Pupils: Pupils are equal, round, and reactive to light. Neck:      Vascular: No carotid bruit. Cardiovascular:      Rate and Rhythm: Normal rate and regular rhythm. Pulses: Normal pulses. Heart sounds: No murmur heard. Pulmonary:      Effort: Pulmonary effort is normal. No respiratory distress. Breath sounds: Normal breath sounds. Abdominal:      General: Abdomen is flat. Bowel sounds are normal. There is no distension. Palpations: Abdomen is soft. Tenderness: There is no abdominal tenderness. Musculoskeletal:         General: No swelling, tenderness or deformity. Cervical back: Normal range of motion and neck supple. No rigidity or tenderness. Right lower leg: No edema. Left lower leg: No edema. Lymphadenopathy:      Cervical: No cervical adenopathy. Skin:     Coloration: Skin is not jaundiced. Findings: No bruising, erythema or lesion. Neurological:      General: No focal deficit present. Mental Status: She is alert and oriented to person, place, and time. Cranial Nerves: No cranial nerve deficit. Motor: No weakness. Gait: Gait normal.            This dictation was generated by voice recognition computer software. Although all attempts are made to edit the dictation for accuracy, there may be errors in the transcription that are not intended.     An electronic signature was used to authenticate this note.     --Yessica Velez MD

## 2022-06-28 NOTE — ASSESSMENT & PLAN NOTE
Hospitalized patient had an echo that showed the presumptive diagnosis of HOCM at this point the patient is to follow-up with cardiology to decide if any further intervention is needed she is on a beta-blocker now and her symptomatology is better

## 2022-06-29 DIAGNOSIS — N18.30 STAGE 3 CHRONIC KIDNEY DISEASE, UNSPECIFIED WHETHER STAGE 3A OR 3B CKD (HCC): ICD-10-CM

## 2022-06-29 LAB
ESTIMATED AVERAGE GLUCOSE: 157.1 MG/DL
HBA1C MFR BLD: 7.1 %
POTASSIUM SERPL-SCNC: 5.2 MMOL/L (ref 3.5–5.1)

## 2022-07-01 ENCOUNTER — TELEPHONE (OUTPATIENT)
Dept: ENDOCRINOLOGY | Age: 67
End: 2022-07-01

## 2022-07-06 ENCOUNTER — TELEMEDICINE (OUTPATIENT)
Dept: ENDOCRINOLOGY | Age: 67
End: 2022-07-06
Payer: MEDICARE

## 2022-07-06 DIAGNOSIS — E11.42 TYPE 2 DIABETES MELLITUS WITH POLYNEUROPATHY (HCC): Primary | ICD-10-CM

## 2022-07-06 DIAGNOSIS — I10 ESSENTIAL HYPERTENSION: ICD-10-CM

## 2022-07-06 DIAGNOSIS — E78.2 MIXED HYPERLIPIDEMIA: ICD-10-CM

## 2022-07-06 DIAGNOSIS — N18.32 STAGE 3B CHRONIC KIDNEY DISEASE (HCC): ICD-10-CM

## 2022-07-06 PROCEDURE — 3051F HG A1C>EQUAL 7.0%<8.0%: CPT | Performed by: INTERNAL MEDICINE

## 2022-07-06 PROCEDURE — 3017F COLORECTAL CA SCREEN DOC REV: CPT | Performed by: INTERNAL MEDICINE

## 2022-07-06 PROCEDURE — 1123F ACP DISCUSS/DSCN MKR DOCD: CPT | Performed by: INTERNAL MEDICINE

## 2022-07-06 PROCEDURE — 2022F DILAT RTA XM EVC RTNOPTHY: CPT | Performed by: INTERNAL MEDICINE

## 2022-07-06 PROCEDURE — 95251 CONT GLUC MNTR ANALYSIS I&R: CPT | Performed by: INTERNAL MEDICINE

## 2022-07-06 PROCEDURE — 99214 OFFICE O/P EST MOD 30 MIN: CPT | Performed by: INTERNAL MEDICINE

## 2022-07-06 PROCEDURE — 1090F PRES/ABSN URINE INCON ASSESS: CPT | Performed by: INTERNAL MEDICINE

## 2022-07-06 PROCEDURE — G8400 PT W/DXA NO RESULTS DOC: HCPCS | Performed by: INTERNAL MEDICINE

## 2022-07-06 PROCEDURE — G8427 DOCREV CUR MEDS BY ELIG CLIN: HCPCS | Performed by: INTERNAL MEDICINE

## 2022-07-06 ASSESSMENT — ENCOUNTER SYMPTOMS: VISUAL CHANGE: 0

## 2022-07-06 NOTE — PROGRESS NOTES
Viktoriya Chávez is a 74 y.o. female is seen  for evaluation and management of type 2  Diabetes--- . Viktoriya Chávez was diagnosed with Diabetes mellitus in 2008 aprox   Pt always had issues with hypoglycemia since her childhood so she was under constant surveillance until she was diagnosed with diabetes  . Viktoriya Chávez got diabetic education in the past.  Comorbid conditions: Neuropathy    INTERIM:    Diabetes  She presents for her follow-up diabetic visit. She has type 2 diabetes mellitus. No MedicAlert identification noted. The initial diagnosis of diabetes was made 10 years ago. Her disease course has been worsening. Hypoglycemia symptoms include sweats and tremors. Pertinent negatives for diabetes include no foot ulcerations, no polydipsia, no polyphagia, no polyuria, no visual change, no weakness and no weight loss. There are no hypoglycemic complications. Symptoms are worsening. Diabetic complications include peripheral neuropathy. Risk factors for coronary artery disease include post-menopausal, hypertension, diabetes mellitus and dyslipidemia. Current diabetic treatment includes insulin injections. She is compliant with treatment some of the time. Her weight is increasing rapidly. She is following a generally unhealthy diet. Meal planning includes avoidance of concentrated sweets. She has had a previous visit with a dietitian. She never participates in exercise. Her home blood glucose trend is fluctuating dramatically. Her breakfast blood glucose is taken between 7-8 am. Her breakfast blood glucose range is generally 140-180 mg/dl. An ACE inhibitor/angiotensin II receptor blocker is being taken. She does not see a podiatrist.Eye exam is current. ---on her way back from [facility] on June 17th she developed chest pain and sweating and was taken by EMS   -she saw her PCP and was told that she had HOCM and started on B Blockers ---she feels improved.   Weight 171 in June 2022   She feels that she lost weight due to Ozempic       Weight trend: stable  Prior visit with dietician: no  Current diet: on average, 3 meals per day  Current exercise: rare    She was in the ER after K was 6.2 but repeat in ER was normal in April 2019  She denies any nausea vomiting she tends to drink plenty of water any ways    Patient has hyperlipidemia and is on Lipitor her last LDL was at target  Patient also has arthritis and takes Celebrex off-and-on which can also cause kidney damage and also has reflux and is on omeprazole    Past Medical History:   Diagnosis Date    Asthma     vocal fold     Ataxia     Cervical stenosis of spinal canal 10/7/2021    Coronary artery disease involving native coronary artery of native heart without angina pectoris 9/9/2021    Depression     Diabetes mellitus ([address]     Hyperlipidemia     Hypertension     Hypothyroidism     Obesity     Osteoarthritis     Paradoxical vocal fold motion disorder     Postmenopausal 5/15/2018    Stage 2 chronic kidney disease 5/7/2019      Patient Active Problem List   Diagnosis    HTN (hypertension)    Diabetes mellitus ([address]    Hyperlipidemia    Insomnia    Depression    Neuropathic pain    Type 2 diabetes mellitus with polyneuropathy (HCC)    Moderate episode of recurrent major depressive disorder ([address]    Diabetic polyneuropathy associated with type 2 diabetes mellitus ([address]    Exogenous obesity    Spinal stenosis of lumbar region with neurogenic claudication    Postmenopausal    Stage 3 chronic kidney disease (HCC)    Tremor    Uncontrolled type 2 diabetes mellitus with hyperglycemia ([address]    Coronary artery disease involving native coronary artery of native heart without angina pectoris    Positive cardiac stress test    Cervical stenosis of spinal canal    Chronic renal disease, stage III ([address] [143238]    HOCM (hypertrophic obstructive cardiomyopathy) ([address]    Vasovagal syncope     Past Surgical History:   Procedure Laterality Date    CARPAL TUNNEL RELEASE      RORY    HYSTERECTOMY, TOTAL ABDOMINAL (CERVIX REMOVED)      Age 37    RI NJX AA&/STRD TFRML EPI LUMBAR/SACRAL 1 LEVEL Bilateral 12/3/2018    BILATERAL L4 LUMBAR TRANSFORAMINAL EPIDURAL STEROID INJECTION WITH FLUOROSCOPY performed by Arsalan Andrea MD at [facility]     Social History     Socioeconomic History    Marital status:      Spouse name: Not on file    Number of children: Not on file    Years of education: Not on file    Highest education level: Not on file   Occupational History    Not on file   Tobacco Use    Smoking status: Never Smoker    Smokeless tobacco: Never Used   Vaping Use    Vaping Use: Never used   Substance and Sexual Activity    Alcohol use: No     Alcohol/week: 0.0 standard drinks    Drug use: No    Sexual activity: Yes     Partners: Male   Other Topics Concern    Not on file   Social History Narrative    Not on file     Social Determinants of Health     Financial Resource Strain: Low Risk     Difficulty of Paying Living Expenses: Not hard at all   Food Insecurity: No Food Insecurity    Worried About [facility] in the Last Year: Never true    [facility] in the Last Year: Never true   Transportation Needs:     Lack of Transportation (Medical): Not on file    Lack of Transportation (Non-Medical):  Not on file   Physical Activity: Inactive    Days of Exercise per Week: 0 days    Minutes of Exercise per Session: 0 min   Stress:     Feeling of Stress : Not on file   Social Connections:     Frequency of Communication with Friends and Family: Not on file    Frequency of Social Gatherings with Friends and Family: Not on file    Attends Orthodox Services: Not on file    Active Member of Clubs or Organizations: Not on file    Attends Club or Organization Meetings: Not on file    Marital Status: Not on file   Intimate Partner Violence:     Fear of Current or Ex-Partner: Not on file    Emotionally Abused: Not on file    Physically Abused: Not on file    Sexually Abused: Not on file   Housing Stability:     Unable to Pay for Housing in the Last Year: Not on file    Number of [place] in the Last Year: Not on file    Unstable Housing in the Last Year: Not on file     Family History   Problem Relation Age of Onset    Cancer Mother     Obesity Sister      Current Outpatient Medications   Medication Sig Dispense Refill    lisinopril (PRINIVIL;ZESTRIL) 10 MG tablet Take 1 tablet by mouth daily 30 tablet 0    metoprolol succinate (TOPROL XL) 25 MG extended release tablet Take 1 tablet by mouth daily 30 tablet 0    morphine (SHANIKA) 10 MG extended release capsule Take 10 mg by mouth daily. Unsure of dose and to start today, will take only at HS      zolpidem (AMBIEN) 5 MG tablet Take 5 mg by mouth nightly as needed for Sleep. Between [place] and flexeril.       atorvastatin (LIPITOR) 20 MG tablet TAKE ONE TABLET BY MOUTH DAILY 90 tablet 1    omeprazole (PRILOSEC) 40 MG delayed release capsule TAKE ONE CAPSULE BY MOUTH DAILY 90 capsule 3    fluticasone (FLONASE) 50 MCG/ACT nasal spray 2 sprays by Nasal route daily 3 each 3    cyclobenzaprine (FLEXERIL) 10 MG tablet Take 0.5 tablets by mouth nightly as needed for Muscle spasms Takes 1/2 tab 30 tablet 0    buPROPion (WELLBUTRIN XL) 300 MG extended release tablet TAKE ONE TABLET BY MOUTH EVERY OTHER DAY ALTERNATING WITH 150MG SR TABLET 45 tablet 3    buPROPion (WELLBUTRIN SR) 150 MG extended release tablet TAKE ONE TABLET BY MOUTH EVERY OTHER DAY ALTERNATING WITH 300 MG XL 45 tablet 0    montelukast (SINGULAIR) 10 MG tablet TAKE ONE TABLET BY MOUTH ONCE NIGHTLY 90 tablet 3    Semaglutide, 1 MG/DOSE, (OZEMPIC, 1 MG/DOSE,) 4 MG/3ML SOPN Inject 1 mg into the skin once a week 9 mL 1    DULoxetine (CYMBALTA) 60 MG extended release capsule TAKE ONE CAPSULE BY MOUTH DAILY 90 capsule 1    Continuous Blood Gluc Sensor (FREESTYLE JEREMIAS 14 DAY SENSOR) MISC CHANGE SENSOR EVERY 14 DAYS AND CHECK BLOOD SUGAR FOUR TIMES A DAY 2 each 5    traMADol (ULTRAM) 50 MG tablet Take 50 mg by mouth every 6 hours as needed for Pain. bid      Insulin Glargine, 1 Unit Dial, (TOUJEO SOLOSTAR) 300 UNIT/ML SOPN INJECT 40 UNITS UNDER THE SKIN DAILY (Patient taking differently: INJECT 36 UNITS UNDER THE SKIN DAILY) 9 pen 3    [employer] PEN NEEDLES 31G 31G X 8 MM MISC USE FOUR TIMES A  each 5    NOVOLOG FLEXPEN 100 UNIT/ML injection pen INJECT 10 UNITS UNDER THE SKIN THREE TIMES A DAY BEFORE MEALS (Patient taking differently: Pt only uses once weekly) 5 pen 2    Continuous Blood Gluc  (FREESTYLE JEREMIAS READER) ENE Use for checking glucose 1 Device 1     No current facility-administered medications for this visit. Allergies   Allergen Reactions    Actos [Pioglitazone Hydrochloride]      Causes mouth blisters    Albuterol      YEAST INFECTION IN MOUTH    Dilaudid [Hydromorphone Hcl]     Fluticasone-Salmeterol      YEAST INFECTIONS IN MOUTH    Glyburide      Causes mouth blisters    Lortab [Hydrocodone-Acetaminophen]      headache    Metformin      Causes mouth blisters    Symbicort [Budesonide-Formoterol Fumarate]      Causes mouth blisters     Family Status   Relation Name Status    Mother   at age 68    Father   at age 32    Sister  (Not Specified)       OBJECTIVE:  There were no vitals taken for this visit.    Wt Readings from Last 3 Encounters:   22 171 lb 3.2 oz (77.7 kg)   22 168 lb 10.4 oz (76.5 kg)   22 169 lb 9.6 oz (76.9 kg)       Constitutional: no acute distress, well appearing and well nourished  Psychiatric: oriented to person, place and time, judgement and insight and normal, recent and remote memory intact and mood and affect are normal  Skin: skin and subcutaneous tissue is normal without visible mass,   Head and Face: visual inspection  of head and face revealed no abnormalities  Eyes: visual inspection showed no lid or conjunctival swelling, erythema or discharge, pupils are normal, equal, round  Ears/Nose: external inspection of ears and nose revealed no abnormalities, hearing is grossly normal  Oropharynx/Mouth/Face: lips, tongue and gums appear  normal with no lesions, the voice quality was normal  Neck: neck appears symmetric, with no visible masses,   Pulmonary: no increased work of breathing or signs of respiratory distress,  Musculoskeletal: normal on inspection    Neurological: normal coordination and normal general cortical function        Lab Results   Component Value Date/Time    LABA1C 7.1 06/28/2022 09:24 AM    LABA1C 6.6 02/02/2022 02:21 PM    LABA1C 7.8 05/19/2021 03:03 PM       ASSESSMENT/PLAN:    --Type 2 diabetes mellitus with polyneuropathy last 8.2 >>10.9>>8.3>>10.9>>8.3>>7.7  ---toujeo 36 units daily A1c 7.1 in June 2022   she is on ozempic 1 mg weekly Switch to 2 mg once weekly Ozempic  Samples will be given hopefully we can get to higher doses of GLP-1 agonist  Still doesn't take meal boluses regularly. She had prior reaction to metformin ( mouth sores) Invokana ( UTI ) and Januvia and made her blood sugars run really high. Hypoglycemia protocol reviewed in detail with patient   Patient was advised that sending of her fingerstick blood glucose logs is crucial in management of her  diabetes. I will adjust the  dose of insulin according to sent data.      Health Maintenance   - Jose Villagomez was advised to have annual dilated eye exam     Last Eye Exam: Up to date on eye exam was told she has no retinopathy as per patient in [address] [place] [address] 2021   Last Podiatry Exam: nov 2020  she has significant peripheral neuropathy   Lipids: Last LDL 52 in May 2019    Microalbumin/Creatinine Ratio: She now follows with nephrology    HOCM  Follows with cardiology and is undergoing work-up      --- Diabetic polyneuropathy associated with type 2 diabetes mellitus ([facility]  She is on gabapentin as per primary care physician    ---- Postmenopausal  She had complete hysterectomy done at age 37 for severe endometriosis. She had been on hormone replacement therapy until age of 46. We discussed prevention of osteoporosis in detail today again   - DEXA Bone Density Axial Skeleton; ordered but she has not done it     --- Recurrent major depressive disorder, in partial remission ([facility]  She is on medication as per primary care physician    --- Essential hypertension  At target     --- Mixed hyperlipidemia  She is on Lipitor and her last LDL is 51 in 2019   We will check  at follow-up as she did not get blood work [address] and/or ordered clinical lab results yes   Reviewed and/or ordered radiology tests Yes  Reviewed and/or ordered other diagnostic tests yes   Made a decision to obtain old records yes   Reviewed and summarized old records yes     Amanda Hdez was counseled regarding symptoms of current diagnosis, course and complications of disease if inadequately treated, side effects of medications, diagnosis, treatment options, and prognosis, risks, benefits, complications, and alternatives of treatment, labs, imaging and other studies and treatment targets and goals. These diagnosis were discussed and reviewed with the patient including the advantages of drug therapy. She was counseled at this visit on the following: diabetes complication prevention and foot care. No follow-ups on file. Diabetes Continuous Glucose Monitoring Report         Reason for Study:     - improve diabetic control without risk of hypoglycemia       Current Medication regimen:    Basal bolus regimen    CGMS Report     CGMS data collection was performed on July 6/22   Patient provided information on her  diet, activities and insulin dosing  during this period.    Data was available for 7 days     Sensor Data Report:   - 12 AM to 6 AM: Overnight blood glucose pattern shows stable hyperglycemia  - 6   AM to 10 AM:  Post breakfast hyper glycemia is noted  --10AM to 5 PM : No hypoglycemia observed during this time. - 5   PM to 8 PM: Post meal minimal hyperglycemia is noted       Average reading 129 mg/dL     Co ef  Violeta 26.8   % of time <70 mg/dL 1 %   % of time >180  mg/dL  7  %   % of time within range 92 %  Number of hypoglycemia episodes noted: 0     Impression:   CGMS shows overall hyperglycemia and postmeal glycemic excursions noted     Recommendation:      Patient was advised to make the following changes in her diabetic regimen  On lantus to 36 units  She will start taking meal boluses according to the carb to insulin ratio of 10-1   Ozempic 1 mg weekly she will increase the dose to 2 mg weekly          TELEHEALTH EVALUATION -- Audio/Visual (During WTAVJ-84 public health emergency)  Pursuant to the emergency declaration under the [employer] and the [place] [address] waiver authority and the [employer] and [employer] Act, this Virtual  Visit was conducted, with patient's consent, to reduce the patient's risk of exposure to COVID-19 and provide care for  patient. Patient identification was verified and the caregiver was present when appropriate. The patient was located in the [facility] where the provider is licensed to practice. The patient and/or guardian are aware that this is a billable service which includes applicable co-pays. This virtual/audio visit was conducted with patient and/or legal guardian's consent. Total time spent : 20+ reviewing the chart, conducting an interview, performing a limited exam by video and educating the patient on my assessment plan.

## 2022-07-07 RX ORDER — INSULIN GLARGINE 300 U/ML
INJECTION, SOLUTION SUBCUTANEOUS
Qty: 9 PEN | Refills: 3 | Status: SHIPPED | OUTPATIENT
Start: 2022-07-07

## 2022-07-08 ENCOUNTER — TELEPHONE (OUTPATIENT)
Dept: ENDOCRINOLOGY | Age: 67
End: 2022-07-08

## 2022-07-08 NOTE — TELEPHONE ENCOUNTER
Pt requesting prescription for rybelsus.       Wilfred De La Torre [id number] [place] [address], [address] [place] [address]   Phone:  [phone]  Fax:  [phone]

## 2022-07-08 NOTE — TELEPHONE ENCOUNTER
The patient is calling again about another RX:  Simón    She wants this one to go to her [address] [facility], phone [phone] fax is [phone]    She is not going to use Ozempic.

## 2022-07-11 NOTE — TELEPHONE ENCOUNTER
I called the patient and had to leave a message on her answering machine that if she would choose to get switched to Rybelsus then she would have to stop Ozempic which apparently she is okay with. But I also informed patient that the weight loss would not be significant as compared to Ozempic that she is currently taking if she is okay with this then we can definitely switch her.

## 2022-07-24 DIAGNOSIS — F51.01 PRIMARY INSOMNIA: Primary | ICD-10-CM

## 2022-07-24 DIAGNOSIS — F33.41 RECURRENT MAJOR DEPRESSIVE DISORDER, IN PARTIAL REMISSION (HCC): ICD-10-CM

## 2022-07-24 DIAGNOSIS — M48.062 SPINAL STENOSIS OF LUMBAR REGION WITH NEUROGENIC CLAUDICATION: ICD-10-CM

## 2022-07-25 RX ORDER — BUPROPION HYDROCHLORIDE 150 MG/1
TABLET, EXTENDED RELEASE ORAL
Qty: 45 TABLET | Refills: 1 | Status: SHIPPED | OUTPATIENT
Start: 2022-07-25

## 2022-07-25 RX ORDER — ZOLPIDEM TARTRATE 5 MG/1
TABLET ORAL
Qty: 30 TABLET | Refills: 0 | Status: SHIPPED | OUTPATIENT
Start: 2022-07-25 | End: 2022-09-22 | Stop reason: SDUPTHER

## 2022-07-25 RX ORDER — CYCLOBENZAPRINE HCL 10 MG
TABLET ORAL
Qty: 30 TABLET | Refills: 1 | Status: SHIPPED | OUTPATIENT
Start: 2022-07-25

## 2022-08-01 ENCOUNTER — OFFICE VISIT (OUTPATIENT)
Dept: CARDIOLOGY CLINIC | Age: 67
End: 2022-08-01
Payer: MEDICARE

## 2022-08-01 VITALS
SYSTOLIC BLOOD PRESSURE: 122 MMHG | HEART RATE: 79 BPM | HEIGHT: 63 IN | DIASTOLIC BLOOD PRESSURE: 70 MMHG | BODY MASS INDEX: 31.01 KG/M2 | OXYGEN SATURATION: 98 % | WEIGHT: 175 LBS

## 2022-08-01 DIAGNOSIS — I10 ESSENTIAL HYPERTENSION: ICD-10-CM

## 2022-08-01 DIAGNOSIS — E78.2 MIXED HYPERLIPIDEMIA: ICD-10-CM

## 2022-08-01 DIAGNOSIS — I51.7 LVH (LEFT VENTRICULAR HYPERTROPHY): ICD-10-CM

## 2022-08-01 DIAGNOSIS — R06.02 SOB (SHORTNESS OF BREATH): ICD-10-CM

## 2022-08-01 DIAGNOSIS — E11.42 TYPE 2 DIABETES MELLITUS WITH DIABETIC POLYNEUROPATHY, WITH LONG-TERM CURRENT USE OF INSULIN (HCC): ICD-10-CM

## 2022-08-01 DIAGNOSIS — Z79.4 TYPE 2 DIABETES MELLITUS WITH DIABETIC POLYNEUROPATHY, WITH LONG-TERM CURRENT USE OF INSULIN (HCC): ICD-10-CM

## 2022-08-01 DIAGNOSIS — I42.1 HOCM (HYPERTROPHIC OBSTRUCTIVE CARDIOMYOPATHY) (HCC): ICD-10-CM

## 2022-08-01 DIAGNOSIS — G47.33 OSA (OBSTRUCTIVE SLEEP APNEA): ICD-10-CM

## 2022-08-01 DIAGNOSIS — Q24.8 LEFT VENTRICULAR OUTFLOW TRACT OBSTRUCTION: Primary | ICD-10-CM

## 2022-08-01 DIAGNOSIS — R55 SYNCOPE AND COLLAPSE: ICD-10-CM

## 2022-08-01 PROCEDURE — 93270 REMOTE 30 DAY ECG REV/REPORT: CPT | Performed by: INTERNAL MEDICINE

## 2022-08-01 PROCEDURE — G8417 CALC BMI ABV UP PARAM F/U: HCPCS | Performed by: INTERNAL MEDICINE

## 2022-08-01 PROCEDURE — 2022F DILAT RTA XM EVC RTNOPTHY: CPT | Performed by: INTERNAL MEDICINE

## 2022-08-01 PROCEDURE — 99205 OFFICE O/P NEW HI 60 MIN: CPT | Performed by: INTERNAL MEDICINE

## 2022-08-01 PROCEDURE — G8427 DOCREV CUR MEDS BY ELIG CLIN: HCPCS | Performed by: INTERNAL MEDICINE

## 2022-08-01 PROCEDURE — 1090F PRES/ABSN URINE INCON ASSESS: CPT | Performed by: INTERNAL MEDICINE

## 2022-08-01 RX ORDER — METOPROLOL SUCCINATE 50 MG/1
50 TABLET, EXTENDED RELEASE ORAL DAILY
Qty: 90 TABLET | Refills: 3 | Status: SHIPPED | OUTPATIENT
Start: 2022-08-01

## 2022-08-01 NOTE — PATIENT INSTRUCTIONS
Plan:   First thing in the morning, Drink 12-16 oz of water. 2.    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 Most of the time, this is an inherited condition. 3.   INCREASE Metoprolol (Toprol) to 50mg   4. Genetic Testing today  5. Reduce caffeine. 6.   You may continue to drive. 7.   Consent for Camzyos will be done at a later date. The purpose of this medication is to help lessen outflow tract obstruction. ~ Refer to Dr. Dany Regan for sleep medicine. 8.  See Dr. Bandar Ferreira in 1 month. 9.  30 day monitor.

## 2022-08-01 NOTE — PROGRESS NOTES
[facility]  Advanced CHF/Pulmonary Hypertension   Cardiac Evaluation      Verna Schulte  YOB: 1955    Date of Visit:  8/1/22      Chief Complaint   Patient presents with    Cardiomyopathy    Hyperlipidemia    Hypertension    Coronary Artery Disease    New Patient        History of Present Illness:  Verna Schulte is a 74 y.o. female who presents from referral from Dr. Akash Clifton for consultation and management of mild HOCM. Verna Schulte is 74 y.o. and has a past medical history of type 2 diabetes, CKD 3, HTN. She was in [facility] and came home on 6/24/22. While in the airport, she began to have midsternal chaet pressure 7/10 with SOB and diffuse diaphoresis and syncope and collapse  She was advised to go the ER and did not. She flew back to [place] and has been having exertional dyspnea and chest pain associate with diaphoresis. She had a did have a negative left heart cath September 2021. She was started on Toprol 25mg on DC on 6/24/22    She had a 30 day monitor recommended along with genetic testing. Today she is seen as a New patient. She states she grew up in an orphanage. She was always told she could not play and made to read in the [facility] because she would pass out. She would pass out with activity. States this has been a long standing problem. It would happen if she would \"lift something\" or walk too long. It would happen in the heat. She states she stays dehydrated. She states her BP would not stabilize and when she stood up. Since being started on Toprol, she feels less dizzy. She uses a rolling walker for balance. She states, when the episodes happen, she cannot cool herself off. She has a chair lift at home. Discussed her outflow tract obstruction with activity. Discussed hydration. She has not formally been diagnosed with HCM. She is here today with her , Chidi Herrera. States she had a UPPP 20 years ago for WATSON.   States she does not sleep well according to her Fit Bit; just 2 hours a night. She reports she wakes up with the bed \"soaking wet. \"       She knows info about her Mother's mother. No heart disease  She has no known information about her father. She has a sister without heart problems  She has children. Allergies   Allergen Reactions    Actos [Pioglitazone Hydrochloride]      Causes mouth blisters    Albuterol      YEAST INFECTION IN MOUTH    Dilaudid [Hydromorphone Hcl]     Fluticasone-Salmeterol      YEAST INFECTIONS IN MOUTH    Glyburide      Causes mouth blisters    Lortab [Hydrocodone-Acetaminophen]      headache    Metformin      Causes mouth blisters    Symbicort [Budesonide-Formoterol Fumarate]      Causes mouth blisters     Current Outpatient Medications   Medication Sig Dispense Refill    cyclobenzaprine (FLEXERIL) 10 MG tablet TAKE 1/2 TABLET BY MOUTH ONCE NIGHTLY AS NEEDED FOR MUSCLE SPASMS 30 tablet 1    buPROPion (WELLBUTRIN SR) 150 MG extended release tablet TAKE ONE TABLET BY MOUTH EVERY OTHER DAY ALTERNATING WITH 300MG 45 tablet 1    zolpidem (AMBIEN) 5 MG tablet TAKE ONE TABLET BY MOUTH ONCE NIGHTLY AS NEEDED FOR SLEEP 30 tablet 0    Insulin Glargine, 1 Unit Dial, (TOUJEO SOLOSTAR) 300 UNIT/ML SOPN INJECT 40 UNITS UNDER THE SKIN DAILY 9 pen 3    lisinopril (PRINIVIL;ZESTRIL) 10 MG tablet Take 1 tablet by mouth daily 30 tablet 0    metoprolol succinate (TOPROL XL) 25 MG extended release tablet Take 1 tablet by mouth daily 30 tablet 0    morphine (SHANIKA) 10 MG extended release capsule Take 10 mg by mouth daily.  Unsure of dose and to start today, will take only at HS      atorvastatin (LIPITOR) 20 MG tablet TAKE ONE TABLET BY MOUTH DAILY 90 tablet 1    omeprazole (PRILOSEC) 40 MG delayed release capsule TAKE ONE CAPSULE BY MOUTH DAILY 90 capsule 3    fluticasone (FLONASE) 50 MCG/ACT nasal spray 2 sprays by Nasal route daily 3 each 3    buPROPion (WELLBUTRIN XL) 300 MG extended release tablet TAKE ONE TABLET BY MOUTH EVERY OTHER DAY ALTERNATING WITH 150MG SR TABLET 45 tablet 3    montelukast (SINGULAIR) 10 MG tablet TAKE ONE TABLET BY MOUTH ONCE NIGHTLY 90 tablet 3    Semaglutide, 1 MG/DOSE, (OZEMPIC, 1 MG/DOSE,) 4 MG/3ML SOPN Inject 1 mg into the skin once a week 9 mL 1    DULoxetine (CYMBALTA) 60 MG extended release capsule TAKE ONE CAPSULE BY MOUTH DAILY 90 capsule 1    Continuous Blood Gluc Sensor (FREESTYLE JEREMIAS 14 DAY SENSOR) MISC CHANGE SENSOR EVERY 14 DAYS AND CHECK BLOOD SUGAR FOUR TIMES A DAY 2 each 5    traMADol (ULTRAM) 50 MG tablet Take 50 mg by mouth every 6 hours as needed for Pain. bid      [employer] PEN NEEDLES 31G 31G X 8 MM MISC USE FOUR TIMES A  each 5    NOVOLOG FLEXPEN 100 UNIT/ML injection pen INJECT 10 UNITS UNDER THE SKIN THREE TIMES A DAY BEFORE MEALS (Patient taking differently: Pt only uses once weekly) 5 pen 2    Continuous Blood Gluc  (FREESTYLE JEREMIAS READER) ENE Use for checking glucose 1 Device 1     No current facility-administered medications for this visit.        Past Medical History:   Diagnosis Date    Asthma     vocal fold     Ataxia     Cervical stenosis of spinal canal 10/7/2021    Coronary artery disease involving native coronary artery of native heart without angina pectoris 9/9/2021    Depression     Diabetes mellitus (HCC)     Hyperlipidemia     Hypertension     Hypothyroidism     Obesity     Osteoarthritis     Paradoxical vocal fold motion disorder     Postmenopausal 5/15/2018    Stage 2 chronic kidney disease 5/7/2019     Past Surgical History:   Procedure Laterality Date    CARPAL TUNNEL RELEASE      RORY    HYSTERECTOMY, TOTAL ABDOMINAL (CERVIX REMOVED)      Age 37    DC NJX AA&/STRD TFRML EPI LUMBAR/SACRAL 1 LEVEL Bilateral 12/3/2018    BILATERAL L4 LUMBAR TRANSFORAMINAL EPIDURAL STEROID INJECTION WITH FLUOROSCOPY performed by Chanelle Anaya MD at [address]     Family History   Problem Relation Age of Onset    Cancer Mother Obesity Sister      Social History     Socioeconomic History    Marital status:      Spouse name: Not on file    Number of children: Not on file    Years of education: Not on file    Highest education level: Not on file   Occupational History    Not on file   Tobacco Use    Smoking status: Never    Smokeless tobacco: Never   Vaping Use    Vaping Use: Never used   Substance and Sexual Activity    Alcohol use: No     Alcohol/week: 0.0 standard drinks    Drug use: No    Sexual activity: Yes     Partners: Male   Other Topics Concern    Not on file   Social History Narrative    Not on file     Social Determinants of Health     Financial Resource Strain: Not on file   Food Insecurity: Not on file   Transportation Needs: Not on file   Physical Activity: Inactive    Days of Exercise per Week: 0 days    Minutes of Exercise per Session: 0 min   Stress: Not on file   Social Connections: Not on file   Intimate Partner Violence: Not on file   Housing Stability: Not on file       Review of Systems:   Constitutional: there has been no unanticipated weight loss. There's been no change in energy level, sleep pattern, or activity level. Eyes: No visual changes or diplopia. No scleral icterus. ENT: No Headaches, hearing loss or vertigo. No mouth sores or sore throat. Cardiovascular: Reviewed in HPI  Respiratory: No cough or wheezing, no sputum production. No hematemesis. Gastrointestinal: No abdominal pain, appetite loss, blood in stools. No change in bowel or bladder habits. Genitourinary: No dysuria, trouble voiding, or hematuria. Musculoskeletal:  No gait disturbance, weakness or joint complaints. + Weakness  Integumentary: No rash or pruritis. Neurological: No headache, diplopia, change in muscle strength, numbness or tingling. No change in gait, balance, coordination, mood, affect, memory, mentation, behavior. Psychiatric: No anxiety, no depression.   Endocrine: No malaise, fatigue or temperature intolerance. No excessive thirst, fluid intake, or urination. No tremor. Hematologic/Lymphatic: No abnormal bruising or bleeding, blood clots or swollen lymph nodes. Allergic/Immunologic: No nasal congestion or hives. Physical Examination:    Vitals:    08/01/22 0746   BP: 122/70   Site: Right Upper Arm   Position: Sitting   Cuff Size: Medium Adult   Pulse: 79   SpO2: 98%   Weight: 175 lb (79.4 kg)   Height: 5' 3\" (1.6 m)     Body mass index is 31 kg/m². Wt Readings from Last 3 Encounters:   08/01/22 175 lb (79.4 kg)   06/28/22 171 lb 3.2 oz (77.7 kg)   06/24/22 168 lb 10.4 oz (76.5 kg)     BP Readings from Last 3 Encounters:   08/01/22 122/70   06/28/22 112/70   06/24/22 124/69     Constitutional and General Appearance: Older than stated age, Pale   WD/WN in NAD  HEENT:  NC/AT  TEENA  No problems with hearing  Skin:  Warm, dry  Respiratory:  Normal excursion and expansion without use of accessory muscles  Resp Auscultation: Normal breath sounds without dullness  Cardiovascular: The apical impulses not displaced  Heart tones are crisp and normal  Cervical veins are not engorged  The carotid upstroke is normal in amplitude and contour without delay or bruit  JVP less than 8 cm H2O  RRR with nl S1 and S2 with II/VI HERSON  Peripheral pulses are symmetrical and full  There is no clubbing, cyanosis of the extremities. No edema  Femoral Arteries: 2+ and equal  Pedal Pulses: 2+ and equal   Neck:  No thyromegaly  Abdomen:  No masses or tenderness  Liver/Spleen: No Abnormalities Noted  Neurological/Psychiatric:  Alert and oriented in all spheres  Moves all extremities well  Exhibits normal gait balance and coordination  No abnormalities of mood, affect, memory, mentation, or behavior are noted    Echo 6/24/22   Hyperdynamic left ventricular systolic function with an ejection fraction   estimated at greater than 70%. No obvious regional wall motion abnormalities.    There is a late peaking gradient recorded across the LV outflow tract   consistent with dynamic obstruction with a peak gradient of 25 mmHg at rest   and with valsalva maneuver. There is mild tricuspid regurgitation with a RVSP estimation of 22 mmHg. Grade I diastolic dysfunction with normal LV filling pressures. Avg.   E/e'=13.0. Consider hypertrophic cardiomyopathy or hyperdynamic state if clinically   indicated. [facility] 9/14/21  Dr. Nereida Brown   Cardiac Cath LVG:  Anatomy:   LM-nml   LAD-nml  Cx-nml  OM- nml  RCA-dominant  RPDA- nml  LVEF- 65  LVG- nml  LVEDP- 27     Contrast: 48  Flouro Time: 2.9  Access: R Radial artery     Impression  ~Coronary Angiography w/ normal coronaries  ~LVG with LVEF of 65 and no regional wall motion abnormalities  ~high lvedp c/w diastolic CHF         Recommendation  ~Aggressive medical treatment and risk factor modification  ~1. Medications reviewed  2. Lasix 20mg po daily. Check bmp in 1 week. FU with nephrology  3. No indication for beta blocker, statin or anti platelet therapy  4. Patient has been advised on the importance of regular exercise of at least 20-30 minutes daily alternating with aerobic and isometric activities. 5. Patient counseled about and offered assistance for smoking cessation  6. No indication for cardiac rehab  7. Follow up with PCP      Labs were reviewed including labs from other hospital systems through [facility]. Cardiac testing was reviewed including echos, nuclear scans, cardiac catheterization, including from other hospital systems through [facility]. Assessment:    1. Left ventricular outflow tract obstruction    2. SOB (shortness of breath)    3. Type 2 diabetes mellitus with diabetic polyneuropathy, with long-term current use of insulin ([facility]    4. Mixed hyperlipidemia    5. Essential hypertension    6. LVH (left ventricular hypertrophy)    7. WATSON (obstructive sleep apnea)    8. Syncope and collapse    9.  HOCM (hypertrophic obstructive cardiomyopathy) ([address] Plan:   First thing in the morning, Drink 12-16 oz of water to help with the outflow tract obstruction  2. Lowering your heart rate, helps this heart outflow tract obstruction problem. Most of the time, this is an inherited condition. 3.   INCREASE Metoprolol (Toprol) to 50mg Daily  4. Genetic Testing today (Unable to produce any saliva)   5. Reduce caffeine. 6.   You may continue to drive. 7.   Consent for Camzyos will be done at a later date. The purpose of this medication is to help lessen outflow tract obstruction. ~ Refer to Dr. Carlyn Snellen for sleep medicine. 8.  See Dr. Jess Rose in 1 month  9.  30 day monitor. Applied in office. 10.  [employer] [facility] to home for blood testing. Limited Echos for Camzyos will be EVERY 4, 8 12 weeks. Then every 3 months per Camzyos REM program.    Scribe's attestation: This note was scribed in the presence of Elver Gonzalez M.D. by Day Arriaza RN     The scribe's documentation has been prepared under my direction and personally reviewed by me in its entirety. I confirm that the note above accurately reflects all work, treatment, procedures, and medical decision making performed by me. QUALITY MEASURES  1. Tobacco Cessation Counseling: NA  2. Retake of BP if >140/90:   NA  3. Documentation to PCP/referring for new patient:  Sent to PCP at close of office visit  4. CAD patient on anti-platelet: NA  5. CAD patient on STATIN therapy:  NA  6. Patient with CHF and aFib on anticoagulation:  NA     Time Based Itemization  A total of 80 minutes was spent on today's patient encounter.   If applicable, non-patient-facing activities:  ( x)Preparing to see the patient and reviewing records  (x ) Individual interpretation of results  ( ) Discussion or coordination of care with other health care professionals  ( x) Ordering of unique tests, medications, or procedures  ( x) Documentation within the EHR      I appreciate the opportunity of cooperating in the care of this patient.     Lorne Swenson M.D., [address] [facility]

## 2022-08-03 ENCOUNTER — TELEPHONE (OUTPATIENT)
Dept: CARDIOLOGY CLINIC | Age: 67
End: 2022-08-03

## 2022-08-03 NOTE — TELEPHONE ENCOUNTER
Pt could not provide genetic saliva sample in office on 8/1/22. Reached out to [facility] to come to patient's home with her consent. Paperwork completed on my desk.

## 2022-08-03 NOTE — TELEPHONE ENCOUNTER
Pt came in and Consented for REMS for Camzyos today. Camzyos Brochure given and all questions answered. Will complete COVER MY MEDS for Camzyos. Serial Limited Echos ordered at 4, 8 and 12 weeks thus far. Need order for 12 week echo.

## 2022-08-05 NOTE — TELEPHONE ENCOUNTER
Machelle genetic paperwork and [employer] information and office note submitted to   [facility], Customer Support    [employer]    Your request has been received. I will forward it on to begin processing.

## 2022-08-08 ENCOUNTER — TELEPHONE (OUTPATIENT)
Dept: CARDIOLOGY CLINIC | Age: 67
End: 2022-08-08

## 2022-08-08 NOTE — TELEPHONE ENCOUNTER
Left message for pt regarding IF someone reached out to her by way of email to have her sign electronic forms for Camzyos patient assistance. Requested that pt call us back with this information and IF she got an Email. (Noted a post it note on my desk regarding Camyzos and to fax Camyzos Form.   There is nothing to be faxed as all enrollment is electronic)

## 2022-08-09 ENCOUNTER — TELEPHONE (OUTPATIENT)
Dept: CARDIOLOGY CLINIC | Age: 67
End: 2022-08-09

## 2022-08-10 ENCOUNTER — TELEPHONE (OUTPATIENT)
Dept: CARDIOLOGY CLINIC | Age: 67
End: 2022-08-10

## 2022-08-10 NOTE — TELEPHONE ENCOUNTER
X [phone]. Called Rosemary and she is off for the day.     will have Rosemary call the office tomorrow around 9am.

## 2022-08-10 NOTE — TELEPHONE ENCOUNTER
Pt called in stating she was returning [facility] call regarding a needed signature.     Pt can be reached at [phone]

## 2022-08-11 ENCOUNTER — TELEPHONE (OUTPATIENT)
Dept: CARDIOLOGY CLINIC | Age: 67
End: 2022-08-11

## 2022-08-11 RX ORDER — LISINOPRIL 5 MG/1
5 TABLET ORAL DAILY
Qty: 90 TABLET | Refills: 3 | Status: SHIPPED | OUTPATIENT
Start: 2022-08-11 | End: 2022-08-29 | Stop reason: ALTCHOICE

## 2022-08-11 NOTE — TELEPHONE ENCOUNTER
Spoke to pt. Updated on the two Camzyos consents now completed and to be expecting a new email from [employer]. Pt aware. Pt gave me her BP  Lying 161/75  Sitting 148/79  Standing 115/66    She states she was well hydrated with 64 oz. Of water. Updated YONI. Will decrease Lisinopril to 5mg daily and keep metoprolol at 50mg. Updated pt and she verbalizes understanding.

## 2022-08-11 NOTE — TELEPHONE ENCOUNTER
Spoke with Jr from [employer] Patient Support Program thru Cover My Meds. The NEW Camyzos Cover My Meds electronic form is completed and ready for YONI to sign electronically when she returns from [facility]. The process involves 2 consent forms.  One in REMS and one in [address] [place] [address]

## 2022-08-12 NOTE — TELEPHONE ENCOUNTER
Cover My Meds camzyos support re filled out so that pt could receive a new consent form by email. Pt updated to be on the look out in her email. She has been approved for the drug but need patient assistance and they has been delayed due to this consent form. Echo also needs to be delayed. Pt is to call when she receives her medication Camzyos 5mg and Echo needs to be scheduled 4 weeks after she starts it.     Please reschedule Echo

## 2022-08-15 NOTE — TELEPHONE ENCOUNTER
Spoke to pt. She was told the drug Camzyos will cost her $1600/month  She did sign the consent through Cover My Meds that was sent to her email. Discussed that this will allow her to get patient assistance. Will Follow with Camzyos rep.

## 2022-08-17 ENCOUNTER — TELEPHONE (OUTPATIENT)
Dept: CARDIOLOGY CLINIC | Age: 67
End: 2022-08-17

## 2022-08-17 NOTE — TELEPHONE ENCOUNTER
Per Cover My Meds patient support, Rosemary, patient's co-pay is high $1600/mo    She will be sent to [facility] [phone] for FUNDING Help    The patient will be responsible to follow up on her own. The patient knows this.

## 2022-08-17 NOTE — TELEPHONE ENCOUNTER
Spoke to pt. States she will drop off paperwork tomorrow for YONI around 2pm for the [facility] patient assistance program. She was made aware YONI is out of the office.

## 2022-08-22 ENCOUNTER — TELEPHONE (OUTPATIENT)
Dept: CARDIOLOGY CLINIC | Age: 67
End: 2022-08-22

## 2022-08-22 NOTE — TELEPHONE ENCOUNTER
If approved, pt will only get 2 months of Camzyos medication through [facility] ([address])   Per [employer], the office was instructed to call REMS for refills beyond 2 months. #[phone]. [employer] rep states that it is a short term drug of two months. Educated and clarified with rep on phone that the drug continues and gets titrated depending on the echo result. She then asked me to hold over 30 minutes to speak to someone from REMS. Per YONI, rep needs to get involved on this case. Phoned pt to make her aware that they do not accept email. Only fax or via the Portal for [facility]. Left pt a message with this information.

## 2022-08-22 NOTE — TELEPHONE ENCOUNTER
Spoke to pt. Informed her that her [address] paperwork was faxed in today. She states she will email her portion in tomorrow. Cancelled her limited echo on 8/24 as she has not secured funding for her Camzyos. She will inform us when she received the drug.

## 2022-08-24 ENCOUNTER — TELEPHONE (OUTPATIENT)
Dept: CARDIOLOGY CLINIC | Age: 67
End: 2022-08-24

## 2022-08-24 NOTE — TELEPHONE ENCOUNTER
Call placed to patient to schedule appt, she was unavailable. Could not leave a message due to no vm. Will need to be called again at a later time.

## 2022-08-24 NOTE — TELEPHONE ENCOUNTER
Spoke to [employer]. Echos are covered with $30 copay. Steve Latham is unsure if that is with each echo. Pt started [employer] Patient Assistance Program paperwork. See other note. Steve Latham has no new information on this case. While talking to Steve Latham, patient brought in her [address] portion completed with her 1040-SR forms for 2021. Will fax to [phone]. Spoke to [place] from REMS who called to see if there was any help she could provide. Her number is [phone]. I advised her while talking with Steve Latham, pt brought in her [employer] forms to be faxed in. She will follow up by email if she has any new information.

## 2022-08-24 NOTE — TELEPHONE ENCOUNTER
Spoke to Ana Burgess. She has no further information. Directed to call Luiza Austin  [phone].     Left message for Edward Schneider

## 2022-08-25 ENCOUNTER — TELEPHONE (OUTPATIENT)
Dept: CARDIOLOGY CLINIC | Age: 67
End: 2022-08-25

## 2022-08-25 NOTE — TELEPHONE ENCOUNTER
Called and spoke to REPP. She states [employer] did not get page 4 of her enrollment. (The MD portion)     Page 4 was faxed in on 8/22/22 and is already scanned in the patient chart with fax confirmation of SUCCESS. Will print it and refax it now. Received Fax confirmation today 8/25/22 that it has gone through to [facility]. Pt aware.

## 2022-08-29 ENCOUNTER — TELEPHONE (OUTPATIENT)
Dept: CARDIOLOGY CLINIC | Age: 67
End: 2022-08-29

## 2022-08-29 ENCOUNTER — OFFICE VISIT (OUTPATIENT)
Dept: CARDIOLOGY CLINIC | Age: 67
End: 2022-08-29
Payer: MEDICARE

## 2022-08-29 VITALS
DIASTOLIC BLOOD PRESSURE: 56 MMHG | SYSTOLIC BLOOD PRESSURE: 94 MMHG | WEIGHT: 175 LBS | BODY MASS INDEX: 31.01 KG/M2 | HEART RATE: 84 BPM | OXYGEN SATURATION: 97 % | HEIGHT: 63 IN

## 2022-08-29 DIAGNOSIS — R06.02 SOB (SHORTNESS OF BREATH): ICD-10-CM

## 2022-08-29 DIAGNOSIS — E78.2 MIXED HYPERLIPIDEMIA: ICD-10-CM

## 2022-08-29 DIAGNOSIS — G47.33 OSA (OBSTRUCTIVE SLEEP APNEA): ICD-10-CM

## 2022-08-29 DIAGNOSIS — R55 SYNCOPE AND COLLAPSE: ICD-10-CM

## 2022-08-29 DIAGNOSIS — I10 ESSENTIAL HYPERTENSION: ICD-10-CM

## 2022-08-29 DIAGNOSIS — Q24.8 LEFT VENTRICULAR OUTFLOW TRACT OBSTRUCTION: ICD-10-CM

## 2022-08-29 DIAGNOSIS — I42.1 HOCM (HYPERTROPHIC OBSTRUCTIVE CARDIOMYOPATHY) (HCC): Primary | ICD-10-CM

## 2022-08-29 PROCEDURE — 1036F TOBACCO NON-USER: CPT | Performed by: INTERNAL MEDICINE

## 2022-08-29 PROCEDURE — G8427 DOCREV CUR MEDS BY ELIG CLIN: HCPCS | Performed by: INTERNAL MEDICINE

## 2022-08-29 PROCEDURE — 3017F COLORECTAL CA SCREEN DOC REV: CPT | Performed by: INTERNAL MEDICINE

## 2022-08-29 PROCEDURE — 1090F PRES/ABSN URINE INCON ASSESS: CPT | Performed by: INTERNAL MEDICINE

## 2022-08-29 PROCEDURE — G8417 CALC BMI ABV UP PARAM F/U: HCPCS | Performed by: INTERNAL MEDICINE

## 2022-08-29 PROCEDURE — 1123F ACP DISCUSS/DSCN MKR DOCD: CPT | Performed by: INTERNAL MEDICINE

## 2022-08-29 PROCEDURE — 99215 OFFICE O/P EST HI 40 MIN: CPT | Performed by: INTERNAL MEDICINE

## 2022-08-29 PROCEDURE — G8399 PT W/DXA RESULTS DOCUMENT: HCPCS | Performed by: INTERNAL MEDICINE

## 2022-08-29 RX ORDER — MAVACAMTEN 5 MG/1
5 CAPSULE, GELATIN COATED ORAL DAILY
Qty: 30 CAPSULE | Refills: 2
Start: 2022-08-29 | End: 2022-09-28

## 2022-08-29 NOTE — PATIENT INSTRUCTIONS
Plan:   First thing in the morning, Drink 12-16 oz of water to help with the outflow tract obstruction  2. STOP Lisinopril for Now.    3.    Continue Metoprolol. 4.    30 day monitor is in her car and needs to be mailed in to [facility]. 5.    Labs in 1 month after starting Camzyos. 6.    We will follow up this week with [address] [place] [address] for your financial assistance.     7.    See Dr. Bishop Benoit in 6 weeks

## 2022-08-29 NOTE — TELEPHONE ENCOUNTER
Received fax that Camzyos is Approved from 8-26-22 through 12-31-22 Free of change. The pharmacy name is [facility] [phone] Option #6 for Camzyos. The script will be sent to them and they will reach out to the patient to mail the drug to her after clarifying/confirming  her address. Asked about refills after first 2 months. Meredith Moran states Camzyos REMS program will have more information about those refills [phone]. [facility] and they are shipping out today delivery is tomorrow. Signature is required for the drug. It will be a 35 day supply. Jorge Arriaza from [place] on the line. She states after two months, she will need a new scripts. Med list was reviewed with the pharmacist.  She will have two 35 day scripts that are ready. Need refill at end of October for 30 days to [place]. Patient Status Form is referred to \[facility]\"". We update the REMS portal.     Will call pt to se sure she is aware she needs to sign for the drug.

## 2022-08-29 NOTE — PROGRESS NOTES
[facility]  Advanced CHF/Pulmonary Hypertension   Cardiac Evaluation      Dave Cowden  YOB: 1955    Date of Visit:  8/29/22      Chief Complaint   Patient presents with    Cardiomyopathy          History of Present Illness:  Dave Cowden is a 74 y.o. female who presents from referral from Dr. Francie Alfonso for consultation and management of mild HOCM. Dave Cowden is 74 y.o. and has a past medical history of type 2 diabetes, CKD 3, HTN. She was in [facility] and came home on 6/24/22. While in the airport, she began to have midsternal chaet pressure 7/10 with SOB and diffuse diaphoresis and syncope and collapse  She was advised to go the ER and did not. She flew back to [place] and has been having exertional dyspnea and chest pain associate with diaphoresis. She had a did have a negative left heart cath September 2021. She was started on Toprol 25mg on DC on 6/24/22    She had a 30 day monitor recommended along with genetic testing. Today she is seen as a New patient. She states she grew up in an orphanage. She was always told she could not play and made to read in the [facility] because she would pass out. She would pass out with activity. States this has been a long standing problem. It would happen if she would \"lift something\" or walk too long. It would happen in the heat. She states she stays dehydrated. She states her BP would not stabilize and when she stood up. Since being started on Toprol, she feels less dizzy. She uses a rolling walker for balance. She states, when the episodes happen, she cannot cool herself off. She has a chair lift at home. Discussed her outflow tract obstruction with activity. Discussed hydration. She has not formally been diagnosed with HCM. She is here today with her , Kenzie Morris. States she had a UPPP 20 years ago for WATSON. States she does not sleep well according to her Fit Bit; just 2 hours a night.   She reports she wakes up with the bed \"soaking wet. \"       She knows info about her Mother's mother. No heart disease  She has no known information about her father. She has a sister without heart problems  She has children. Today she is here for follow up for LVOT gradient obstruction/CHF. She had blood genetic testing and the results are not back yet; expected on 9/14/22. She wore a 30 day heart monitor. Her Toprol was increased to 50mg daily on 8/1/22. Her  is concerned about low BP and heart rate. BP at home 443 systolic and drops down to 100's with standing up. She is trying to drink fluids all day long. She states she gets exhausted making her bed. She will mail in her 30 day monitor as planned er [employer]. She is here today with her . Genetic Testing [facility]: Received 8/24/22  Result: Pending       Date Dose of Camzyos LVEF LVOT Gradient   6/24/22 0 70 25                                                           Allergies   Allergen Reactions    Actos [Pioglitazone Hydrochloride]      Causes mouth blisters    Albuterol      YEAST INFECTION IN MOUTH    Dilaudid [Hydromorphone Hcl]     Fluticasone-Salmeterol      YEAST INFECTIONS IN MOUTH    Glyburide      Causes mouth blisters    Lortab [Hydrocodone-Acetaminophen]      headache    Metformin      Causes mouth blisters    Symbicort [Budesonide-Formoterol Fumarate]      Causes mouth blisters     Current Outpatient Medications   Medication Sig Dispense Refill    metoprolol succinate (TOPROL XL) 50 MG extended release tablet Take 1 tablet by mouth in the morning.  90 tablet 3    cyclobenzaprine (FLEXERIL) 10 MG tablet TAKE 1/2 TABLET BY MOUTH ONCE NIGHTLY AS NEEDED FOR MUSCLE SPASMS 30 tablet 1    buPROPion (WELLBUTRIN SR) 150 MG extended release tablet TAKE ONE TABLET BY MOUTH EVERY OTHER DAY ALTERNATING WITH 300MG 45 tablet 1    Insulin Glargine, 1 Unit Dial, (TOUJEO SOLOSTAR) 300 UNIT/ML SOPN INJECT 40 UNITS UNDER THE SKIN DAILY 9 pen 3 chronic kidney disease 5/7/2019     Past Surgical History:   Procedure Laterality Date    CARPAL TUNNEL RELEASE      RORY    HYSTERECTOMY, TOTAL ABDOMINAL (CERVIX REMOVED)      Age 37    SD NJX AA&/STRD TFRML EPI LUMBAR/SACRAL 1 LEVEL Bilateral 12/3/2018    BILATERAL L4 LUMBAR TRANSFORAMINAL EPIDURAL STEROID INJECTION WITH FLUOROSCOPY performed by Wisam Medrano MD at [address]     Family History   Problem Relation Age of Onset    Cancer Mother     Obesity Sister      Social History     Socioeconomic History    Marital status:      Spouse name: Not on file    Number of children: Not on file    Years of education: Not on file    Highest education level: Not on file   Occupational History    Not on file   Tobacco Use    Smoking status: Never    Smokeless tobacco: Never   Vaping Use    Vaping Use: Never used   Substance and Sexual Activity    Alcohol use: No     Alcohol/week: 0.0 standard drinks    Drug use: No    Sexual activity: Yes     Partners: Male   Other Topics Concern    Not on file   Social History Narrative    Not on file     Social Determinants of Health     Financial Resource Strain: Not on file   Food Insecurity: Not on file   Transportation Needs: Not on file   Physical Activity: Inactive    Days of Exercise per Week: 0 days    Minutes of Exercise per Session: 0 min   Stress: Not on file   Social Connections: Not on file   Intimate Partner Violence: Not on file   Housing Stability: Not on file       Review of Systems:   Constitutional: there has been no unanticipated weight loss. There's been no change in energy level, sleep pattern, or activity level. Eyes: No visual changes or diplopia. No scleral icterus. ENT: No Headaches, hearing loss or vertigo. No mouth sores or sore throat. Cardiovascular: Reviewed in HPI  Respiratory: No cough or wheezing, no sputum production. No hematemesis.     Gastrointestinal: No abdominal pain, appetite loss, blood in stools. No change in bowel or bladder habits. Genitourinary: No dysuria, trouble voiding, or hematuria. Musculoskeletal:  No gait disturbance, weakness or joint complaints. + Weakness  Integumentary: No rash or pruritis. Neurological: No headache, diplopia, change in muscle strength, numbness or tingling. No change in gait, balance, coordination, mood, affect, memory, mentation, behavior. Psychiatric: No anxiety, no depression. Endocrine: No malaise, fatigue or temperature intolerance. No excessive thirst, fluid intake, or urination. No tremor. Hematologic/Lymphatic: No abnormal bruising or bleeding, blood clots or swollen lymph nodes. Allergic/Immunologic: No nasal congestion or hives. Physical Examination:    Vitals:    08/29/22 0751   BP: (!) 94/56   Pulse: 84   SpO2: 97%   Weight: 175 lb (79.4 kg)   Height: 5' 3\" (1.6 m)       Body mass index is 31 kg/m². Wt Readings from Last 3 Encounters:   08/29/22 175 lb (79.4 kg)   08/15/22 179 lb (81.2 kg)   08/01/22 175 lb (79.4 kg)     BP Readings from Last 3 Encounters:   08/29/22 (!) 94/56   08/15/22 124/64   08/01/22 122/70     Constitutional and General Appearance: Older than stated age, Pale   WD/WN in NAD  HEENT:  NC/AT  TEENA  No problems with hearing  Skin:  Warm, dry  Respiratory:  Normal excursion and expansion without use of accessory muscles  Resp Auscultation: Normal breath sounds without dullness  Cardiovascular: The apical impulses not displaced  Heart tones are crisp and normal  Cervical veins are not engorged  The carotid upstroke is normal in amplitude and contour without delay or bruit  JVP less than 8 cm H2O  RRR with nl S1 and S2 with II/VI HERSON  Peripheral pulses are symmetrical and full  There is no clubbing, cyanosis of the extremities.   No edema  Femoral Arteries: 2+ and equal  Pedal Pulses: 2+ and equal   Neck:  No thyromegaly  Abdomen:  No masses or tenderness  Liver/Spleen: No Abnormalities Noted  Neurological/Psychiatric:  Alert and oriented in all spheres  Moves all extremities well  Exhibits normal gait balance and coordination  No abnormalities of mood, affect, memory, mentation, or behavior are noted    Echo 6/24/22   Hyperdynamic left ventricular systolic function with an ejection fraction   estimated at greater than 70%. No obvious regional wall motion abnormalities. There is a late peaking gradient recorded across the LV outflow tract   consistent with dynamic obstruction with a peak gradient of 25 mmHg at rest   and with valsalva maneuver. There is mild tricuspid regurgitation with a RVSP estimation of 22 mmHg. Grade I diastolic dysfunction with normal LV filling pressures. Avg.   E/e'=13.0. Consider hypertrophic cardiomyopathy or hyperdynamic state if clinically   indicated. [facility] 9/14/21  Dr. Mckenzie Been   Cardiac Cath LVG:  Anatomy:   LM-nml   LAD-nml  Cx-nml  OM- nml  RCA-dominant  RPDA- nml  LVEF- 65  LVG- nml  LVEDP- 27     Contrast: 48  Flouro Time: 2.9  Access: R Radial artery     Impression  ~Coronary Angiography w/ normal coronaries  ~LVG with LVEF of 65 and no regional wall motion abnormalities  ~high lvedp c/w diastolic CHF         Recommendation  ~Aggressive medical treatment and risk factor modification  ~1. Medications reviewed  2. Lasix 20mg po daily. Check bmp in 1 week. FU with nephrology  3. No indication for beta blocker, statin or anti platelet therapy  4. Patient has been advised on the importance of regular exercise of at least 20-30 minutes daily alternating with aerobic and isometric activities. 5. Patient counseled about and offered assistance for smoking cessation  6. No indication for cardiac rehab  7. Follow up with PCP      Labs were reviewed including labs from other hospital systems through [facility]. Cardiac testing was reviewed including echos, nuclear scans, cardiac catheterization, including from other hospital systems through [facility]. Assessment:    1.  HOCM (hypertrophic obstructive cardiomyopathy) ([facility] [address]    2. SOB (shortness of breath)    3. Mixed hyperlipidemia    4. Essential hypertension    5. WATSON (obstructive sleep apnea)    6. Syncope and collapse    7. Left ventricular outflow tract obstruction           Plan:   First thing in the morning, Drink 12-16 oz of water to help with the outflow tract obstruction  2. STOP Lisinopril for Now.    3.    Continue Metoprolol. 4.    30 day monitor is in her car and needs to be mailed in to [address] . 5.    Labs in 1 month after starting Camzyos. 6.    We will follow up this week with [employer] [employer] for your financial assistance. 7.    See Dr. Flor Mcguire in 6 weeks        Scribe's attestation: This note was scribed in the presence of Frank Kennedy M.D. by Rafia Currie RN     The scribe's documentation has been prepared under my direction and personally reviewed by me in its entirety. I confirm that the note above accurately reflects all work, treatment, procedures, and medical decision making performed by me. Limited Echos for Camzyos will be EVERY 4, 8 12 weeks. Then every 3 months per Camzyos REM program.          QUALITY MEASURES  1. Tobacco Cessation Counseling: NA  2. Retake of BP if >140/90:   NA  3. Documentation to PCP/referring for new patient:  Sent to PCP at close of office visit  4. CAD patient on anti-platelet: NA  5. CAD patient on STATIN therapy:  NA  6. Patient with CHF and aFib on anticoagulation:  NA     Time Based Itemization  A total of 40 minutes was spent on today's patient encounter.   If applicable, non-patient-facing activities:  ( x)Preparing to see the patient and reviewing records  ( x) Individual interpretation of results  ( ) Discussion or coordination of care with other health care professionals  ( x) Ordering of unique tests, medications, or procedures  ( x) Documentation within the EHR      I appreciate the opportunity of cooperating in the care of this patient.     Zoltan Johnson M.D., [facility]

## 2022-08-29 NOTE — TELEPHONE ENCOUNTER
Serial limited Echos are now scheduled for Sept 28, October 26 and November 23 at [facility].

## 2022-08-30 ENCOUNTER — HOSPITAL ENCOUNTER (OUTPATIENT)
Dept: GENERAL RADIOLOGY | Age: 67
Discharge: HOME | End: 2022-08-30
Payer: MEDICARE

## 2022-08-30 DIAGNOSIS — Z78.0 MENOPAUSE: ICD-10-CM

## 2022-08-30 PROCEDURE — 77080 DXA BONE DENSITY AXIAL: CPT

## 2022-08-31 ENCOUNTER — TELEPHONE (OUTPATIENT)
Dept: CARDIOLOGY CLINIC | Age: 67
End: 2022-08-31

## 2022-08-31 NOTE — TELEPHONE ENCOUNTER
Spoke to pt. She now has Camzyos in hand and will start it On September 1st (tomorrow) per her preference. The dose is 5mg daily. She states she is nervous to start the medication. Reminded pt on hydration each day.

## 2022-09-06 PROCEDURE — 93272 ECG/REVIEW INTERPRET ONLY: CPT | Performed by: INTERNAL MEDICINE

## 2022-09-08 DIAGNOSIS — E11.42 TYPE 2 DIABETES MELLITUS WITH POLYNEUROPATHY (HCC): ICD-10-CM

## 2022-09-08 RX ORDER — FLASH GLUCOSE SENSOR
KIT MISCELLANEOUS
Qty: 2 EACH | Refills: 5 | Status: SHIPPED | OUTPATIENT
Start: 2022-09-08

## 2022-09-19 ENCOUNTER — TELEPHONE (OUTPATIENT)
Dept: CARDIOLOGY CLINIC | Age: 67
End: 2022-09-19

## 2022-09-19 RX ORDER — FUROSEMIDE 20 MG/1
TABLET ORAL
Qty: 90 TABLET | OUTPATIENT
Start: 2022-09-19

## 2022-09-20 ENCOUNTER — TELEPHONE (OUTPATIENT)
Dept: CARDIOLOGY CLINIC | Age: 67
End: 2022-09-20

## 2022-09-20 NOTE — TELEPHONE ENCOUNTER
----- Message from Marly Tate MD sent at 9/19/2022  9:13 PM EDT -----  Please call patient and make sure he gets this message. Aj Pepper. Your monitor was normal.  No abnormal rhythms seen.   Marly Tate

## 2022-09-22 DIAGNOSIS — F51.01 PRIMARY INSOMNIA: ICD-10-CM

## 2022-09-22 RX ORDER — ZOLPIDEM TARTRATE 5 MG/1
TABLET ORAL
Qty: 30 TABLET | Refills: 2 | Status: SHIPPED | OUTPATIENT
Start: 2022-09-22 | End: 2023-01-22

## 2022-09-23 ENCOUNTER — OFFICE VISIT (OUTPATIENT)
Dept: PULMONOLOGY | Age: 67
End: 2022-09-23
Payer: MEDICARE

## 2022-09-23 VITALS — BODY MASS INDEX: 31.35 KG/M2 | OXYGEN SATURATION: 97 % | WEIGHT: 177 LBS | HEART RATE: 97 BPM

## 2022-09-23 DIAGNOSIS — E66.09 CLASS 1 OBESITY DUE TO EXCESS CALORIES WITH SERIOUS COMORBIDITY AND BODY MASS INDEX (BMI) OF 31.0 TO 31.9 IN ADULT: ICD-10-CM

## 2022-09-23 DIAGNOSIS — G47.33 OSA (OBSTRUCTIVE SLEEP APNEA): Primary | ICD-10-CM

## 2022-09-23 DIAGNOSIS — I10 PRIMARY HYPERTENSION: ICD-10-CM

## 2022-09-23 PROCEDURE — G8399 PT W/DXA RESULTS DOCUMENT: HCPCS | Performed by: INTERNAL MEDICINE

## 2022-09-23 PROCEDURE — 1090F PRES/ABSN URINE INCON ASSESS: CPT | Performed by: INTERNAL MEDICINE

## 2022-09-23 PROCEDURE — 99204 OFFICE O/P NEW MOD 45 MIN: CPT | Performed by: INTERNAL MEDICINE

## 2022-09-23 PROCEDURE — 1123F ACP DISCUSS/DSCN MKR DOCD: CPT | Performed by: INTERNAL MEDICINE

## 2022-09-23 PROCEDURE — 1036F TOBACCO NON-USER: CPT | Performed by: INTERNAL MEDICINE

## 2022-09-23 PROCEDURE — 3017F COLORECTAL CA SCREEN DOC REV: CPT | Performed by: INTERNAL MEDICINE

## 2022-09-23 PROCEDURE — G8427 DOCREV CUR MEDS BY ELIG CLIN: HCPCS | Performed by: INTERNAL MEDICINE

## 2022-09-23 PROCEDURE — G8417 CALC BMI ABV UP PARAM F/U: HCPCS | Performed by: INTERNAL MEDICINE

## 2022-09-23 NOTE — PROGRESS NOTES
PULMONARY OFFICE NEW PATIENT VISIT    CONSULTING PHYSICIAN:  Amada Sharpe MD , Andrew Cleveland MD     REASON FOR VISIT:   Chief Complaint   Patient presents with    New Patient         DATE OF VISIT: 9/23/2022    HISTORY OF PRESENT ILLNESS: 74y.o. year old female comes into the pulmonary/sleep clinic for the first time. Patient reports that she was diagnosed to have obstructive sleep apnea in 25 years ago was started on CPAP therapy. She used it for about a year after which she underwent a UPPP surgery, lost some weight and stopped using CPAP therapy. Over the years her sleep remained consistent but as the time progressed, her sleep has become poor again. She finds it difficult to sleep and staying asleep. Her sleep pattern is completely disturbed. She stays awake during the night and then feels sleepy throughout the day. During the sleep she tosses and turns and has drenching sweats. Her weight has been fluctuating. Still continues to have occasional snoring. Unsure if she is having any breathing pauses. Has had episodes of palpitation and chest pain recently. As of now she is taking half tablet of 5 mg Ambien, Flexeril, morphine and duloxetine in order to help her sleep at nighttime. REVIEW OF SYSTEMS:   CONSTITUTIONAL SYMPTOMS: The patient denies fever, fatigue, night sweats, weight loss or weight gain. HEENT: No vision changes. No tinnitus, Denies sinus pain. No hoarseness, or dysphagia. NECK: Patient denies swelling in the neck. CARDIOVASCULAR: Denies chest pain, palpitation, syncope. RESPIRATORY: As per HPI. GASTROINTESTINAL: Denies nausea, abdominal pain or change in bowel function. GENITOURINARY: Denies obstructive symptoms. No history of incontinence. BREASTS: No masses or lumps in the breasts. SKIN: No rashes or itching. MUSCULOSKELETAL: Denies weakness or bone pain. NEUROLOGICAL: No headaches or seizures. PSYCHIATRIC: Denies mood swings or depression. ENDOCRINE: Denies heat or cold intolerance or excessive thirst.  HEMATOLOGIC/LYMPHATIC: Denies easy bruising or lymph node swelling. ALLERGIC/IMMUNOLOGIC: No environmental allergies.     PAST MEDICAL HISTORY:   Past Medical History:   Diagnosis Date    Asthma     vocal fold     Ataxia     Cervical stenosis of spinal canal 10/7/2021    Coronary artery disease involving native coronary artery of native heart without angina pectoris 9/9/2021    Depression     Diabetes mellitus (HCC)     Hyperlipidemia     Hypertension     Hypothyroidism     Obesity     Osteoarthritis     Paradoxical vocal fold motion disorder     Postmenopausal 5/15/2018    Stage 2 chronic kidney disease 5/7/2019       PAST SURGICAL HISTORY:   Past Surgical History:   Procedure Laterality Date    CARPAL TUNNEL RELEASE      RORY    HYSTERECTOMY, TOTAL ABDOMINAL (CERVIX REMOVED)      Age 37    AK NJX AA&/STRD TFRML EPI LUMBAR/SACRAL 1 LEVEL Bilateral 12/3/2018    BILATERAL L4 LUMBAR TRANSFORAMINAL EPIDURAL STEROID INJECTION WITH FLUOROSCOPY performed by Minh Malone MD at [facility] HISTORY:   Social History     Tobacco Use    Smoking status: Never    Smokeless tobacco: Never   Vaping Use    Vaping Use: Never used   Substance Use Topics    Alcohol use: No     Alcohol/week: 0.0 standard drinks    Drug use: No       FAMILY HISTORY:   Family History   Problem Relation Age of Onset    Cancer Mother     Obesity Sister        MEDICATIONS:     Current Outpatient Medications on File Prior to Visit   Medication Sig Dispense Refill    zolpidem (AMBIEN) 5 MG tablet TAKE ONE TABLET BY MOUTH ONCE NIGHTLY AS NEEDED FOR SLEEP 30 tablet 2    Continuous Blood Gluc Sensor (FREESTYLE JEREMIAS 14 DAY SENSOR) MISC CHANGE SENSOR EVERY 14 DAYS AND CHECK BLOOD SUGAR FOUR TIMES A DAY 2 each 5    Mavacamten (CAMZYOS) 5 MG CAPS Take 5 mg by mouth daily 30 capsule 2    metoprolol succinate (TOPROL XL) 50 MG extended release tablet Take 1 tablet by mouth in the morning. 90 tablet 3    cyclobenzaprine (FLEXERIL) 10 MG tablet TAKE 1/2 TABLET BY MOUTH ONCE NIGHTLY AS NEEDED FOR MUSCLE SPASMS 30 tablet 1    buPROPion (WELLBUTRIN SR) 150 MG extended release tablet TAKE ONE TABLET BY MOUTH EVERY OTHER DAY ALTERNATING WITH 300MG 45 tablet 1    Insulin Glargine, 1 Unit Dial, (TOUJEO SOLOSTAR) 300 UNIT/ML SOPN INJECT 40 UNITS UNDER THE SKIN DAILY 9 pen 3    morphine (SHANIKA) 10 MG extended release capsule Take 10 mg by mouth daily. Unsure of dose and to start today, will take only at HS      atorvastatin (LIPITOR) 20 MG tablet TAKE ONE TABLET BY MOUTH DAILY 90 tablet 1    omeprazole (PRILOSEC) 40 MG delayed release capsule TAKE ONE CAPSULE BY MOUTH DAILY 90 capsule 3    fluticasone (FLONASE) 50 MCG/ACT nasal spray 2 sprays by Nasal route daily 3 each 3    buPROPion (WELLBUTRIN XL) 300 MG extended release tablet TAKE ONE TABLET BY MOUTH EVERY OTHER DAY ALTERNATING WITH 150MG SR TABLET 45 tablet 3    montelukast (SINGULAIR) 10 MG tablet TAKE ONE TABLET BY MOUTH ONCE NIGHTLY 90 tablet 3    Semaglutide, 1 MG/DOSE, (OZEMPIC, 1 MG/DOSE,) 4 MG/3ML SOPN Inject 1 mg into the skin once a week 9 mL 1    DULoxetine (CYMBALTA) 60 MG extended release capsule TAKE ONE CAPSULE BY MOUTH DAILY 90 capsule 1    traMADol (ULTRAM) 50 MG tablet Take 50 mg by mouth every 6 hours as needed for Pain. bid      [employer] PEN NEEDLES 31G 31G X 8 MM MISC USE FOUR TIMES A  each 5    NOVOLOG FLEXPEN 100 UNIT/ML injection pen INJECT 10 UNITS UNDER THE SKIN THREE TIMES A DAY BEFORE MEALS (Patient taking differently: Pt only uses once weekly) 5 pen 2    Continuous Blood Gluc  (FREESTYLE JEREMIAS READER) ENE Use for checking glucose 1 Device 1     No current facility-administered medications on file prior to visit.                ALLERGIES:   Allergies as of 09/23/2022 - Fully Reviewed 08/29/2022   Allergen Reaction Noted    Actos [pioglitazone hydrochloride]  08/10/2012 Albuterol  05/27/2011    Dilaudid [hydromorphone hcl]  05/27/2011    Fluticasone-salmeterol  05/27/2011    Glyburide  08/10/2012    Lortab [hydrocodone-acetaminophen]  05/15/2018    Metformin  08/10/2012    Symbicort [budesonide-formoterol fumarate]  08/10/2012      OBJECTIVE:   weight is 177 lb (80.3 kg). Her pulse is 97. Her oxygen saturation is 97%. PHYSICAL EXAM:    CONSTITUTIONAL: She is a 74y.o.-year-old who appears her stated age. She is alert and oriented x 3 and in no acute distress. HEENT: PERRLA, EOMI. No scleral icterus. No thrush, atraumatic, normocephalic. Mallampati class 3 airway. NECK: Supple, without cervical or supraclavicular lymphadenopathy:  CARDIOVASCULAR: S1 S2 RRR. Without murmer  RESPIRATORY & CHEST: Lungs are clear to auscultation and percussion. No wheezing, no crackles. Good air movement  GASTROINTESTINAL & ABDOMEN: Soft, nontender, positive bowel sounds in all quadrants, non-distended, without hepatosplenomegaly. GENITOURINARY: Deferred. MUSCULOSKELETAL: No tenderness to palpation of the axial skeleton. There is no clubbing. No cyanosis. No edema of the lower extremities. SKIN OF BODY: No rash or jaundice. PSYCHIATRIC EVALUATION: Normal affect. Patient answers questions appropriately. HEMATOLOGIC/LYMPHATIC/ IMMUNOLOGIC: No palpable lymphadenopathy. NEUROLOGIC: Alert and oriented x 3. Groslly non-focal. Motor strength is 5+/5 in all muscle groups. The patient has a normal sensorium globally. LABS:    Lab Summary Latest Ref Rng & Units 8/1/2022   WBC 4.0 - 11.0 K/uL 6.8   Hgb 12.0 - 16.0 g/dL 12.1   Hct 36.0 - 48.0 % 37.1   Platelets 871 - 948 K/uL 345   Sodium 136 - 145 mmol/L 141   Potassium 3.5 - 5.1 mmol/L 4.9   BUN 7 - 20 mg/dL 17   Creatinine 0.6 - 1.2 mg/dL 1.4(H)   Glucose 70 - 99 mg/dL 145(H)   Calcium 8.3 - 10.6 mg/dL 9.4   Phosphorus 2.5 - 4.9 mg/dL 5. 1(H)   Albumin 3.4 - 5.0 g/dL 4.3   Some recent data might be hidden       All labs were personally reviewed by me any my interpretation is: CBC is normal. Chem 8 is hyperglycemia. IMAGING:    Narrative   EXAMINATION:   ONE XRAY VIEW OF THE CHEST       6/23/2022 3:11 pm       COMPARISON:   08/30/2021       HISTORY:   ORDERING SYSTEM PROVIDED HISTORY: Chest pain with exertion   TECHNOLOGIST PROVIDED HISTORY:   Reason for exam:->Chest pain with exertion   Reason for Exam: chest pain with exertion       FINDINGS:   The lungs are without acute focal process. There is no effusion or   pneumothorax. The cardiomediastinal silhouette is stable. The osseous   structures are stable. Impression   No acute process. Pulmonary Functions Testing Results:          IMPRESSION AND RECOMMENDATIONS:     1. WATSON (obstructive sleep apnea)  -Patient continues to have sleep symptoms which could be suggestive of her obstructive sleep apnea still active and leading to disturbed sleep.  -I will reinvestigate this possibility with a split-night sleep study.  -Patient has hypertrophic cardiomyopathy, coronary artery disease which makes her a poor candidate for home sleep testing.  - Sleep Study with PAP Titration; Future    2. Primary hypertension  -Currently on metoprolol.  -Untreated sleep apnea can lead to refractory hypertension. 3. Class 1 obesity due to excess calories with serious comorbidity and body mass index (BMI) of 31.0 to 31.9 in adult  -I strongly advised the patient to make efforts to lose weight. I discussed various modalities including moderate intensity intermittent exercises, diet control and bariatric surgery. If the patient loses even 10 to 15% of current body weight, it will be beneficial in improving the overall health. Return in about 6 weeks (around 11/4/2022) for watson. Jhonny Mckinney MD  Pulmonary Critical Care and Sleep Medicine  9/23/2022, 2:56 PM    This note was completed using dragon medical speech recognition software.  Grammatical errors, random word insertions, pronoun errors and incomplete sentences are occasional consequences of this technology due to software limitations. If there are questions or concerns about the content of this note of information contained within the body of this dictation they should be addressed with the provider for clarification.

## 2022-09-26 ENCOUNTER — TELEPHONE (OUTPATIENT)
Dept: SLEEP CENTER | Age: 67
End: 2022-09-26

## 2022-09-26 NOTE — TELEPHONE ENCOUNTER
Called to schedule a split night per [facility] for the pt to return my call     [employer]

## 2022-09-28 ENCOUNTER — TELEPHONE (OUTPATIENT)
Dept: CARDIOLOGY CLINIC | Age: 67
End: 2022-09-28

## 2022-09-28 ENCOUNTER — HOSPITAL ENCOUNTER (OUTPATIENT)
Dept: NON INVASIVE DIAGNOSTICS | Age: 67
Discharge: HOME | End: 2022-09-28
Payer: MEDICARE

## 2022-09-28 DIAGNOSIS — I42.1 HOCM (HYPERTROPHIC OBSTRUCTIVE CARDIOMYOPATHY) (HCC): ICD-10-CM

## 2022-09-28 DIAGNOSIS — I51.7 LVH (LEFT VENTRICULAR HYPERTROPHY): ICD-10-CM

## 2022-09-28 PROCEDURE — 93325 DOPPLER ECHO COLOR FLOW MAPG: CPT

## 2022-09-28 PROCEDURE — 93308 TTE F-UP OR LMTD: CPT

## 2022-09-28 PROCEDURE — 93321 DOPPLER ECHO F-UP/LMTD STD: CPT

## 2022-09-28 RX ORDER — MAVACAMTEN 5 MG/1
5 CAPSULE, GELATIN COATED ORAL DAILY
Qty: 30 CAPSULE | Refills: 2 | Status: SHIPPED | OUTPATIENT
Start: 2022-09-28 | End: 2022-11-03

## 2022-09-28 NOTE — TELEPHONE ENCOUNTER
Spoke to pt regarding Camzyos REMS. Pt denies being on any other medications both OTC or prescribed. Pt then goes on to state she feels much improved with NO rest breaks during the changing of her sheets which she states is a huge improvement. (She used to rest during this activity 5-6 times)     She also states she less dizzy now. She used to be dizzy everyday. Now she is dizzy once a week. She also states her lying to sitting BP no longer drops 60 points. It only drops 20-30mmHg. Pt updated to continue the same dose. Updated YONI on pt activity improvements. Pt knows to keep next appt with YONI. The REMS status form should trigger the next 30 days to be mailed to pt per Niki Singh. Iris Barnes will verify. Sent new script to [facility].

## 2022-09-28 NOTE — TELEPHONE ENCOUNTER
Spoke to pharmacist Cami Monterroso at [place] [phone] and she reports the [facility] script has been received. They will refill for 35 days and 1 refill per protocol.

## 2022-10-03 ENCOUNTER — TELEPHONE (OUTPATIENT)
Dept: CARDIOLOGY CLINIC | Age: 67
End: 2022-10-03

## 2022-10-03 NOTE — TELEPHONE ENCOUNTER
She states she is not coughing up any phlegm. She sounds nasal and congested. No fever. Asked about Dayquil, NuQuil and Mucinex.

## 2022-10-03 NOTE — TELEPHONE ENCOUNTER
Pt called wanting to know what to take for laryngitis and upper respiratory with her heart condition. Pt already takes fluticasone for allergies already.    Please advise   Thank you

## 2022-10-03 NOTE — TELEPHONE ENCOUNTER
Mucinex (not mucinex D) is okay. I would stay away from decongestants and Dayquil and nyquil have decongestants. Antihistamines, expectorants, acetaminophen are all okay. Nose sprays are also okay.   YONI

## 2022-10-07 NOTE — PROGRESS NOTES
[facility]  Advanced CHF/Pulmonary Hypertension   Cardiac Evaluation      Flakito Bowser  YOB: 1955    Date of Visit:  10/10/22      Chief Complaint   Patient presents with    Cardiomyopathy    Shortness of Breath    Dizziness            History of Present Illness:  Flakito Bowser is a 74 y.o. female who presents from referral from Dr. Xiao Shirley for consultation and management of mild HOCM. Flakito Bowser is 74 y.o. and has a past medical history of type 2 diabetes, CKD 3, HTN. She was in [facility] and came home on 6/24/22. While in the airport, she began to have midsternal chest pressure 7/10 with SOB and diffuse diaphoresis and syncope and collapse  She was advised to go the ER and did not. She flew back to [facility] and has been having exertional dyspnea and chest pain associate with diaphoresis. She had a did have a negative left heart cath September 2021. She was started on Toprol 25mg on DC on 6/24/22    She had a 30 day monitor recommended along with genetic testing. Today she is seen as a New patient. She states she grew up in an orphanage. She was always told she could not play and made to read in the [facility] because she would pass out. She would pass out with activity. States this has been a long standing problem. It would happen if she would \"lift something\" or walk too long. It would happen in the heat. She states she stays dehydrated. She states her BP would not stabilize and when she stood up. Since being started on Toprol, she feels less dizzy. She uses a rolling walker for balance. She states, when the episodes happen, she cannot cool herself off. She has a chair lift at home. Discussed her outflow tract obstruction with activity. Discussed hydration. She has not formally been diagnosed with HCM. She is here today with her , Stephie Ordoñez. States she had a UPPP 20 years ago for WATSON.   States she does not sleep well according to her Fit Bit; just 2 hours a night. She reports she wakes up with the bed \"soaking wet. \"       She knows info about her Mother's mother. No heart disease  She has no known information about her father. She has a sister without heart problems  She has children. Her Toprol was increased to 50mg daily on 8/1/22. Her  is concerned about low BP and heart rate. BP at home 789 systolic and drops down to 100's with standing up. She is trying to drink fluids all day long. She states she gets exhausted making her bed. She will mail in her 30 day monitor as planned er [employer]. She is here today with her . Genetic Testing [facility]: Received 8/24/22  Result: 8/30/22  No Clinically Significant Variants Detected. 8-29-22 Lisinopril stopped. Today she is here for follow up. She feels improved but still has episodes of dizziness. She feels poorly after soaking in a hot bath. Discussed drinking fluid before a hot bath. She is here with her . He states her orthostatic numbers only drop 20 points as apposed to 50 points. She doesn't need her rolling walker today. Discussed course of Camzyos at 12 weeks. She states she was told she needs a sleep study. She will talk with pulmonology office.         Started Camzyos 5mg Sept 1, 2022    Date Dose of Camzyos LVEF LVOT Gradient   6/24/22 Not started  70 25mmHg   9/28/22  5mg (started 9/1/22) 60-65% 34mmHg                                                     Allergies   Allergen Reactions    Actos [Pioglitazone Hydrochloride]      Causes mouth blisters    Albuterol      YEAST INFECTION IN MOUTH    Dilaudid [Hydromorphone Hcl]     Fluticasone-Salmeterol      YEAST INFECTIONS IN MOUTH    Glyburide      Causes mouth blisters    Lortab [Hydrocodone-Acetaminophen]      headache    Metformin      Causes mouth blisters    Symbicort [Budesonide-Formoterol Fumarate]      Causes mouth blisters     Current Outpatient Medications   Medication Sig Dispense Refill    Mavacamten (CAMZYOS) 5 MG CAPS Take 5 mg by mouth daily 30 capsule 2    zolpidem (AMBIEN) 5 MG tablet TAKE ONE TABLET BY MOUTH ONCE NIGHTLY AS NEEDED FOR SLEEP 30 tablet 2    metoprolol succinate (TOPROL XL) 50 MG extended release tablet Take 1 tablet by mouth in the morning. 90 tablet 3    cyclobenzaprine (FLEXERIL) 10 MG tablet TAKE 1/2 TABLET BY MOUTH ONCE NIGHTLY AS NEEDED FOR MUSCLE SPASMS 30 tablet 1    buPROPion (WELLBUTRIN SR) 150 MG extended release tablet TAKE ONE TABLET BY MOUTH EVERY OTHER DAY ALTERNATING WITH 300MG 45 tablet 1    Insulin Glargine, 1 Unit Dial, (TOUJEO SOLOSTAR) 300 UNIT/ML SOPN INJECT 40 UNITS UNDER THE SKIN DAILY 9 pen 3    morphine (SHANIKA) 10 MG extended release capsule Take 10 mg by mouth daily. Unsure of dose and to start today, will take only at HS      atorvastatin (LIPITOR) 20 MG tablet TAKE ONE TABLET BY MOUTH DAILY 90 tablet 1    omeprazole (PRILOSEC) 40 MG delayed release capsule TAKE ONE CAPSULE BY MOUTH DAILY 90 capsule 3    fluticasone (FLONASE) 50 MCG/ACT nasal spray 2 sprays by Nasal route daily 3 each 3    buPROPion (WELLBUTRIN XL) 300 MG extended release tablet TAKE ONE TABLET BY MOUTH EVERY OTHER DAY ALTERNATING WITH 150MG SR TABLET 45 tablet 3    montelukast (SINGULAIR) 10 MG tablet TAKE ONE TABLET BY MOUTH ONCE NIGHTLY 90 tablet 3    Semaglutide, 1 MG/DOSE, (OZEMPIC, 1 MG/DOSE,) 4 MG/3ML SOPN Inject 1 mg into the skin once a week 9 mL 1    DULoxetine (CYMBALTA) 60 MG extended release capsule TAKE ONE CAPSULE BY MOUTH DAILY 90 capsule 1    traMADol (ULTRAM) 50 MG tablet Take 50 mg by mouth every 6 hours as needed for Pain.  bid      Continuous Blood Gluc Sensor (FREESTYLE JEREMIAS 14 DAY SENSOR) MISC CHANGE SENSOR EVERY 14 DAYS AND CHECK BLOOD SUGAR FOUR TIMES A DAY 2 each 5    [employer] PEN NEEDLES 31G 31G X 8 MM MISC USE FOUR TIMES A  each 5    NOVOLOG FLEXPEN 100 UNIT/ML injection pen INJECT 10 UNITS UNDER THE SKIN THREE TIMES A DAY BEFORE MEALS (Patient not taking: Reported on 10/10/2022) 5 pen 2    Continuous Blood Gluc  (FREESTYLE JEREMIAS READER) ENE Use for checking glucose 1 Device 1     No current facility-administered medications for this visit.        Past Medical History:   Diagnosis Date    Asthma     vocal fold     Ataxia     Cervical stenosis of spinal canal 10/7/2021    Coronary artery disease involving native coronary artery of native heart without angina pectoris 9/9/2021    Depression     Diabetes mellitus (HCC)     Hyperlipidemia     Hypertension     Hypothyroidism     Obesity     Osteoarthritis     Paradoxical vocal fold motion disorder     Postmenopausal 5/15/2018    Stage 2 chronic kidney disease 5/7/2019     Past Surgical History:   Procedure Laterality Date    CARPAL TUNNEL RELEASE      RORY    HYSTERECTOMY, TOTAL ABDOMINAL (CERVIX REMOVED)      Age 37    MO NJX AA&/STRD TFRML EPI LUMBAR/SACRAL 1 LEVEL Bilateral 12/3/2018    BILATERAL L4 LUMBAR TRANSFORAMINAL EPIDURAL STEROID INJECTION WITH FLUOROSCOPY performed by Bess Payan MD at [address]     Family History   Problem Relation Age of Onset    Cancer Mother     Obesity Sister      Social History     Socioeconomic History    Marital status:      Spouse name: Not on file    Number of children: Not on file    Years of education: Not on file    Highest education level: Not on file   Occupational History    Not on file   Tobacco Use    Smoking status: Never    Smokeless tobacco: Never   Vaping Use    Vaping Use: Never used   Substance and Sexual Activity    Alcohol use: No     Alcohol/week: 0.0 standard drinks    Drug use: No    Sexual activity: Yes     Partners: Male   Other Topics Concern    Not on file   Social History Narrative    Not on file     Social Determinants of Health     Financial Resource Strain: Not on file   Food Insecurity: Not on file   Transportation Needs: Not on file   Physical Activity: Inactive    Days of Exercise per Week: 0 days    Minutes of Exercise per Session: 0 min   Stress: Not on file   Social Connections: Not on file   Intimate Partner Violence: Not on file   Housing Stability: Not on file       Review of Systems:   Constitutional: there has been no unanticipated weight loss. There's been no change in energy level, sleep pattern, or activity level. Eyes: No visual changes or diplopia. No scleral icterus. ENT: No Headaches, hearing loss or vertigo. No mouth sores or sore throat. Cardiovascular: Reviewed in HPI  Respiratory: No cough or wheezing, no sputum production. No hematemesis. Gastrointestinal: No abdominal pain, appetite loss, blood in stools. No change in bowel or bladder habits. Genitourinary: No dysuria, trouble voiding, or hematuria. Musculoskeletal:  No gait disturbance, weakness or joint complaints. + Weakness  Integumentary: No rash or pruritis. Neurological: No headache, diplopia, change in muscle strength, numbness or tingling. No change in gait, balance, coordination, mood, affect, memory, mentation, behavior. Psychiatric: No anxiety, no depression. Endocrine: No malaise, fatigue or temperature intolerance. No excessive thirst, fluid intake, or urination. No tremor. Hematologic/Lymphatic: No abnormal bruising or bleeding, blood clots or swollen lymph nodes. Allergic/Immunologic: No nasal congestion or hives. Physical Examination:    Vitals:    10/10/22 0743   BP: 100/66   Pulse: 83   SpO2: 95%   Weight: 175 lb (79.4 kg)   Height: 5' 3\" (1.6 m)         Body mass index is 31 kg/m².      Wt Readings from Last 3 Encounters:   10/10/22 175 lb (79.4 kg)   09/23/22 177 lb (80.3 kg)   08/29/22 175 lb (79.4 kg)     BP Readings from Last 3 Encounters:   10/10/22 100/66   08/29/22 (!) 94/56   08/15/22 124/64     Constitutional and General Appearance: Pale   WD/WN in NAD  HEENT:  NC/AT  TEENA  No problems with hearing  Skin:  Warm, dry  Respiratory:  Normal excursion and expansion without use of accessory muscles  Resp Auscultation: Normal breath sounds without dullness  Cardiovascular: The apical impulses not displaced  Heart tones are crisp and normal  Cervical veins are not engorged  The carotid upstroke is normal in amplitude and contour without delay or bruit  JVP less than 8 cm H2O  RRR with nl S1 and S2 with II/VI HERSON  Peripheral pulses are symmetrical and full  There is no clubbing, cyanosis of the extremities. No edema  Femoral Arteries: 2+ and equal  Pedal Pulses: 2+ and equal   Neck:  No thyromegaly  Abdomen:  No masses or tenderness  Liver/Spleen: No Abnormalities Noted  Neurological/Psychiatric:  Alert and oriented in all spheres  Moves all extremities well  Exhibits normal gait balance and coordination  No abnormalities of mood, affect, memory, mentation, or behavior are noted      Diagnostic Testin22 Echo    Limited echo for Camzyos protocol. (Week 4)   Moderate septal hypertrophy noted. Normal global systolic function with an ejection fraction estimated at   60-65%. Resting maximum LVOT gradient is estimated at 4 mmHg and 34 mmHg with   valsalva. There is mild mitral regurgitation noted. There is mild aortic insufficiency. Echo 22   Hyperdynamic left ventricular systolic function with an ejection fraction   estimated at greater than 70%. No obvious regional wall motion abnormalities. There is a late peaking gradient recorded across the LV outflow tract   consistent with dynamic obstruction with a peak gradient of 25 mmHg at rest   and with valsalva maneuver. There is mild tricuspid regurgitation with a RVSP estimation of 22 mmHg. Grade I diastolic dysfunction with normal LV filling pressures. Avg.   E/e'=13.0. Consider hypertrophic cardiomyopathy or hyperdynamic state if clinically   indicated.        [facility] 21  Dr. Migdalia Mendez   Cardiac Cath LVG:  Anatomy:   LM-nml   LAD-nml  Cx-nml  OM- nml  RCA-dominant  RPDA- nml  LVEF- 65  LVG- nml  LVEDP- 27     Contrast: 48  Flouro Time: 2.9  Access: R Radial artery     Impression  ~Coronary Angiography w/ normal coronaries  ~LVG with LVEF of 65 and no regional wall motion abnormalities  ~high lvedp c/w diastolic CHF         Recommendation  ~Aggressive medical treatment and risk factor modification  ~1. Medications reviewed  2. Lasix 20mg po daily. Check bmp in 1 week. FU with nephrology  3. No indication for beta blocker, statin or anti platelet therapy  4. Patient has been advised on the importance of regular exercise of at least 20-30 minutes daily alternating with aerobic and isometric activities. 5. Patient counseled about and offered assistance for smoking cessation  6. No indication for cardiac rehab  7. Follow up with PCP      Labs were reviewed including labs from other hospital systems through [facility]. Cardiac testing was reviewed including echos, nuclear scans, cardiac catheterization, including from other hospital systems through [facility]. Assessment:    1. HOCM (hypertrophic obstructive cardiomyopathy) ([facility] [address]    2. SOB (shortness of breath)    3. Mixed hyperlipidemia    4. Essential hypertension    5. WATSON (obstructive sleep apnea)    6. Syncope and collapse    7. Left ventricular outflow tract obstruction    8. Night sweats        1. HOCM (hypertrophic obstructive cardiomyopathy) ([facility])   ~LVOT 25mmHg to 34mmHg. Continues on Camzyos. -her dizziness has improved on Camzyos     2. SOB (shortness of breath):  SOB is somewhat improved after starting Camzyos on 9/1/22. ~Improved activity tolerance and less dizziness. 3. Mixed hyperlipidemia:   ~5/3/22 , HDL 58, LDL 86, . Continue Lipitor 20mg daily. 4. Essential hypertension:  ~/66   Pulse 83   Ht 5' 3\" (1.6 m)   Wt 175 lb (79.4 kg)   SpO2 95%   BMI 31.00 kg/m²   ~Stable.        5. WATSON (obstructive sleep apnea)   ~Seeing Dr. Kaia Bermudez 9/2022.      6. Syncope and collapse:  ~No further episodes of syncope since 6/24/22     7. Left ventricular outflow tract obstruction:  ~Started Camzyos 5mg on 9/1/22 for LVOT obstruction. Plan:   Drink fluid before taking a hot bath. Continue Camzyos 5mg daily. She has her refill of Camzyos. Labs: TSH, BNP, BMP. Continue same medications. See Dr. Stefanie Alcantara in 3 months       Limited Echos for [facility] will be EVERY 4, 8 12 weeks. Then every 3 months per Camzyos REM program.    Scribe's attestation: This note was scribed in the presence of Alexandre Medel M.D. by Nate Sanchez RN     The scribe's documentation has been prepared under my direction and personally reviewed by me in its entirety. I confirm that the note above accurately reflects all work, treatment, procedures, and medical decision making performed by me. QUALITY MEASURES  1. Tobacco Cessation Counseling: NA  2. Retake of BP if >140/90:   NA  3. Documentation to PCP/referring for new patient:  Sent to PCP at close of office visit  4. CAD patient on anti-platelet: NA  5. CAD patient on STATIN therapy:  NA  6. Patient with CHF and aFib on anticoagulation:  NA     Time Based Itemization  A total of 40 minutes was spent on today's patient encounter. If applicable, non-patient-facing activities:  ( x)Preparing to see the patient and reviewing records  (x ) Individual interpretation of results  ( ) Discussion or coordination of care with other health care professionals  ( x) Ordering of unique tests, medications, or procedures  ( x) Documentation within the EHR      I appreciate the opportunity of cooperating in the care of this patient.     Alexandre Medel M.D., [facility]

## 2022-10-10 ENCOUNTER — OFFICE VISIT (OUTPATIENT)
Dept: CARDIOLOGY CLINIC | Age: 67
End: 2022-10-10
Payer: MEDICARE

## 2022-10-10 VITALS
BODY MASS INDEX: 31.01 KG/M2 | HEART RATE: 83 BPM | OXYGEN SATURATION: 95 % | DIASTOLIC BLOOD PRESSURE: 66 MMHG | WEIGHT: 175 LBS | SYSTOLIC BLOOD PRESSURE: 100 MMHG | HEIGHT: 63 IN

## 2022-10-10 DIAGNOSIS — R61 NIGHT SWEATS: ICD-10-CM

## 2022-10-10 DIAGNOSIS — I42.1 HOCM (HYPERTROPHIC OBSTRUCTIVE CARDIOMYOPATHY) (HCC): Primary | ICD-10-CM

## 2022-10-10 DIAGNOSIS — R55 SYNCOPE AND COLLAPSE: ICD-10-CM

## 2022-10-10 DIAGNOSIS — E78.2 MIXED HYPERLIPIDEMIA: ICD-10-CM

## 2022-10-10 DIAGNOSIS — I10 ESSENTIAL HYPERTENSION: ICD-10-CM

## 2022-10-10 DIAGNOSIS — R06.02 SOB (SHORTNESS OF BREATH): ICD-10-CM

## 2022-10-10 DIAGNOSIS — G47.33 OSA (OBSTRUCTIVE SLEEP APNEA): ICD-10-CM

## 2022-10-10 DIAGNOSIS — Q24.8 LEFT VENTRICULAR OUTFLOW TRACT OBSTRUCTION: ICD-10-CM

## 2022-10-10 DIAGNOSIS — I42.1 HOCM (HYPERTROPHIC OBSTRUCTIVE CARDIOMYOPATHY) (HCC): ICD-10-CM

## 2022-10-10 LAB
ANION GAP SERPL CALCULATED.3IONS-SCNC: 13 MMOL/L (ref 3–16)
BUN BLDV-MCNC: 18 MG/DL (ref 7–20)
CALCIUM SERPL-MCNC: 9.4 MG/DL (ref 8.3–10.6)
CHLORIDE BLD-SCNC: 100 MMOL/L (ref 99–110)
CO2: 25 MMOL/L (ref 21–32)
CREAT SERPL-MCNC: 1.4 MG/DL (ref 0.6–1.2)
GFR AFRICAN AMERICAN: 45
GFR NON-AFRICAN AMERICAN: 37
GLUCOSE BLD-MCNC: 113 MG/DL (ref 70–99)
POTASSIUM SERPL-SCNC: 4.9 MMOL/L (ref 3.5–5.1)
PRO-BNP: 73 PG/ML (ref 0–124)
SODIUM BLD-SCNC: 138 MMOL/L (ref 136–145)
TSH REFLEX: 2.19 UIU/ML (ref 0.27–4.2)

## 2022-10-10 PROCEDURE — 3078F DIAST BP <80 MM HG: CPT | Performed by: INTERNAL MEDICINE

## 2022-10-10 PROCEDURE — G8484 FLU IMMUNIZE NO ADMIN: HCPCS | Performed by: INTERNAL MEDICINE

## 2022-10-10 PROCEDURE — 3017F COLORECTAL CA SCREEN DOC REV: CPT | Performed by: INTERNAL MEDICINE

## 2022-10-10 PROCEDURE — G8427 DOCREV CUR MEDS BY ELIG CLIN: HCPCS | Performed by: INTERNAL MEDICINE

## 2022-10-10 PROCEDURE — 1090F PRES/ABSN URINE INCON ASSESS: CPT | Performed by: INTERNAL MEDICINE

## 2022-10-10 PROCEDURE — 1123F ACP DISCUSS/DSCN MKR DOCD: CPT | Performed by: INTERNAL MEDICINE

## 2022-10-10 PROCEDURE — G8417 CALC BMI ABV UP PARAM F/U: HCPCS | Performed by: INTERNAL MEDICINE

## 2022-10-10 PROCEDURE — 3074F SYST BP LT 130 MM HG: CPT | Performed by: INTERNAL MEDICINE

## 2022-10-10 PROCEDURE — 1036F TOBACCO NON-USER: CPT | Performed by: INTERNAL MEDICINE

## 2022-10-10 PROCEDURE — 99215 OFFICE O/P EST HI 40 MIN: CPT | Performed by: INTERNAL MEDICINE

## 2022-10-10 PROCEDURE — G8399 PT W/DXA RESULTS DOCUMENT: HCPCS | Performed by: INTERNAL MEDICINE

## 2022-10-10 NOTE — PATIENT INSTRUCTIONS
Plan:   Drink fluid before taking a hot bath. Continue Camzyos 5mg daily. She has her refill of Camzyos. Labs: TSH, BNP, BMP. Continue same medications.     See Dr. Carly Felix in 3 months

## 2022-10-24 RX ORDER — DULOXETIN HYDROCHLORIDE 60 MG/1
CAPSULE, DELAYED RELEASE ORAL
Qty: 90 CAPSULE | Refills: 1 | Status: SHIPPED | OUTPATIENT
Start: 2022-10-24

## 2022-10-26 ENCOUNTER — HOSPITAL ENCOUNTER (OUTPATIENT)
Dept: NON INVASIVE DIAGNOSTICS | Age: 67
Discharge: HOME | End: 2022-10-26
Payer: MEDICARE

## 2022-10-26 LAB
LV EF: 55 %
LVEF MODALITY: NORMAL

## 2022-10-26 PROCEDURE — 93325 DOPPLER ECHO COLOR FLOW MAPG: CPT

## 2022-10-26 PROCEDURE — 93308 TTE F-UP OR LMTD: CPT

## 2022-10-26 PROCEDURE — 93321 DOPPLER ECHO F-UP/LMTD STD: CPT

## 2022-10-27 ENCOUNTER — TELEPHONE (OUTPATIENT)
Dept: CARDIOLOGY CLINIC | Age: 67
End: 2022-10-27

## 2022-10-27 NOTE — TELEPHONE ENCOUNTER
Left message for pt with improved LVOT 34 to 25mmHg and stable EF. Continue same dose of Camzyos. Requested she call back with questions.

## 2022-10-28 ENCOUNTER — TELEPHONE (OUTPATIENT)
Dept: PULMONOLOGY | Age: 67
End: 2022-10-28

## 2022-10-28 NOTE — TELEPHONE ENCOUNTER
Left message for pt as follow up. Reminder her to notify office of any new medications OTC or prescription meds.

## 2022-10-28 NOTE — TELEPHONE ENCOUNTER
LM on  for pt to call office re: upcoming appt with Dr. Kaia Bermudez. Appt needs r/s or canceled until after PSG is completed.

## 2022-11-03 RX ORDER — MAVACAMTEN 5 MG/1
CAPSULE, GELATIN COATED ORAL
Qty: 30 CAPSULE | Refills: 2 | Status: SHIPPED | OUTPATIENT
Start: 2022-11-03

## 2022-11-04 ENCOUNTER — OFFICE VISIT (OUTPATIENT)
Dept: CARDIOLOGY CLINIC | Age: 67
End: 2022-11-04
Payer: MEDICARE

## 2022-11-04 ENCOUNTER — TELEPHONE (OUTPATIENT)
Dept: CARDIOLOGY CLINIC | Age: 67
End: 2022-11-04

## 2022-11-04 VITALS
DIASTOLIC BLOOD PRESSURE: 60 MMHG | WEIGHT: 170 LBS | SYSTOLIC BLOOD PRESSURE: 110 MMHG | HEART RATE: 89 BPM | BODY MASS INDEX: 29.02 KG/M2 | HEIGHT: 64 IN | OXYGEN SATURATION: 97 %

## 2022-11-04 DIAGNOSIS — I42.1 HOCM (HYPERTROPHIC OBSTRUCTIVE CARDIOMYOPATHY) (HCC): ICD-10-CM

## 2022-11-04 DIAGNOSIS — R07.89 OTHER CHEST PAIN: Primary | ICD-10-CM

## 2022-11-04 DIAGNOSIS — R06.02 SOB (SHORTNESS OF BREATH): ICD-10-CM

## 2022-11-04 PROCEDURE — 1123F ACP DISCUSS/DSCN MKR DOCD: CPT | Performed by: NURSE PRACTITIONER

## 2022-11-04 PROCEDURE — G8399 PT W/DXA RESULTS DOCUMENT: HCPCS | Performed by: NURSE PRACTITIONER

## 2022-11-04 PROCEDURE — G8417 CALC BMI ABV UP PARAM F/U: HCPCS | Performed by: NURSE PRACTITIONER

## 2022-11-04 PROCEDURE — 3078F DIAST BP <80 MM HG: CPT | Performed by: NURSE PRACTITIONER

## 2022-11-04 PROCEDURE — 99214 OFFICE O/P EST MOD 30 MIN: CPT | Performed by: NURSE PRACTITIONER

## 2022-11-04 PROCEDURE — 1090F PRES/ABSN URINE INCON ASSESS: CPT | Performed by: NURSE PRACTITIONER

## 2022-11-04 PROCEDURE — G8484 FLU IMMUNIZE NO ADMIN: HCPCS | Performed by: NURSE PRACTITIONER

## 2022-11-04 PROCEDURE — 93000 ELECTROCARDIOGRAM COMPLETE: CPT | Performed by: NURSE PRACTITIONER

## 2022-11-04 PROCEDURE — G8427 DOCREV CUR MEDS BY ELIG CLIN: HCPCS | Performed by: NURSE PRACTITIONER

## 2022-11-04 PROCEDURE — 3017F COLORECTAL CA SCREEN DOC REV: CPT | Performed by: NURSE PRACTITIONER

## 2022-11-04 PROCEDURE — 1036F TOBACCO NON-USER: CPT | Performed by: NURSE PRACTITIONER

## 2022-11-04 PROCEDURE — 3074F SYST BP LT 130 MM HG: CPT | Performed by: NURSE PRACTITIONER

## 2022-11-04 ASSESSMENT — ENCOUNTER SYMPTOMS
GASTROINTESTINAL NEGATIVE: 1
RESPIRATORY NEGATIVE: 1

## 2022-11-04 NOTE — TELEPHONE ENCOUNTER
Pt asking to speak to [facility]. Pt is having intermittent jaw pain and yesterday under arm pain. Also asking about a flu shot and taking camzyos. Please call to advise.

## 2022-11-04 NOTE — PROGRESS NOTES
[address]   Congestive Heart Failure    PrimaryCare Doctor:  Flori Bianchi MD  Primary Cardiologist: Felicia Cooper         Chief Complaint:  chest pain    History of Present Illness:  Andrea Coppola is a 69 y.o. female with HOCM, DM, CKD3, HTN who presents today with c/o chest pain and SOB while walking at the mall yesterday. The  pain was on her left side under her arm, sharp and resolved with rest. She does not know how long it lasted but it was associated with sweating and dizziness. It is similar to episodes that she has had all her life. Today she feels fatigued but back to normal. Her EKG today is w/o acute changes. Baseline Weight: 170-175  Wt Readings from Last 3 Encounters:   22 170 lb (77.1 kg)   10/10/22 175 lb (79.4 kg)   22 177 lb (80.3 kg)        EF: 60-65%  [facility] Echo    Limited echo for Camzyos protocol. (Week 4)   Moderate septal hypertrophy noted. Normal global systolic function with an ejection fraction estimated at   60-65%. Resting maximum LVOT gradient is estimated at 4 mmHg and 34 mmHg with   valsalva. There is mild mitral regurgitation noted. There is mild aortic insufficiency. Echo 22   Hyperdynamic left ventricular systolic function with an ejection fraction   estimated at greater than 70%. No obvious regional wall motion abnormalities. There is a late peaking gradient recorded across the LV outflow tract   consistent with dynamic obstruction with a peak gradient of 25 mmHg at rest   and with valsalva maneuver. There is mild tricuspid regurgitation with a RVSP estimation of 22 mmHg. Grade I diastolic dysfunction with normal LV filling pressures. Avg.   E/e'=13.0. Consider hypertrophic cardiomyopathy or hyperdynamic state if clinically   indicated.         [facility] 21  Dr. Magda Mckay   Cardiac Cath LVG:  Anatomy:   LM-nml   LAD-nml  Cx-nml  OM- nml  RCA-dominant  RPDA- nml  LVEF- 65  LVG- nml  LVEDP- 27 Contrast: 48  Flouro Time: 2.9  Access: R Radial artery     Impression  ~Coronary Angiography w/ normal coronaries  ~LVG with LVEF of 65 and no regional wall motion abnormalities  ~high lvedp c/w diastolic CHF         Recommendation  ~Aggressive medical treatment and risk factor modification  ~1. Medications reviewed  2. Lasix 20mg po daily. Check bmp in 1 week. FU with nephrology  3. No indication for beta blocker, statin or anti platelet therapy  4. Patient has been advised on the importance of regular exercise of at least 20-30 minutes daily alternating with aerobic and isometric activities. 5. Patient counseled about and offered assistance for smoking cessation  6. No indication for cardiac rehab  7. Follow up with PCP      Activity: at baseline  Can you walk 1-2 blocks or do a moderate amount of house/yard work? Yes      NYHA Class: I       Past Medical History:   has a past medical history of Asthma, Ataxia, Cervical stenosis of spinal canal, Coronary artery disease involving native coronary artery of native heart without angina pectoris, Depression, Diabetes mellitus ([address] Hyperlipidemia, Hypertension, Hypothyroidism, Obesity, Osteoarthritis, Paradoxical vocal fold motion disorder, Postmenopausal, and Stage 2 chronic kidney disease. Surgical History:   has a past surgical history that includes Carpal tunnel release; Throat surgery; Hysterectomy, total abdominal; and pr njx aa&/strd tfrml epi lumbar/sacral 1 level (Bilateral, 12/3/2018). Social History:   reports that she has never smoked. She has never used smokeless tobacco. She reports that she does not drink alcohol and does not use drugs. Family History:   Family History   Problem Relation Age of Onset    Cancer Mother     Obesity Sister        HomeMedications:  Prior to Admission medications    Medication Sig Start Date End Date Taking?  Authorizing Provider   Mavacamten (CAMZYOS) 5 MG CAPS TAKE 1 CAPSULE BY MOUTH ONCE DAILY 11/3/22   Toni Morgan Vicente Thurston MD   DULoxetine (CYMBALTA) 60 MG extended release capsule TAKE ONE CAPSULE BY MOUTH DAILY 10/24/22   Roney Kemp MD   zolpidem (AMBIEN) 5 MG tablet TAKE ONE TABLET BY MOUTH ONCE NIGHTLY AS NEEDED FOR SLEEP 9/22/22 1/22/23  Che Ang MD   Continuous Blood Gluc Sensor (FREESTYLE JEREMIAS 14 DAY SENSOR) MISC CHANGE SENSOR EVERY 14 DAYS AND CHECK BLOOD SUGAR FOUR TIMES A DAY 9/8/22   Trey Tamez MD   metoprolol succinate (TOPROL XL) 50 MG extended release tablet Take 1 tablet by mouth in the morning. 8/1/22   Evette Fernández MD   cyclobenzaprine (FLEXERIL) 10 MG tablet TAKE 1/2 TABLET BY MOUTH ONCE NIGHTLY AS NEEDED FOR MUSCLE SPASMS 7/25/22   Roney Kemp MD   buPROPion [facility] SR) 150 MG extended release tablet TAKE ONE TABLET BY MOUTH EVERY OTHER DAY ALTERNATING WITH 300MG 7/25/22   Roney Kemp MD   Insulin Glargine, 1 Unit Dial, (TOUJEO SOLOSTAR) 300 UNIT/ML SOPN INJECT 40 UNITS UNDER THE SKIN DAILY 7/7/22   Trey Tamez MD   morphine (SHANIKA) 10 MG extended release capsule Take 10 mg by mouth daily.  Unsure of dose and to start today, will take only at [address] [facility] MD Maria Del Carmen   atorvastatin (LIPITOR) 20 MG tablet TAKE ONE TABLET BY MOUTH DAILY 5/12/22   Trey Tamez MD   omeprazole (PRILOSEC) 40 MG delayed release capsule TAKE ONE CAPSULE BY MOUTH DAILY 5/12/22   Achilles Salt, APRN - CNP   fluticasone (FLONASE) 50 MCG/ACT nasal spray 2 sprays by Nasal route daily 5/12/22   Achilles Salt, APRN - CNP   buPROPion (WELLBUTRIN XL) 300 MG extended release tablet TAKE ONE TABLET BY MOUTH EVERY OTHER DAY ALTERNATING WITH 150MG SR TABLET 5/12/22   Achilles Salt, APRN - CNP   montelukast (SINGULAIR) 10 MG tablet TAKE ONE TABLET BY MOUTH ONCE NIGHTLY 5/12/22   Achilles Salt, APRN - CNP   Semaglutide, 1 MG/DOSE, (OZEMPIC, 1 MG/DOSE,) 4 MG/3ML SOPN Inject 1 mg into the skin once a week 5/2/22   Trey Tamez MD   traMADol (ULTRAM) 50 MG tablet Take 50 mg by mouth every 6 hours as needed for Pain. bid    Historical Provider, MD MORRIS PEN NEEDLES 31G 31G X 8 MM MISC USE FOUR TIMES A DAY 11/22/21   Monica Mckinney MD   NOVOLOG FLEXPEN 100 UNIT/ML injection pen INJECT 10 UNITS UNDER THE SKIN THREE TIMES A DAY BEFORE MEALS  Patient not taking: Reported on 10/10/2022 3/29/21   Monica Mckinney MD   Continuous Blood Gluc  (FREESTYLE JEREMIAS READER) ENE Use for checking glucose 5/15/18   Monica Mckinney MD        Allergies:  Actos [pioglitazone hydrochloride], Albuterol, Dilaudid [hydromorphone hcl], Fluticasone-salmeterol, Glyburide, Lortab [hydrocodone-acetaminophen], Metformin, and Symbicort [budesonide-formoterol fumarate]     ROS:   Review of Systems   Constitutional:  Positive for fatigue. Respiratory: Negative. Cardiovascular: Negative. Gastrointestinal: Negative. Genitourinary: Negative. Musculoskeletal: Negative. Skin: Negative. Neurological: Negative. Hematological: Negative. Psychiatric/Behavioral: Negative. Physical Examination:    Vitals:    11/04/22 1433   BP: 110/60   Site: Left Upper Arm   Position: Sitting   Cuff Size: Medium Adult   Pulse: 89   SpO2: 97%   Weight: 170 lb (77.1 kg)   Height: 5' 3.5\" (1.613 m)           Physical Exam  Vitals reviewed. Constitutional:       Appearance: Normal appearance. She is normal weight. HENT:      Head: Normocephalic and atraumatic. Eyes:      Extraocular Movements: Extraocular movements intact. Pupils: Pupils are equal, round, and reactive to light. Cardiovascular:      Rate and Rhythm: Normal rate and regular rhythm. Pulses: Normal pulses. Heart sounds: Normal heart sounds. Pulmonary:      Effort: Pulmonary effort is normal.      Breath sounds: Normal breath sounds. Abdominal:      Palpations: Abdomen is soft. Musculoskeletal:         General: Normal range of motion. Cervical back: Normal range of motion and neck supple. Skin:     General: Skin is warm and dry. Neurological:      General: No focal deficit present. Mental Status: She is alert and oriented to person, place, and time. Mental status is at baseline. Psychiatric:         Mood and Affect: Mood normal.         Behavior: Behavior normal.         Thought Content:  Thought content normal.         Judgment: Judgment normal.       Lab Data:    CBC:   Lab Results   Component Value Date/Time    WBC 6.8 08/01/2022 10:48 AM    WBC 7.4 06/24/2022 05:13 AM    WBC 8.3 06/23/2022 03:20 PM    RBC 4.39 08/01/2022 10:48 AM    RBC 4.42 06/24/2022 05:13 AM    RBC 4.71 06/23/2022 03:20 PM    HGB 12.1 08/01/2022 10:48 AM    HGB 12.3 06/24/2022 05:13 AM    HGB 13.1 06/23/2022 03:20 PM    HCT 37.1 08/01/2022 10:48 AM    HCT 37.6 06/24/2022 05:13 AM    HCT 39.6 06/23/2022 03:20 PM    MCV 84.5 08/01/2022 10:48 AM    MCV 85.1 06/24/2022 05:13 AM    MCV 84.1 06/23/2022 03:20 PM    RDW 13.7 08/01/2022 10:48 AM    RDW 13.5 06/24/2022 05:13 AM    RDW 13.4 06/23/2022 03:20 PM     08/01/2022 10:48 AM     06/24/2022 05:13 AM     06/23/2022 03:20 PM     BMP:  Lab Results   Component Value Date/Time     10/10/2022 03:11 PM     08/01/2022 10:48 AM     06/28/2022 09:24 AM    K 4.9 10/10/2022 03:11 PM    K 4.9 08/01/2022 10:48 AM    K 5.2 06/29/2022 03:01 PM    K 4.4 06/24/2022 05:13 AM    K 4.6 06/23/2022 03:20 PM     10/10/2022 03:11 PM     08/01/2022 10:48 AM     06/28/2022 09:24 AM    CO2 25 10/10/2022 03:11 PM    CO2 26 08/01/2022 10:48 AM    CO2 23 06/28/2022 09:24 AM    PHOS 5.1 08/01/2022 10:48 AM    PHOS 3.5 02/18/2021 12:45 PM    PHOS 4.7 07/06/2020 10:27 AM    BUN 18 10/10/2022 03:11 PM    BUN 17 08/01/2022 10:48 AM    BUN 24 06/28/2022 09:24 AM    CREATININE 1.4 10/10/2022 03:11 PM    CREATININE 1.4 08/01/2022 10:48 AM    CREATININE 1.4 06/28/2022 09:24 AM     BNP:   Lab Results   Component Value Date/Time    PROBNP 73 10/10/2022 03:11 PM    PROBNP 42 06/23/2022 03:20 PM Iron Studies:  No components found for: FE,  TIBC,  FERRITIN        Assessment/Plan:    1. Other chest pain - EKG ok today, nl LHC one year ago, continue to monitor   2. SOB (shortness of breath) - stable   3.  HOCM (hypertrophic obstructive cardiomyopathy) ([facility] [address] - stable, continue Camzyos, next echo scheduled         Instructions:   Medications: no change    Follow up: as scheduled        [facility]: [phone]      I appreciate the opportunity of cooperating in the care of this individual.    ALESHA Katz - CNP, 11/4/2022,2:31 PM

## 2022-11-04 NOTE — TELEPHONE ENCOUNTER
Call patient. Okay to take flu shot. Ask about jaw pain? With chewing or clenching? Or with activity? If with activity, she should go to the ED.   YONI

## 2022-11-04 NOTE — TELEPHONE ENCOUNTER
Pt came to the office on her own to be seen for a chest pain episode she had yesterday while walking at the mall. She states she was profusely sweating, dizzy and out of breath. She also had pain under her left arm area/side of left chest which scared her. Orthostatic BP/HR done today:    Sitting 100/60, HR 90, Saturation 99% on RA. Standing 98/62, HR , Saturation 98% on RA    Patient states she only had the jaw pain at rest.     This walk-in was turned into a visit for an EKG and to be seen by [facility]. Pt OK to get flu shot per YONI and she was made aware.

## 2022-11-09 DIAGNOSIS — E78.2 MIXED HYPERLIPIDEMIA: ICD-10-CM

## 2022-11-09 DIAGNOSIS — I10 ESSENTIAL HYPERTENSION: ICD-10-CM

## 2022-11-09 DIAGNOSIS — N18.31 CHRONIC KIDNEY DISEASE (CKD) STAGE G3A/A1, MODERATELY DECREASED GLOMERULAR FILTRATION RATE (GFR) BETWEEN 45-59 ML/MIN/1.73 SQUARE METER AND ALBUMINURIA CREATININE RATIO LESS THAN 30 MG/G (HCC): ICD-10-CM

## 2022-11-09 LAB
HCT VFR BLD CALC: 38.6 % (ref 36–48)
HEMOGLOBIN: 12.3 G/DL (ref 12–16)
MCH RBC QN AUTO: 27 PG (ref 26–34)
MCHC RBC AUTO-ENTMCNC: 31.9 G/DL (ref 31–36)
MCV RBC AUTO: 84.7 FL (ref 80–100)
PDW BLD-RTO: 13.7 % (ref 12.4–15.4)
PLATELET # BLD: 519 K/UL (ref 135–450)
PMV BLD AUTO: 7.7 FL (ref 5–10.5)
RBC # BLD: 4.56 M/UL (ref 4–5.2)
WBC # BLD: 7.9 K/UL (ref 4–11)

## 2022-11-09 RX ORDER — ATORVASTATIN CALCIUM 20 MG/1
TABLET, FILM COATED ORAL
Qty: 90 TABLET | Refills: 1 | Status: SHIPPED | OUTPATIENT
Start: 2022-11-09

## 2022-11-13 ENCOUNTER — HOSPITAL ENCOUNTER (OUTPATIENT)
Dept: SLEEP CENTER | Age: 67
Discharge: HOME | End: 2022-11-13
Payer: MEDICARE

## 2022-11-13 DIAGNOSIS — G47.33 OSA (OBSTRUCTIVE SLEEP APNEA): ICD-10-CM

## 2022-11-13 PROCEDURE — 95810 POLYSOM 6/> YRS 4/> PARAM: CPT

## 2022-11-14 PROCEDURE — 95810 POLYSOM 6/> YRS 4/> PARAM: CPT | Performed by: INTERNAL MEDICINE

## 2022-11-15 DIAGNOSIS — G47.33 OSA (OBSTRUCTIVE SLEEP APNEA): Primary | ICD-10-CM

## 2022-11-16 ENCOUNTER — TELEPHONE (OUTPATIENT)
Dept: CARDIOLOGY CLINIC | Age: 67
End: 2022-11-16

## 2022-11-16 ENCOUNTER — TELEPHONE (OUTPATIENT)
Dept: PULMONOLOGY | Age: 67
End: 2022-11-16

## 2022-11-16 NOTE — TELEPHONE ENCOUNTER
Left message for pt that she needs to sign her camzyos forms for next year. Due before Dec 1. Forms are on my desk on a clip board. Requested that she not come in tomorrow morning as I will be at [facility]. The afternoon would work as well as anytime on Friday.

## 2022-11-21 ENCOUNTER — TELEPHONE (OUTPATIENT)
Dept: SLEEP CENTER | Age: 67
End: 2022-11-21

## 2022-11-21 NOTE — TELEPHONE ENCOUNTER
Spoke with patient. Results discussed. Patient to be scheduled for titration study. Order sent to sleep lab.

## 2022-11-21 NOTE — TELEPHONE ENCOUNTER
Called to schedule a CPAP per Kary pereira for the pt to return my call     [facility]    Psg done at [facility]

## 2022-11-22 NOTE — TELEPHONE ENCOUNTER
Left message for pt regarding forms to be signed by Dec 1 for REJI and Camzyos. Also called Jus Gomez and left message about Forms needing to be signed.

## 2022-11-23 ENCOUNTER — TELEPHONE (OUTPATIENT)
Dept: CARDIOLOGY CLINIC | Age: 67
End: 2022-11-23

## 2022-11-23 DIAGNOSIS — I42.1 HOCM (HYPERTROPHIC OBSTRUCTIVE CARDIOMYOPATHY) (HCC): Primary | ICD-10-CM

## 2022-11-28 ENCOUNTER — HOSPITAL ENCOUNTER (OUTPATIENT)
Dept: NON INVASIVE DIAGNOSTICS | Age: 67
Discharge: HOME | End: 2022-11-28
Payer: MEDICARE

## 2022-11-28 ENCOUNTER — TELEPHONE (OUTPATIENT)
Dept: CARDIOLOGY CLINIC | Age: 67
End: 2022-11-28

## 2022-11-28 DIAGNOSIS — I42.1 HOCM (HYPERTROPHIC OBSTRUCTIVE CARDIOMYOPATHY) (HCC): Primary | ICD-10-CM

## 2022-11-28 DIAGNOSIS — I42.1 HOCM (HYPERTROPHIC OBSTRUCTIVE CARDIOMYOPATHY) (HCC): ICD-10-CM

## 2022-11-28 DIAGNOSIS — Q24.8 LEFT VENTRICULAR OUTFLOW TRACT OBSTRUCTION: ICD-10-CM

## 2022-11-28 DIAGNOSIS — R06.02 SOB (SHORTNESS OF BREATH): ICD-10-CM

## 2022-11-28 DIAGNOSIS — I51.7 LVH (LEFT VENTRICULAR HYPERTROPHY): ICD-10-CM

## 2022-11-28 PROCEDURE — 93321 DOPPLER ECHO F-UP/LMTD STD: CPT

## 2022-11-28 PROCEDURE — 93325 DOPPLER ECHO COLOR FLOW MAPG: CPT

## 2022-11-28 PROCEDURE — 93308 TTE F-UP OR LMTD: CPT

## 2022-11-28 NOTE — TELEPHONE ENCOUNTER
Echo today shows EF of 60% and LVOT obstruction down to 4mmHg from 25mmHg   (was very difficult to produce and many times could not be produced) on 10/26/22    Next echo per protocol is in 12 weeks. Pt to stay on same dose of 5mg Daily. Started Camzyos 5mg Sept 1, 2022     Date Dose of Camzyos LVEF LVOT Gradient   6/24/22 Not started  70 25mmHg   9/28/22  5mg (started 9/1/22) 60-65% 34mmHg   10/26/22 5mg 55% 25mmHg   11/28/22 (12 week echo) 5mg (Continue same dose) 60% 4mmHg                                                        Left message for pt. Will try to reach her.

## 2022-11-30 NOTE — TELEPHONE ENCOUNTER
LM for pt. Regarding need for next echo. Echo needed week of Feb 27th, 2023. Layla to make the appt. Instructed pt to call us.

## 2022-12-01 ENCOUNTER — OFFICE VISIT (OUTPATIENT)
Dept: ENDOCRINOLOGY | Age: 67
End: 2022-12-01
Payer: MEDICARE

## 2022-12-01 VITALS
SYSTOLIC BLOOD PRESSURE: 112 MMHG | RESPIRATION RATE: 16 BRPM | HEIGHT: 64 IN | HEART RATE: 95 BPM | BODY MASS INDEX: 29.02 KG/M2 | DIASTOLIC BLOOD PRESSURE: 74 MMHG | WEIGHT: 170 LBS

## 2022-12-01 DIAGNOSIS — E11.42 TYPE 2 DIABETES MELLITUS WITH POLYNEUROPATHY (HCC): Primary | ICD-10-CM

## 2022-12-01 LAB — HBA1C MFR BLD: 6.9 %

## 2022-12-01 PROCEDURE — 1123F ACP DISCUSS/DSCN MKR DOCD: CPT | Performed by: INTERNAL MEDICINE

## 2022-12-01 PROCEDURE — 1036F TOBACCO NON-USER: CPT | Performed by: INTERNAL MEDICINE

## 2022-12-01 PROCEDURE — 3074F SYST BP LT 130 MM HG: CPT | Performed by: INTERNAL MEDICINE

## 2022-12-01 PROCEDURE — G8484 FLU IMMUNIZE NO ADMIN: HCPCS | Performed by: INTERNAL MEDICINE

## 2022-12-01 PROCEDURE — 3017F COLORECTAL CA SCREEN DOC REV: CPT | Performed by: INTERNAL MEDICINE

## 2022-12-01 PROCEDURE — 3078F DIAST BP <80 MM HG: CPT | Performed by: INTERNAL MEDICINE

## 2022-12-01 PROCEDURE — 83036 HEMOGLOBIN GLYCOSYLATED A1C: CPT | Performed by: INTERNAL MEDICINE

## 2022-12-01 PROCEDURE — G8427 DOCREV CUR MEDS BY ELIG CLIN: HCPCS | Performed by: INTERNAL MEDICINE

## 2022-12-01 PROCEDURE — 3051F HG A1C>EQUAL 7.0%<8.0%: CPT | Performed by: INTERNAL MEDICINE

## 2022-12-01 PROCEDURE — 2022F DILAT RTA XM EVC RTNOPTHY: CPT | Performed by: INTERNAL MEDICINE

## 2022-12-01 PROCEDURE — 1090F PRES/ABSN URINE INCON ASSESS: CPT | Performed by: INTERNAL MEDICINE

## 2022-12-01 PROCEDURE — 99214 OFFICE O/P EST MOD 30 MIN: CPT | Performed by: INTERNAL MEDICINE

## 2022-12-01 PROCEDURE — G8417 CALC BMI ABV UP PARAM F/U: HCPCS | Performed by: INTERNAL MEDICINE

## 2022-12-01 PROCEDURE — G8399 PT W/DXA RESULTS DOCUMENT: HCPCS | Performed by: INTERNAL MEDICINE

## 2022-12-01 RX ORDER — SEMAGLUTIDE 2.68 MG/ML
INJECTION, SOLUTION SUBCUTANEOUS
Qty: 3 ML | Refills: 3 | Status: SHIPPED | OUTPATIENT
Start: 2022-12-01

## 2022-12-01 ASSESSMENT — ENCOUNTER SYMPTOMS: VISUAL CHANGE: 0

## 2022-12-01 NOTE — PROGRESS NOTES
Andrea Coppola is a 69 y.o. female is seen  for evaluation and management of type 2  Diabetes--- . Andrea Coppola was diagnosed with Diabetes mellitus in 2008 aprox   Pt always had issues with hypoglycemia since her childhood so she was under constant surveillance until she was diagnosed with diabetes  . Andrea Coppola got diabetic education in the past.  Comorbid conditions: Neuropathy  ---on her way back from [facility] on June 17th she developed chest pain and sweating and was taken by EMS   -she saw her PCP and was told that she had HOCM and started on B Blockers ---she feels improved. Weight 171 in June 2022 . Patient has stage III chronic kidney disease and follows with nephrology  INTERIM:    Diabetes  She presents for her follow-up diabetic visit. She has type 2 diabetes mellitus. No MedicAlert identification noted. The initial diagnosis of diabetes was made 10 years ago. Her disease course has been worsening. Hypoglycemia symptoms include sweats and tremors. Pertinent negatives for diabetes include no foot ulcerations, no polydipsia, no polyphagia, no polyuria, no visual change, no weakness and no weight loss. There are no hypoglycemic complications. Symptoms are worsening. Diabetic complications include peripheral neuropathy. Risk factors for coronary artery disease include post-menopausal, hypertension, diabetes mellitus and dyslipidemia. Current diabetic treatment includes insulin injections. She is compliant with treatment some of the time. Her weight is increasing rapidly. She is following a generally unhealthy diet. Meal planning includes avoidance of concentrated sweets. She has had a previous visit with a dietitian. She never participates in exercise. Her home blood glucose trend is fluctuating dramatically. Her breakfast blood glucose is taken between 7-8 am. Her breakfast blood glucose range is generally 140-180 mg/dl. An ACE inhibitor/angiotensin II receptor blocker is being taken. She does not see a podiatrist.Eye exam is current. She feels that she lost weight due to Ozempic, no further weight loss still taking 1 mg of Ozempic  Having some hypoglycemia overnight currently taking Toujeo 40 units nightly.       Weight trend: stable  Prior visit with dietician: no  Current diet: on average, 3 meals per day  Current exercise: rare    She was in the ER after K was 6.2 but repeat in ER was normal in April 2019  She denies any nausea vomiting she tends to drink plenty of water any ways    Patient has hyperlipidemia and is on Lipitor her last LDL was at target  Patient also has arthritis and takes Celebrex off-and-on which can also cause kidney damage and also has reflux and is on omeprazole    Past Medical History:   Diagnosis Date    Asthma     vocal fold     Ataxia     Cervical stenosis of spinal canal 10/7/2021    Coronary artery disease involving native coronary artery of native heart without angina pectoris 9/9/2021    Depression     Diabetes mellitus ([address]     Hyperlipidemia     Hypertension     Hypothyroidism     Obesity     Osteoarthritis     Paradoxical vocal fold motion disorder     Postmenopausal 5/15/2018    Stage 2 chronic kidney disease 5/7/2019      Patient Active Problem List   Diagnosis    HTN (hypertension)    Diabetes mellitus ([address]    Hyperlipidemia    Insomnia    Depression    Neuropathic pain    Type 2 diabetes mellitus with polyneuropathy (HCC)    Moderate episode of recurrent major depressive disorder (HCC)    Diabetic polyneuropathy associated with type 2 diabetes mellitus ([address]    Exogenous obesity    Spinal stenosis of lumbar region with neurogenic claudication    Postmenopausal    Stage 3 chronic kidney disease (HCC)    Tremor    Uncontrolled type 2 diabetes mellitus with hyperglycemia ([address]    Coronary artery disease involving native coronary artery of native heart without angina pectoris    Positive cardiac stress test    Cervical stenosis of spinal canal Chronic renal disease, stage III ([facility] [[id number]]    Other chest pain    HOCM (hypertrophic obstructive cardiomyopathy) (HCC)    Vasovagal syncope    WATSON (obstructive sleep apnea)     Past Surgical History:   Procedure Laterality Date    CARPAL TUNNEL RELEASE      RORY    HYSTERECTOMY, TOTAL ABDOMINAL (CERVIX REMOVED)      Age 37    MT NJX AA&/STRD TFRML EPI LUMBAR/SACRAL 1 LEVEL Bilateral 12/3/2018    BILATERAL L4 LUMBAR TRANSFORAMINAL EPIDURAL STEROID INJECTION WITH FLUOROSCOPY performed by Cuba Jeronimo MD at [address]     Social History     Socioeconomic History    Marital status:      Spouse name: Not on file    Number of children: Not on file    Years of education: Not on file    Highest education level: Not on file   Occupational History    Not on file   Tobacco Use    Smoking status: Never    Smokeless tobacco: Never   Vaping Use    Vaping Use: Never used   Substance and Sexual Activity    Alcohol use: No     Alcohol/week: 0.0 standard drinks    Drug use: No    Sexual activity: Yes     Partners: Male   Other Topics Concern    Not on file   Social History Narrative    Not on file     Social Determinants of Health     Financial Resource Strain: Not on file   Food Insecurity: Not on file   Transportation Needs: Not on file   Physical Activity: Inactive    Days of Exercise per Week: 0 days    Minutes of Exercise per Session: 0 min   Stress: Not on file   Social Connections: Not on file   Intimate Partner Violence: Not on file   Housing Stability: Not on file     Family History   Problem Relation Age of Onset    Cancer Mother     Obesity Sister      Current Outpatient Medications   Medication Sig Dispense Refill    Semaglutide, 2 MG/DOSE, (OZEMPIC, 2 MG/DOSE,) 8 MG/3ML SOPN Take 2 mg weekly 3 mL 3    dapagliflozin (FARXIGA) 5 MG tablet Take 1 tablet by mouth every morning 90 tablet 1    atorvastatin (LIPITOR) 20 MG tablet TAKE ONE TABLET BY MOUTH DAILY 90 tablet 1 Mavacamten (CAMZYOS) 5 MG CAPS TAKE 1 CAPSULE BY MOUTH ONCE DAILY 30 capsule 2    DULoxetine (CYMBALTA) 60 MG extended release capsule TAKE ONE CAPSULE BY MOUTH DAILY 90 capsule 1    zolpidem (AMBIEN) 5 MG tablet TAKE ONE TABLET BY MOUTH ONCE NIGHTLY AS NEEDED FOR SLEEP 30 tablet 2    Continuous Blood Gluc Sensor (FREESTYLE JEREMIAS 14 DAY SENSOR) MISC CHANGE SENSOR EVERY 14 DAYS AND CHECK BLOOD SUGAR FOUR TIMES A DAY 2 each 5    metoprolol succinate (TOPROL XL) 50 MG extended release tablet Take 1 tablet by mouth in the morning. 90 tablet 3    cyclobenzaprine (FLEXERIL) 10 MG tablet TAKE 1/2 TABLET BY MOUTH ONCE NIGHTLY AS NEEDED FOR MUSCLE SPASMS 30 tablet 1    buPROPion (WELLBUTRIN SR) 150 MG extended release tablet TAKE ONE TABLET BY MOUTH EVERY OTHER DAY ALTERNATING WITH 300MG 45 tablet 1    Insulin Glargine, 1 Unit Dial, (TOUJEO SOLOSTAR) 300 UNIT/ML SOPN INJECT 40 UNITS UNDER THE SKIN DAILY 9 pen 3    morphine (SHANIKA) 10 MG extended release capsule Take 10 mg by mouth daily. Unsure of dose and to start today, will take only at HS      omeprazole (PRILOSEC) 40 MG delayed release capsule TAKE ONE CAPSULE BY MOUTH DAILY 90 capsule 3    fluticasone (FLONASE) 50 MCG/ACT nasal spray 2 sprays by Nasal route daily 3 each 3    buPROPion (WELLBUTRIN XL) 300 MG extended release tablet TAKE ONE TABLET BY MOUTH EVERY OTHER DAY ALTERNATING WITH 150MG SR TABLET 45 tablet 3    montelukast (SINGULAIR) 10 MG tablet TAKE ONE TABLET BY MOUTH ONCE NIGHTLY 90 tablet 3    traMADol (ULTRAM) 50 MG tablet Take 50 mg by mouth every 6 hours as needed for Pain. bid      [employer] PEN NEEDLES 31G 31G X 8 MM MISC USE FOUR TIMES A  each 5     No current facility-administered medications for this visit.      Allergies   Allergen Reactions    Actos [Pioglitazone Hydrochloride]      Causes mouth blisters    Albuterol      YEAST INFECTION IN MOUTH    Dilaudid [Hydromorphone Hcl]     Fluticasone-Salmeterol      YEAST INFECTIONS IN MOUTH Glyburide      Causes mouth blisters    Lortab [Hydrocodone-Acetaminophen]      headache    Metformin      Causes mouth blisters    Symbicort [Budesonide-Formoterol Fumarate]      Causes mouth blisters     Family Status   Relation Name Status    Mother   at age 68    Father   at age 32    Sister  (Not Specified)     ROS  I have reviewed the review of system questionnaire filled by the patient .   Patient was advised to contact PCP for non endocrine signs and symptoms     OBJECTIVE:  /74   Pulse 95   Resp 16   Ht 5' 3.5\" (1.613 m)   Wt 170 lb (77.1 kg)   BMI 29.64 kg/m²    Wt Readings from Last 3 Encounters:   22 170 lb (77.1 kg)   22 170 lb (77.1 kg)   10/10/22 175 lb (79.4 kg)       Constitutional: no acute distress, well appearing and well nourished  Psychiatric: oriented to person, place and time, judgement and insight and normal, recent and remote memory intact and mood and affect are normal  Skin: skin and subcutaneous tissue is normal without visible mass,   Head and Face: visual inspection  of head and face revealed no abnormalities  Eyes: visual inspection showed no lid or conjunctival swelling, erythema or discharge, pupils are normal, equal, round  Ears/Nose: external inspection of ears and nose revealed no abnormalities, hearing is grossly normal  Oropharynx/Mouth/Face: lips, tongue and gums appear  normal with no lesions, the voice quality was normal  Neck: neck appears symmetric, with no visible masses,   Pulmonary: no increased work of breathing or signs of respiratory distress,  Musculoskeletal: normal on inspection    Neurological: normal coordination and normal general cortical function          Lab Results   Component Value Date/Time    LABA1C 7.1 2022 09:24 AM    LABA1C 6.6 2022 02:21 PM    LABA1C 7.8 2021 03:03 PM       ASSESSMENT/PLAN:      --Type 2 diabetes mellitus with polyneuropathy last 8.2 >>10.9>>8.3>>10.9>>8.3>>7.7    Control is at target with A1c of 6.9  she is on ozempic 1 mg weekly still although last time we decided to increase the dose   Increase to 2 mg   Toujeo 40 units daily   Reduce the dose to 30 units   Start Farxiga 5 mg daily All possible Side effects were discussed in detail with patient in detail and patient was advised to call our office if she  notes any side effects shewas given are written handout detailing side effects from the medication. She had prior reaction to metformin ( mouth sores) Invokana ( UTI ) and Januvia and made her blood sugars run really high. Hypoglycemia protocol reviewed in detail with patient   Patient was advised that sending of her fingerstick blood glucose logs is crucial in management of her  diabetes. I will adjust the  dose of insulin according to sent data. Health Maintenance   - Lisa Rodriguez was advised to have annual dilated eye exam     Last Eye Exam: Up to date on eye exam was told she has no retinopathy   Diabetic foot care discussed with patient in detail and patient advised to get yearly foot exam with podiatry. Lipids: Last LDL 52 in May 2019    Microalbumin/Creatinine Ratio: She now follows with nephrology    HOCM  Follows with cardiology and is undergoing work-up      --- Diabetic polyneuropathy associated with type 2 diabetes mellitus ([facility] [address]  She is on gabapentin as per primary care physician    ---- Postmenopausal  She had complete hysterectomy done at age 37 for severe endometriosis. She had been on hormone replacement therapy until age of 46.   We discussed prevention of osteoporosis in detail today again   - DEXA Bone Density Axial Skeleton; ordered but she has not done it     --- Recurrent major depressive disorder, in partial remission [facility])  She is on medication as per primary care physician    --- Essential hypertension  At target     --- Mixed hyperlipidemia  She is on Lipitor and her last LDL is 51 in 2019   We will check  at follow-up as she did not get blood work done9      Reviewed and/or ordered clinical lab results yes   Reviewed and/or ordered radiology tests Yes  Reviewed and/or ordered other diagnostic tests yes   Made a decision to obtain old records yes   Reviewed and summarized old records yes     Obdulia Perez was counseled regarding symptoms of current diagnosis, course and complications of disease if inadequately treated, side effects of medications, diagnosis, treatment options, and prognosis, risks, benefits, complications, and alternatives of treatment, labs, imaging and other studies and treatment targets and goals. These diagnosis were discussed and reviewed with the patient including the advantages of drug therapy. She was counseled at this visit on the following: diabetes complication prevention and foot care. Return in about 3 months (around 3/1/2023).

## 2022-12-02 ENCOUNTER — TELEPHONE (OUTPATIENT)
Dept: CARDIOLOGY CLINIC | Age: 67
End: 2022-12-02

## 2022-12-02 NOTE — TELEPHONE ENCOUNTER
Pt asking to speak to Deisy Chapman,  states Dr Valdez Em increased her ozempic and added Fayrene Lesches. Asking if that is ok with the camzyos she is taking. Please call to advise.

## 2022-12-02 NOTE — TELEPHONE ENCOUNTER
Spoke to pt. Requested she wait until Monday for any new medication start after YONI reviews the Drug to Drug interaction with Enio. Pt verbalizes understanding.

## 2022-12-03 NOTE — TELEPHONE ENCOUNTER
There are no interactions with Camzyos with Ozempic or [place]. Both are okay to use with Camzyos.   YONI

## 2022-12-14 DIAGNOSIS — N18.31 CHRONIC KIDNEY DISEASE (CKD) STAGE G3A/A1, MODERATELY DECREASED GLOMERULAR FILTRATION RATE (GFR) BETWEEN 45-59 ML/MIN/1.73 SQUARE METER AND ALBUMINURIA CREATININE RATIO LESS THAN 30 MG/G (HCC): ICD-10-CM

## 2022-12-14 DIAGNOSIS — I10 ESSENTIAL HYPERTENSION: ICD-10-CM

## 2022-12-14 LAB
A/G RATIO: 1.7 (ref 1.1–2.2)
ALBUMIN SERPL-MCNC: 4.1 G/DL (ref 3.4–5)
ALP BLD-CCNC: 129 U/L (ref 40–129)
ALT SERPL-CCNC: 10 U/L (ref 10–40)
ANION GAP SERPL CALCULATED.3IONS-SCNC: 12 MMOL/L (ref 3–16)
AST SERPL-CCNC: 13 U/L (ref 15–37)
BILIRUB SERPL-MCNC: <0.2 MG/DL (ref 0–1)
BUN BLDV-MCNC: 17 MG/DL (ref 7–20)
CALCIUM SERPL-MCNC: 9.1 MG/DL (ref 8.3–10.6)
CHLORIDE BLD-SCNC: 100 MMOL/L (ref 99–110)
CO2: 26 MMOL/L (ref 21–32)
CREAT SERPL-MCNC: 1.2 MG/DL (ref 0.6–1.2)
GFR SERPL CREATININE-BSD FRML MDRD: 49 ML/MIN/{1.73_M2}
GLUCOSE BLD-MCNC: 136 MG/DL (ref 70–99)
POTASSIUM SERPL-SCNC: 4.7 MMOL/L (ref 3.5–5.1)
SODIUM BLD-SCNC: 138 MMOL/L (ref 136–145)
TOTAL PROTEIN: 6.5 G/DL (ref 6.4–8.2)

## 2022-12-15 LAB
CREATININE URINE: 277.8 MG/DL (ref 28–259)
PROTEIN PROTEIN: 32 MG/DL
PROTEIN/CREAT RATIO: 0.1 MG/DL

## 2022-12-20 ENCOUNTER — TELEPHONE (OUTPATIENT)
Dept: ENDOCRINOLOGY | Age: 67
End: 2022-12-20

## 2022-12-20 ENCOUNTER — TELEPHONE (OUTPATIENT)
Dept: CARDIOLOGY CLINIC | Age: 67
End: 2022-12-20

## 2022-12-20 NOTE — TELEPHONE ENCOUNTER
Spoke with pt. Dr. Carl Carlson wants pt to start Minnie Belcher if you feel it would be beneficial.    Davin Nely has been doubled to 2mg weekly  Toujeo decreased to 30 units. Kidney function has improved.

## 2022-12-20 NOTE — TELEPHONE ENCOUNTER
Patient made aware that Freestyle kari 3 is not approved for Medicare patients at this time and it would need to go through a DME once it is under the Medicare plans.

## 2022-12-20 NOTE — TELEPHONE ENCOUNTER
Alberto Weiner will be fine for her to start. She might have a diuretic effect with the Maria Eugenia Fulling resulting in her needing to drink more fluids, so she just needs to keep an eye out for this.   YONI

## 2022-12-20 NOTE — TELEPHONE ENCOUNTER
Pt calling and states she would like to have a rx for the [facility] 3, she thought it was already sent to pharmacy.  She uses [facility] on [address]    DS#[phone]

## 2022-12-23 NOTE — PROGRESS NOTES
[facility]  Advanced CHF/Pulmonary Hypertension   Cardiac Evaluation      Darline Donis  YOB: 1955    Date of Visit:  1/13/23    Chief Complaint   Patient presents with    Congestive Heart Failure    Shortness of Breath     History of Present Illness:  Darline Donis is a 69 y.o. female who presents from referral from Dr. Lorena Matthew for consultation and management of mild HOCM. Darline Donis is 69 y.o. and has a past medical history of type 2 diabetes, CKD 3, HTN. She was in [facility] and came home on 6/24/22. While in the airport, she began to have midsternal chest pressure 7/10 with SOB and diffuse diaphoresis and syncope and collapse  She was advised to go the ER and did not. She flew back to [place] and has been having exertional dyspnea and chest pain associate with diaphoresis. She had a did have a negative left heart cath September 2021. She was started on Toprol 25mg on DC on 6/24/22    She had a 30 day monitor recommended along with genetic testing. She grew up in an orphanage. She was always told she could not play and made to read in the [facility] because she would pass out. She would pass out with activity. States this has been a long standing problem. It would happen if she would \"lift something\" or walk too long. It would happen in the heat. She states she stays dehydrated. She states her BP would not stabilize and when she stood up. Since being started on Toprol, she feels less dizzy. She uses a rolling walker for balance. She states, when the episodes happen, she cannot cool herself off. She has a chair lift at home. She knows info about her Mother's mother. No heart disease  She has no known information about her father. She has a sister without heart problems  She has children. Her Toprol was increased to 50mg daily on 8/1/22.       Genetic Testing [facility]: Received 8/24/22  Result: 8/30/22  No Clinically Significant Variants Detected. 8/29/22 Lisinopril stopped. Started Camzyos 5mg Sept 1, 2022. She is scheduled for ECHO #5 (Week 24) end of 2/27/2023. Today, she is here for ongoing follow up. Overall, she feels fairly well, reports she has more energy which she's very glad about. Her breathing has also improved. She denies exertional chest pain, PND, palpitations, light-headedness, edema. She remains on Camzyos 5mg. Her renal function has improved which pleases her nephrologist.    Date Dose of Camzyos LVEF LVOT Gradient   6/24/22 Not started  70% 25mmHg   9/28/22  5mg (started 9/1/22) 60-65% 34mmHg   10/26/22 5mg 55% 25mmHg   11/28/22 5mg 60% 4mmHg                                   Allergies   Allergen Reactions    Actos [Pioglitazone Hydrochloride]      Causes mouth blisters    Albuterol      YEAST INFECTION IN MOUTH    Dilaudid [Hydromorphone Hcl]     Fluticasone-Salmeterol      YEAST INFECTIONS IN MOUTH    Glyburide      Causes mouth blisters    Lortab [Hydrocodone-Acetaminophen]      headache    Metformin      Causes mouth blisters    Symbicort [Budesonide-Formoterol Fumarate]      Causes mouth blisters     Current Outpatient Medications   Medication Sig Dispense Refill    Semaglutide, 2 MG/DOSE, (OZEMPIC, 2 MG/DOSE,) 8 MG/3ML SOPN Take 2 mg weekly 3 mL 3    dapagliflozin (FARXIGA) 5 MG tablet Take 1 tablet by mouth every morning 90 tablet 1    atorvastatin (LIPITOR) 20 MG tablet TAKE ONE TABLET BY MOUTH DAILY 90 tablet 1    Mavacamten (CAMZYOS) 5 MG CAPS TAKE 1 CAPSULE BY MOUTH ONCE DAILY 30 capsule 2    DULoxetine (CYMBALTA) 60 MG extended release capsule TAKE ONE CAPSULE BY MOUTH DAILY 90 capsule 1    zolpidem (AMBIEN) 5 MG tablet TAKE ONE TABLET BY MOUTH ONCE NIGHTLY AS NEEDED FOR SLEEP 30 tablet 2    metoprolol succinate (TOPROL XL) 50 MG extended release tablet Take 1 tablet by mouth in the morning.  90 tablet 3    cyclobenzaprine (FLEXERIL) 10 MG tablet TAKE 1/2 TABLET BY MOUTH ONCE NIGHTLY AS NEEDED FOR MUSCLE SPASMS 30 tablet 1    buPROPion (WELLBUTRIN SR) 150 MG extended release tablet TAKE ONE TABLET BY MOUTH EVERY OTHER DAY ALTERNATING WITH 300MG 45 tablet 1    Insulin Glargine, 1 Unit Dial, (TOUJEO SOLOSTAR) 300 UNIT/ML SOPN INJECT 40 UNITS UNDER THE SKIN DAILY 9 pen 3    morphine (SHANIKA) 10 MG extended release capsule Take 10 mg by mouth daily. Unsure of dose and to start today, will take only at HS      omeprazole (PRILOSEC) 40 MG delayed release capsule TAKE ONE CAPSULE BY MOUTH DAILY 90 capsule 3    fluticasone (FLONASE) 50 MCG/ACT nasal spray 2 sprays by Nasal route daily 3 each 3    buPROPion (WELLBUTRIN XL) 300 MG extended release tablet TAKE ONE TABLET BY MOUTH EVERY OTHER DAY ALTERNATING WITH 150MG SR TABLET 45 tablet 3    montelukast (SINGULAIR) 10 MG tablet TAKE ONE TABLET BY MOUTH ONCE NIGHTLY 90 tablet 3    traMADol (ULTRAM) 50 MG tablet Take 50 mg by mouth every 6 hours as needed for Pain. bid      [employer] PEN NEEDLES 31G 31G X 8 MM MISC USE  FOUR TIMES A  each 5    Continuous Blood Gluc Sensor (FREESTYLE JEREMIAS 14 DAY SENSOR) MISC CHANGE SENSOR EVERY 14 DAYS AND CHECK BLOOD SUGAR FOUR TIMES A DAY 2 each 5     No current facility-administered medications for this visit.        Past Medical History:   Diagnosis Date    Asthma     vocal fold     Ataxia     Cervical stenosis of spinal canal 10/7/2021    Coronary artery disease involving native coronary artery of native heart without angina pectoris 9/9/2021    Depression     Diabetes mellitus (HCC)     Hyperlipidemia     Hypertension     Hypothyroidism     Obesity     Osteoarthritis     Paradoxical vocal fold motion disorder     Postmenopausal 5/15/2018    Stage 2 chronic kidney disease 5/7/2019     Past Surgical History:   Procedure Laterality Date    CARPAL TUNNEL RELEASE      RORY    HYSTERECTOMY, TOTAL ABDOMINAL (CERVIX REMOVED)      Age 37    ME NJX AA&/STRD TFRML EPI LUMBAR/SACRAL 1 LEVEL Bilateral 12/3/2018    BILATERAL L4 LUMBAR TRANSFORAMINAL EPIDURAL STEROID INJECTION WITH FLUOROSCOPY performed by Cosme Severe, MD at [address]     Family History   Problem Relation Age of Onset    Cancer Mother     Obesity Sister      Social History     Socioeconomic History    Marital status:      Spouse name: Not on file    Number of children: Not on file    Years of education: Not on file    Highest education level: Not on file   Occupational History    Not on file   Tobacco Use    Smoking status: Never    Smokeless tobacco: Never   Vaping Use    Vaping Use: Never used   Substance and Sexual Activity    Alcohol use: No     Alcohol/week: 0.0 standard drinks    Drug use: No    Sexual activity: Yes     Partners: Male   Other Topics Concern    Not on file   Social History Narrative    Not on file     Social Determinants of Health     Financial Resource Strain: Not on file   Food Insecurity: Not on file   Transportation Needs: Not on file   Physical Activity: Inactive    Days of Exercise per Week: 0 days    Minutes of Exercise per Session: 0 min   Stress: Not on file   Social Connections: Not on file   Intimate Partner Violence: Not on file   Housing Stability: Not on file       Review of Systems:   Constitutional: there has been no unanticipated weight loss. There's been no change in energy level, sleep pattern, or activity level. Eyes: No visual changes or diplopia. No scleral icterus. ENT: No Headaches, hearing loss or vertigo. No mouth sores or sore throat. Cardiovascular: Reviewed in HPI  Respiratory: No cough or wheezing, no sputum production. No hematemesis. Gastrointestinal: No abdominal pain, appetite loss, blood in stools. No change in bowel or bladder habits. Genitourinary: No dysuria, trouble voiding, or hematuria. Musculoskeletal:  No gait disturbance, weakness or joint complaints. + Weakness  Integumentary: No rash or pruritis.   Neurological: No headache, diplopia, change in muscle strength, numbness or tingling. No change in gait, balance, coordination, mood, affect, memory, mentation, behavior. Psychiatric: No anxiety, no depression. Endocrine: No malaise, fatigue or temperature intolerance. No excessive thirst, fluid intake, or urination. No tremor. Hematologic/Lymphatic: No abnormal bruising or bleeding, blood clots or swollen lymph nodes. Allergic/Immunologic: No nasal congestion or hives. Physical Examination:    Vitals:    01/13/23 0756   BP: 98/62   Pulse: 90   SpO2: 96%   Weight: 173 lb (78.5 kg)   Height: 5' 3.5\" (1.613 m)           Body mass index is 30.16 kg/m². Wt Readings from Last 3 Encounters:   01/13/23 173 lb (78.5 kg)   12/19/22 167 lb (75.8 kg)   12/01/22 170 lb (77.1 kg)     BP Readings from Last 3 Encounters:   01/13/23 98/62   12/19/22 83/61   12/01/22 112/74     Constitutional and General Appearance: Pale   WD/WN in NAD  HEENT:  NC/AT  TEENA  No problems with hearing  Skin:  Warm, dry  Respiratory:  Normal excursion and expansion without use of accessory muscles  Resp Auscultation: Normal breath sounds without dullness  Cardiovascular: The apical impulses not displaced  Heart tones are crisp and normal  Cervical veins are not engorged  The carotid upstroke is normal in amplitude and contour without delay or bruit  JVP less than 8 cm H2O  RRR with nl S1 and S2 with II/VI HERSON  Peripheral pulses are symmetrical and full  There is no clubbing, cyanosis of the extremities.   No edema  Femoral Arteries: 2+ and equal  Pedal Pulses: 2+ and equal   Neck:  No thyromegaly  Abdomen:  No masses or tenderness  Liver/Spleen: No Abnormalities Noted  Neurological/Psychiatric:  Alert and oriented in all spheres  Moves all extremities well  Exhibits normal gait balance and coordination  No abnormalities of mood, affect, memory, mentation, or behavior are noted    Diagnostic Testing:    ECHO, limited (per Camzyos protocol week 12) 11/28/22  Technically difficult examination during first half of study.  Elevated HR with irregular rhythm during first half of study. Improved after rest.  Normal global ejection fraction estimated at 60%. No obvious regional wall motion abnormalities. Mild septal hypertrophy approx. 1.27 cm. LVOT peak gradient, resting, is 4 mmHg, mean gradient of 2 mmHg and a peak velocity of 9.51 m/s. LVOT peak gradient, with Valsalva, is 4 mmHg, mean gradient of 2 mmHg and a peak velocity of 9.51 m/s. Mild aortic regurgitation is present. ECHO, limited 10/26/22  Limited exam per Camzyos protocol week 8. Normal global ejection fraction estimated at 55%. Mild septal hypertrophy. There is no resting evidence of an LVOT obstruction. No obvious regional wall motion abnormalities. There is a very mild LVOT obstruction with a peak gradient of 25 mmHg that was very difficult to produce and many times could not be  produced    ECHO 9/28/22   Limited echo for Camzyos protocol. (Week 4)   Moderate septal hypertrophy noted. Normal global systolic function with an ejection fraction estimated at   60-65%. Resting maximum LVOT gradient is estimated at 4 mmHg and 34 mmHg with   valsalva. There is mild mitral regurgitation noted. There is mild aortic insufficiency. Echo 6/24/22   Hyperdynamic left ventricular systolic function with an ejection fraction   estimated at greater than 70%. No obvious regional wall motion abnormalities. There is a late peaking gradient recorded across the LV outflow tract   consistent with dynamic obstruction with a peak gradient of 25 mmHg at rest   and with valsalva maneuver. There is mild tricuspid regurgitation with a RVSP estimation of 22 mmHg. Grade I diastolic dysfunction with normal LV filling pressures. Avg.   E/e'=13.0. Consider hypertrophic cardiomyopathy or hyperdynamic state if clinically   indicated.        [facility] 9/14/21  Dr. Galeano Lower   Cardiac Cath LVG:  Anatomy:   LM-nml   LAD-nml  Cx-nml  OM- nml  RCA-dominant  RPDA- nml  LVEF- 65  LVG- nml  LVEDP- 27     Contrast: 48  Flouro Time: 2.9  Access: R Radial artery     Impression  ~Coronary Angiography w/ normal coronaries  ~LVG with LVEF of 65 and no regional wall motion abnormalities  ~high lvedp c/w diastolic CHF         Recommendation  ~Aggressive medical treatment and risk factor modification  ~1. Medications reviewed  2. Lasix 20mg po daily. Check bmp in 1 week. FU with nephrology  3. No indication for beta blocker, statin or anti platelet therapy  4. Patient has been advised on the importance of regular exercise of at least 20-30 minutes daily alternating with aerobic and isometric activities. 5. Patient counseled about and offered assistance for smoking cessation  6. No indication for cardiac rehab  7. Follow up with PCP      Labs were reviewed including labs from other hospital systems through [facility]. Cardiac testing was reviewed including echos, nuclear scans, cardiac catheterization, including from other hospital systems through [facility]. Assessment:    1. HOCM (hypertrophic obstructive cardiomyopathy) ([address]    2. SOB (shortness of breath)    3. Left ventricular outflow tract obstruction    4. Mixed hyperlipidemia    5. WATSON (obstructive sleep apnea)        1. HOCM (hypertrophic obstructive cardiomyopathy: Stable, compensated  -LVOT now at 4mmHg from 11/28/22. Next ECHO 2/27/23.  -Continues on Camzyos.   -Now on Farxiga per PCP -- goal is to eventually not require insulin  -Her dizziness has improved on Camzyos. She also has more energy.  -Renal function has improved     2. SOB (shortness of breath), h/o:  SOB is somewhat improved after starting Camzyos on 9/1/22.    -Improved activity tolerance and less dizziness. 3. Mixed hyperlipidemia: Continue Lipitor 20mg daily. -5/3/22> , HDL 58, LDL 86, .      4. Essential hypertension: Stable. Low.   -BP 98/62   Pulse 90   Ht 5' 3.5\" (1.613 m)   Wt 173 lb (78.5 kg)   SpO2 96%   BMI 30.16 kg/m²      5. WATSON (obstructive sleep apnea)   -Seeing Dr. Eduardo Quintero 9/2022.      6. Syncope and collapse, h/o: No recent events  -No further episodes of syncope since 6/24/22     7. Left ventricular outflow tract obstruction: Improved greatly on Camzyos  -Started Camzyos 5mg on 9/1/22 for LVOT obstruction. Plan:  No med changes. She has responded very well to [employer] treatment. Her outflow tract gradient has decreased to 4 mmg  BNP to add to PCP labs in March 2023  5th ECHO scheduled 2/27/23   Return for follow up in 3 months    Limited Echos for [employer] every 3 months per Camzyos REM program.      Time Based Itemization  A total of 40 minutes was spent on today's patient encounter. If applicable, non-patient-facing activities:  ( x)Preparing to see the patient and reviewing records  (x ) Individual interpretation of results  ( ) Discussion or coordination of care with other health care professionals  ( x) Ordering of unique tests, medications, or procedures  ( x) Documentation within the EHR      I appreciate the opportunity of cooperating in the care of this patient. Percy Lowery M.D., [address] attestation: This note was scribed in the presence of Dr. Rukhsana Chavis MD, by Ranjit Callahan RN. The scribe's documentation has been prepared under my direction and personally reviewed by me in its entirety. I confirm that the note above accurately reflects all work, treatment, procedures, and medical decision making performed by me.

## 2023-01-08 DIAGNOSIS — E11.42 TYPE 2 DIABETES MELLITUS WITH POLYNEUROPATHY (HCC): ICD-10-CM

## 2023-01-09 RX ORDER — PEN NEEDLE, DIABETIC 31 GX5/16"
NEEDLE, DISPOSABLE MISCELLANEOUS
Qty: 200 EACH | Refills: 5 | Status: SHIPPED | OUTPATIENT
Start: 2023-01-09

## 2023-01-13 ENCOUNTER — OFFICE VISIT (OUTPATIENT)
Dept: CARDIOLOGY CLINIC | Age: 68
End: 2023-01-13

## 2023-01-13 VITALS
OXYGEN SATURATION: 96 % | WEIGHT: 173 LBS | SYSTOLIC BLOOD PRESSURE: 98 MMHG | BODY MASS INDEX: 29.53 KG/M2 | HEART RATE: 90 BPM | HEIGHT: 64 IN | DIASTOLIC BLOOD PRESSURE: 62 MMHG

## 2023-01-13 DIAGNOSIS — E78.2 MIXED HYPERLIPIDEMIA: ICD-10-CM

## 2023-01-13 DIAGNOSIS — Q24.8 LEFT VENTRICULAR OUTFLOW TRACT OBSTRUCTION: ICD-10-CM

## 2023-01-13 DIAGNOSIS — G47.33 OSA (OBSTRUCTIVE SLEEP APNEA): ICD-10-CM

## 2023-01-13 DIAGNOSIS — I42.1 HOCM (HYPERTROPHIC OBSTRUCTIVE CARDIOMYOPATHY) (HCC): Primary | ICD-10-CM

## 2023-01-13 DIAGNOSIS — R06.02 SOB (SHORTNESS OF BREATH): ICD-10-CM

## 2023-01-13 NOTE — PATIENT INSTRUCTIONS
Continue Camzyos 5mg    Stay well hydrated -- any kind or flavor of water is fine. Add blood work to March labs.

## 2023-01-24 ENCOUNTER — TELEPHONE (OUTPATIENT)
Dept: CARDIOLOGY CLINIC | Age: 68
End: 2023-01-24

## 2023-01-24 NOTE — TELEPHONE ENCOUNTER
Left message for pt that her Limited Echo date (based on REMS) was miscalculated and needs to be between 2/9/23 and 2/16/23. Left pt a message to call [address] [place], [place] [address]Y to reschedule.

## 2023-01-25 NOTE — TELEPHONE ENCOUNTER
OK for echo on 2/15 at 12:30 per Juany Nichols. Spoke to pt. She is agreeable. Nehal Ramon will place pt on scheduled.

## 2023-01-28 ENCOUNTER — HOSPITAL ENCOUNTER (OUTPATIENT)
Dept: SLEEP CENTER | Age: 68
Discharge: HOME | End: 2023-01-28

## 2023-01-28 DIAGNOSIS — G47.33 OSA (OBSTRUCTIVE SLEEP APNEA): ICD-10-CM

## 2023-02-03 ENCOUNTER — TELEPHONE (OUTPATIENT)
Dept: CARDIOLOGY CLINIC | Age: 68
End: 2023-02-03

## 2023-02-03 DIAGNOSIS — R06.02 SOB (SHORTNESS OF BREATH): ICD-10-CM

## 2023-02-03 DIAGNOSIS — I42.1 HOCM (HYPERTROPHIC OBSTRUCTIVE CARDIOMYOPATHY) (HCC): Primary | ICD-10-CM

## 2023-02-03 DIAGNOSIS — I51.7 LVH (LEFT VENTRICULAR HYPERTROPHY): ICD-10-CM

## 2023-02-03 DIAGNOSIS — I10 ESSENTIAL HYPERTENSION: ICD-10-CM

## 2023-02-03 DIAGNOSIS — R55 SYNCOPE AND COLLAPSE: ICD-10-CM

## 2023-02-03 NOTE — TELEPHONE ENCOUNTER
Left message with scheduling number to call for 6 min chapa [facility] with HF questionnaire to complete in office.

## 2023-02-16 ENCOUNTER — TELEPHONE (OUTPATIENT)
Dept: CARDIOLOGY CLINIC | Age: 68
End: 2023-02-16

## 2023-02-16 ENCOUNTER — HOSPITAL ENCOUNTER (OUTPATIENT)
Dept: NON INVASIVE DIAGNOSTICS | Age: 68
Discharge: HOME | End: 2023-02-16
Payer: MEDICARE

## 2023-02-16 DIAGNOSIS — I42.1 HOCM (HYPERTROPHIC OBSTRUCTIVE CARDIOMYOPATHY) (HCC): ICD-10-CM

## 2023-02-16 DIAGNOSIS — I51.7 LVH (LEFT VENTRICULAR HYPERTROPHY): ICD-10-CM

## 2023-02-16 LAB
LV EF: 55 %
LVEF MODALITY: NORMAL

## 2023-02-16 PROCEDURE — 93308 TTE F-UP OR LMTD: CPT

## 2023-02-16 PROCEDURE — 93325 DOPPLER ECHO COLOR FLOW MAPG: CPT

## 2023-02-16 PROCEDURE — 93321 DOPPLER ECHO F-UP/LMTD STD: CPT

## 2023-02-16 RX ORDER — MAVACAMTEN 5 MG/1
1 CAPSULE, GELATIN COATED ORAL DAILY
Qty: 30 CAPSULE | Refills: 1 | Status: SHIPPED | OUTPATIENT
Start: 2023-02-16

## 2023-02-16 NOTE — TELEPHONE ENCOUNTER
Soraida called to get an update rems portal for Camzyos for the Pt. Please call to discuss for further details. Please call Soraida at:  [phone].   Please advise

## 2023-02-16 NOTE — TELEPHONE ENCOUNTER
Left detailed message on both phone numbers that pt missed her echo yesterday and it is of the highest importance in order to continue treatment of Camzyos per FDA guidelines. She was offered MFF today at 1:00 or KS today at 2:00. Requested call back ASAP to put pt on the echo schedule as her REMS timeline is running out and she will not get a refill of Camzyos.

## 2023-02-16 NOTE — TELEPHONE ENCOUNTER
Called Kizzy at [place]. REMS completed. Transferred to the pharmacist. Diaz Jain Pharmacist.   The provider is only allowed to send in 30 tabs with 1 refill until the patient has been on drug therapy for 12 months or greater. Sent medication to [facility]. YONI updated.

## 2023-02-17 ENCOUNTER — TELEPHONE (OUTPATIENT)
Dept: CARDIOLOGY CLINIC | Age: 68
End: 2023-02-17

## 2023-02-17 DIAGNOSIS — Q24.8 LEFT VENTRICULAR OUTFLOW TRACT OBSTRUCTION: ICD-10-CM

## 2023-02-17 DIAGNOSIS — I51.7 LVH (LEFT VENTRICULAR HYPERTROPHY): ICD-10-CM

## 2023-02-17 DIAGNOSIS — R55 SYNCOPE AND COLLAPSE: ICD-10-CM

## 2023-02-17 DIAGNOSIS — I42.1 HOCM (HYPERTROPHIC OBSTRUCTIVE CARDIOMYOPATHY) (HCC): Primary | ICD-10-CM

## 2023-02-17 NOTE — TELEPHONE ENCOUNTER
Spoke to pt. Informed of her echo results and peak gradients is at 0. Pt to remain on camzyos 5mg and she is aware. Pt is agreeable to Echo in 12 weeks. Pt would like Echo after 2:00pm.     Next Echo for May 3rd after 2:00pm at [facility] please.

## 2023-02-17 NOTE — TELEPHONE ENCOUNTER
Moni Landeros had her echo yesterday. She stopped by the office after and scheduled her 6 min walk on 2/24 @ 11:00.

## 2023-02-24 ENCOUNTER — HOSPITAL ENCOUNTER (OUTPATIENT)
Dept: PULMONOLOGY | Age: 68
Discharge: HOME | End: 2023-02-24
Payer: MEDICARE

## 2023-02-24 ENCOUNTER — HOSPITAL ENCOUNTER (OUTPATIENT)
Age: 68
Discharge: HOME | End: 2023-02-24
Payer: MEDICARE

## 2023-02-24 DIAGNOSIS — I42.1 HOCM (HYPERTROPHIC OBSTRUCTIVE CARDIOMYOPATHY) (HCC): ICD-10-CM

## 2023-02-24 DIAGNOSIS — N18.31 CHRONIC KIDNEY DISEASE (CKD) STAGE G3A/A1, MODERATELY DECREASED GLOMERULAR FILTRATION RATE (GFR) BETWEEN 45-59 ML/MIN/1.73 SQUARE METER AND ALBUMINURIA CREATININE RATIO LESS THAN 30 MG/G (HCC): ICD-10-CM

## 2023-02-24 DIAGNOSIS — I51.7 LVH (LEFT VENTRICULAR HYPERTROPHY): ICD-10-CM

## 2023-02-24 DIAGNOSIS — R55 SYNCOPE AND COLLAPSE: ICD-10-CM

## 2023-02-24 DIAGNOSIS — R06.02 SOB (SHORTNESS OF BREATH): ICD-10-CM

## 2023-02-24 DIAGNOSIS — I10 ESSENTIAL HYPERTENSION: ICD-10-CM

## 2023-02-24 LAB
A/G RATIO: 1.5 (ref 1.1–2.2)
ALBUMIN SERPL-MCNC: 4.3 G/DL (ref 3.4–5)
ALP BLD-CCNC: 122 U/L (ref 40–129)
ALT SERPL-CCNC: 8 U/L (ref 10–40)
ANION GAP SERPL CALCULATED.3IONS-SCNC: 16 MMOL/L (ref 3–16)
AST SERPL-CCNC: 15 U/L (ref 15–37)
BILIRUB SERPL-MCNC: 0.3 MG/DL (ref 0–1)
BUN BLDV-MCNC: 16 MG/DL (ref 7–20)
CALCIUM SERPL-MCNC: 9.5 MG/DL (ref 8.3–10.6)
CHLORIDE BLD-SCNC: 102 MMOL/L (ref 99–110)
CO2: 21 MMOL/L (ref 21–32)
CREAT SERPL-MCNC: 1.5 MG/DL (ref 0.6–1.2)
CREATININE URINE: 168.7 MG/DL (ref 28–259)
GFR SERPL CREATININE-BSD FRML MDRD: 37 ML/MIN/{1.73_M2}
GLUCOSE BLD-MCNC: 113 MG/DL (ref 70–99)
HCT VFR BLD CALC: 41.8 % (ref 36–48)
HEMOGLOBIN: 13.3 G/DL (ref 12–16)
MCH RBC QN AUTO: 27.2 PG (ref 26–34)
MCHC RBC AUTO-ENTMCNC: 31.7 G/DL (ref 31–36)
MCV RBC AUTO: 85.6 FL (ref 80–100)
PDW BLD-RTO: 14.2 % (ref 12.4–15.4)
PLATELET # BLD: 444 K/UL (ref 135–450)
PMV BLD AUTO: 8.8 FL (ref 5–10.5)
POTASSIUM SERPL-SCNC: 4.4 MMOL/L (ref 3.5–5.1)
PRO-BNP: 105 PG/ML (ref 0–124)
PROTEIN PROTEIN: 15 MG/DL
PROTEIN/CREAT RATIO: 0.1 MG/DL
RBC # BLD: 4.89 M/UL (ref 4–5.2)
SODIUM BLD-SCNC: 139 MMOL/L (ref 136–145)
TOTAL PROTEIN: 7.2 G/DL (ref 6.4–8.2)
WBC # BLD: 6.9 K/UL (ref 4–11)

## 2023-02-24 PROCEDURE — 83880 ASSAY OF NATRIURETIC PEPTIDE: CPT

## 2023-02-24 PROCEDURE — 36415 COLL VENOUS BLD VENIPUNCTURE: CPT

## 2023-02-24 PROCEDURE — 82570 ASSAY OF URINE CREATININE: CPT

## 2023-02-24 PROCEDURE — 84156 ASSAY OF PROTEIN URINE: CPT

## 2023-02-24 PROCEDURE — 85027 COMPLETE CBC AUTOMATED: CPT

## 2023-02-24 PROCEDURE — 80053 COMPREHEN METABOLIC PANEL: CPT

## 2023-02-24 PROCEDURE — 94618 PULMONARY STRESS TESTING: CPT

## 2023-02-24 ASSESSMENT — 6 MINUTE WALK TEST (6MWT)
BORG DYSPNEA SCALE SCORE: 1
HEART RATE: 80
HEART RATE: 75
HEART RATE: 99
BORG DYSPNEA SCALE SCORE: 1
BORG DYSPNEA SCALE SCORE: 0
DID PATIENT STOP OR PAUSE BEFORE 6 MINUTES?: NO
O2 SATURATION: 94
O2 SATURATION: 96
TOTAL DISTANCE WALKED (M): 249
O2 SATURATION: 96
OXYGEN DEVICE: ROOM AIR

## 2023-02-24 NOTE — PROGRESS NOTES
02/24/23 1149   Data Measured Before Walk   HR 80   O2 Saturation 96   O2 Device Room air   Viviane Dyspnea Scale 0   Data Measured During the Walk   Heart Rate 99   O2 Saturation 94  (room air)   Viviane Dyspnea Scale 1   Data Measured Immediately After Walk   Did Patient Stop or Pause Before 6 Minutes No   Total Distance Walked (m) 249 Meters  (820 feet)   Viviane Dyspnea Scale 1   Data Measured 5 Minutes After Walk   Heart Rate 75   O2 Saturation 96  (room air)

## 2023-03-25 DIAGNOSIS — F51.01 PRIMARY INSOMNIA: ICD-10-CM

## 2023-03-27 RX ORDER — DULOXETIN HYDROCHLORIDE 60 MG/1
CAPSULE, DELAYED RELEASE ORAL
Qty: 90 CAPSULE | Refills: 0 | OUTPATIENT
Start: 2023-03-27

## 2023-03-27 RX ORDER — ZOLPIDEM TARTRATE 5 MG/1
TABLET ORAL
Qty: 30 TABLET | OUTPATIENT
Start: 2023-03-27

## 2023-03-28 ENCOUNTER — TELEPHONE (OUTPATIENT)
Dept: INTERNAL MEDICINE CLINIC | Age: 68
End: 2023-03-28

## 2023-03-28 DIAGNOSIS — E11.42 TYPE 2 DIABETES MELLITUS WITH POLYNEUROPATHY (HCC): ICD-10-CM

## 2023-03-28 LAB
ALBUMIN SERPL-MCNC: 4 G/DL (ref 3.4–5)
ALBUMIN/GLOB SERPL: 1.7 {RATIO} (ref 1.1–2.2)
ALP SERPL-CCNC: 114 U/L (ref 40–129)
ALT SERPL-CCNC: 8 U/L (ref 10–40)
ANION GAP SERPL CALCULATED.3IONS-SCNC: 14 MMOL/L (ref 3–16)
AST SERPL-CCNC: 15 U/L (ref 15–37)
BILIRUB SERPL-MCNC: <0.2 MG/DL (ref 0–1)
BUN SERPL-MCNC: 11 MG/DL (ref 7–20)
CALCIUM SERPL-MCNC: 9.7 MG/DL (ref 8.3–10.6)
CHLORIDE SERPL-SCNC: 105 MMOL/L (ref 99–110)
CHOLEST SERPL-MCNC: 148 MG/DL (ref 0–199)
CO2 SERPL-SCNC: 21 MMOL/L (ref 21–32)
CREAT SERPL-MCNC: 1.4 MG/DL (ref 0.6–1.2)
CREAT UR-MCNC: 142.1 MG/DL (ref 28–259)
EST. AVERAGE GLUCOSE BLD GHB EST-MCNC: 137 MG/DL
GFR SERPLBLD CREATININE-BSD FMLA CKD-EPI: 41 ML/MIN/{1.73_M2}
GLUCOSE SERPL-MCNC: 120 MG/DL (ref 70–99)
HBA1C MFR BLD: 6.4 %
HDLC SERPL-MCNC: 46 MG/DL (ref 40–60)
LDLC SERPL CALC-MCNC: 67 MG/DL
MICROALBUMIN UR DL<=1MG/L-MCNC: <1.2 MG/DL
MICROALBUMIN/CREAT UR: NORMAL MG/G (ref 0–30)
POTASSIUM SERPL-SCNC: 4.2 MMOL/L (ref 3.5–5.1)
PROT SERPL-MCNC: 6.3 G/DL (ref 6.4–8.2)
SODIUM SERPL-SCNC: 140 MMOL/L (ref 136–145)
TRIGL SERPL-MCNC: 176 MG/DL (ref 0–150)
TSH SERPL DL<=0.005 MIU/L-ACNC: 3.57 UIU/ML (ref 0.27–4.2)
VLDLC SERPL CALC-MCNC: 35 MG/DL

## 2023-03-28 NOTE — TELEPHONE ENCOUNTER
Pt came in office stating her handicap placcard is renewed and needs a new prescription for one. Please advise and call Pt if any questions or concerns.

## 2023-03-28 NOTE — LETTER
March 28, 2023       [facility] YOB: 1955           To Whom It May Concern: It is my medical opinion that Angeline Chavez requires a disability parking placard for the following reasons:  She has limited walking ability due to a neurologic condition  Duration of need: permanent    If you have any questions or concerns, please don't hesitate to call.     Sincerely,        Tresa Fernández MD

## 2023-04-05 ENCOUNTER — TELEPHONE (OUTPATIENT)
Dept: ENDOCRINOLOGY | Age: 68
End: 2023-04-05

## 2023-04-05 DIAGNOSIS — E11.42 TYPE 2 DIABETES MELLITUS WITH POLYNEUROPATHY (HCC): ICD-10-CM

## 2023-04-05 DIAGNOSIS — F51.01 PRIMARY INSOMNIA: ICD-10-CM

## 2023-04-05 RX ORDER — DULOXETIN HYDROCHLORIDE 60 MG/1
CAPSULE, DELAYED RELEASE ORAL
Qty: 90 CAPSULE | Refills: 0 | Status: SHIPPED | OUTPATIENT
Start: 2023-04-05

## 2023-04-05 RX ORDER — ZOLPIDEM TARTRATE 5 MG/1
TABLET ORAL
Qty: 30 TABLET | Refills: 0 | Status: SHIPPED
Start: 2023-04-05 | End: 2023-04-17 | Stop reason: DRUGHIGH

## 2023-04-05 RX ORDER — SEMAGLUTIDE 2.68 MG/ML
INJECTION, SOLUTION SUBCUTANEOUS
Qty: 3 ML | Refills: 3 | Status: SHIPPED | OUTPATIENT
Start: 2023-04-05

## 2023-04-05 RX ORDER — FLASH GLUCOSE SENSOR
KIT MISCELLANEOUS
Qty: 2 EACH | Refills: 5 | Status: SHIPPED | OUTPATIENT
Start: 2023-04-05

## 2023-04-17 ENCOUNTER — OFFICE VISIT (OUTPATIENT)
Dept: CARDIOLOGY CLINIC | Age: 68
End: 2023-04-17
Payer: MEDICARE

## 2023-04-17 ENCOUNTER — TELEPHONE (OUTPATIENT)
Dept: CARDIOLOGY CLINIC | Age: 68
End: 2023-04-17

## 2023-04-17 VITALS
OXYGEN SATURATION: 95 % | SYSTOLIC BLOOD PRESSURE: 98 MMHG | BODY MASS INDEX: 26.43 KG/M2 | DIASTOLIC BLOOD PRESSURE: 60 MMHG | WEIGHT: 154.8 LBS | HEART RATE: 83 BPM | HEIGHT: 64 IN

## 2023-04-17 DIAGNOSIS — I51.7 LVH (LEFT VENTRICULAR HYPERTROPHY): ICD-10-CM

## 2023-04-17 DIAGNOSIS — R06.02 SOB (SHORTNESS OF BREATH): ICD-10-CM

## 2023-04-17 DIAGNOSIS — Q24.8 LEFT VENTRICULAR OUTFLOW TRACT OBSTRUCTION: ICD-10-CM

## 2023-04-17 DIAGNOSIS — G47.33 OSA (OBSTRUCTIVE SLEEP APNEA): ICD-10-CM

## 2023-04-17 DIAGNOSIS — I10 ESSENTIAL HYPERTENSION: ICD-10-CM

## 2023-04-17 DIAGNOSIS — E78.2 MIXED HYPERLIPIDEMIA: ICD-10-CM

## 2023-04-17 DIAGNOSIS — I42.1 HOCM (HYPERTROPHIC OBSTRUCTIVE CARDIOMYOPATHY) (HCC): Primary | ICD-10-CM

## 2023-04-17 PROCEDURE — G8427 DOCREV CUR MEDS BY ELIG CLIN: HCPCS | Performed by: INTERNAL MEDICINE

## 2023-04-17 PROCEDURE — G8399 PT W/DXA RESULTS DOCUMENT: HCPCS | Performed by: INTERNAL MEDICINE

## 2023-04-17 PROCEDURE — G8417 CALC BMI ABV UP PARAM F/U: HCPCS | Performed by: INTERNAL MEDICINE

## 2023-04-17 PROCEDURE — 99215 OFFICE O/P EST HI 40 MIN: CPT | Performed by: INTERNAL MEDICINE

## 2023-04-17 PROCEDURE — 1123F ACP DISCUSS/DSCN MKR DOCD: CPT | Performed by: INTERNAL MEDICINE

## 2023-04-17 PROCEDURE — 1036F TOBACCO NON-USER: CPT | Performed by: INTERNAL MEDICINE

## 2023-04-17 PROCEDURE — 3074F SYST BP LT 130 MM HG: CPT | Performed by: INTERNAL MEDICINE

## 2023-04-17 PROCEDURE — 3017F COLORECTAL CA SCREEN DOC REV: CPT | Performed by: INTERNAL MEDICINE

## 2023-04-17 PROCEDURE — 1090F PRES/ABSN URINE INCON ASSESS: CPT | Performed by: INTERNAL MEDICINE

## 2023-04-17 PROCEDURE — 3078F DIAST BP <80 MM HG: CPT | Performed by: INTERNAL MEDICINE

## 2023-04-17 NOTE — TELEPHONE ENCOUNTER
She is working on finding a mask that will work. So far, she has been unable to find a comfortable mask. She is going to try Nasal pillows next. She saw her sleep doctor last week. Her concern is she has apnea for 30 seconds. Asking for recommendations. Suggested side sleeping while waiting for her new mask.

## 2023-04-17 NOTE — TELEPHONE ENCOUNTER
Pt forgot to ask her question while at the [address] today. She says her sleep apnea if 30 sec or longer pauses in breathing would it affect the heart. Pt would like to discuss. Please advise.

## 2023-04-17 NOTE — PATIENT INSTRUCTIONS
Plan:   Moved Echo to [facility] on May 8 at 4pm   2. Continue same medications. 3.    Labs before next office visit. CBC, CMP, BNP.    4.   See Dr. Mary Alice Jasmine in 4 months

## 2023-04-17 NOTE — TELEPHONE ENCOUNTER
Nasal pillows is a good idea. Apparently there is a sleep apnea store in town somewhere that you can go and try different masks. I don't know where the store is, but I bet her sleep doctor's office will know.       YONI

## 2023-05-05 ENCOUNTER — TELEPHONE (OUTPATIENT)
Dept: ENDOCRINOLOGY | Age: 68
End: 2023-05-05

## 2023-05-08 ENCOUNTER — PROCEDURE VISIT (OUTPATIENT)
Dept: CARDIOLOGY CLINIC | Age: 68
End: 2023-05-08
Payer: MEDICARE

## 2023-05-08 DIAGNOSIS — Q24.8 LEFT VENTRICULAR OUTFLOW TRACT OBSTRUCTION: ICD-10-CM

## 2023-05-08 DIAGNOSIS — R55 SYNCOPE AND COLLAPSE: ICD-10-CM

## 2023-05-08 DIAGNOSIS — I42.1 HOCM (HYPERTROPHIC OBSTRUCTIVE CARDIOMYOPATHY) (HCC): ICD-10-CM

## 2023-05-08 DIAGNOSIS — I51.7 LVH (LEFT VENTRICULAR HYPERTROPHY): ICD-10-CM

## 2023-05-08 LAB
LV EF: 58 %
LVEF MODALITY: NORMAL

## 2023-05-08 PROCEDURE — 93325 DOPPLER ECHO COLOR FLOW MAPG: CPT | Performed by: INTERNAL MEDICINE

## 2023-05-08 PROCEDURE — 93321 DOPPLER ECHO F-UP/LMTD STD: CPT | Performed by: INTERNAL MEDICINE

## 2023-05-08 PROCEDURE — 93308 TTE F-UP OR LMTD: CPT | Performed by: INTERNAL MEDICINE

## 2023-05-08 NOTE — TELEPHONE ENCOUNTER
Fax from [id number] for [employer] avail to you at the amount listed in your plan documents and you dont need auth

## 2023-05-10 DIAGNOSIS — K21.9 GASTROESOPHAGEAL REFLUX DISEASE, UNSPECIFIED WHETHER ESOPHAGITIS PRESENT: ICD-10-CM

## 2023-05-10 RX ORDER — OMEPRAZOLE 40 MG/1
CAPSULE, DELAYED RELEASE ORAL
Qty: 90 CAPSULE | Refills: 3 | Status: SHIPPED | OUTPATIENT
Start: 2023-05-10

## 2023-05-11 ENCOUNTER — TELEPHONE (OUTPATIENT)
Dept: CARDIOLOGY CLINIC | Age: 68
End: 2023-05-11

## 2023-05-11 DIAGNOSIS — I42.1 HOCM (HYPERTROPHIC OBSTRUCTIVE CARDIOMYOPATHY) (HCC): Primary | ICD-10-CM

## 2023-05-11 DIAGNOSIS — R06.02 SOB (SHORTNESS OF BREATH): ICD-10-CM

## 2023-05-11 NOTE — TELEPHONE ENCOUNTER
Is she going to be taking it as a short course or a long term med. Short term should be okay. But it can affect the kidney numbers. So if she is going to be on it for several weeks or more, I would suggest we check labs in a month.   YONI

## 2023-05-12 ENCOUNTER — TELEPHONE (OUTPATIENT)
Dept: CARDIOLOGY CLINIC | Age: 68
End: 2023-05-12

## 2023-05-12 DIAGNOSIS — I42.1 HOCM (HYPERTROPHIC OBSTRUCTIVE CARDIOMYOPATHY) (HCC): Primary | ICD-10-CM

## 2023-05-12 DIAGNOSIS — R55 SYNCOPE AND COLLAPSE: ICD-10-CM

## 2023-05-12 DIAGNOSIS — I51.7 LVH (LEFT VENTRICULAR HYPERTROPHY): ICD-10-CM

## 2023-05-12 DIAGNOSIS — R06.02 SOB (SHORTNESS OF BREATH): ICD-10-CM

## 2023-05-12 NOTE — TELEPHONE ENCOUNTER
Spoke to pt. She will be starting new medication . Shubham Sanchez CELEBREX. Per YONI, OK to start it with Camzyos. Pt will get CMP, BNP in one month and she is aware  No HF hospitalizations on camzyos. She will continue Camzyos 5mg. Camzyos REMS (Risk Evaluation Mitigation Strategy) electronically completed and submitted.

## 2023-05-12 NOTE — TELEPHONE ENCOUNTER
----- Message from Tamiko Huerta MD sent at 5/8/2023  9:09 PM EDT -----  Ricki Bush, see below. Noman. Your echo again looks great! Ejection fraction normal, and gradient is very low at 3. This is great news! No changes.   Tamiko Huerta

## 2023-05-13 DIAGNOSIS — E78.2 MIXED HYPERLIPIDEMIA: ICD-10-CM

## 2023-05-15 RX ORDER — ATORVASTATIN CALCIUM 20 MG/1
TABLET, FILM COATED ORAL
Qty: 90 TABLET | Refills: 1 | Status: SHIPPED | OUTPATIENT
Start: 2023-05-15

## 2023-05-31 ENCOUNTER — OFFICE VISIT (OUTPATIENT)
Dept: INTERNAL MEDICINE CLINIC | Age: 68
End: 2023-05-31

## 2023-05-31 VITALS
WEIGHT: 154.2 LBS | OXYGEN SATURATION: 96 % | HEART RATE: 90 BPM | HEIGHT: 63 IN | SYSTOLIC BLOOD PRESSURE: 88 MMHG | BODY MASS INDEX: 27.32 KG/M2 | DIASTOLIC BLOOD PRESSURE: 56 MMHG

## 2023-05-31 DIAGNOSIS — N18.30 STAGE 3 CHRONIC KIDNEY DISEASE, UNSPECIFIED WHETHER STAGE 3A OR 3B CKD (HCC): ICD-10-CM

## 2023-05-31 DIAGNOSIS — I42.1 HOCM (HYPERTROPHIC OBSTRUCTIVE CARDIOMYOPATHY) (HCC): ICD-10-CM

## 2023-05-31 DIAGNOSIS — F51.01 PRIMARY INSOMNIA: ICD-10-CM

## 2023-05-31 DIAGNOSIS — E11.42 TYPE 2 DIABETES MELLITUS WITH POLYNEUROPATHY (HCC): ICD-10-CM

## 2023-05-31 DIAGNOSIS — I50.42 CHRONIC COMBINED SYSTOLIC AND DIASTOLIC CONGESTIVE HEART FAILURE (HCC): ICD-10-CM

## 2023-05-31 DIAGNOSIS — G47.33 OSA (OBSTRUCTIVE SLEEP APNEA): ICD-10-CM

## 2023-05-31 DIAGNOSIS — F33.41 RECURRENT MAJOR DEPRESSIVE DISORDER, IN PARTIAL REMISSION (HCC): ICD-10-CM

## 2023-05-31 DIAGNOSIS — I10 PRIMARY HYPERTENSION: ICD-10-CM

## 2023-05-31 DIAGNOSIS — Z12.31 SCREENING MAMMOGRAM FOR HIGH-RISK PATIENT: ICD-10-CM

## 2023-05-31 DIAGNOSIS — Z12.11 COLON CANCER SCREENING: ICD-10-CM

## 2023-05-31 DIAGNOSIS — Z00.00 MEDICARE ANNUAL WELLNESS VISIT, SUBSEQUENT: Primary | ICD-10-CM

## 2023-05-31 DIAGNOSIS — M48.062 SPINAL STENOSIS OF LUMBAR REGION WITH NEUROGENIC CLAUDICATION: ICD-10-CM

## 2023-05-31 DIAGNOSIS — I25.10 CORONARY ARTERY DISEASE INVOLVING NATIVE CORONARY ARTERY OF NATIVE HEART WITHOUT ANGINA PECTORIS: ICD-10-CM

## 2023-05-31 LAB
ALBUMIN SERPL-MCNC: 4.6 G/DL (ref 3.4–5)
ALBUMIN/GLOB SERPL: 1.8 {RATIO} (ref 1.1–2.2)
ALP SERPL-CCNC: 130 U/L (ref 40–129)
ALT SERPL-CCNC: 9 U/L (ref 10–40)
ANION GAP SERPL CALCULATED.3IONS-SCNC: 14 MMOL/L (ref 3–16)
AST SERPL-CCNC: 12 U/L (ref 15–37)
BASOPHILS # BLD: 0.1 K/UL (ref 0–0.2)
BASOPHILS NFR BLD: 0.8 %
BILIRUB SERPL-MCNC: 0.3 MG/DL (ref 0–1)
BUN SERPL-MCNC: 23 MG/DL (ref 7–20)
CALCIUM SERPL-MCNC: 9.5 MG/DL (ref 8.3–10.6)
CHLORIDE SERPL-SCNC: 104 MMOL/L (ref 99–110)
CHOLEST SERPL-MCNC: 147 MG/DL (ref 0–199)
CO2 SERPL-SCNC: 23 MMOL/L (ref 21–32)
CREAT SERPL-MCNC: 1.6 MG/DL (ref 0.6–1.2)
DEPRECATED RDW RBC AUTO: 14.9 % (ref 12.4–15.4)
EOSINOPHIL # BLD: 0.1 K/UL (ref 0–0.6)
EOSINOPHIL NFR BLD: 1.2 %
GFR SERPLBLD CREATININE-BSD FMLA CKD-EPI: 35 ML/MIN/{1.73_M2}
GLUCOSE SERPL-MCNC: 146 MG/DL (ref 70–99)
HCT VFR BLD AUTO: 43.5 % (ref 36–48)
HDLC SERPL-MCNC: 54 MG/DL (ref 40–60)
HGB BLD-MCNC: 14.2 G/DL (ref 12–16)
LDLC SERPL CALC-MCNC: 61 MG/DL
LYMPHOCYTES # BLD: 2.2 K/UL (ref 1–5.1)
LYMPHOCYTES NFR BLD: 31.4 %
MCH RBC QN AUTO: 27.4 PG (ref 26–34)
MCHC RBC AUTO-ENTMCNC: 32.8 G/DL (ref 31–36)
MCV RBC AUTO: 83.6 FL (ref 80–100)
MONOCYTES # BLD: 0.2 K/UL (ref 0–1.3)
MONOCYTES NFR BLD: 2.9 %
NEUTROPHILS # BLD: 4.4 K/UL (ref 1.7–7.7)
NEUTROPHILS NFR BLD: 63.7 %
NT-PROBNP SERPL-MCNC: 59 PG/ML (ref 0–124)
PLATELET # BLD AUTO: 349 K/UL (ref 135–450)
PMV BLD AUTO: 8.6 FL (ref 5–10.5)
POTASSIUM SERPL-SCNC: 5.2 MMOL/L (ref 3.5–5.1)
PROT SERPL-MCNC: 7.1 G/DL (ref 6.4–8.2)
RBC # BLD AUTO: 5.2 M/UL (ref 4–5.2)
SODIUM SERPL-SCNC: 141 MMOL/L (ref 136–145)
TRIGL SERPL-MCNC: 158 MG/DL (ref 0–150)
TSH SERPL DL<=0.005 MIU/L-ACNC: 2.13 UIU/ML (ref 0.27–4.2)
VLDLC SERPL CALC-MCNC: 32 MG/DL
WBC # BLD AUTO: 6.9 K/UL (ref 4–11)

## 2023-05-31 RX ORDER — CELECOXIB 200 MG/1
200 CAPSULE ORAL DAILY
COMMUNITY

## 2023-05-31 SDOH — ECONOMIC STABILITY: FOOD INSECURITY: WITHIN THE PAST 12 MONTHS, THE FOOD YOU BOUGHT JUST DIDN'T LAST AND YOU DIDN'T HAVE MONEY TO GET MORE.: NEVER TRUE

## 2023-05-31 SDOH — ECONOMIC STABILITY: INCOME INSECURITY: HOW HARD IS IT FOR YOU TO PAY FOR THE VERY BASICS LIKE FOOD, HOUSING, MEDICAL CARE, AND HEATING?: NOT VERY HARD

## 2023-05-31 SDOH — ECONOMIC STABILITY: FOOD INSECURITY: WITHIN THE PAST 12 MONTHS, YOU WORRIED THAT YOUR FOOD WOULD RUN OUT BEFORE YOU GOT MONEY TO BUY MORE.: NEVER TRUE

## 2023-05-31 SDOH — ECONOMIC STABILITY: HOUSING INSECURITY
IN THE LAST 12 MONTHS, WAS THERE A TIME WHEN YOU DID NOT HAVE A STEADY PLACE TO SLEEP OR SLEPT IN A SHELTER (INCLUDING NOW)?: NO

## 2023-05-31 ASSESSMENT — PATIENT HEALTH QUESTIONNAIRE - PHQ9
4. FEELING TIRED OR HAVING LITTLE ENERGY: 3
7. TROUBLE CONCENTRATING ON THINGS, SUCH AS READING THE NEWSPAPER OR WATCHING TELEVISION: 0
3. TROUBLE FALLING OR STAYING ASLEEP: 3
1. LITTLE INTEREST OR PLEASURE IN DOING THINGS: 1
SUM OF ALL RESPONSES TO PHQ9 QUESTIONS 1 & 2: 2
SUM OF ALL RESPONSES TO PHQ QUESTIONS 1-9: 8
5. POOR APPETITE OR OVEREATING: 0
SUM OF ALL RESPONSES TO PHQ QUESTIONS 1-9: 8
9. THOUGHTS THAT YOU WOULD BE BETTER OFF DEAD, OR OF HURTING YOURSELF: 0
6. FEELING BAD ABOUT YOURSELF - OR THAT YOU ARE A FAILURE OR HAVE LET YOURSELF OR YOUR FAMILY DOWN: 0
SUM OF ALL RESPONSES TO PHQ QUESTIONS 1-9: 8
SUM OF ALL RESPONSES TO PHQ QUESTIONS 1-9: 8
2. FEELING DOWN, DEPRESSED OR HOPELESS: 1
10. IF YOU CHECKED OFF ANY PROBLEMS, HOW DIFFICULT HAVE THESE PROBLEMS MADE IT FOR YOU TO DO YOUR WORK, TAKE CARE OF THINGS AT HOME, OR GET ALONG WITH OTHER PEOPLE: 2
8. MOVING OR SPEAKING SO SLOWLY THAT OTHER PEOPLE COULD HAVE NOTICED. OR THE OPPOSITE, BEING SO FIGETY OR RESTLESS THAT YOU HAVE BEEN MOVING AROUND A LOT MORE THAN USUAL: 0

## 2023-05-31 ASSESSMENT — ENCOUNTER SYMPTOMS
VOMITING: 0
SORE THROAT: 0
BACK PAIN: 1
ABDOMINAL PAIN: 0
SWOLLEN GLANDS: 0
VISUAL CHANGE: 0

## 2023-05-31 ASSESSMENT — LIFESTYLE VARIABLES: HOW OFTEN DO YOU HAVE A DRINK CONTAINING ALCOHOL: NEVER

## 2023-05-31 NOTE — PROGRESS NOTES
Alondra Anderson (:  1955) is a 69 y.o. female,Established patient, here for evaluation of the following chief complaint(s):  New Patient (Previous Dr Juan Ramon Ceron pt ) and Medicare AW         ASSESSMENT/PLAN:  1. Medicare annual wellness visit, subsequent  2. Stage 3 chronic kidney disease, unspecified whether stage 3a or 3b CKD ([facility]  3. Recurrent major depressive disorder, in partial remission ([facility]  4. Type 2 diabetes mellitus with polyneuropathy (HCC)  -     Lipid Panel; Future  5. HOCM (hypertrophic obstructive cardiomyopathy) (HCC)  -     Brain Natriuretic Peptide; Future  6. Primary hypertension  -     Comprehensive Metabolic Panel; Future  -     CBC with Auto Differential; Future  -     TSH; Future  7. WATSON (obstructive sleep apnea)  8. Spinal stenosis of lumbar region with neurogenic claudication  9. Colon cancer screening  -     Fecal DNA Colorectal cancer screening (Cologuard)  10. Screening mammogram for high-risk patient  -     SUJATHA TRAY DIGITAL SCREEN BILATERAL; Future  11. Coronary artery disease involving native coronary artery of native heart without angina pectoris  12. Primary insomnia  13. Chronic combined systolic and diastolic congestive heart failure (HCC)  -     Brain Natriuretic Peptide; Future  Patient having difficulty sleeping her issues more to do with the maintenance rather than initiation of sleep at this point she has been tried on Ambien we discussed the potential of using other medications like Ambien CR or medications like.     She is currently seeing her sleep specialist in the next couple weeks and she will discuss with her further with him    The patient heart failure seems to be doing extremely well at this point she is back to her baseline functioning very well on medications we will check her blood test today the patient will need to have a mammogram as well as colorectal cancer screening for now we will continue the Cologuard with the potential of doing a

## 2023-06-01 NOTE — RESULT ENCOUNTER NOTE
Please let the patient know that results were acceptable, her kidney function is slightly off she needs to hydrate better

## 2023-06-07 ENCOUNTER — TELEPHONE (OUTPATIENT)
Dept: CARDIOLOGY CLINIC | Age: 68
End: 2023-06-07

## 2023-06-07 NOTE — TELEPHONE ENCOUNTER
Received refill request for CAMZYOS 5 MG CAPS from [facility] pharmacy.      Last OV:  4- YONI    Next OV: 8- YONI    Last Labs: 5- CMP,CBC    Last Filled:  4- YONI

## 2023-06-07 NOTE — TELEPHONE ENCOUNTER
Spoke with pt  She is stating that she's having difficulty staying hydrated even though she is drinking a lot throughout the day--has a 40 oz bottle. She is fatigued, dizzy, stumbling    Labs showing dehydration and increased  potassium; Taking [place]. No diuretic    She is asking about the hydration powders that you mix with water.

## 2023-06-07 NOTE — TELEPHONE ENCOUNTER
Pt having hard time staying hydrated, there packets at the store they would like to know if they work they can not take the Gatorade because of potassium levels. Pt does not know the name of the powders however they with try to have the name by the time they get a call back.     Pt would like to know if annabella can call back     R[facility]am [facility]   [phone]

## 2023-06-07 NOTE — TELEPHONE ENCOUNTER
Spoke with pt  Relayed Results and instructions as directed       During this phone call YONI verbally advised that pt could add more sodium to her diet. Pt agreeable.

## 2023-06-07 NOTE — TELEPHONE ENCOUNTER
Decrease metoprolol by 1/2. I don't know about hydration powders. I would have to see what the ingredients are. Find out the brand she is looking at. Is she avoiding sugary drinks/foods? The more she eats or drinks of these, the more she will urinate. Avoid sugary foods    I would approach Dr. Yun oGins and see if she wants to adjust diabetic meds.     YONI

## 2023-06-19 DIAGNOSIS — J30.9 ALLERGIC SINUSITIS: ICD-10-CM

## 2023-06-19 LAB — NONINV COLON CA DNA+OCC BLD SCRN STL QL: NEGATIVE

## 2023-06-19 RX ORDER — MONTELUKAST SODIUM 10 MG/1
TABLET ORAL
Qty: 90 TABLET | Refills: 3 | Status: SHIPPED | OUTPATIENT
Start: 2023-06-19

## 2023-06-20 ENCOUNTER — TELEPHONE (OUTPATIENT)
Dept: ENDOCRINOLOGY | Age: 68
End: 2023-06-20

## 2023-06-20 NOTE — TELEPHONE ENCOUNTER
Fax from Olive Villanueva    A request for a generic product.      Re: Freestyle Minoo 14 day sensor

## 2023-06-20 NOTE — TELEPHONE ENCOUNTER
Left VM for patient to notify her that [address] [place] [address] is requesting a generic product for the [employer] 14 day reader. There is no generic for the [employer]. Usually CGM's need to go through a DME and since she has [facility] we would need to use [employer]. Patient to call office back to let us know if she wants us to submit an order through them and is she wanting the 14 day reader or does she want to upgrade to the [place] [address]?

## 2023-06-21 RX ORDER — MAVACAMTEN 5 MG/1
CAPSULE, GELATIN COATED ORAL
Qty: 30 CAPSULE | Refills: 0 | OUTPATIENT
Start: 2023-06-21

## 2023-07-13 ENCOUNTER — OFFICE VISIT (OUTPATIENT)
Dept: ENT CLINIC | Age: 68
End: 2023-07-13
Payer: MEDICARE

## 2023-07-13 VITALS
DIASTOLIC BLOOD PRESSURE: 72 MMHG | HEART RATE: 75 BPM | WEIGHT: 160 LBS | HEIGHT: 63 IN | SYSTOLIC BLOOD PRESSURE: 118 MMHG | BODY MASS INDEX: 28.35 KG/M2 | OXYGEN SATURATION: 96 % | TEMPERATURE: 97.1 F

## 2023-07-13 DIAGNOSIS — H61.21 IMPACTED CERUMEN OF RIGHT EAR: Primary | ICD-10-CM

## 2023-07-13 DIAGNOSIS — H90.3 SENSORINEURAL HEARING LOSS (SNHL) OF BOTH EARS: ICD-10-CM

## 2023-07-13 PROCEDURE — 69210 REMOVE IMPACTED EAR WAX UNI: CPT | Performed by: STUDENT IN AN ORGANIZED HEALTH CARE EDUCATION/TRAINING PROGRAM

## 2023-07-13 NOTE — PROGRESS NOTES
[facility] [address] VISIT      Patient Name: [address] [facility] Record Number:  [id number]  Primary Care Physician:  Paula Doyle MD    ChiefComplaint     Chief Complaint   Patient presents with    Ear Problem     Ear cleaning, went to get hearing aids and wad told needs ear cleaning        History of Present Illness     Jason Dumont is an 69 y.o. female previously seen for bilateral sensorineural hearing loss and tinnitus, bilateral eustachian tube dysfunction, allergic sinusitis. Interval History:   She went to her audiologist at an outside office in [facility] for hearing test and to consider hearing aids. They noted earwax in both of her ears. She feels like earwax fell out of her left ear yesterday. No otalgia or otorrhea.     Past Medical History     Past Medical History:   Diagnosis Date    Asthma     vocal fold     Ataxia     Cervical stenosis of spinal canal 10/7/2021    Coronary artery disease involving native coronary artery of native heart without angina pectoris 9/9/2021    Depression     Diabetes mellitus (HCC)     Hyperlipidemia     Hypertension     Hypothyroidism     Obesity     Osteoarthritis     Paradoxical vocal fold motion disorder     Postmenopausal 5/15/2018    Stage 2 chronic kidney disease 5/7/2019       Past Surgical History     Past Surgical History:   Procedure Laterality Date    CARPAL TUNNEL RELEASE      RORY    HYSTERECTOMY, TOTAL ABDOMINAL (CERVIX REMOVED)      Age 37    [place] NJX AA&/STRD TFRML EPI LUMBAR/SACRAL 1 LEVEL Bilateral 12/3/2018    BILATERAL L4 LUMBAR TRANSFORAMINAL EPIDURAL STEROID INJECTION WITH FLUOROSCOPY performed by Gold Nino MD at [facility]       Family History     Family History   Problem Relation Age of Onset    Cancer Mother     Obesity Sister        Social History     Social History     Tobacco Use    Smoking status: Never    Smokeless tobacco:

## 2023-07-14 ENCOUNTER — OFFICE VISIT (OUTPATIENT)
Dept: INTERNAL MEDICINE CLINIC | Age: 68
End: 2023-07-14

## 2023-07-14 VITALS
SYSTOLIC BLOOD PRESSURE: 100 MMHG | HEART RATE: 78 BPM | OXYGEN SATURATION: 96 % | DIASTOLIC BLOOD PRESSURE: 64 MMHG | BODY MASS INDEX: 28.24 KG/M2 | WEIGHT: 159.4 LBS | HEIGHT: 63 IN

## 2023-07-14 DIAGNOSIS — R41.3 MEMORY DISTURBANCE: Primary | ICD-10-CM

## 2023-07-14 DIAGNOSIS — N18.31 STAGE 3A CHRONIC KIDNEY DISEASE (HCC): ICD-10-CM

## 2023-07-14 DIAGNOSIS — R41.3 MEMORY DISTURBANCE: ICD-10-CM

## 2023-07-14 DIAGNOSIS — F33.41 RECURRENT MAJOR DEPRESSIVE DISORDER, IN PARTIAL REMISSION (HCC): ICD-10-CM

## 2023-07-14 DIAGNOSIS — E11.42 TYPE 2 DIABETES MELLITUS WITH POLYNEUROPATHY (HCC): ICD-10-CM

## 2023-07-14 LAB
ALBUMIN SERPL-MCNC: 4.3 G/DL (ref 3.4–5)
ALBUMIN/GLOB SERPL: 2 {RATIO} (ref 1.1–2.2)
ALP SERPL-CCNC: 136 U/L (ref 40–129)
ALT SERPL-CCNC: 15 U/L (ref 10–40)
ANION GAP SERPL CALCULATED.3IONS-SCNC: 9 MMOL/L (ref 3–16)
AST SERPL-CCNC: 16 U/L (ref 15–37)
BILIRUB SERPL-MCNC: 0.4 MG/DL (ref 0–1)
BUN SERPL-MCNC: 20 MG/DL (ref 7–20)
CALCIUM SERPL-MCNC: 9.5 MG/DL (ref 8.3–10.6)
CHLORIDE SERPL-SCNC: 102 MMOL/L (ref 99–110)
CO2 SERPL-SCNC: 27 MMOL/L (ref 21–32)
CREAT SERPL-MCNC: 1.5 MG/DL (ref 0.6–1.2)
GFR SERPLBLD CREATININE-BSD FMLA CKD-EPI: 37 ML/MIN/{1.73_M2}
GLUCOSE SERPL-MCNC: 132 MG/DL (ref 70–99)
POTASSIUM SERPL-SCNC: 5.3 MMOL/L (ref 3.5–5.1)
PROT SERPL-MCNC: 6.5 G/DL (ref 6.4–8.2)
SODIUM SERPL-SCNC: 138 MMOL/L (ref 136–145)

## 2023-07-14 RX ORDER — BUPROPION HYDROCHLORIDE 150 MG/1
TABLET, EXTENDED RELEASE ORAL
Qty: 45 TABLET | Refills: 3 | Status: SHIPPED | OUTPATIENT
Start: 2023-07-14

## 2023-07-14 NOTE — PROGRESS NOTES
Fidelia Baron (:  1955) is a 69 y.o. female,Established patient, here for evaluation of the following chief complaint(s):  Discuss Medications and Memory Loss (Brain fog and forgetfulness)         ASSESSMENT/PLAN:  1. Memory disturbance  -     Comprehensive Metabolic Panel; Future  2. Type 2 diabetes mellitus with polyneuropathy (HCC)  3. Stage 3a chronic kidney disease ([address])  -     Comprehensive Metabolic Panel; Future    Patient has been describing episodes where she is having some lapses of memory his own judgment and changes in the last few months the only change that she has recognizes the change of her Ambien from 5 to 10 mg which very much could do this at this point again as a first step have the patient go back to her previous dose check her kidney function liver function and see if there is been any changes in it also make sure that the patient stays very well-hydrated and keep her blood pressure at an acceptable range to guarantee good brain diffusion    It is also noted that the patient has lost at least 30 pounds in the last year or so which can change her pharmacodynamics of these medications so will abel have to keep that in mind    Patient is also on some other known neuropsychotropic medications that can affect it in addition to the fact that the patient age is changing    At this stage will abel start with that 1 step and then decide on the next steps based on the clinical response  No follow-ups on file.          Subjective   SUBJECTIVE/OBJECTIVE:    Lab Review   Lab Results   Component Value Date/Time     2023 10:03 AM     2023 11:36 AM     2023 11:50 AM    K 5.2 2023 10:03 AM    K 4.2 2023 11:36 AM    K 4.4 2023 11:50 AM    K 4.4 2022 05:13 AM    K 4.6 2022 03:20 PM    CO2 23 2023 10:03 AM    CO2 21 2023 11:36 AM    CO2 21 2023 11:50 AM    BUN 23 2023 10:03 AM    BUN 11 2023 11:36 AM

## 2023-07-14 NOTE — TELEPHONE ENCOUNTER
Last OV: 5/31/2023  Next OV: 7/14/2023    Next appointment due:around 11/30/2023    Last fill:4/13/23  Refills:0

## 2023-07-15 DIAGNOSIS — F33.41 RECURRENT MAJOR DEPRESSIVE DISORDER, IN PARTIAL REMISSION (HCC): ICD-10-CM

## 2023-07-17 ENCOUNTER — TELEPHONE (OUTPATIENT)
Dept: ENDOCRINOLOGY | Age: 68
End: 2023-07-17

## 2023-07-17 DIAGNOSIS — E11.42 TYPE 2 DIABETES MELLITUS WITH POLYNEUROPATHY (HCC): Primary | ICD-10-CM

## 2023-07-17 RX ORDER — BUPROPION HYDROCHLORIDE 300 MG/1
TABLET ORAL
Qty: 45 TABLET | Refills: 3 | OUTPATIENT
Start: 2023-07-17

## 2023-07-17 NOTE — TELEPHONE ENCOUNTER
Last OV: 7/14/2023  Next OV: Visit date not found    Next appointment due:around 11/30/2023    Last fill:7/14/23  Refills:3

## 2023-07-17 NOTE — TELEPHONE ENCOUNTER
Pt calling and states she has called around to all the pharmacies in the [place] area for Ozempic 2mg dose. She is asking what to do next? Alternative?     CB# [phone]

## 2023-07-18 ENCOUNTER — TELEPHONE (OUTPATIENT)
Dept: ENDOCRINOLOGY | Age: 68
End: 2023-07-18

## 2023-07-18 ENCOUNTER — TELEPHONE (OUTPATIENT)
Dept: INTERNAL MEDICINE CLINIC | Age: 68
End: 2023-07-18

## 2023-07-18 RX ORDER — SEMAGLUTIDE 1.34 MG/ML
INJECTION, SOLUTION SUBCUTANEOUS
Qty: 3 ML | Refills: 1 | Status: SHIPPED | OUTPATIENT
Start: 2023-07-18

## 2023-07-18 NOTE — TELEPHONE ENCOUNTER
Sent the 1 mg dose to [facility]. Informed patient via voice mail message.

## 2023-07-18 NOTE — TELEPHONE ENCOUNTER
Jerry Teresa Beltran: [id number] - PA Case ID: [id number] - Rx #: [id number]  Drug  FreeStyle Katheryn 2 Sensor  Message from Plan  This system cannot be used to initiate this request. Excluded on pharmacy benefit. Please submit to DME. Thanks!

## 2023-07-18 NOTE — TELEPHONE ENCOUNTER
Left VM for patient that the Freestyle kari 2 needs to be submitted through a DME company. So we can submit an order through [facility] but they do require a visit at least every 6 months in order to submit an order. Patient was last seen in 12/2022 which is more than 6 months so we are unable to submit. Patient can try to get in sooner with a cancellation or we can wait until her appt in Sept to submit the order.

## 2023-07-18 NOTE — TELEPHONE ENCOUNTER
Spoke with patient over the phone about uncompleted order for a Mammogram, offered assistance to help patient schedule over the phone at this time, patient declined. Patient prefers to call later to schedule request phone number be messaged to her, Dizko Samurai message sent.

## 2023-07-31 ENCOUNTER — TELEPHONE (OUTPATIENT)
Dept: CARDIOLOGY CLINIC | Age: 68
End: 2023-07-31

## 2023-07-31 ENCOUNTER — PROCEDURE VISIT (OUTPATIENT)
Dept: CARDIOLOGY CLINIC | Age: 68
End: 2023-07-31
Payer: MEDICARE

## 2023-07-31 DIAGNOSIS — I51.7 LVH (LEFT VENTRICULAR HYPERTROPHY): ICD-10-CM

## 2023-07-31 DIAGNOSIS — I42.1 HOCM (HYPERTROPHIC OBSTRUCTIVE CARDIOMYOPATHY) (HCC): Primary | ICD-10-CM

## 2023-07-31 DIAGNOSIS — R55 SYNCOPE AND COLLAPSE: ICD-10-CM

## 2023-07-31 DIAGNOSIS — I42.1 HOCM (HYPERTROPHIC OBSTRUCTIVE CARDIOMYOPATHY) (HCC): ICD-10-CM

## 2023-07-31 DIAGNOSIS — Q24.8 LEFT VENTRICULAR OUTFLOW TRACT OBSTRUCTION: ICD-10-CM

## 2023-07-31 DIAGNOSIS — R06.02 SOB (SHORTNESS OF BREATH): ICD-10-CM

## 2023-07-31 LAB
LV EF: 55 %
LVEF MODALITY: NORMAL

## 2023-07-31 PROCEDURE — 93308 TTE F-UP OR LMTD: CPT | Performed by: INTERNAL MEDICINE

## 2023-07-31 PROCEDURE — 93321 DOPPLER ECHO F-UP/LMTD STD: CPT | Performed by: INTERNAL MEDICINE

## 2023-07-31 PROCEDURE — 93325 DOPPLER ECHO COLOR FLOW MAPG: CPT | Performed by: INTERNAL MEDICINE

## 2023-08-07 RX ORDER — METOPROLOL SUCCINATE 50 MG/1
50 TABLET, EXTENDED RELEASE ORAL DAILY
Qty: 90 TABLET | Refills: 3 | Status: SHIPPED | OUTPATIENT
Start: 2023-08-07

## 2023-08-10 ENCOUNTER — OFFICE VISIT (OUTPATIENT)
Dept: PULMONOLOGY | Age: 68
End: 2023-08-10
Payer: MEDICARE

## 2023-08-10 VITALS
WEIGHT: 160 LBS | DIASTOLIC BLOOD PRESSURE: 62 MMHG | SYSTOLIC BLOOD PRESSURE: 110 MMHG | HEART RATE: 80 BPM | OXYGEN SATURATION: 97 % | BODY MASS INDEX: 28.35 KG/M2 | HEIGHT: 63 IN

## 2023-08-10 DIAGNOSIS — G47.33 OSA (OBSTRUCTIVE SLEEP APNEA): Primary | ICD-10-CM

## 2023-08-10 DIAGNOSIS — I10 PRIMARY HYPERTENSION: ICD-10-CM

## 2023-08-10 DIAGNOSIS — E66.3 OVERWEIGHT (BMI 25.0-29.9): ICD-10-CM

## 2023-08-10 PROCEDURE — 99214 OFFICE O/P EST MOD 30 MIN: CPT | Performed by: INTERNAL MEDICINE

## 2023-08-10 PROCEDURE — 1036F TOBACCO NON-USER: CPT | Performed by: INTERNAL MEDICINE

## 2023-08-10 PROCEDURE — 3017F COLORECTAL CA SCREEN DOC REV: CPT | Performed by: INTERNAL MEDICINE

## 2023-08-10 PROCEDURE — G8428 CUR MEDS NOT DOCUMENT: HCPCS | Performed by: INTERNAL MEDICINE

## 2023-08-10 PROCEDURE — 3078F DIAST BP <80 MM HG: CPT | Performed by: INTERNAL MEDICINE

## 2023-08-10 PROCEDURE — 1123F ACP DISCUSS/DSCN MKR DOCD: CPT | Performed by: INTERNAL MEDICINE

## 2023-08-10 PROCEDURE — 3074F SYST BP LT 130 MM HG: CPT | Performed by: INTERNAL MEDICINE

## 2023-08-10 PROCEDURE — G8417 CALC BMI ABV UP PARAM F/U: HCPCS | Performed by: INTERNAL MEDICINE

## 2023-08-10 PROCEDURE — 1090F PRES/ABSN URINE INCON ASSESS: CPT | Performed by: INTERNAL MEDICINE

## 2023-08-10 PROCEDURE — G8399 PT W/DXA RESULTS DOCUMENT: HCPCS | Performed by: INTERNAL MEDICINE

## 2023-08-10 RX ORDER — ZOLPIDEM TARTRATE 5 MG/1
5 TABLET ORAL NIGHTLY PRN
COMMUNITY

## 2023-08-10 RX ORDER — TRAZODONE HYDROCHLORIDE 50 MG/1
50 TABLET ORAL NIGHTLY
Qty: 90 TABLET | Refills: 1 | Status: SHIPPED | OUTPATIENT
Start: 2023-08-10

## 2023-08-10 RX ORDER — MAVACAMTEN 5 MG/1
CAPSULE, GELATIN COATED ORAL
Qty: 30 CAPSULE | Refills: 1 | Status: SHIPPED | OUTPATIENT
Start: 2023-08-10

## 2023-08-10 ASSESSMENT — SLEEP AND FATIGUE QUESTIONNAIRES
HOW LIKELY ARE YOU TO NOD OFF OR FALL ASLEEP WHILE WATCHING TV: 1
HOW LIKELY ARE YOU TO NOD OFF OR FALL ASLEEP IN A CAR, WHILE STOPPED FOR A FEW MINUTES IN TRAFFIC: 0
HOW LIKELY ARE YOU TO NOD OFF OR FALL ASLEEP WHILE SITTING AND TALKING TO SOMEONE: 0
HOW LIKELY ARE YOU TO NOD OFF OR FALL ASLEEP WHEN YOU ARE A PASSENGER IN A CAR FOR AN HOUR WITHOUT A BREAK: 3
HOW LIKELY ARE YOU TO NOD OFF OR FALL ASLEEP WHILE SITTING AND READING: 2
HOW LIKELY ARE YOU TO NOD OFF OR FALL ASLEEP WHILE SITTING QUIETLY AFTER LUNCH WITHOUT ALCOHOL: 0
ESS TOTAL SCORE: 12
HOW LIKELY ARE YOU TO NOD OFF OR FALL ASLEEP WHILE LYING DOWN TO REST IN THE AFTERNOON WHEN CIRCUMSTANCES PERMIT: 3
HOW LIKELY ARE YOU TO NOD OFF OR FALL ASLEEP WHILE SITTING INACTIVE IN A PUBLIC PLACE: 3

## 2023-08-10 NOTE — PROGRESS NOTES
lymphadenopathy. NEUROLOGIC: Alert and oriented x 3. Groslly non-focal. Motor strength is 5+/5 in all muscle groups. The patient has a normal sensorium globally. LABS:    Lab Summary Latest Ref Rng & Units 7/14/2023 5/31/2023   WBC 4.0 - 11.0 K/uL - 6.9   Hgb 12.0 - 16.0 g/dL - 14.2   Hct 36.0 - 48.0 % - 43.5   Platelets 601 - 525 K/uL - 349   Sodium 136 - 145 mmol/L 138 141   Potassium 3.5 - 5.1 mmol/L 5.3(H) 5. 2(H)   BUN 7 - 20 mg/dL 20 23(H)   Creatinine 0.6 - 1.2 mg/dL 1.5(H) 1.6(H)   Glucose 70 - 99 mg/dL 132(H) 146(H)   Calcium 8.3 - 10.6 mg/dL 9.5 9.5   Alk Phos 40 - 129 U/L 136(H) 130(H)   Albumin 3.4 - 5.0 g/dL 4.3 4.6   Bilirubin 0.0 - 1.0 mg/dL 0.4 0.3   AST 15 - 37 U/L 16 12(L)   ALT 10 - 40 U/L 15 9(L)   Cholesterol 0 - 199 mg/dL - 147   HDL cholesterol 40 - 60 mg/dL - 54   Triglycerides 0 - 150 mg/dL - 158(H)   LDL calc <100 mg/dL - 61   VLDL calc Not Established mg/dL - 32   TSH 0.27 - 4.20 uIU/mL - 2.13   Some recent data might be hidden       All labs were personally reviewed by me any my interpretation is: CBC is normal. Chem 8 is hyperglycemia. IMAGING:    Narrative   EXAMINATION:   ONE XRAY VIEW OF THE CHEST       6/23/2022 3:11 pm       COMPARISON:   08/30/2021       HISTORY:   ORDERING SYSTEM PROVIDED HISTORY: Chest pain with exertion   TECHNOLOGIST PROVIDED HISTORY:   Reason for exam:->Chest pain with exertion   Reason for Exam: chest pain with exertion       FINDINGS:   The lungs are without acute focal process. There is no effusion or   pneumothorax. The cardiomediastinal silhouette is stable. The osseous   structures are stable. Impression   No acute process.              Pulmonary Functions Testing Results:    Baseline polysomnography done on 11/13/2022  Overall AHI: 15.7  REM AHI: 25.9  Supine AHI: 17.9  Lowest oxygen saturation: 87%  Time spent when oxygen saturation of 88%: 5.5 minutes      CPAP titration done on 1/28/2023  Adequate therapy: Auto BiPAP with EPAP min

## 2023-08-11 DIAGNOSIS — F33.41 RECURRENT MAJOR DEPRESSIVE DISORDER, IN PARTIAL REMISSION (HCC): ICD-10-CM

## 2023-08-12 ENCOUNTER — PATIENT MESSAGE (OUTPATIENT)
Dept: CARDIOLOGY CLINIC | Age: 68
End: 2023-08-12

## 2023-08-14 ENCOUNTER — TELEPHONE (OUTPATIENT)
Dept: INTERNAL MEDICINE CLINIC | Age: 68
End: 2023-08-14

## 2023-08-14 RX ORDER — BUPROPION HYDROCHLORIDE 300 MG/1
TABLET ORAL
Qty: 45 TABLET | Refills: 3 | Status: SHIPPED | OUTPATIENT
Start: 2023-08-14

## 2023-08-14 RX ORDER — DULOXETIN HYDROCHLORIDE 60 MG/1
60 CAPSULE, DELAYED RELEASE ORAL DAILY
Qty: 90 CAPSULE | Refills: 0 | Status: SHIPPED | OUTPATIENT
Start: 2023-08-14

## 2023-08-14 NOTE — PROGRESS NOTES
Last OV: 7/14/2023  Next OV: Visit date not found    Next appointment due:around 11/30/2023    Last fill:4/5/23  Refills:0 # 80

## 2023-08-14 NOTE — TELEPHONE ENCOUNTER
Pt calling, states she saw sleep doctor and was told she should not be taking trazodone and cymbalta together. Pt would like to know what Dr. Dinesh Galeano recommends, she would like to stay on cymbalta. Please advise and call pt.

## 2023-08-14 NOTE — TELEPHONE ENCOUNTER
From: Roula Rhodes  To: Dr. Eren Quinonez: 8/12/2023 3:41 PM EDT  Subject: Trazodone 50 mg    Dr. Radha Howard changed my sleep med from Ambien to Trazodone. When I went to pick it up, the pharmacist questioned me taking Cymbalta and Metoprolol with Trazodone. Also, is there an issue taking it with Camzyos?     Yu Kwok

## 2023-08-14 NOTE — TELEPHONE ENCOUNTER
What I found is that there is an interaction between cymbalta and trazodone. You should reach out to your family doctor about what you should do. I don't think you should be taking both. Trazodone is okay for you to take with camzyos if your family doctor wants you to take trazodone instead of cymbalta.   YONI

## 2023-08-16 DIAGNOSIS — E11.42 TYPE 2 DIABETES MELLITUS WITH POLYNEUROPATHY (HCC): Primary | ICD-10-CM

## 2023-08-16 RX ORDER — DAPAGLIFLOZIN 5 MG/1
TABLET, FILM COATED ORAL
Qty: 30 TABLET | Refills: 0 | Status: SHIPPED | OUTPATIENT
Start: 2023-08-16 | End: 2023-09-12

## 2023-08-17 ENCOUNTER — TELEPHONE (OUTPATIENT)
Dept: ENDOCRINOLOGY | Age: 68
End: 2023-08-17

## 2023-08-17 ENCOUNTER — OFFICE VISIT (OUTPATIENT)
Dept: ENT CLINIC | Age: 68
End: 2023-08-17
Payer: MEDICARE

## 2023-08-17 VITALS
HEART RATE: 77 BPM | BODY MASS INDEX: 29.23 KG/M2 | TEMPERATURE: 97.8 F | OXYGEN SATURATION: 94 % | WEIGHT: 165 LBS | HEIGHT: 63 IN | SYSTOLIC BLOOD PRESSURE: 101 MMHG | DIASTOLIC BLOOD PRESSURE: 67 MMHG

## 2023-08-17 DIAGNOSIS — E11.42 TYPE 2 DIABETES MELLITUS WITH POLYNEUROPATHY (HCC): ICD-10-CM

## 2023-08-17 DIAGNOSIS — G47.33 OSA (OBSTRUCTIVE SLEEP APNEA): Primary | ICD-10-CM

## 2023-08-17 PROCEDURE — 3017F COLORECTAL CA SCREEN DOC REV: CPT | Performed by: STUDENT IN AN ORGANIZED HEALTH CARE EDUCATION/TRAINING PROGRAM

## 2023-08-17 PROCEDURE — 3074F SYST BP LT 130 MM HG: CPT | Performed by: STUDENT IN AN ORGANIZED HEALTH CARE EDUCATION/TRAINING PROGRAM

## 2023-08-17 PROCEDURE — 1036F TOBACCO NON-USER: CPT | Performed by: STUDENT IN AN ORGANIZED HEALTH CARE EDUCATION/TRAINING PROGRAM

## 2023-08-17 PROCEDURE — 1123F ACP DISCUSS/DSCN MKR DOCD: CPT | Performed by: STUDENT IN AN ORGANIZED HEALTH CARE EDUCATION/TRAINING PROGRAM

## 2023-08-17 PROCEDURE — 99213 OFFICE O/P EST LOW 20 MIN: CPT | Performed by: STUDENT IN AN ORGANIZED HEALTH CARE EDUCATION/TRAINING PROGRAM

## 2023-08-17 PROCEDURE — G8427 DOCREV CUR MEDS BY ELIG CLIN: HCPCS | Performed by: STUDENT IN AN ORGANIZED HEALTH CARE EDUCATION/TRAINING PROGRAM

## 2023-08-17 PROCEDURE — G8399 PT W/DXA RESULTS DOCUMENT: HCPCS | Performed by: STUDENT IN AN ORGANIZED HEALTH CARE EDUCATION/TRAINING PROGRAM

## 2023-08-17 PROCEDURE — 3078F DIAST BP <80 MM HG: CPT | Performed by: STUDENT IN AN ORGANIZED HEALTH CARE EDUCATION/TRAINING PROGRAM

## 2023-08-17 PROCEDURE — G8417 CALC BMI ABV UP PARAM F/U: HCPCS | Performed by: STUDENT IN AN ORGANIZED HEALTH CARE EDUCATION/TRAINING PROGRAM

## 2023-08-17 PROCEDURE — 1090F PRES/ABSN URINE INCON ASSESS: CPT | Performed by: STUDENT IN AN ORGANIZED HEALTH CARE EDUCATION/TRAINING PROGRAM

## 2023-08-17 NOTE — PROGRESS NOTES
[facility] [address] VISIT      Patient Name: [address] [facility] Record Number:  [id number]  Primary Care Physician:  Donnie Young MD    ChiefComplaint     Chief Complaint   Patient presents with    Other     Pt here to discuss Inspire/DISE -- Pt having issues with CPAP       History of Present Illness     Interval History:   She is initially diagnosed with obstructive sleep apnea approximately 20 years ago. Her symptoms seem to get worse and she had another sleep study this past November. Since then she has tried 3 different CPAP mask. She started with a nasal pillow and then went to a full facemask. She states that she will start wearing it when she goes to sleep at night but at max has worn it for approximately 3 hours. It causes her to toss and turn she will eventually pull off the mask. She was on Ambien and while she was on this she would do odd things at night such as cut her hair and not remember in the morning. She is now off Ambien and on trazodone as prescribed by her sleep medicine doctor. She had surgery for sleep apnea approximately 30 years ago. No history of neck surgery.         Past Medical History     Past Medical History:   Diagnosis Date    Asthma     vocal fold     Ataxia     Cervical stenosis of spinal canal 10/7/2021    Coronary artery disease involving native coronary artery of native heart without angina pectoris 9/9/2021    Depression     Diabetes mellitus (HCC)     Hyperlipidemia     Hypertension     Hypothyroidism     Obesity     Osteoarthritis     Paradoxical vocal fold motion disorder     Postmenopausal 5/15/2018    Stage 2 chronic kidney disease 5/7/2019       Past Surgical History     Past Surgical History:   Procedure Laterality Date    CARPAL TUNNEL RELEASE      RORY    HYSTERECTOMY, TOTAL ABDOMINAL (CERVIX REMOVED)      Age 37    [place] NJX AA&/STRD TFRML EPI LUMBAR/SACRAL 1 LEVEL Bilateral 12/3/2018

## 2023-08-17 NOTE — TELEPHONE ENCOUNTER
Carrington Guerrero stopped at the  requesting a script for Shane Hernandez. I let her know there is a message in her chart regarding her sensors. \"Left VM for patient that the Freestyle kari 2 needs to be submitted through a [employer] company. So we can submit an order through [facility] but they do require a visit at least every 6 months in order to submit an order. Patient was last seen in 12/2022 which is more than 6 months so we are unable to submit. Patient can try to get in sooner with a cancellation or we can wait until her appt in Sept to submit the order. \"    She states she will wait until her appt in September to get her order to [facility] but she wants to know if a script can go to [facility] now so she can pay out of pocket. She states it only costs her $78 out of pocket. There is a script in her chart that was sent on 6/26/2023 with 5 refills. Does she need a new script or can she just call her pharmacy so they can get it ready for her? Please advise. Thanks.         Medication name: Freestyle Kari Sensor  Medication dose: n/a  Frequency: Change every 14 days  Quantity: 2 each with 5 refills       Pharmacy name: [id number] [id number] Feilpe Kaur, [address] [place] [address] [phone] [facility] [phone]       Last ov: 12/1/2022  Last labs: 3/28/2022    Carrington Guerrero [phone] (home) [phone] (work)

## 2023-08-18 ENCOUNTER — OFFICE VISIT (OUTPATIENT)
Dept: CARDIOLOGY CLINIC | Age: 68
End: 2023-08-18
Payer: MEDICARE

## 2023-08-18 VITALS
HEIGHT: 63 IN | DIASTOLIC BLOOD PRESSURE: 52 MMHG | HEART RATE: 86 BPM | BODY MASS INDEX: 28.7 KG/M2 | WEIGHT: 162 LBS | OXYGEN SATURATION: 97 % | SYSTOLIC BLOOD PRESSURE: 90 MMHG

## 2023-08-18 DIAGNOSIS — Z01.810 PREOP CARDIOVASCULAR EXAM: Primary | ICD-10-CM

## 2023-08-18 DIAGNOSIS — I10 ESSENTIAL HYPERTENSION: ICD-10-CM

## 2023-08-18 DIAGNOSIS — E78.2 MIXED HYPERLIPIDEMIA: ICD-10-CM

## 2023-08-18 DIAGNOSIS — R07.89 OTHER CHEST PAIN: ICD-10-CM

## 2023-08-18 DIAGNOSIS — G47.33 OSA (OBSTRUCTIVE SLEEP APNEA): ICD-10-CM

## 2023-08-18 DIAGNOSIS — I42.1 HOCM (HYPERTROPHIC OBSTRUCTIVE CARDIOMYOPATHY) (HCC): ICD-10-CM

## 2023-08-18 DIAGNOSIS — Q24.8 LEFT VENTRICULAR OUTFLOW TRACT OBSTRUCTION: ICD-10-CM

## 2023-08-18 DIAGNOSIS — R06.02 SOB (SHORTNESS OF BREATH): ICD-10-CM

## 2023-08-18 PROCEDURE — G8427 DOCREV CUR MEDS BY ELIG CLIN: HCPCS | Performed by: INTERNAL MEDICINE

## 2023-08-18 PROCEDURE — G8399 PT W/DXA RESULTS DOCUMENT: HCPCS | Performed by: INTERNAL MEDICINE

## 2023-08-18 PROCEDURE — 3017F COLORECTAL CA SCREEN DOC REV: CPT | Performed by: INTERNAL MEDICINE

## 2023-08-18 PROCEDURE — G8417 CALC BMI ABV UP PARAM F/U: HCPCS | Performed by: INTERNAL MEDICINE

## 2023-08-18 PROCEDURE — 3078F DIAST BP <80 MM HG: CPT | Performed by: INTERNAL MEDICINE

## 2023-08-18 PROCEDURE — 99215 OFFICE O/P EST HI 40 MIN: CPT | Performed by: INTERNAL MEDICINE

## 2023-08-18 PROCEDURE — 93000 ELECTROCARDIOGRAM COMPLETE: CPT | Performed by: INTERNAL MEDICINE

## 2023-08-18 PROCEDURE — 1090F PRES/ABSN URINE INCON ASSESS: CPT | Performed by: INTERNAL MEDICINE

## 2023-08-18 PROCEDURE — 3074F SYST BP LT 130 MM HG: CPT | Performed by: INTERNAL MEDICINE

## 2023-08-18 PROCEDURE — 1123F ACP DISCUSS/DSCN MKR DOCD: CPT | Performed by: INTERNAL MEDICINE

## 2023-08-18 PROCEDURE — 1036F TOBACCO NON-USER: CPT | Performed by: INTERNAL MEDICINE

## 2023-08-18 NOTE — PATIENT INSTRUCTIONS
Plan:  Make sure to take Camzyos medication the morning of EGD procedure  Continue current medications  See Dr. Ernestine Cifuentes in  4 months

## 2023-08-21 ENCOUNTER — OFFICE VISIT (OUTPATIENT)
Dept: INTERNAL MEDICINE CLINIC | Age: 68
End: 2023-08-21

## 2023-08-21 VITALS
SYSTOLIC BLOOD PRESSURE: 102 MMHG | BODY MASS INDEX: 29.48 KG/M2 | HEIGHT: 63 IN | OXYGEN SATURATION: 98 % | WEIGHT: 166.4 LBS | DIASTOLIC BLOOD PRESSURE: 80 MMHG | HEART RATE: 68 BPM

## 2023-08-21 DIAGNOSIS — E78.2 MIXED HYPERLIPIDEMIA: ICD-10-CM

## 2023-08-21 DIAGNOSIS — N18.32 STAGE 3B CHRONIC KIDNEY DISEASE (HCC): ICD-10-CM

## 2023-08-21 DIAGNOSIS — E11.65 UNCONTROLLED TYPE 2 DIABETES MELLITUS WITH HYPERGLYCEMIA (HCC): ICD-10-CM

## 2023-08-21 DIAGNOSIS — M48.062 SPINAL STENOSIS OF LUMBAR REGION WITH NEUROGENIC CLAUDICATION: ICD-10-CM

## 2023-08-21 DIAGNOSIS — I10 PRIMARY HYPERTENSION: ICD-10-CM

## 2023-08-21 DIAGNOSIS — E11.65 UNCONTROLLED TYPE 2 DIABETES MELLITUS WITH HYPERGLYCEMIA (HCC): Primary | ICD-10-CM

## 2023-08-21 DIAGNOSIS — E11.42 DIABETIC POLYNEUROPATHY ASSOCIATED WITH TYPE 2 DIABETES MELLITUS (HCC): ICD-10-CM

## 2023-08-21 DIAGNOSIS — F33.41 RECURRENT MAJOR DEPRESSIVE DISORDER, IN PARTIAL REMISSION (HCC): ICD-10-CM

## 2023-08-21 RX ORDER — DULOXETIN HYDROCHLORIDE 60 MG/1
60 CAPSULE, DELAYED RELEASE ORAL DAILY
Qty: 90 CAPSULE | Refills: 0 | Status: SHIPPED | OUTPATIENT
Start: 2023-08-21

## 2023-08-21 ASSESSMENT — ENCOUNTER SYMPTOMS
BLURRED VISION: 0
VISUAL CHANGE: 0

## 2023-08-21 NOTE — PROGRESS NOTES
Araseli Fernandez (:  1955) is a 69 y.o. female,Established patient, here for evaluation of the following chief complaint(s):  Pre-op Exam (ENDOSCOPY )         ASSESSMENT/PLAN:  1. Uncontrolled type 2 diabetes mellitus with hyperglycemia (HCC)  -     TSH; Future  -     Hemoglobin A1C; Future  2. Stage 3b chronic kidney disease ([address])  -     CBC with Auto Differential; Future  -     Comprehensive Metabolic Panel; Future  3. Spinal stenosis of lumbar region with neurogenic claudication  4. Mixed hyperlipidemia  -     Lipid Panel; Future  5. Primary hypertension  6. Diabetic polyneuropathy associated with type 2 diabetes mellitus ([address])  7. Recurrent major depressive disorder, in partial remission (HCC)  -     DULoxetine (CYMBALTA) 60 MG extended release capsule; Take 1 capsule by mouth daily, Disp-90 capsule, R-0Schedule follow up for further refills. (Around 23)Normal    The patient is doing significantly better she is probably in the best point in the last 18 months and does not have any contraindication for her procedure so she is cleared for her endoscopy    Can you check the patient blood testing today to decide if any adjustment is needed she does need a sensor for her tocometer  Return in about 3 months (around 2023).          Subjective   SUBJECTIVE/OBJECTIVE:    Lab Review   Lab Results   Component Value Date/Time     2023 01:12 PM     2023 10:50 AM     2023 10:03 AM    K 4.7 2023 01:12 PM    K 5.3 2023 10:50 AM    K 5.2 2023 10:03 AM    K 4.4 2022 05:13 AM    K 4.6 2022 03:20 PM    CO2 24 2023 01:12 PM    CO2 27 2023 10:50 AM    CO2 23 2023 10:03 AM    BUN 13 2023 01:12 PM    BUN 20 2023 10:50 AM    BUN 23 2023 10:03 AM    CREATININE 1.3 2023 01:12 PM    CREATININE 1.5 2023 10:50 AM    CREATININE 1.6 2023 10:03 AM    GLUCOSE 129 2023 01:12 PM    GLUCOSE 132

## 2023-08-22 LAB
ALBUMIN SERPL-MCNC: 3.9 G/DL (ref 3.4–5)
ALBUMIN/GLOB SERPL: 1.9 {RATIO} (ref 1.1–2.2)
ALP SERPL-CCNC: 142 U/L (ref 40–129)
ALT SERPL-CCNC: 12 U/L (ref 10–40)
ANION GAP SERPL CALCULATED.3IONS-SCNC: 12 MMOL/L (ref 3–16)
AST SERPL-CCNC: 12 U/L (ref 15–37)
BASOPHILS # BLD: 0 K/UL (ref 0–0.2)
BASOPHILS NFR BLD: 0.7 %
BILIRUB SERPL-MCNC: <0.2 MG/DL (ref 0–1)
BUN SERPL-MCNC: 13 MG/DL (ref 7–20)
CALCIUM SERPL-MCNC: 9 MG/DL (ref 8.3–10.6)
CHLORIDE SERPL-SCNC: 102 MMOL/L (ref 99–110)
CHOLEST SERPL-MCNC: 147 MG/DL (ref 0–199)
CO2 SERPL-SCNC: 24 MMOL/L (ref 21–32)
CREAT SERPL-MCNC: 1.3 MG/DL (ref 0.6–1.2)
DEPRECATED RDW RBC AUTO: 14.2 % (ref 12.4–15.4)
EOSINOPHIL # BLD: 0.1 K/UL (ref 0–0.6)
EOSINOPHIL NFR BLD: 2 %
EST. AVERAGE GLUCOSE BLD GHB EST-MCNC: 151.3 MG/DL
GFR SERPLBLD CREATININE-BSD FMLA CKD-EPI: 44 ML/MIN/{1.73_M2}
GLUCOSE SERPL-MCNC: 129 MG/DL (ref 70–99)
HBA1C MFR BLD: 6.9 %
HCT VFR BLD AUTO: 37.4 % (ref 36–48)
HDLC SERPL-MCNC: 58 MG/DL (ref 40–60)
HGB BLD-MCNC: 12.4 G/DL (ref 12–16)
LDLC SERPL CALC-MCNC: 66 MG/DL
LYMPHOCYTES # BLD: 2.1 K/UL (ref 1–5.1)
LYMPHOCYTES NFR BLD: 31.4 %
MCH RBC QN AUTO: 28.5 PG (ref 26–34)
MCHC RBC AUTO-ENTMCNC: 33 G/DL (ref 31–36)
MCV RBC AUTO: 86.3 FL (ref 80–100)
MONOCYTES # BLD: 0.1 K/UL (ref 0–1.3)
MONOCYTES NFR BLD: 2.2 %
NEUTROPHILS # BLD: 4.2 K/UL (ref 1.7–7.7)
NEUTROPHILS NFR BLD: 63.7 %
PLATELET # BLD AUTO: 296 K/UL (ref 135–450)
PMV BLD AUTO: 8.6 FL (ref 5–10.5)
POTASSIUM SERPL-SCNC: 4.7 MMOL/L (ref 3.5–5.1)
PROT SERPL-MCNC: 6 G/DL (ref 6.4–8.2)
RBC # BLD AUTO: 4.34 M/UL (ref 4–5.2)
SODIUM SERPL-SCNC: 138 MMOL/L (ref 136–145)
TRIGL SERPL-MCNC: 117 MG/DL (ref 0–150)
TSH SERPL DL<=0.005 MIU/L-ACNC: 2.14 UIU/ML (ref 0.27–4.2)
VLDLC SERPL CALC-MCNC: 23 MG/DL
WBC # BLD AUTO: 6.7 K/UL (ref 4–11)

## 2023-08-23 ASSESSMENT — ENCOUNTER SYMPTOMS
WHEEZING: 0
COUGH: 0
CHEST TIGHTNESS: 0
SHORTNESS OF BREATH: 0
BLOOD IN STOOL: 0
ABDOMINAL PAIN: 0
CONSTIPATION: 0
COLOR CHANGE: 0
SINUS PAIN: 0

## 2023-08-23 NOTE — PROGRESS NOTES
Name_______________________________________Printed:____________________  Date and time of surgery_8/25/2023_____1000_________________Arrival Time:___0830_____________   1. The instructions given regarding when and if a patient needs to stop oral intake prior to surgery varies. Follow the specific instructions you were given                  _x__Nothing to eat or to drink after Midnight the night before.                   ____Carbo loading or instructions will be given to select patients-if you have been given those instructions -please do the following                           The evening before your surgery after dinner before midnight drink 40 ounces of gatorade. If you are diabetic use sugar free. The morning of surgery drink 40 ounces of water. This needs to be finished 3 hours prior to your surgery start time. 2. Take the following pills with a small sip of water on the morning of surgery__omeprazole, metol_________________________________________________                  Do not take blood pressure medications ending in pril or sartan the shant prior to surgery or the morning of surgery. Dr Tierney Fails patient are not to take any medications the AM of surgery. 3. Aspirin, Ibuprofen, Advil, Naproxen, Vitamin E and other Anti-inflammatory products and supplements should be stopped for 5 -7days before surgery or as directed by your physician. 4. Check with your Doctor regarding stopping Plavix, Coumadin,Eliquis, Lovenox,Effient,Pradaxa,Xarelto, Fragmin or other blood thinners and follow their instructions. 5. Do not smoke, and do not drink any alcoholic beverages 24 hours prior to surgery. This includes NA Beer. Refrain from the usage of any recreational drugs. 6. You may brush your teeth and gargle the morning of surgery. DO NOT SWALLOW WATER   7. You MUST make arrangements for a responsible adult to stay on site while you are here and take you home after your surgery.  You will not be allowed to leave

## 2023-08-24 ENCOUNTER — TELEPHONE (OUTPATIENT)
Dept: ENT CLINIC | Age: 68
End: 2023-08-24

## 2023-08-24 NOTE — TELEPHONE ENCOUNTER
Patient called the office back she stated she did receive the message that the time will be moved up and she said she will be there at 7:30 a

## 2023-08-24 NOTE — TELEPHONE ENCOUNTER
Left message on patients vm that we need to move surgery to 9 AM on 8/25/2023. Patient needs to be at the surgery center at 7:30 AM. Asked for patient to call the office and inform me that she got the message.

## 2023-08-25 ENCOUNTER — HOSPITAL ENCOUNTER (OUTPATIENT)
Age: 68
Setting detail: OUTPATIENT SURGERY
Discharge: HOME | End: 2023-08-25
Attending: STUDENT IN AN ORGANIZED HEALTH CARE EDUCATION/TRAINING PROGRAM | Admitting: STUDENT IN AN ORGANIZED HEALTH CARE EDUCATION/TRAINING PROGRAM
Payer: MEDICARE

## 2023-08-25 ENCOUNTER — ANESTHESIA (OUTPATIENT)
Dept: OPERATING ROOM | Age: 68
End: 2023-08-25
Payer: MEDICARE

## 2023-08-25 ENCOUNTER — ANESTHESIA EVENT (OUTPATIENT)
Dept: OPERATING ROOM | Age: 68
End: 2023-08-25
Payer: MEDICARE

## 2023-08-25 VITALS
SYSTOLIC BLOOD PRESSURE: 126 MMHG | HEART RATE: 78 BPM | DIASTOLIC BLOOD PRESSURE: 70 MMHG | TEMPERATURE: 97.7 F | HEIGHT: 63 IN | BODY MASS INDEX: 29.14 KG/M2 | RESPIRATION RATE: 19 BRPM | OXYGEN SATURATION: 100 % | WEIGHT: 164.44 LBS

## 2023-08-25 LAB
GLUCOSE BLD-MCNC: 108 MG/DL (ref 70–99)
GLUCOSE BLD-MCNC: 127 MG/DL (ref 70–99)
PERFORMED ON: ABNORMAL
PERFORMED ON: ABNORMAL

## 2023-08-25 PROCEDURE — 7100000010 HC PHASE II RECOVERY - FIRST 15 MIN: Performed by: STUDENT IN AN ORGANIZED HEALTH CARE EDUCATION/TRAINING PROGRAM

## 2023-08-25 PROCEDURE — 6370000000 HC RX 637 (ALT 250 FOR IP): Performed by: STUDENT IN AN ORGANIZED HEALTH CARE EDUCATION/TRAINING PROGRAM

## 2023-08-25 PROCEDURE — 3700000000 HC ANESTHESIA ATTENDED CARE: Performed by: STUDENT IN AN ORGANIZED HEALTH CARE EDUCATION/TRAINING PROGRAM

## 2023-08-25 PROCEDURE — 3600000012 HC SURGERY LEVEL 2 ADDTL 15MIN: Performed by: STUDENT IN AN ORGANIZED HEALTH CARE EDUCATION/TRAINING PROGRAM

## 2023-08-25 PROCEDURE — 7100000011 HC PHASE II RECOVERY - ADDTL 15 MIN: Performed by: STUDENT IN AN ORGANIZED HEALTH CARE EDUCATION/TRAINING PROGRAM

## 2023-08-25 PROCEDURE — 6360000002 HC RX W HCPCS: Performed by: NURSE ANESTHETIST, CERTIFIED REGISTERED

## 2023-08-25 PROCEDURE — 2500000003 HC RX 250 WO HCPCS: Performed by: NURSE ANESTHETIST, CERTIFIED REGISTERED

## 2023-08-25 PROCEDURE — 3600000014 HC SURGERY LEVEL 4 ADDTL 15MIN: Performed by: STUDENT IN AN ORGANIZED HEALTH CARE EDUCATION/TRAINING PROGRAM

## 2023-08-25 PROCEDURE — 3600000002 HC SURGERY LEVEL 2 BASE: Performed by: STUDENT IN AN ORGANIZED HEALTH CARE EDUCATION/TRAINING PROGRAM

## 2023-08-25 PROCEDURE — 2580000003 HC RX 258: Performed by: NURSE ANESTHETIST, CERTIFIED REGISTERED

## 2023-08-25 PROCEDURE — 3600000004 HC SURGERY LEVEL 4 BASE: Performed by: STUDENT IN AN ORGANIZED HEALTH CARE EDUCATION/TRAINING PROGRAM

## 2023-08-25 PROCEDURE — 2720000010 HC SURG SUPPLY STERILE: Performed by: STUDENT IN AN ORGANIZED HEALTH CARE EDUCATION/TRAINING PROGRAM

## 2023-08-25 PROCEDURE — 42975 DISE EVAL SLP DO BRTH FLX DX: CPT | Performed by: STUDENT IN AN ORGANIZED HEALTH CARE EDUCATION/TRAINING PROGRAM

## 2023-08-25 PROCEDURE — 2580000003 HC RX 258: Performed by: STUDENT IN AN ORGANIZED HEALTH CARE EDUCATION/TRAINING PROGRAM

## 2023-08-25 PROCEDURE — 3700000001 HC ADD 15 MINUTES (ANESTHESIA): Performed by: STUDENT IN AN ORGANIZED HEALTH CARE EDUCATION/TRAINING PROGRAM

## 2023-08-25 PROCEDURE — 7100000000 HC PACU RECOVERY - FIRST 15 MIN: Performed by: STUDENT IN AN ORGANIZED HEALTH CARE EDUCATION/TRAINING PROGRAM

## 2023-08-25 PROCEDURE — 7100000001 HC PACU RECOVERY - ADDTL 15 MIN: Performed by: STUDENT IN AN ORGANIZED HEALTH CARE EDUCATION/TRAINING PROGRAM

## 2023-08-25 PROCEDURE — 2709999900 HC NON-CHARGEABLE SUPPLY: Performed by: STUDENT IN AN ORGANIZED HEALTH CARE EDUCATION/TRAINING PROGRAM

## 2023-08-25 RX ORDER — ONDANSETRON 2 MG/ML
4 INJECTION INTRAMUSCULAR; INTRAVENOUS
Status: DISCONTINUED | OUTPATIENT
Start: 2023-08-25 | End: 2023-08-25 | Stop reason: HOSPADM

## 2023-08-25 RX ORDER — FENTANYL CITRATE 50 UG/ML
25 INJECTION, SOLUTION INTRAMUSCULAR; INTRAVENOUS EVERY 5 MIN PRN
Status: DISCONTINUED | OUTPATIENT
Start: 2023-08-25 | End: 2023-08-25 | Stop reason: HOSPADM

## 2023-08-25 RX ORDER — SODIUM CHLORIDE 9 MG/ML
INJECTION, SOLUTION INTRAVENOUS PRN
Status: DISCONTINUED | OUTPATIENT
Start: 2023-08-25 | End: 2023-08-25 | Stop reason: HOSPADM

## 2023-08-25 RX ORDER — SODIUM CHLORIDE 9 MG/ML
INJECTION, SOLUTION INTRAVENOUS CONTINUOUS PRN
Status: DISCONTINUED | OUTPATIENT
Start: 2023-08-25 | End: 2023-08-25 | Stop reason: SDUPTHER

## 2023-08-25 RX ORDER — HYDROMORPHONE HYDROCHLORIDE 2 MG/ML
0.2 INJECTION, SOLUTION INTRAMUSCULAR; INTRAVENOUS; SUBCUTANEOUS EVERY 5 MIN PRN
Status: DISCONTINUED | OUTPATIENT
Start: 2023-08-25 | End: 2023-08-25 | Stop reason: HOSPADM

## 2023-08-25 RX ORDER — SODIUM CHLORIDE 9 MG/ML
INJECTION, SOLUTION INTRAVENOUS CONTINUOUS
Status: DISCONTINUED | OUTPATIENT
Start: 2023-08-25 | End: 2023-08-25 | Stop reason: HOSPADM

## 2023-08-25 RX ORDER — LIDOCAINE HYDROCHLORIDE 20 MG/ML
INJECTION, SOLUTION EPIDURAL; INFILTRATION; INTRACAUDAL; PERINEURAL PRN
Status: DISCONTINUED | OUTPATIENT
Start: 2023-08-25 | End: 2023-08-25 | Stop reason: SDUPTHER

## 2023-08-25 RX ORDER — SODIUM CHLORIDE 0.9 % (FLUSH) 0.9 %
5-40 SYRINGE (ML) INJECTION PRN
Status: DISCONTINUED | OUTPATIENT
Start: 2023-08-25 | End: 2023-08-25 | Stop reason: HOSPADM

## 2023-08-25 RX ORDER — OXYCODONE HYDROCHLORIDE 5 MG/1
5 TABLET ORAL
Status: DISCONTINUED | OUTPATIENT
Start: 2023-08-25 | End: 2023-08-25 | Stop reason: HOSPADM

## 2023-08-25 RX ORDER — PROPOFOL 10 MG/ML
INJECTION, EMULSION INTRAVENOUS PRN
Status: DISCONTINUED | OUTPATIENT
Start: 2023-08-25 | End: 2023-08-25 | Stop reason: SDUPTHER

## 2023-08-25 RX ORDER — SODIUM CHLORIDE 0.9 % (FLUSH) 0.9 %
5-40 SYRINGE (ML) INJECTION EVERY 12 HOURS SCHEDULED
Status: DISCONTINUED | OUTPATIENT
Start: 2023-08-25 | End: 2023-08-25 | Stop reason: HOSPADM

## 2023-08-25 RX ORDER — ACETAMINOPHEN 325 MG/1
650 TABLET ORAL ONCE
Status: COMPLETED | OUTPATIENT
Start: 2023-08-25 | End: 2023-08-25

## 2023-08-25 RX ORDER — SODIUM CHLORIDE, SODIUM LACTATE, POTASSIUM CHLORIDE, CALCIUM CHLORIDE 600; 310; 30; 20 MG/100ML; MG/100ML; MG/100ML; MG/100ML
INJECTION, SOLUTION INTRAVENOUS CONTINUOUS
Status: DISCONTINUED | OUTPATIENT
Start: 2023-08-25 | End: 2023-08-25 | Stop reason: HOSPADM

## 2023-08-25 RX ORDER — OXYMETAZOLINE HYDROCHLORIDE 0.05 G/100ML
2 SPRAY NASAL ONCE
Status: COMPLETED | OUTPATIENT
Start: 2023-08-25 | End: 2023-08-25

## 2023-08-25 RX ADMIN — LIDOCAINE HYDROCHLORIDE 100 MG: 20 INJECTION, SOLUTION EPIDURAL; INFILTRATION; INTRACAUDAL; PERINEURAL at 09:03

## 2023-08-25 RX ADMIN — SODIUM CHLORIDE: 9 INJECTION, SOLUTION INTRAVENOUS at 09:00

## 2023-08-25 RX ADMIN — PROPOFOL 50 MG: 10 INJECTION, EMULSION INTRAVENOUS at 09:03

## 2023-08-25 RX ADMIN — PROPOFOL 25 MG: 10 INJECTION, EMULSION INTRAVENOUS at 09:17

## 2023-08-25 RX ADMIN — SODIUM CHLORIDE: 9 INJECTION, SOLUTION INTRAVENOUS at 08:10

## 2023-08-25 RX ADMIN — PROPOFOL 50 MG: 10 INJECTION, EMULSION INTRAVENOUS at 09:08

## 2023-08-25 RX ADMIN — PROPOFOL 100 MCG/KG/MIN: 10 INJECTION, EMULSION INTRAVENOUS at 09:04

## 2023-08-25 RX ADMIN — NASAL DECONGESTANT 2 SPRAY: 0.05 SPRAY NASAL at 08:57

## 2023-08-25 RX ADMIN — ACETAMINOPHEN 650 MG: 325 TABLET ORAL at 08:10

## 2023-08-25 ASSESSMENT — PAIN - FUNCTIONAL ASSESSMENT: PAIN_FUNCTIONAL_ASSESSMENT: 0-10

## 2023-08-25 ASSESSMENT — PAIN SCALES - GENERAL: PAINLEVEL_OUTOF10: 0

## 2023-08-25 NOTE — H&P
Date of Surgery Update:  Roula Rhodes was seen, history and physical examination reviewed, and patient examined by me today. There have been no significant clinical changes since the completion of the previous history and physical.    The risk, benefits, and alternatives of the proposed procedure have been explained to the patient (or appropriate guardian) and understanding verbalized. All questions answered. Patient wishes to proceed.     Electronically signed by: Van Robles DO,8/25/2023,8:53 AM

## 2023-08-25 NOTE — PROGRESS NOTES
Teaching/ education completed for home care including pain management, activity,safety precautionsand infection control. Patient verbalized understanding.

## 2023-08-25 NOTE — PROGRESS NOTES
Received from OR - Unresponsive, nasal cannula 3 liters 96%, semi fowlers, respirations easy and regular,vss.

## 2023-08-25 NOTE — ANESTHESIA POSTPROCEDURE EVALUATION
Department of Anesthesiology  Postprocedure Note    Patient: Angeline Gunderson  MRN: [id number]  YOB: 1955  Date of evaluation: 8/25/2023      Procedure Summary     Date: 08/25/23 Room / Location: [address]    Anesthesia Start: 0900 Anesthesia Stop: 6217    Procedure: DRUG INDUCED SLEEP ENDOSCOPY (Nose) Diagnosis:       Obstructive sleep apnea      (Obstructive sleep apnea [G47.33])    Surgeons: Angle Gimenez DO Responsible Provider: Андрей Bruno MD    Anesthesia Type: MAC ASA Status: 3          Anesthesia Type: No value filed.     Diana Phase I: Diana Score: 10    Diana Phase II: Diana Score: 10      Anesthesia Post Evaluation    Patient location during evaluation: bedside  Patient participation: complete - patient participated  Level of consciousness: awake and alert  Pain score: 0  Airway patency: patent  Nausea & Vomiting: no vomiting  Complications: no  Cardiovascular status: hemodynamically stable  Respiratory status: nonlabored ventilation  Hydration status: stable  Pain management: adequate

## 2023-08-25 NOTE — DISCHARGE INSTRUCTIONS
ANESTHESIA DISCHARGE INSTRUCTIONS     Wear your seatbelt home. You are under the influence of drugs-do not drink alcohol, drive, operate machinery, make any important decisions or sign any legal documents for 24 hours. Children should not ride bikes, [place] or play on gym sets for 24 hours after surgery. A responsible adult needs to be with you for 24 hours. You may experience lightheadedness, dizziness, or sleepiness following surgery. Rest at home today- increase activity as tolerated. Progress slowly to a regular diet unless your physician has instructed you otherwise. Drink plenty of water. If persistent nausea and vomiting becomes a problem, call your physician. Coughing, sore throat and muscle aches are other side effects of anesthesia, and should improve with time. Do not drive or operate machinery while taking narcotics.

## 2023-08-25 NOTE — OP NOTE
[facility] SURGERY  OPERATIVE REPORT    Patient Name: Ángel Figueroa  YOB: 1955  Medical Record Number:  [id number]  Billing Number:  [id number]  Date of Procedure: 8/25/23  Time: 0900    Pre Operative Diagnoses: Obstructive Sleep Apnea  Post Operative Diagnoses:  Obstructive Sleep Apnea           Procedure:  1. Drug Induced Sleep endoscopy (62687)       Surgeon: Kenneth Menon, DO    OR Staff/ Assistant:  Circulator: Queta Tom RN  Scrub Person First: Nadir Small  Circulator Assist: Crystal Catalan    Anesthesia:  Sedation     Findings:  1) Anterior posterior collapse    Indications: This is a 69 y.o. female with history of moderate to severe symptomatic obstructive sleep apnea, who is intolerant unable to achieve benefit with positive pressure therapy, presents today for drug-induced sleep endoscopy to better characterize her locations and pattern of obstruction to predict appropriate medical and/or surgical options moving forward. Risks and benefits discussed with the patient including alternate treatment options, Informed consent was obtained, the patient elected to proceed with the planned procedure. DETAILS OF PROCEDURE(S):       The patient was brought to the operating room and was anesthetized via the standard drug-induced sleep endoscopy protocol. The propofol infusion rate was started at 100 MCG and increase gradually to level at which conditions that mimic sleep were gradually observed. With the patient unresponsive to verbal commands, but still with spontaneous respiration, sleep disordered breathing events and associated desaturations were clearly observed. Under these conditions, flexible endoscope was inserted to examine both sides of the nose as well as the pharynx. The nose was relatively unremarkable. The retropatellar space showed a more obliquely oriented palate.   A mild lateral wall component was noted, but the palatal collapse was primarily an anterior posterior fashion. More distally, mild lateral oropharyngeal wall component was noted, but again no complete lateral oropharyngeal collapse. In the hypopharynx, a very large, tongue base is observed in complete anterior-posterior retrolingual/retroepiglottic obstruction. In summary, there is no evidence of complete concentric palatal obstruction and it does appear to be a candidate anatomically for hypoglossal nerve stimulation therapy. I was present for and performed the entire procedure. Estimated Blood Loss: 0mL                 Specimens: None           Complications: There were no complications.     Deacon Fairbanks DO

## 2023-08-27 ENCOUNTER — TELEPHONE (OUTPATIENT)
Dept: INTERNAL MEDICINE CLINIC | Age: 68
End: 2023-08-27

## 2023-08-27 ENCOUNTER — APPOINTMENT (OUTPATIENT)
Dept: GENERAL RADIOLOGY | Age: 68
End: 2023-08-27
Payer: MEDICARE

## 2023-08-27 ENCOUNTER — HOSPITAL ENCOUNTER (EMERGENCY)
Age: 68
Discharge: HOME | End: 2023-08-27
Attending: EMERGENCY MEDICINE
Payer: MEDICARE

## 2023-08-27 VITALS
HEIGHT: 63 IN | DIASTOLIC BLOOD PRESSURE: 71 MMHG | TEMPERATURE: 99.9 F | BODY MASS INDEX: 28.7 KG/M2 | RESPIRATION RATE: 15 BRPM | OXYGEN SATURATION: 100 % | WEIGHT: 162 LBS | SYSTOLIC BLOOD PRESSURE: 119 MMHG | HEART RATE: 73 BPM

## 2023-08-27 DIAGNOSIS — J06.9 ACUTE UPPER RESPIRATORY INFECTION: ICD-10-CM

## 2023-08-27 DIAGNOSIS — E86.0 DEHYDRATION: ICD-10-CM

## 2023-08-27 DIAGNOSIS — R05.1 ACUTE COUGH: ICD-10-CM

## 2023-08-27 DIAGNOSIS — U07.1 COVID-19: Primary | ICD-10-CM

## 2023-08-27 DIAGNOSIS — R51.9 ACUTE NONINTRACTABLE HEADACHE, UNSPECIFIED HEADACHE TYPE: ICD-10-CM

## 2023-08-27 LAB
ALBUMIN SERPL-MCNC: 3.6 G/DL (ref 3.4–5)
ALBUMIN/GLOB SERPL: 1.1 {RATIO} (ref 1.1–2.2)
ALP SERPL-CCNC: 121 U/L (ref 40–129)
ALT SERPL-CCNC: 11 U/L (ref 10–40)
ANION GAP SERPL CALCULATED.3IONS-SCNC: 17 MMOL/L (ref 3–16)
AST SERPL-CCNC: 16 U/L (ref 15–37)
BASOPHILS # BLD: 0 K/UL (ref 0–0.2)
BASOPHILS NFR BLD: 0.9 %
BILIRUB SERPL-MCNC: 0.4 MG/DL (ref 0–1)
BILIRUB UR QL STRIP.AUTO: NEGATIVE
BUN SERPL-MCNC: 18 MG/DL (ref 7–20)
CALCIUM SERPL-MCNC: 9.1 MG/DL (ref 8.3–10.6)
CHLORIDE SERPL-SCNC: 101 MMOL/L (ref 99–110)
CLARITY UR: CLEAR
CO2 SERPL-SCNC: 15 MMOL/L (ref 21–32)
COLOR UR: YELLOW
CREAT SERPL-MCNC: 1.3 MG/DL (ref 0.6–1.2)
DEPRECATED RDW RBC AUTO: 13.6 % (ref 12.4–15.4)
EOSINOPHIL # BLD: 0.1 K/UL (ref 0–0.6)
EOSINOPHIL NFR BLD: 1.5 %
GFR SERPLBLD CREATININE-BSD FMLA CKD-EPI: 44 ML/MIN/{1.73_M2}
GLUCOSE SERPL-MCNC: 207 MG/DL (ref 70–99)
GLUCOSE UR STRIP.AUTO-MCNC: >=1000 MG/DL
HCT VFR BLD AUTO: 39.9 % (ref 36–48)
HGB BLD-MCNC: 12.9 G/DL (ref 12–16)
HGB UR QL STRIP.AUTO: NEGATIVE
KETONES UR STRIP.AUTO-MCNC: NEGATIVE MG/DL
LACTATE BLDV-SCNC: 1.6 MMOL/L (ref 0.4–1.9)
LACTATE BLDV-SCNC: 2 MMOL/L (ref 0.4–1.9)
LEUKOCYTE ESTERASE UR QL STRIP.AUTO: NEGATIVE
LYMPHOCYTES # BLD: 1.4 K/UL (ref 1–5.1)
LYMPHOCYTES NFR BLD: 34.8 %
MAGNESIUM SERPL-MCNC: 2.2 MG/DL (ref 1.8–2.4)
MCH RBC QN AUTO: 27.5 PG (ref 26–34)
MCHC RBC AUTO-ENTMCNC: 32.2 G/DL (ref 31–36)
MCV RBC AUTO: 85.4 FL (ref 80–100)
MONOCYTES # BLD: 0.2 K/UL (ref 0–1.3)
MONOCYTES NFR BLD: 4.9 %
NEUTROPHILS # BLD: 2.4 K/UL (ref 1.7–7.7)
NEUTROPHILS NFR BLD: 57.9 %
NITRITE UR QL STRIP.AUTO: NEGATIVE
NT-PROBNP SERPL-MCNC: 61 PG/ML (ref 0–124)
PH UR STRIP.AUTO: 5.5 [PH] (ref 5–8)
PLATELET # BLD AUTO: 241 K/UL (ref 135–450)
PMV BLD AUTO: 8.2 FL (ref 5–10.5)
POTASSIUM SERPL-SCNC: 4.3 MMOL/L (ref 3.5–5.1)
PROCALCITONIN SERPL IA-MCNC: 0.09 NG/ML (ref 0–0.15)
PROT SERPL-MCNC: 6.8 G/DL (ref 6.4–8.2)
PROT UR STRIP.AUTO-MCNC: NEGATIVE MG/DL
RBC # BLD AUTO: 4.68 M/UL (ref 4–5.2)
SODIUM SERPL-SCNC: 133 MMOL/L (ref 136–145)
SP GR UR STRIP.AUTO: 1.02 (ref 1–1.03)
TROPONIN, HIGH SENSITIVITY: <6 NG/L (ref 0–14)
UA COMPLETE W REFLEX CULTURE PNL UR: ABNORMAL
UA DIPSTICK W REFLEX MICRO PNL UR: ABNORMAL
URN SPEC COLLECT METH UR: ABNORMAL
UROBILINOGEN UR STRIP-ACNC: 0.2 E.U./DL
WBC # BLD AUTO: 4.1 K/UL (ref 4–11)

## 2023-08-27 PROCEDURE — 83735 ASSAY OF MAGNESIUM: CPT

## 2023-08-27 PROCEDURE — 36415 COLL VENOUS BLD VENIPUNCTURE: CPT

## 2023-08-27 PROCEDURE — 81003 URINALYSIS AUTO W/O SCOPE: CPT

## 2023-08-27 PROCEDURE — 84484 ASSAY OF TROPONIN QUANT: CPT

## 2023-08-27 PROCEDURE — 93005 ELECTROCARDIOGRAM TRACING: CPT | Performed by: PHYSICIAN ASSISTANT

## 2023-08-27 PROCEDURE — 85025 COMPLETE CBC W/AUTO DIFF WBC: CPT

## 2023-08-27 PROCEDURE — 2580000003 HC RX 258: Performed by: PHYSICIAN ASSISTANT

## 2023-08-27 PROCEDURE — 80053 COMPREHEN METABOLIC PANEL: CPT

## 2023-08-27 PROCEDURE — 99285 EMERGENCY DEPT VISIT HI MDM: CPT

## 2023-08-27 PROCEDURE — 83880 ASSAY OF NATRIURETIC PEPTIDE: CPT

## 2023-08-27 PROCEDURE — 96360 HYDRATION IV INFUSION INIT: CPT

## 2023-08-27 PROCEDURE — 6370000000 HC RX 637 (ALT 250 FOR IP): Performed by: PHYSICIAN ASSISTANT

## 2023-08-27 PROCEDURE — 84145 PROCALCITONIN (PCT): CPT

## 2023-08-27 PROCEDURE — 71046 X-RAY EXAM CHEST 2 VIEWS: CPT

## 2023-08-27 PROCEDURE — 83605 ASSAY OF LACTIC ACID: CPT

## 2023-08-27 RX ORDER — ACETAMINOPHEN 500 MG
1000 TABLET ORAL ONCE
Status: COMPLETED | OUTPATIENT
Start: 2023-08-27 | End: 2023-08-27

## 2023-08-27 RX ORDER — 0.9 % SODIUM CHLORIDE 0.9 %
1000 INTRAVENOUS SOLUTION INTRAVENOUS ONCE
Status: COMPLETED | OUTPATIENT
Start: 2023-08-27 | End: 2023-08-27

## 2023-08-27 RX ADMIN — ACETAMINOPHEN 1000 MG: 500 TABLET ORAL at 15:47

## 2023-08-27 RX ADMIN — SODIUM CHLORIDE 1000 ML: 9 INJECTION, SOLUTION INTRAVENOUS at 17:06

## 2023-08-27 ASSESSMENT — ENCOUNTER SYMPTOMS
TROUBLE SWALLOWING: 0
VOICE CHANGE: 0
ABDOMINAL PAIN: 0
SHORTNESS OF BREATH: 0
BACK PAIN: 0
COUGH: 1
WHEEZING: 0
DIARRHEA: 0
RHINORRHEA: 1
VOMITING: 0
NAUSEA: 0
CHEST TIGHTNESS: 0

## 2023-08-27 ASSESSMENT — PAIN - FUNCTIONAL ASSESSMENT: PAIN_FUNCTIONAL_ASSESSMENT: NONE - DENIES PAIN

## 2023-08-27 ASSESSMENT — PAIN SCALES - GENERAL: PAINLEVEL_OUTOF10: 0

## 2023-08-27 NOTE — ED PROVIDER NOTES
[facility]      Pt Name: Arnold Amaya  MRN: [id number]  [address] 1955  Date of evaluation: 8/27/2023  Provider: Troy Augustin MD  I independently performed a history and physical on Arnold Amaya. All diagnostic, treatment, and disposition decisions were made by myself in conjunction with the advanced practice provider. HPI: Arnold Amaya presented with   Chief Complaint   Patient presents with    Cough     Had positive covid test today. Called PCP to call in an antiviral but they said to come to ED to get a prescription. Arnold Amaya has a past medical history of Anxiety, Asthma, Ataxia, Cervical stenosis of spinal canal (10/07/2021), Claustrophobia, Coronary artery disease involving native coronary artery of native heart without angina pectoris (09/09/2021), Depression, Diabetes mellitus ([address]), Hyperlipidemia, Hypertension, Hypertrophic cardiomyopathy ([address]), Hypothyroidism, Obesity, Osteoarthritis, Paradoxical vocal fold motion disorder, Postmenopausal (05/15/2018), Sleep apnea, and Stage 2 chronic kidney disease (05/07/2019). She has a past surgical history that includes Carpal tunnel release; Throat surgery; Hysterectomy, total abdominal; pr njx aa&/strd tfrml epi lumbar/sacral 1 level (Bilateral, 12/03/2018); Tonsillectomy; Breast surgery; eye surgery; and Examination under anesthesia (N/A, 8/25/2023). No current facility-administered medications on file prior to encounter.      Current Outpatient Medications on File Prior to Encounter   Medication Sig Dispense Refill    DULoxetine (CYMBALTA) 60 MG extended release capsule Take 1 capsule by mouth daily 90 capsule 0    Continuous Blood Gluc Sensor (FREESTYLE JEREMIAS 2 SENSOR) MISC Change every 14 days 2 each 5    FARXIGA 5 MG tablet TAKE ONE TABLET BY MOUTH EVERY MORNING 30 tablet 0    buPROPion (WELLBUTRIN XL) 300 MG extended release tablet TAKE ONE TABLET BY MOUTH EVERY OTHER DAY ALTERNATING WITH 150MG

## 2023-08-27 NOTE — ED PROVIDER NOTES
[facility]        Pt Name: Roula Rhodes  MRN: [id number]  [address] [facility] [place] 1955  Date of evaluation: 8/27/2023  Provider: Aneta Chand PA-C  PCP: Urban Prasad MD  Note Started: 3:59 PM EDT 8/27/23       I have seen and evaluated this patient with my supervising physician Ovidio Fraser, *. CHIEF COMPLAINT       Chief Complaint   Patient presents with    Cough     Had positive covid test today. Called PCP to call in an antiviral but they said to come to ED to get a prescription. HISTORY OF PRESENT ILLNESS: 1 or more Elements     History from : Patient    Limitations to history : None    Roula Rhodes is a 69 y.o. female with a history of hypertension, hyperlipidemia, CAD, hypertrophic cardiomyopathy, diabetes and CKD who presents to the emergency department today at the recommendation of her PCP after she took a home test today that was positive for COVID-19. Patient states her symptoms began 3 days ago including nasal congestion, runny nose, headache with generalized fatigue, coughing and fevers and chills. Patient states she does sometimes feel like a tight band is around her chest when she is coughing. She denies feeling short of breath. She denies heart palpitations, diaphoresis, lightheadedness or dizziness. She denies abdominal pain, nausea, vomiting or diarrhea. Nursing Notes were all reviewed and agreed with or any disagreements were addressed in the HPI. REVIEW OF SYSTEMS :      Review of Systems   Constitutional:  Positive for chills, fatigue and fever. HENT:  Positive for congestion and rhinorrhea. Negative for sneezing, trouble swallowing and voice change. Respiratory:  Positive for cough. Negative for chest tightness, shortness of breath and wheezing. Cardiovascular:  Positive for chest pain. Negative for palpitations and leg swelling.    Gastrointestinal:  Negative for abdominal

## 2023-08-27 NOTE — ED NOTES
Pt medicated, given big gulp and updated on plan of care, pt ambulated with steady gait, resp unlabored     Alaina Rivera RN  08/27/23 [id number]

## 2023-08-28 ENCOUNTER — TELEMEDICINE (OUTPATIENT)
Dept: INTERNAL MEDICINE CLINIC | Age: 68
End: 2023-08-28

## 2023-08-28 DIAGNOSIS — U07.1 COVID: Primary | ICD-10-CM

## 2023-08-28 PROBLEM — R07.89 OTHER CHEST PAIN: Status: RESOLVED | Noted: 2022-06-23 | Resolved: 2023-08-28

## 2023-08-28 LAB
EKG ATRIAL RATE: 82 BPM
EKG DIAGNOSIS: NORMAL
EKG P AXIS: 35 DEGREES
EKG P-R INTERVAL: 134 MS
EKG Q-T INTERVAL: 376 MS
EKG QRS DURATION: 94 MS
EKG QTC CALCULATION (BAZETT): 439 MS
EKG R AXIS: -7 DEGREES
EKG T AXIS: 53 DEGREES
EKG VENTRICULAR RATE: 82 BPM

## 2023-08-28 PROCEDURE — 93010 ELECTROCARDIOGRAM REPORT: CPT | Performed by: INTERNAL MEDICINE

## 2023-08-28 ASSESSMENT — ENCOUNTER SYMPTOMS
NAUSEA: 0
ABDOMINAL PAIN: 0
SWOLLEN GLANDS: 0
SORE THROAT: 0
CHANGE IN BOWEL HABIT: 0

## 2023-08-28 NOTE — PROGRESS NOTES
compliance with the treatment plan as well as documenting on the day of the visit. Helio Kennedy, was evaluated through a synchronous (real-time) audio-video encounter. The patient (or guardian if applicable) is aware that this is a billable service, which includes applicable co-pays. This Virtual Visit was conducted with patient's (and/or legal guardian's) consent. Patient identification was verified, and a caregiver was present when appropriate. The patient was located at Home: [place] [id number]  Provider was located at [facility] ([address]): [address] [facility] [address]  [facility],  [address]    [facility]! Confirm you are appropriately licensed, registered, or certified to deliver care in the state where the patient is located as indicated above. If you are not or unsure, please re-schedule the visit.     Are you appropriately licensed in the patient's state?: Yes         --Edelmira Jensen MD

## 2023-09-07 ENCOUNTER — OFFICE VISIT (OUTPATIENT)
Dept: ENDOCRINOLOGY | Age: 68
End: 2023-09-07

## 2023-09-07 VITALS
WEIGHT: 163 LBS | HEART RATE: 90 BPM | RESPIRATION RATE: 16 BRPM | DIASTOLIC BLOOD PRESSURE: 76 MMHG | SYSTOLIC BLOOD PRESSURE: 132 MMHG | HEIGHT: 63 IN | BODY MASS INDEX: 28.88 KG/M2

## 2023-09-07 DIAGNOSIS — I25.10 CORONARY ARTERY DISEASE INVOLVING NATIVE CORONARY ARTERY OF NATIVE HEART WITHOUT ANGINA PECTORIS: ICD-10-CM

## 2023-09-07 DIAGNOSIS — N18.30 STAGE 3 CHRONIC KIDNEY DISEASE, UNSPECIFIED WHETHER STAGE 3A OR 3B CKD (HCC): ICD-10-CM

## 2023-09-07 DIAGNOSIS — E11.42 TYPE 2 DIABETES MELLITUS WITH POLYNEUROPATHY (HCC): Primary | ICD-10-CM

## 2023-09-07 DIAGNOSIS — G47.33 OSA (OBSTRUCTIVE SLEEP APNEA): ICD-10-CM

## 2023-09-07 DIAGNOSIS — I10 PRIMARY HYPERTENSION: ICD-10-CM

## 2023-09-07 RX ORDER — SEMAGLUTIDE 2.68 MG/ML
INJECTION, SOLUTION SUBCUTANEOUS
Qty: 9 ML | Refills: 1 | Status: SHIPPED | OUTPATIENT
Start: 2023-09-07

## 2023-09-07 RX ORDER — BLOOD-GLUCOSE SENSOR
EACH MISCELLANEOUS
Qty: 2 EACH | Refills: 5 | Status: SHIPPED | OUTPATIENT
Start: 2023-09-07

## 2023-09-07 ASSESSMENT — ENCOUNTER SYMPTOMS: VISUAL CHANGE: 0

## 2023-09-07 NOTE — PROGRESS NOTES
Delores Warren is a 69 y.o. female is seen  for evaluation and management of type 2  Diabetes--- . Delores Warren was diagnosed with Diabetes mellitus in 2008 aprox   Pt always had issues with hypoglycemia since her childhood so she was under constant surveillance until she was diagnosed with diabetes  . Delores Warren got diabetic education in the past.  Comorbid conditions: Neuropathy  ---on her way back from [facility] on June 17th she developed chest pain and sweating and was taken by EMS   -she saw her PCP and was told that she had HOCM and started on B Blockers ---she feels improved. Weight 171 in June 2022 . Patient has stage III chronic kidney disease and follows with nephrology  INTERIM:    Diabetes  She presents for her follow-up diabetic visit. She has type 2 diabetes mellitus. No MedicAlert identification noted. The initial diagnosis of diabetes was made 10 years ago. Her disease course has been worsening. Hypoglycemia symptoms include sweats and tremors. Pertinent negatives for diabetes include no foot ulcerations, no polydipsia, no polyphagia, no polyuria, no visual change, no weakness and no weight loss. There are no hypoglycemic complications. Symptoms are worsening. Diabetic complications include peripheral neuropathy. Risk factors for coronary artery disease include post-menopausal, hypertension, diabetes mellitus and dyslipidemia. Current diabetic treatment includes insulin injections. She is compliant with treatment some of the time. Her weight is increasing rapidly. She is following a generally unhealthy diet. Meal planning includes avoidance of concentrated sweets. She has had a previous visit with a dietitian. She never participates in exercise. Her home blood glucose trend is fluctuating dramatically. Her breakfast blood glucose is taken between 7-8 am. Her breakfast blood glucose range is generally 140-180 mg/dl. An ACE inhibitor/angiotensin II receptor blocker is being taken.

## 2023-09-11 DIAGNOSIS — E11.42 TYPE 2 DIABETES MELLITUS WITH POLYNEUROPATHY (HCC): ICD-10-CM

## 2023-09-11 RX ORDER — SEMAGLUTIDE 1.34 MG/ML
INJECTION, SOLUTION SUBCUTANEOUS
Qty: 3 ML | Refills: 1 | OUTPATIENT
Start: 2023-09-11

## 2023-09-12 DIAGNOSIS — E11.42 TYPE 2 DIABETES MELLITUS WITH POLYNEUROPATHY (HCC): ICD-10-CM

## 2023-09-12 RX ORDER — DAPAGLIFLOZIN 5 MG/1
TABLET, FILM COATED ORAL
Qty: 30 TABLET | Refills: 5 | Status: SHIPPED | OUTPATIENT
Start: 2023-09-12

## 2023-09-19 ENCOUNTER — OFFICE VISIT (OUTPATIENT)
Dept: ENT CLINIC | Age: 68
End: 2023-09-19
Payer: MEDICARE

## 2023-09-19 VITALS
DIASTOLIC BLOOD PRESSURE: 70 MMHG | OXYGEN SATURATION: 95 % | WEIGHT: 166 LBS | BODY MASS INDEX: 29.41 KG/M2 | SYSTOLIC BLOOD PRESSURE: 111 MMHG | HEIGHT: 63 IN | HEART RATE: 80 BPM | TEMPERATURE: 97.5 F

## 2023-09-19 DIAGNOSIS — G47.33 OSA (OBSTRUCTIVE SLEEP APNEA): Primary | ICD-10-CM

## 2023-09-19 DIAGNOSIS — J30.9 ALLERGIC RHINITIS, UNSPECIFIED SEASONALITY, UNSPECIFIED TRIGGER: ICD-10-CM

## 2023-09-19 PROCEDURE — 3074F SYST BP LT 130 MM HG: CPT | Performed by: STUDENT IN AN ORGANIZED HEALTH CARE EDUCATION/TRAINING PROGRAM

## 2023-09-19 PROCEDURE — 1123F ACP DISCUSS/DSCN MKR DOCD: CPT | Performed by: STUDENT IN AN ORGANIZED HEALTH CARE EDUCATION/TRAINING PROGRAM

## 2023-09-19 PROCEDURE — 3017F COLORECTAL CA SCREEN DOC REV: CPT | Performed by: STUDENT IN AN ORGANIZED HEALTH CARE EDUCATION/TRAINING PROGRAM

## 2023-09-19 PROCEDURE — 1036F TOBACCO NON-USER: CPT | Performed by: STUDENT IN AN ORGANIZED HEALTH CARE EDUCATION/TRAINING PROGRAM

## 2023-09-19 PROCEDURE — G8427 DOCREV CUR MEDS BY ELIG CLIN: HCPCS | Performed by: STUDENT IN AN ORGANIZED HEALTH CARE EDUCATION/TRAINING PROGRAM

## 2023-09-19 PROCEDURE — 99214 OFFICE O/P EST MOD 30 MIN: CPT | Performed by: STUDENT IN AN ORGANIZED HEALTH CARE EDUCATION/TRAINING PROGRAM

## 2023-09-19 PROCEDURE — G8417 CALC BMI ABV UP PARAM F/U: HCPCS | Performed by: STUDENT IN AN ORGANIZED HEALTH CARE EDUCATION/TRAINING PROGRAM

## 2023-09-19 PROCEDURE — G8399 PT W/DXA RESULTS DOCUMENT: HCPCS | Performed by: STUDENT IN AN ORGANIZED HEALTH CARE EDUCATION/TRAINING PROGRAM

## 2023-09-19 PROCEDURE — 3078F DIAST BP <80 MM HG: CPT | Performed by: STUDENT IN AN ORGANIZED HEALTH CARE EDUCATION/TRAINING PROGRAM

## 2023-09-19 PROCEDURE — 1090F PRES/ABSN URINE INCON ASSESS: CPT | Performed by: STUDENT IN AN ORGANIZED HEALTH CARE EDUCATION/TRAINING PROGRAM

## 2023-09-19 RX ORDER — AZELASTINE 1 MG/ML
2 SPRAY, METERED NASAL 2 TIMES DAILY
Qty: 120 ML | Refills: 1 | Status: SHIPPED | OUTPATIENT
Start: 2023-09-19

## 2023-10-11 ENCOUNTER — TELEPHONE (OUTPATIENT)
Dept: ENT CLINIC | Age: 68
End: 2023-10-11

## 2023-10-13 NOTE — TELEPHONE ENCOUNTER
Spoke with patient to inform her that we are still not at the threshold to start doing the inspire implants on medicare patients just yet. As soon as we are - we will submit the information to her insurance and then call and get her scheduled .

## 2023-10-16 ENCOUNTER — PROCEDURE VISIT (OUTPATIENT)
Dept: CARDIOLOGY CLINIC | Age: 68
End: 2023-10-16
Payer: MEDICARE

## 2023-10-16 DIAGNOSIS — I42.1 HOCM (HYPERTROPHIC OBSTRUCTIVE CARDIOMYOPATHY) (HCC): ICD-10-CM

## 2023-10-16 DIAGNOSIS — R06.02 SOB (SHORTNESS OF BREATH): ICD-10-CM

## 2023-10-16 DIAGNOSIS — I51.7 LVH (LEFT VENTRICULAR HYPERTROPHY): ICD-10-CM

## 2023-10-16 DIAGNOSIS — Q24.8 LEFT VENTRICULAR OUTFLOW TRACT OBSTRUCTION: ICD-10-CM

## 2023-10-16 PROCEDURE — 93308 TTE F-UP OR LMTD: CPT | Performed by: INTERNAL MEDICINE

## 2023-10-16 PROCEDURE — 93325 DOPPLER ECHO COLOR FLOW MAPG: CPT | Performed by: INTERNAL MEDICINE

## 2023-10-16 PROCEDURE — 93321 DOPPLER ECHO F-UP/LMTD STD: CPT | Performed by: INTERNAL MEDICINE

## 2023-10-17 ENCOUNTER — TELEPHONE (OUTPATIENT)
Dept: CARDIOLOGY CLINIC | Age: 68
End: 2023-10-17

## 2023-10-17 NOTE — TELEPHONE ENCOUNTER
----- Message from Kasey Luu MD sent at 10/16/2023  9:44 PM EDT -----  See below. Please do REMS. Laurent Diss, your echo looks great! Ejection fraction 55%. Outflow tract obstruction is 3, which is amazing. No changes.   Kasey Luu

## 2023-10-17 NOTE — TELEPHONE ENCOUNTER
Pt's \"window\" for REMS is open on 10/19/23. Left message for pt asking if she has had any hospital admissions or started any new medications or OTC medications. Requested a call back from the patient. She was in the ED on 8/27/23 with COVID and got IVF. No Paxlovid or antiviral was prescribed.

## 2023-10-19 RX ORDER — MAVACAMTEN 5 MG/1
1 CAPSULE, GELATIN COATED ORAL DAILY
Qty: 35 CAPSULE | Refills: 1 | Status: SHIPPED | OUTPATIENT
Start: 2023-10-19 | End: 2023-10-20 | Stop reason: SDUPTHER

## 2023-10-20 RX ORDER — MAVACAMTEN 5 MG/1
1 CAPSULE, GELATIN COATED ORAL DAILY
Qty: 30 CAPSULE | Refills: 0 | OUTPATIENT
Start: 2023-10-20

## 2023-10-20 RX ORDER — MAVACAMTEN 5 MG/1
1 CAPSULE, GELATIN COATED ORAL DAILY
Qty: 35 CAPSULE | Refills: 3 | Status: SHIPPED | OUTPATIENT
Start: 2023-10-20

## 2023-10-20 NOTE — TELEPHONE ENCOUNTER
Spoke to pt. Camzyos sent to pharmacy earlier today. Next Echo 1/11/23 and updated pt. She has not started any new medications. No hospitalizations other than the ED visit for Covid.

## 2023-10-26 ENCOUNTER — TELEPHONE (OUTPATIENT)
Dept: ENDOCRINOLOGY | Age: 68
End: 2023-10-26

## 2023-10-26 DIAGNOSIS — E11.42 TYPE 2 DIABETES MELLITUS WITH POLYNEUROPATHY (HCC): Primary | ICD-10-CM

## 2023-10-26 RX ORDER — BLOOD-GLUCOSE SENSOR
EACH MISCELLANEOUS
Qty: 2 EACH | Refills: 5 | Status: SHIPPED | OUTPATIENT
Start: 2023-10-26

## 2023-10-26 NOTE — TELEPHONE ENCOUNTER
Pt calling and states [facility] and would like to switch to the [facility].  She states she is self pay and [facility] [phone]

## 2023-10-27 ENCOUNTER — CARE COORDINATION (OUTPATIENT)
Dept: CARE COORDINATION | Age: 68
End: 2023-10-27

## 2023-10-27 NOTE — CARE COORDINATION
Payor referral to care coordination. No answer, HIPPA compliant message left through  requesting call return. Attempt #1. RN-CC will follow up at later date & time.

## 2023-11-03 ENCOUNTER — TELEPHONE (OUTPATIENT)
Dept: CARDIOLOGY CLINIC | Age: 68
End: 2023-11-03

## 2023-11-03 NOTE — TELEPHONE ENCOUNTER
Pt states she will come in Monday 11/6 for 2024 Enio LEYVA paperwork. Requested pt call back with a time she will be in.

## 2023-11-06 RX ORDER — MAVACAMTEN 5 MG/1
1 CAPSULE, GELATIN COATED ORAL DAILY
Qty: 30 CAPSULE | Refills: 11 | Status: SHIPPED | OUTPATIENT
Start: 2023-11-06

## 2023-11-06 NOTE — TELEPHONE ENCOUNTER
Pt came in to sign Camzyos paperwork. Now in folder. She must also call this year to get a denial from LIS. Given the number to call  [phone]. Pt verbalizes understanding.

## 2023-11-07 ENCOUNTER — CARE COORDINATION (OUTPATIENT)
Dept: CARE COORDINATION | Age: 68
End: 2023-11-07

## 2023-11-07 NOTE — CARE COORDINATION
Payor referral to care coordination. No answer, HIPPA compliant message left through  requesting call return. Attempt #2. RN-CC will follow up at later date & time. [employer] message sent.  Attempt #3

## 2023-11-09 ENCOUNTER — TELEPHONE (OUTPATIENT)
Dept: CARDIOLOGY CLINIC | Age: 68
End: 2023-11-09

## 2023-11-09 NOTE — TELEPHONE ENCOUNTER
Faxed completed [address] [facility] patient [employer] for [employer] for 2024 with signed script. Received fax confirmation.

## 2023-11-11 DIAGNOSIS — E78.2 MIXED HYPERLIPIDEMIA: ICD-10-CM

## 2023-11-13 ENCOUNTER — CARE COORDINATION (OUTPATIENT)
Dept: CARE COORDINATION | Age: 68
End: 2023-11-13

## 2023-11-13 RX ORDER — ATORVASTATIN CALCIUM 20 MG/1
TABLET, FILM COATED ORAL
Qty: 90 TABLET | Refills: 1 | Status: SHIPPED | OUTPATIENT
Start: 2023-11-13

## 2023-11-30 ENCOUNTER — TELEPHONE (OUTPATIENT)
Dept: INTERNAL MEDICINE CLINIC | Age: 68
End: 2023-11-30

## 2023-11-30 NOTE — TELEPHONE ENCOUNTER
Pt called in regards to being sick for the past few weeks states she's her allergies have been acting up and has had some discomfort in her ears. Pt also stated she has fallen twice in the past month and was having some concerns of that. Pt wanted to know if Dr. Adria Moreland would like to see her in office.

## 2023-12-01 ENCOUNTER — APPOINTMENT (OUTPATIENT)
Dept: GENERAL RADIOLOGY | Age: 68
End: 2023-12-01
Payer: MEDICARE

## 2023-12-01 ENCOUNTER — HOSPITAL ENCOUNTER (EMERGENCY)
Age: 68
Discharge: HOME | End: 2023-12-01
Attending: EMERGENCY MEDICINE
Payer: MEDICARE

## 2023-12-01 ENCOUNTER — APPOINTMENT (OUTPATIENT)
Dept: CT IMAGING | Age: 68
End: 2023-12-01
Payer: MEDICARE

## 2023-12-01 ENCOUNTER — OFFICE VISIT (OUTPATIENT)
Dept: INTERNAL MEDICINE CLINIC | Age: 68
End: 2023-12-01

## 2023-12-01 VITALS
SYSTOLIC BLOOD PRESSURE: 134 MMHG | BODY MASS INDEX: 22.67 KG/M2 | OXYGEN SATURATION: 99 % | TEMPERATURE: 98.2 F | DIASTOLIC BLOOD PRESSURE: 72 MMHG | HEART RATE: 41 BPM | WEIGHT: 128 LBS

## 2023-12-01 VITALS
OXYGEN SATURATION: 100 % | RESPIRATION RATE: 21 BRPM | SYSTOLIC BLOOD PRESSURE: 129 MMHG | HEIGHT: 63 IN | BODY MASS INDEX: 22.68 KG/M2 | DIASTOLIC BLOOD PRESSURE: 68 MMHG | HEART RATE: 106 BPM | WEIGHT: 128 LBS | TEMPERATURE: 99.8 F

## 2023-12-01 DIAGNOSIS — E11.65 UNCONTROLLED TYPE 2 DIABETES MELLITUS WITH HYPERGLYCEMIA (HCC): ICD-10-CM

## 2023-12-01 DIAGNOSIS — J18.9 PNEUMONIA OF LEFT LOWER LOBE DUE TO INFECTIOUS ORGANISM: Primary | ICD-10-CM

## 2023-12-01 DIAGNOSIS — R00.0 TACHYCARDIA: ICD-10-CM

## 2023-12-01 DIAGNOSIS — N18.32 STAGE 3B CHRONIC KIDNEY DISEASE (HCC): ICD-10-CM

## 2023-12-01 DIAGNOSIS — J06.9 URTI (ACUTE UPPER RESPIRATORY INFECTION): Primary | ICD-10-CM

## 2023-12-01 LAB
ABO + RH BLD: NORMAL
ANION GAP SERPL CALCULATED.3IONS-SCNC: 11 MMOL/L (ref 3–16)
BASOPHILS # BLD: 0 K/UL (ref 0–0.2)
BASOPHILS NFR BLD: 0.4 %
BLD GP AB SCN SERPL QL: NORMAL
BUN SERPL-MCNC: 16 MG/DL (ref 7–20)
CALCIUM SERPL-MCNC: 9.5 MG/DL (ref 8.3–10.6)
CHLORIDE SERPL-SCNC: 95 MMOL/L (ref 99–110)
CO2 SERPL-SCNC: 25 MMOL/L (ref 21–32)
CREAT SERPL-MCNC: 1.3 MG/DL (ref 0.6–1.2)
DEPRECATED RDW RBC AUTO: 14.8 % (ref 12.4–15.4)
EOSINOPHIL # BLD: 0 K/UL (ref 0–0.6)
EOSINOPHIL NFR BLD: 0.5 %
FLUAV RNA RESP QL NAA+PROBE: NOT DETECTED
FLUBV RNA RESP QL NAA+PROBE: NOT DETECTED
GFR SERPLBLD CREATININE-BSD FMLA CKD-EPI: 44 ML/MIN/{1.73_M2}
GLUCOSE SERPL-MCNC: 170 MG/DL (ref 70–99)
HCT VFR BLD AUTO: 40.8 % (ref 36–48)
HGB BLD-MCNC: 13.5 G/DL (ref 12–16)
LYMPHOCYTES # BLD: 1.2 K/UL (ref 1–5.1)
LYMPHOCYTES NFR BLD: 12.5 %
MCH RBC QN AUTO: 27.4 PG (ref 26–34)
MCHC RBC AUTO-ENTMCNC: 33.1 G/DL (ref 31–36)
MCV RBC AUTO: 82.7 FL (ref 80–100)
MONOCYTES # BLD: 0.5 K/UL (ref 0–1.3)
MONOCYTES NFR BLD: 4.8 %
NEUTROPHILS # BLD: 7.7 K/UL (ref 1.7–7.7)
NEUTROPHILS NFR BLD: 81.8 %
NT-PROBNP SERPL-MCNC: 183 PG/ML (ref 0–124)
PLATELET # BLD AUTO: 381 K/UL (ref 135–450)
PMV BLD AUTO: 7.8 FL (ref 5–10.5)
POTASSIUM SERPL-SCNC: 4.5 MMOL/L (ref 3.5–5.1)
RBC # BLD AUTO: 4.93 M/UL (ref 4–5.2)
SARS-COV-2 RNA RESP QL NAA+PROBE: NOT DETECTED
SODIUM SERPL-SCNC: 131 MMOL/L (ref 136–145)
TROPONIN, HIGH SENSITIVITY: <6 NG/L (ref 0–14)
WBC # BLD AUTO: 9.5 K/UL (ref 4–11)

## 2023-12-01 PROCEDURE — 83880 ASSAY OF NATRIURETIC PEPTIDE: CPT

## 2023-12-01 PROCEDURE — 71045 X-RAY EXAM CHEST 1 VIEW: CPT

## 2023-12-01 PROCEDURE — 93005 ELECTROCARDIOGRAM TRACING: CPT | Performed by: EMERGENCY MEDICINE

## 2023-12-01 PROCEDURE — 86850 RBC ANTIBODY SCREEN: CPT

## 2023-12-01 PROCEDURE — 6360000004 HC RX CONTRAST MEDICATION: Performed by: EMERGENCY MEDICINE

## 2023-12-01 PROCEDURE — 84484 ASSAY OF TROPONIN QUANT: CPT

## 2023-12-01 PROCEDURE — 87636 SARSCOV2 & INF A&B AMP PRB: CPT

## 2023-12-01 PROCEDURE — 6370000000 HC RX 637 (ALT 250 FOR IP): Performed by: EMERGENCY MEDICINE

## 2023-12-01 PROCEDURE — 86900 BLOOD TYPING SEROLOGIC ABO: CPT

## 2023-12-01 PROCEDURE — 86901 BLOOD TYPING SEROLOGIC RH(D): CPT

## 2023-12-01 PROCEDURE — 99285 EMERGENCY DEPT VISIT HI MDM: CPT

## 2023-12-01 PROCEDURE — 85025 COMPLETE CBC W/AUTO DIFF WBC: CPT

## 2023-12-01 PROCEDURE — 2580000003 HC RX 258: Performed by: EMERGENCY MEDICINE

## 2023-12-01 PROCEDURE — 80048 BASIC METABOLIC PNL TOTAL CA: CPT

## 2023-12-01 PROCEDURE — 71260 CT THORAX DX C+: CPT

## 2023-12-01 PROCEDURE — 87040 BLOOD CULTURE FOR BACTERIA: CPT

## 2023-12-01 RX ORDER — AZITHROMYCIN 250 MG/1
250 TABLET, FILM COATED ORAL SEE ADMIN INSTRUCTIONS
Qty: 6 TABLET | Refills: 0 | Status: SHIPPED | OUTPATIENT
Start: 2023-12-01 | End: 2023-12-06

## 2023-12-01 RX ORDER — 0.9 % SODIUM CHLORIDE 0.9 %
1000 INTRAVENOUS SOLUTION INTRAVENOUS ONCE
Status: COMPLETED | OUTPATIENT
Start: 2023-12-01 | End: 2023-12-01

## 2023-12-01 RX ORDER — CEFUROXIME AXETIL 250 MG/1
250 TABLET ORAL 2 TIMES DAILY
Qty: 14 TABLET | Refills: 0 | Status: SHIPPED | OUTPATIENT
Start: 2023-12-01 | End: 2023-12-08

## 2023-12-01 RX ORDER — CEFUROXIME AXETIL 250 MG/1
500 TABLET ORAL ONCE
Status: COMPLETED | OUTPATIENT
Start: 2023-12-01 | End: 2023-12-01

## 2023-12-01 RX ORDER — AZITHROMYCIN 250 MG/1
500 TABLET, FILM COATED ORAL ONCE
Status: COMPLETED | OUTPATIENT
Start: 2023-12-01 | End: 2023-12-01

## 2023-12-01 RX ADMIN — SODIUM CHLORIDE 1000 ML: 9 INJECTION, SOLUTION INTRAVENOUS at 16:27

## 2023-12-01 RX ADMIN — AZITHROMYCIN DIHYDRATE 500 MG: 250 TABLET, FILM COATED ORAL at 18:09

## 2023-12-01 RX ADMIN — IOPAMIDOL 75 ML: 755 INJECTION, SOLUTION INTRAVENOUS at 18:49

## 2023-12-01 RX ADMIN — CEFUROXIME AXETIL 500 MG: 250 TABLET ORAL at 18:09

## 2023-12-01 ASSESSMENT — ENCOUNTER SYMPTOMS
SHORTNESS OF BREATH: 0
COUGH: 1
RHINORRHEA: 0
SORE THROAT: 0

## 2023-12-01 ASSESSMENT — LIFESTYLE VARIABLES
HOW OFTEN DO YOU HAVE A DRINK CONTAINING ALCOHOL: NEVER
HOW MANY STANDARD DRINKS CONTAINING ALCOHOL DO YOU HAVE ON A TYPICAL DAY: PATIENT DOES NOT DRINK

## 2023-12-01 ASSESSMENT — PAIN - FUNCTIONAL ASSESSMENT: PAIN_FUNCTIONAL_ASSESSMENT: NONE - DENIES PAIN

## 2023-12-01 NOTE — ED PROVIDER NOTES
Emergency Department Encounter    Patient: Susie Clark  MRN: [id number]  : 1955  Date of Evaluation: 2023  ED Provider:  Rema North MD    Triage Chief Complaint:   Irregular Heart Beat (From Dr. Dominga Abarca office via [facility] cc irregular heart rate, dizziness, fatigue. Symptoms x \"several days\". A. Fib. With heart rate 110-150 per EMS.)    [place]:  Susie Clark is a 77 y.o. female that presents to the ER for evaluation of positive tachycardia, mild productive sputum and shortness of breath. She is a history of hypertrophic cardiomyopathy no history of VTE no defibrillator, she has history of chronic stage III kidney disease, diabetes hypertension neuropathic pain. No recent hospitalization, unknown if sick contacts no hemoptysis, prior history of pneumonia. History of asthma. Afebrile. No vaginal bleeding no rectal bleeding. No melena.   Afebrile on arrival    ROS - see HPI, below listed is current ROS at time of my eval:  General:  No fevers, no weakness  Eyes:  no discharge  ENT:  No sore throat, no nasal congestion  Cardiovascular:  + chest pain, no palpitations  Respiratory:  No shortness of breath, no cough, no wheezing  Gastrointestinal:  No pain, no nausea, no vomiting, no diarrhea  Musculoskeletal:  No muscle pain, no joint pain  Skin:  No rash, no pruritis  Neurologic:  No speech problems, no headache, no extremity numbness, no extremity tingling, no extremity weakness  Psychiatric:  No anxiety  Genitourinary:  No dysuria, no hematuria  Endocrine:  No unexpected weight gain, no unexpected weight loss  Extremities:  no edema, no pain    Past Medical History:   Diagnosis Date    Anxiety     Asthma     vocal fold     Ataxia     Cervical stenosis of spinal canal 10/07/2021    Claustrophobia     Coronary artery disease involving native coronary artery of native heart without angina pectoris 2021    Depression     Diabetes mellitus (HCC)     Hyperlipidemia

## 2023-12-02 LAB
BACTERIA BLD CULT ORG #2: NORMAL
BACTERIA BLD CULT: NORMAL
EKG ATRIAL RATE: 141 BPM
EKG DIAGNOSIS: NORMAL
EKG P AXIS: 42 DEGREES
EKG P-R INTERVAL: 138 MS
EKG Q-T INTERVAL: 268 MS
EKG QRS DURATION: 74 MS
EKG QTC CALCULATION (BAZETT): 410 MS
EKG R AXIS: -52 DEGREES
EKG T AXIS: 57 DEGREES
EKG VENTRICULAR RATE: 141 BPM

## 2023-12-02 PROCEDURE — 93010 ELECTROCARDIOGRAM REPORT: CPT | Performed by: INTERNAL MEDICINE

## 2023-12-04 ENCOUNTER — TELEPHONE (OUTPATIENT)
Dept: INTERNAL MEDICINE CLINIC | Age: 68
End: 2023-12-04

## 2023-12-04 NOTE — TELEPHONE ENCOUNTER
Patient is calling to set up [address] appointment. No appointments available until 12/22/23. She states she is diagnosed with pneumonia and asked about how long she would be off work for this. Patient also states she will need work note and possibly paperwork also.

## 2023-12-04 NOTE — TELEPHONE ENCOUNTER
Pt scheduled ED for 12/7/23. She asked if she should be off of work until then, said she wasn't given instructions on working or not when she was discharged.

## 2023-12-05 LAB
BACTERIA BLD CULT ORG #2: NORMAL
BACTERIA BLD CULT: NORMAL

## 2023-12-07 ENCOUNTER — OFFICE VISIT (OUTPATIENT)
Dept: INTERNAL MEDICINE CLINIC | Age: 68
End: 2023-12-07

## 2023-12-07 VITALS
OXYGEN SATURATION: 95 % | BODY MASS INDEX: 28.87 KG/M2 | SYSTOLIC BLOOD PRESSURE: 118 MMHG | HEART RATE: 66 BPM | WEIGHT: 163 LBS | DIASTOLIC BLOOD PRESSURE: 78 MMHG

## 2023-12-07 DIAGNOSIS — E87.1 HYPONATREMIA: ICD-10-CM

## 2023-12-07 DIAGNOSIS — J18.9 PNEUMONIA OF LEFT LOWER LOBE DUE TO INFECTIOUS ORGANISM: ICD-10-CM

## 2023-12-07 DIAGNOSIS — H61.22 EXCESSIVE EAR WAX, LEFT: ICD-10-CM

## 2023-12-07 DIAGNOSIS — E87.1 HYPONATREMIA: Primary | ICD-10-CM

## 2023-12-07 RX ORDER — PREDNISONE 10 MG/1
10 TABLET ORAL 2 TIMES DAILY
Qty: 10 TABLET | Refills: 0 | Status: SHIPPED | OUTPATIENT
Start: 2023-12-07 | End: 2023-12-12

## 2023-12-07 ASSESSMENT — ENCOUNTER SYMPTOMS
SPUTUM PRODUCTION: 0
HOARSE VOICE: 1
SHORTNESS OF BREATH: 0
BLOOD IN STOOL: 0
ABDOMINAL PAIN: 0
SINUS PAIN: 0
CONSTIPATION: 0
COLOR CHANGE: 0
RHINORRHEA: 0
TROUBLE SWALLOWING: 0
COUGH: 0
CHEST TIGHTNESS: 0
DIFFICULTY BREATHING: 0
FREQUENT THROAT CLEARING: 0
WHEEZING: 0

## 2023-12-07 NOTE — PROGRESS NOTES
1.3 12/01/2023 03:54 PM    CREATININE 1.3 08/27/2023 03:48 PM    CREATININE 1.3 08/21/2023 01:12 PM    GLUCOSE 170 12/01/2023 03:54 PM    GLUCOSE 207 08/27/2023 03:48 PM    GLUCOSE 129 08/21/2023 01:12 PM    CALCIUM 9.5 12/01/2023 03:54 PM    CALCIUM 9.1 08/27/2023 03:48 PM    CALCIUM 9.0 08/21/2023 01:12 PM     Lab Results   Component Value Date/Time    WBC 9.5 12/01/2023 03:54 PM    WBC 4.1 08/27/2023 03:48 PM    WBC 6.7 08/21/2023 01:12 PM    HGB 13.5 12/01/2023 03:54 PM    HGB 12.9 08/27/2023 03:48 PM    HGB 12.4 08/21/2023 01:12 PM    HCT 40.8 12/01/2023 03:54 PM    HCT 39.9 08/27/2023 03:48 PM    HCT 37.4 08/21/2023 01:12 PM    MCV 82.7 12/01/2023 03:54 PM    MCV 85.4 08/27/2023 03:48 PM    MCV 86.3 08/21/2023 01:12 PM     12/01/2023 03:54 PM     08/27/2023 03:48 PM     08/21/2023 01:12 PM     Lab Results   Component Value Date/Time    CHOL 147 08/21/2023 01:12 PM    CHOL 147 05/31/2023 10:03 AM    CHOL 148 03/28/2023 11:36 AM    TRIG 117 08/21/2023 01:12 PM    TRIG 158 05/31/2023 10:03 AM    TRIG 176 03/28/2023 11:36 AM    HDL 58 08/21/2023 01:12 PM    HDL 54 05/31/2023 10:03 AM    HDL 46 03/28/2023 11:36 AM    HDL 54 09/21/2010 10:58 AM    LDLDIRECT 115 04/26/2019 11:40 AM           12/7/2023    10:24 AM 12/1/2023     6:00 PM 12/1/2023     5:45 PM   Vitals   SYSTOLIC [phone]   DIASTOLIC 78 68 83   Pulse 66 106 108   Respirations  21 17   SpO2 95 %  100 %   Weight - Scale 163 lb         Pneumonia  She complains of hoarse voice. There is no cough, difficulty breathing, frequent throat clearing, shortness of breath, sputum production or wheezing. This is a new problem. The current episode started in the past 7 days. Associated symptoms include ear congestion. Pertinent negatives include no appetite change, chest pain, ear pain, fever, headaches, myalgias, rhinorrhea or trouble swallowing.        Review of Systems   Constitutional:  Negative for activity change, appetite change, fatigue,

## 2023-12-08 LAB
ANION GAP SERPL CALCULATED.3IONS-SCNC: 10 MMOL/L (ref 3–16)
BUN SERPL-MCNC: 18 MG/DL (ref 7–20)
CALCIUM SERPL-MCNC: 9.6 MG/DL (ref 8.3–10.6)
CHLORIDE SERPL-SCNC: 100 MMOL/L (ref 99–110)
CO2 SERPL-SCNC: 27 MMOL/L (ref 21–32)
CREAT SERPL-MCNC: 1.5 MG/DL (ref 0.6–1.2)
GFR SERPLBLD CREATININE-BSD FMLA CKD-EPI: 37 ML/MIN/{1.73_M2}
GLUCOSE SERPL-MCNC: 161 MG/DL (ref 70–99)
POTASSIUM SERPL-SCNC: 5.1 MMOL/L (ref 3.5–5.1)
SODIUM SERPL-SCNC: 137 MMOL/L (ref 136–145)

## 2023-12-15 NOTE — PROGRESS NOTES
mL 1    FARXIGA 5 MG tablet TAKE ONE TABLET BY MOUTH EVERY MORNING 30 tablet 5    Semaglutide, 2 MG/DOSE, (OZEMPIC, 2 MG/DOSE,) 8 MG/3ML SOPN DIAL AND INJECT UNDER THE SKIN 2 MG WEEKLY 9 mL 1    DULoxetine (CYMBALTA) 60 MG extended release capsule Take 1 capsule by mouth daily 90 capsule 0    buPROPion (WELLBUTRIN XL) 300 MG extended release tablet TAKE ONE TABLET BY MOUTH EVERY OTHER DAY ALTERNATING WITH 150MG 45 tablet 3    traZODone (DESYREL) 50 MG tablet Take 1 tablet by mouth nightly (Patient taking differently: Take 0.5 tablets by mouth nightly) 90 tablet 1    metoprolol succinate (TOPROL XL) 50 MG extended release tablet Take 1 tablet by mouth daily (Patient taking differently: Take 0.5 tablets by mouth daily) 90 tablet 3    buPROPion (WELLBUTRIN SR) 150 MG extended release tablet TAKE ONE TABLET BY MOUTH EVERY OTHER DAY ALTERNATING WITH 300 MG 45 tablet 3    montelukast (SINGULAIR) 10 MG tablet TAKE ONE TABLET BY MOUTH ONCE NIGHTLY 90 tablet 3    omeprazole (PRILOSEC) 40 MG delayed release capsule TAKE ONE CAPSULE BY MOUTH DAILY 90 capsule 3    Insulin Glargine, 1 Unit Dial, (TOUJEO SOLOSTAR) 300 UNIT/ML SOPN INJECT 40 UNITS UNDER THE SKIN DAILY 9 pen 3    morphine (SHANIKA) 10 MG extended release capsule Take 2 capsules by mouth daily. Unsure of dose and to start today, will take only at HS      fluticasone (FLONASE) 50 MCG/ACT nasal spray 2 sprays by Nasal route daily 3 each 3    Continuous Blood Gluc Sensor (FREESTYLE JEREMIAS 3 SENSOR) MISC To check glucose ---please change every 14 days 2 each 5    Continuous Blood Gluc Sensor (FREESTYLE JEREMIAS 2 SENSOR) MISC Change every 14 days 2 each 5    Continuous Blood Gluc  (FREESTYLE JEREMIAS 2 READER) ENE Use to check glucose 1 each 0    [employer] PEN NEEDLES 31G 31G X 8 MM MISC USE  FOUR TIMES A  each 5     No current facility-administered medications for this visit.        Past Medical History:   Diagnosis Date    Anxiety     Asthma     vocal fold

## 2023-12-18 ENCOUNTER — OFFICE VISIT (OUTPATIENT)
Dept: CARDIOLOGY CLINIC | Age: 68
End: 2023-12-18
Payer: MEDICARE

## 2023-12-18 VITALS
BODY MASS INDEX: 29.95 KG/M2 | SYSTOLIC BLOOD PRESSURE: 90 MMHG | HEART RATE: 85 BPM | HEIGHT: 63 IN | WEIGHT: 169 LBS | OXYGEN SATURATION: 94 % | DIASTOLIC BLOOD PRESSURE: 60 MMHG

## 2023-12-18 DIAGNOSIS — I42.1 HOCM (HYPERTROPHIC OBSTRUCTIVE CARDIOMYOPATHY) (HCC): Primary | ICD-10-CM

## 2023-12-18 DIAGNOSIS — G47.33 OSA (OBSTRUCTIVE SLEEP APNEA): ICD-10-CM

## 2023-12-18 DIAGNOSIS — I10 ESSENTIAL HYPERTENSION: ICD-10-CM

## 2023-12-18 DIAGNOSIS — Q24.8 LEFT VENTRICULAR OUTFLOW TRACT OBSTRUCTION: ICD-10-CM

## 2023-12-18 DIAGNOSIS — R06.02 SOB (SHORTNESS OF BREATH): ICD-10-CM

## 2023-12-18 DIAGNOSIS — E78.2 MIXED HYPERLIPIDEMIA: ICD-10-CM

## 2023-12-18 DIAGNOSIS — I51.7 LVH (LEFT VENTRICULAR HYPERTROPHY): ICD-10-CM

## 2023-12-18 PROCEDURE — G8484 FLU IMMUNIZE NO ADMIN: HCPCS | Performed by: INTERNAL MEDICINE

## 2023-12-18 PROCEDURE — 3078F DIAST BP <80 MM HG: CPT | Performed by: INTERNAL MEDICINE

## 2023-12-18 PROCEDURE — G8399 PT W/DXA RESULTS DOCUMENT: HCPCS | Performed by: INTERNAL MEDICINE

## 2023-12-18 PROCEDURE — 1036F TOBACCO NON-USER: CPT | Performed by: INTERNAL MEDICINE

## 2023-12-18 PROCEDURE — 99215 OFFICE O/P EST HI 40 MIN: CPT | Performed by: INTERNAL MEDICINE

## 2023-12-18 PROCEDURE — 1123F ACP DISCUSS/DSCN MKR DOCD: CPT | Performed by: INTERNAL MEDICINE

## 2023-12-18 PROCEDURE — G8427 DOCREV CUR MEDS BY ELIG CLIN: HCPCS | Performed by: INTERNAL MEDICINE

## 2023-12-18 PROCEDURE — 3017F COLORECTAL CA SCREEN DOC REV: CPT | Performed by: INTERNAL MEDICINE

## 2023-12-18 PROCEDURE — G8417 CALC BMI ABV UP PARAM F/U: HCPCS | Performed by: INTERNAL MEDICINE

## 2023-12-18 PROCEDURE — 1090F PRES/ABSN URINE INCON ASSESS: CPT | Performed by: INTERNAL MEDICINE

## 2023-12-18 PROCEDURE — 3074F SYST BP LT 130 MM HG: CPT | Performed by: INTERNAL MEDICINE

## 2023-12-18 NOTE — PATIENT INSTRUCTIONS
Plan:  Get labs now ( CBC,CMP,BNP,Lipid ) FASTING   Schedule an appointment with EP  See Dr. Angelita Rodriguez in  April.

## 2024-01-01 NOTE — TELEPHONE ENCOUNTER
----- Message from Rafael Fernandez MD sent at 7/31/2023  4:23 PM EDT -----  See below. Continue same dosage. Mikel Wills, your echo looks great! Heart function is normal 55% and outflow tract gradient is 2 which is outstanding. No changes.   Rafael Fernandez
Called and LM that Holly Hinkle is scheduled for 10/16 @ 3:00 [employer].
Spoke to pt. REMS completed. Please schedule next Limited echo for October 16 early AM or after 2pm at [facility] station. Week #60 on Camzyos 5mg. Please call pt and advise.
Follow Instructions Provided by your Surgical Team

## 2024-01-11 ENCOUNTER — PROCEDURE VISIT (OUTPATIENT)
Dept: CARDIOLOGY CLINIC | Age: 69
End: 2024-01-11

## 2024-01-11 DIAGNOSIS — I51.7 LVH (LEFT VENTRICULAR HYPERTROPHY): ICD-10-CM

## 2024-01-11 DIAGNOSIS — I42.1 HOCM (HYPERTROPHIC OBSTRUCTIVE CARDIOMYOPATHY) (HCC): ICD-10-CM

## 2024-01-12 ENCOUNTER — TELEPHONE (OUTPATIENT)
Dept: CARDIOLOGY CLINIC | Age: 69
End: 2024-01-12

## 2024-01-12 DIAGNOSIS — Q24.8 LEFT VENTRICULAR OUTFLOW TRACT OBSTRUCTION: ICD-10-CM

## 2024-01-12 DIAGNOSIS — I10 ESSENTIAL HYPERTENSION: ICD-10-CM

## 2024-01-12 DIAGNOSIS — I51.7 LVH (LEFT VENTRICULAR HYPERTROPHY): ICD-10-CM

## 2024-01-12 DIAGNOSIS — I42.1 HOCM (HYPERTROPHIC OBSTRUCTIVE CARDIOMYOPATHY) (HCC): Primary | ICD-10-CM

## 2024-01-12 DIAGNOSIS — R06.02 SOB (SHORTNESS OF BREATH): ICD-10-CM

## 2024-01-12 NOTE — TELEPHONE ENCOUNTER
LMOM about Echo results.       Patient needs scheduled for an Echo per [facility] protocol around 4/4/24-4/12/24.

## 2024-01-12 NOTE — TELEPHONE ENCOUNTER
----- Message from Nava Hu MD sent at 1/11/2024  9:32 PM EST -----  See message.  LVOT gradient is 2 and ef is 55%.  Continue same dosage.  Send in REMS and reschedule next echo please.  YONI Kim, your echo looks great!  Heart function normal.  And pressure still very low.  Good news!  We will continue the same dosage.  Nava Hu

## 2024-01-15 ENCOUNTER — TELEPHONE (OUTPATIENT)
Dept: ENT CLINIC | Age: 69
End: 2024-01-15

## 2024-01-18 ENCOUNTER — TELEPHONE (OUTPATIENT)
Dept: CARDIOLOGY CLINIC | Age: 69
End: 2024-01-18

## 2024-01-18 NOTE — TELEPHONE ENCOUNTER
We have received a fax from [facility]. Patient is not eligible to receive Camzyos because she has not provided proof of denial for the LIS program.     I have spoke with patient and updated her on this information and provided her the number for Low Income Subsidy number ( [phone]).

## 2024-01-30 ENCOUNTER — OFFICE VISIT (OUTPATIENT)
Dept: INTERNAL MEDICINE CLINIC | Age: 69
End: 2024-01-30
Payer: MEDICARE

## 2024-01-30 ENCOUNTER — TELEPHONE (OUTPATIENT)
Dept: CARDIOLOGY CLINIC | Age: 69
End: 2024-01-30

## 2024-01-30 VITALS
SYSTOLIC BLOOD PRESSURE: 110 MMHG | DIASTOLIC BLOOD PRESSURE: 68 MMHG | WEIGHT: 163 LBS | OXYGEN SATURATION: 97 % | HEART RATE: 80 BPM | BODY MASS INDEX: 28.88 KG/M2 | HEIGHT: 63 IN

## 2024-01-30 DIAGNOSIS — N18.32 STAGE 3B CHRONIC KIDNEY DISEASE (HCC): ICD-10-CM

## 2024-01-30 DIAGNOSIS — I50.42 CHRONIC COMBINED SYSTOLIC AND DIASTOLIC CONGESTIVE HEART FAILURE (HCC): ICD-10-CM

## 2024-01-30 DIAGNOSIS — N63.20 MASS OF LEFT BREAST, UNSPECIFIED QUADRANT: Primary | ICD-10-CM

## 2024-01-30 DIAGNOSIS — N61.1 BOIL, BREAST: ICD-10-CM

## 2024-01-30 DIAGNOSIS — E11.42 TYPE 2 DIABETES MELLITUS WITH POLYNEUROPATHY (HCC): ICD-10-CM

## 2024-01-30 DIAGNOSIS — N64.9 DISORDER OF BREAST, UNSPECIFIED: ICD-10-CM

## 2024-01-30 DIAGNOSIS — M48.062 SPINAL STENOSIS OF LUMBAR REGION WITH NEUROGENIC CLAUDICATION: ICD-10-CM

## 2024-01-30 DIAGNOSIS — E11.65 UNCONTROLLED TYPE 2 DIABETES MELLITUS WITH HYPERGLYCEMIA (HCC): ICD-10-CM

## 2024-01-30 DIAGNOSIS — F33.1 MODERATE EPISODE OF RECURRENT MAJOR DEPRESSIVE DISORDER (HCC): ICD-10-CM

## 2024-01-30 DIAGNOSIS — I42.1 HOCM (HYPERTROPHIC OBSTRUCTIVE CARDIOMYOPATHY) (HCC): ICD-10-CM

## 2024-01-30 DIAGNOSIS — F33.41 RECURRENT MAJOR DEPRESSIVE DISORDER, IN PARTIAL REMISSION (HCC): ICD-10-CM

## 2024-01-30 PROCEDURE — 1036F TOBACCO NON-USER: CPT | Performed by: INTERNAL MEDICINE

## 2024-01-30 PROCEDURE — G8399 PT W/DXA RESULTS DOCUMENT: HCPCS | Performed by: INTERNAL MEDICINE

## 2024-01-30 PROCEDURE — G8484 FLU IMMUNIZE NO ADMIN: HCPCS | Performed by: INTERNAL MEDICINE

## 2024-01-30 PROCEDURE — 3017F COLORECTAL CA SCREEN DOC REV: CPT | Performed by: INTERNAL MEDICINE

## 2024-01-30 PROCEDURE — 99214 OFFICE O/P EST MOD 30 MIN: CPT | Performed by: INTERNAL MEDICINE

## 2024-01-30 PROCEDURE — 3046F HEMOGLOBIN A1C LEVEL >9.0%: CPT | Performed by: INTERNAL MEDICINE

## 2024-01-30 PROCEDURE — G8417 CALC BMI ABV UP PARAM F/U: HCPCS | Performed by: INTERNAL MEDICINE

## 2024-01-30 PROCEDURE — 2022F DILAT RTA XM EVC RTNOPTHY: CPT | Performed by: INTERNAL MEDICINE

## 2024-01-30 PROCEDURE — 1090F PRES/ABSN URINE INCON ASSESS: CPT | Performed by: INTERNAL MEDICINE

## 2024-01-30 PROCEDURE — 1123F ACP DISCUSS/DSCN MKR DOCD: CPT | Performed by: INTERNAL MEDICINE

## 2024-01-30 PROCEDURE — 3078F DIAST BP <80 MM HG: CPT | Performed by: INTERNAL MEDICINE

## 2024-01-30 PROCEDURE — 3074F SYST BP LT 130 MM HG: CPT | Performed by: INTERNAL MEDICINE

## 2024-01-30 PROCEDURE — G8427 DOCREV CUR MEDS BY ELIG CLIN: HCPCS | Performed by: INTERNAL MEDICINE

## 2024-01-30 RX ORDER — AMOXICILLIN AND CLAVULANATE POTASSIUM 875; 125 MG/1; MG/1
1 TABLET, FILM COATED ORAL 2 TIMES DAILY
Qty: 20 TABLET | Refills: 0 | Status: SHIPPED | OUTPATIENT
Start: 2024-01-30 | End: 2024-02-09

## 2024-01-30 ASSESSMENT — PATIENT HEALTH QUESTIONNAIRE - PHQ9
8. MOVING OR SPEAKING SO SLOWLY THAT OTHER PEOPLE COULD HAVE NOTICED. OR THE OPPOSITE, BEING SO FIGETY OR RESTLESS THAT YOU HAVE BEEN MOVING AROUND A LOT MORE THAN USUAL: 2
SUM OF ALL RESPONSES TO PHQ QUESTIONS 1-9: 6
5. POOR APPETITE OR OVEREATING: 0
3. TROUBLE FALLING OR STAYING ASLEEP: 3
SUM OF ALL RESPONSES TO PHQ QUESTIONS 1-9: 6
SUM OF ALL RESPONSES TO PHQ QUESTIONS 1-9: 6
7. TROUBLE CONCENTRATING ON THINGS, SUCH AS READING THE NEWSPAPER OR WATCHING TELEVISION: 0
10. IF YOU CHECKED OFF ANY PROBLEMS, HOW DIFFICULT HAVE THESE PROBLEMS MADE IT FOR YOU TO DO YOUR WORK, TAKE CARE OF THINGS AT HOME, OR GET ALONG WITH OTHER PEOPLE: 0
SUM OF ALL RESPONSES TO PHQ QUESTIONS 1-9: 6
4. FEELING TIRED OR HAVING LITTLE ENERGY: 0
6. FEELING BAD ABOUT YOURSELF - OR THAT YOU ARE A FAILURE OR HAVE LET YOURSELF OR YOUR FAMILY DOWN: 1
9. THOUGHTS THAT YOU WOULD BE BETTER OFF DEAD, OR OF HURTING YOURSELF: 0
2. FEELING DOWN, DEPRESSED OR HOPELESS: 0
1. LITTLE INTEREST OR PLEASURE IN DOING THINGS: 0
SUM OF ALL RESPONSES TO PHQ9 QUESTIONS 1 & 2: 0

## 2024-01-30 ASSESSMENT — ENCOUNTER SYMPTOMS: BREAST PAIN: 1

## 2024-01-30 NOTE — PROGRESS NOTES
Judy Gallardo (:  1955) is a 68 y.o. female,Established patient, here for evaluation of the following chief complaint(s):  Nodule (R Breast nodule and  boils around R side )         ASSESSMENT/PLAN:  1. Mass of left breast, unspecified quadrant  -     US BREAST LIMITED RIGHT; Future  -     [facility] TRAY DIGITAL DIAGNOSTIC UNILATERAL RIGHT; Future  2. Chronic combined systolic and diastolic congestive heart failure (HCC)  3. HOCM (hypertrophic obstructive cardiomyopathy) (HCC)  4. Recurrent major depressive disorder, in partial remission (HCC)  5. Uncontrolled type 2 diabetes mellitus with hyperglycemia (HCC)  6. Moderate episode of recurrent major depressive disorder (HCC)  7. Type 2 diabetes mellitus with polyneuropathy (HCC)  8. Stage 3b chronic kidney disease (HCC)  9. Boil, breast  -     amoxicillin-clavulanate (AUGMENTIN) 875-125 MG per tablet; Take 1 tablet by mouth 2 times daily for 10 days, Disp-20 tablet, R-0Normal  10. Disorder of breast, unspecified  -     US BREAST LIMITED RIGHT; Future  -     [facility] TRAY DIGITAL DIAGNOSTIC UNILATERAL RIGHT; Future  11. Spinal stenosis of lumbar region with neurogenic claudication  Patient has noted at least 1 area in the medial aspect of the right breast that looks inflamed on right it is consistent on examination with the infected boil at this point we will start treatment with topical warm compresses as well as using oral antibiotics for the next 10 days and then reassess it.    Patient does have a small area with a elongated mass lesion that is about 1 cm in width and 2 inches in length it is movable and firm and ultrasound did not mammogram will be done for further assessment.    Patient continues to have back pain and peripheral neuropathy and radiculopathy we did discuss the different options of treatment for her pain including pain management versus surgical intervention patient is to follow brennan up with the pain management to decide what muscular to her

## 2024-01-30 NOTE — TELEPHONE ENCOUNTER
Pt is returning James's call from today. 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 Please call to advise.

## 2024-02-02 RX ORDER — TRAZODONE HYDROCHLORIDE 50 MG/1
50 TABLET ORAL NIGHTLY
Qty: 90 TABLET | Refills: 0 | Status: SHIPPED | OUTPATIENT
Start: 2024-02-02

## 2024-02-02 NOTE — TELEPHONE ENCOUNTER
Last appointment:  8/10/2023    Next appointment:  2/29/2024    Patient has not used her BiPAP since 08/12/2023.  Patient has not returned call to Dr. Bowie's office to schedule Inspire.

## 2024-02-10 DIAGNOSIS — F33.41 RECURRENT MAJOR DEPRESSIVE DISORDER, IN PARTIAL REMISSION (HCC): ICD-10-CM

## 2024-02-12 ENCOUNTER — TELEPHONE (OUTPATIENT)
Dept: WOMENS IMAGING | Age: 69
End: 2024-02-12

## 2024-02-12 ENCOUNTER — TELEPHONE (OUTPATIENT)
Dept: INTERNAL MEDICINE CLINIC | Age: 69
End: 2024-02-12

## 2024-02-12 DIAGNOSIS — N63.20 MASS OF LEFT BREAST, UNSPECIFIED QUADRANT: Primary | ICD-10-CM

## 2024-02-12 DIAGNOSIS — R92.2 INCONCLUSIVE MAMMOGRAM: ICD-10-CM

## 2024-02-12 RX ORDER — DULOXETIN HYDROCHLORIDE 60 MG/1
60 CAPSULE, DELAYED RELEASE ORAL DAILY
Qty: 90 CAPSULE | Refills: 0 | Status: SHIPPED | OUTPATIENT
Start: 2024-02-12

## 2024-02-12 NOTE — TELEPHONE ENCOUNTER
[facility] calling ---need order change---need   Diagnostic Bilateral Mammogram order--[facility] [id number] (pt hasn't been seen since 2019) ---Thanks.

## 2024-02-12 NOTE — TELEPHONE ENCOUNTER
Left message for patient on VM requesting return call. Reaching out to return patient's call to schedule breast imaging.

## 2024-02-12 NOTE — TELEPHONE ENCOUNTER
Last OV: 1/30/2024  Next OV: 2/13/2024    Next appointment due:(around 2/13/2024)     Last fill:8/21/23  Refills:0#90

## 2024-02-13 DIAGNOSIS — E11.42 TYPE 2 DIABETES MELLITUS WITH POLYNEUROPATHY (HCC): ICD-10-CM

## 2024-02-13 RX ORDER — INSULIN GLARGINE 300 U/ML
INJECTION, SOLUTION SUBCUTANEOUS
Qty: 12 ML | Refills: 3 | Status: SHIPPED | OUTPATIENT
Start: 2024-02-13

## 2024-02-13 NOTE — TELEPHONE ENCOUNTER
Requested Prescriptions     Signed Prescriptions Disp Refills    insulin glargine, 1 unit dial, (TOUJEO SOLOSTAR) 300 UNIT/ML concentrated injection pen 12 mL 3     Sig: INJECT 40 UNITS UNDER THE SKIN DAILY     Authorizing Provider: VERENA CAMACHO     Ordering User: MARILU GILLESPIE 03/07/2024

## 2024-02-16 ENCOUNTER — OFFICE VISIT (OUTPATIENT)
Dept: CARDIOLOGY CLINIC | Age: 69
End: 2024-02-16
Payer: COMMERCIAL

## 2024-02-16 VITALS
WEIGHT: 166 LBS | SYSTOLIC BLOOD PRESSURE: 102 MMHG | OXYGEN SATURATION: 97 % | HEART RATE: 86 BPM | HEIGHT: 63 IN | DIASTOLIC BLOOD PRESSURE: 66 MMHG | BODY MASS INDEX: 29.41 KG/M2

## 2024-02-16 DIAGNOSIS — I48.91 ATRIAL FIBRILLATION, UNSPECIFIED TYPE (HCC): Primary | ICD-10-CM

## 2024-02-16 PROCEDURE — 3074F SYST BP LT 130 MM HG: CPT | Performed by: INTERNAL MEDICINE

## 2024-02-16 PROCEDURE — G8484 FLU IMMUNIZE NO ADMIN: HCPCS | Performed by: INTERNAL MEDICINE

## 2024-02-16 PROCEDURE — G8399 PT W/DXA RESULTS DOCUMENT: HCPCS | Performed by: INTERNAL MEDICINE

## 2024-02-16 PROCEDURE — G8427 DOCREV CUR MEDS BY ELIG CLIN: HCPCS | Performed by: INTERNAL MEDICINE

## 2024-02-16 PROCEDURE — 1036F TOBACCO NON-USER: CPT | Performed by: INTERNAL MEDICINE

## 2024-02-16 PROCEDURE — 93000 ELECTROCARDIOGRAM COMPLETE: CPT | Performed by: INTERNAL MEDICINE

## 2024-02-16 PROCEDURE — 3078F DIAST BP <80 MM HG: CPT | Performed by: INTERNAL MEDICINE

## 2024-02-16 PROCEDURE — 99205 OFFICE O/P NEW HI 60 MIN: CPT | Performed by: INTERNAL MEDICINE

## 2024-02-16 PROCEDURE — 3017F COLORECTAL CA SCREEN DOC REV: CPT | Performed by: INTERNAL MEDICINE

## 2024-02-16 PROCEDURE — 1123F ACP DISCUSS/DSCN MKR DOCD: CPT | Performed by: INTERNAL MEDICINE

## 2024-02-16 PROCEDURE — G8417 CALC BMI ABV UP PARAM F/U: HCPCS | Performed by: INTERNAL MEDICINE

## 2024-02-16 PROCEDURE — 1090F PRES/ABSN URINE INCON ASSESS: CPT | Performed by: INTERNAL MEDICINE

## 2024-02-16 NOTE — PROGRESS NOTES
indicated. Will start Eliquis 5 mg BID. Treatment options for atrial fibrillation including cardioversion, ablation , and antiarrythmic medication. Since she had one episode noted in the setting of pneumonia, and no recurrence on 30 day event monitor, we will continue monitoring. Repeat monitor in 1 year    - Afib risk factors including age, HTN, obesity, inactivity and WATSON were discussed with patient. Risk factor modification recommended   ~ TSH 2.14 (8/21/23)    - Treatment options including cardioversion, rate control strategy, antiarrhythmics, anticoagulation and possible ablation were discussed with patient. Risks, benefits and alternative of each treatment options were explained. All questions answered                          ~ Information given regarding ablation for pt to review                          ~ The risks, benefits and alternatives of the ablation procedure were discussed with the patient. The risks including, but not limited to, the risks of bleeding, infection, radiation exposure, injury to vascular, cardiac and surrounding structures (including pneumothorax), stroke, cardiac perforation, tamponade, need for emergent open heart surgery, need for pacemaker implantation, Injury to the phrenic nerve, injury to the esophagus, myocardial infarction and death were discussed in detail.                                       - I explained the success rate considering possible need for a second procedure is about 60-80% and with addition of anti arrhythmics is higher     Syncope    - no recurrence   - last episode 06/2022    HOCM   - no evidence of LVOT obstruction per echo 1/11/24   - Continue on Camzyos and Farxiga   - follows with HF team (Dr. Hu)     HTN  - Controlled: 102/66  - BP goal <130/80  - Home BP monitoring encouraged, printed information provided on how to accurately measure BP at home.  - Counseled to follow a low salt diet to assure blood pressure remains controlled for cardiovascular

## 2024-02-16 NOTE — PATIENT INSTRUCTIONS
So, you are starting Eliquis (Apixaban)?   Please see below with helpful tips on lessening the cost:  ALL Commercial Insurance pts should get the $10 copay card.  IF we do not have them - you can go to Eliquis.com and activate the card from the website.  These must be activated before presenting this at the pharmacy.  This step is crucial!!!  Otherwise, the pharmacy will state the card is invalid.    The card is good for 24 months upon activation - once 24 months is used, you will get another card (or go to website) and active another card.  Medicare patients with Part D -   Will pay their standard copay amount (typically $10-$60 month/max)  When hit [facility] - Will pay 25% of the drug cost (will be $140/month based on $561 wholesale retail cost of the drug  If can't afford the $140 - call [employer] to ask about resources available (Eileen said it helps to say resources available and not what assistance is available)  There is a program called Medicare Extra help - can be used once they hit the doughnut hole Medicare patients with no prescription coverage (or self-pay patients)   You will need to call the [employer]-185-Eliquis to ask what resources are available for them   If necessary- they should be applying for Medicaid and then the drug is free for them (typically 23K individual yearly income)    1-443-Eliquis (1-705.968.5083)  Always remember-shop around as pharmacies can differ quite a lot on cost.    Beststudy.com  GoodRX.com    Additional Affordability Resources    Other Resources   Here are some additional resources that may help you gain access to the medicines or services you need. This is not a complete list and is provided as a public service for health care providers, caregivers, and low-income patients.     Area Agencies on Aging (ElderCare)  Local area agencies on aging may be able to help patients age 65 years and older who cannot afford their medicines. To contact your local area agency on aging,

## 2024-02-23 ENCOUNTER — TELEPHONE (OUTPATIENT)
Dept: INTERNAL MEDICINE CLINIC | Age: 69
End: 2024-02-23

## 2024-02-23 ENCOUNTER — TELEPHONE (OUTPATIENT)
Dept: ENDOCRINOLOGY | Age: 69
End: 2024-02-23

## 2024-02-23 ENCOUNTER — TELEPHONE (OUTPATIENT)
Dept: CARDIOLOGY CLINIC | Age: 69
End: 2024-02-23

## 2024-02-23 DIAGNOSIS — E78.2 MIXED HYPERLIPIDEMIA: ICD-10-CM

## 2024-02-23 DIAGNOSIS — E11.42 TYPE 2 DIABETES MELLITUS WITH POLYNEUROPATHY (HCC): ICD-10-CM

## 2024-02-23 DIAGNOSIS — K21.9 GASTROESOPHAGEAL REFLUX DISEASE, UNSPECIFIED WHETHER ESOPHAGITIS PRESENT: ICD-10-CM

## 2024-02-23 DIAGNOSIS — I42.1 HOCM (HYPERTROPHIC OBSTRUCTIVE CARDIOMYOPATHY) (HCC): Primary | ICD-10-CM

## 2024-02-23 RX ORDER — INSULIN GLARGINE 300 U/ML
INJECTION, SOLUTION SUBCUTANEOUS
Qty: 12 ML | Refills: 3 | Status: SHIPPED | OUTPATIENT
Start: 2024-02-23

## 2024-02-23 RX ORDER — DAPAGLIFLOZIN 5 MG/1
5 TABLET, FILM COATED ORAL EVERY MORNING
Qty: 90 TABLET | Refills: 1 | Status: SHIPPED | OUTPATIENT
Start: 2024-02-23

## 2024-02-23 RX ORDER — OMEPRAZOLE 40 MG/1
40 CAPSULE, DELAYED RELEASE ORAL DAILY
Qty: 90 CAPSULE | Refills: 0 | Status: SHIPPED | OUTPATIENT
Start: 2024-02-23

## 2024-02-23 RX ORDER — SEMAGLUTIDE 2.68 MG/ML
INJECTION, SOLUTION SUBCUTANEOUS
Qty: 9 ML | Refills: 1 | Status: SHIPPED | OUTPATIENT
Start: 2024-02-23

## 2024-02-23 RX ORDER — MAVACAMTEN 5 MG/1
1 CAPSULE, GELATIN COATED ORAL DAILY
Qty: 30 CAPSULE | Refills: 11 | Status: SHIPPED | OUTPATIENT
Start: 2024-02-23

## 2024-02-23 RX ORDER — PEN NEEDLE, DIABETIC 31 GX5/16"
NEEDLE, DISPOSABLE MISCELLANEOUS
Qty: 200 EACH | Refills: 5 | Status: SHIPPED | OUTPATIENT
Start: 2024-02-23

## 2024-02-23 RX ORDER — ATORVASTATIN CALCIUM 20 MG/1
20 TABLET, FILM COATED ORAL DAILY
Qty: 90 TABLET | Refills: 1 | Status: SHIPPED | OUTPATIENT
Start: 2024-02-23

## 2024-02-23 NOTE — TELEPHONE ENCOUNTER
Office has been notified that pt is requiring Prior Authorization for the following medication:  -- Omeprazole Cap DR    Please initiate this request through CoverMyMeds, contacting the following Payor/Insurance:  -- Hum[employer] Medicare/Humana Gold Plus    Please see below, or the documentation attached to this encounter for any additional information that may assist in processing PA:  -- K21.9    Thank you!

## 2024-02-23 NOTE — TELEPHONE ENCOUNTER
Pt has found a pharmacy that will cover the cost of camzyos. Please call   [facility] Pharmacy phone number [phone] with script info.

## 2024-02-23 NOTE — TELEPHONE ENCOUNTER
Called [phone] for [facility] MD line.     Spoke to rep Sanford who asked for the Insurance information.   Rep Sanford given patient's Hum[employer] Gold Plus card information.       Within 2-3 days, they will contact patient.     Co-pay ? He does not know if co pay assistance was given.

## 2024-02-23 NOTE — TELEPHONE ENCOUNTER
Pt calling requesting refill of Omeprazole   90 day fill    Last written 5/10/23  Last OV 1/30/24   Next OV none   Last recommended OV NA     Please send to        [facility]                              [facility]

## 2024-02-26 NOTE — TELEPHONE ENCOUNTER
Submitted PA for Omeprazole Cap DR  Via CM (Key: [id number]) STATUS: PENDING.    Follow up done daily; if no decision with in three days we will refax.  If another three days goes by with no decision will call the insurance for status.

## 2024-02-27 ENCOUNTER — TELEPHONE (OUTPATIENT)
Dept: CARDIOLOGY CLINIC | Age: 69
End: 2024-02-27

## 2024-02-27 ENCOUNTER — TELEPHONE (OUTPATIENT)
Dept: ENDOCRINOLOGY | Age: 69
End: 2024-02-27

## 2024-02-27 NOTE — TELEPHONE ENCOUNTER
The Camzyos 5mg script is available today and they will reach out out to the pt today. There is a different department handling this medication.  I gave them Kerwin's number because Judy is hard to reach and doesn't return our calls due to her work schedule.       No estimated co-pay listed.

## 2024-02-27 NOTE — TELEPHONE ENCOUNTER
Please advise if patient is taking metformin. Not on list and LOV note states she had a reaction to it.

## 2024-02-27 NOTE — TELEPHONE ENCOUNTER
Call from [employer] asking to speak w/ clinical staff regarding Ozempic rx  [facility] could not tell me what they needed. Asked if it was for clarification on rx or for Prior Auth. They did not know?    CB# [phone]  Ref # [id number]

## 2024-02-27 NOTE — TELEPHONE ENCOUNTER
The medication was DENIED; DENIAL letter uploaded to MEDIA.    Medication is not covered for dx of GERD.    If you want an APPEAL; please note in this encounter what new information you would like to APPEAL with.  Once complete route back to PA POOL.    If this requires a response please respond to the pool ( P MHCX PSC MEDICATION PRE-AUTH).      Thank you please advise patient.

## 2024-02-27 NOTE — TELEPHONE ENCOUNTER
Please confirm with the patient and if she is not taking it please remove from the med list.  As I am not sure why [facility] is getting the prescription if patient is not taking it?

## 2024-02-28 ENCOUNTER — TELEPHONE (OUTPATIENT)
Dept: ENDOCRINOLOGY | Age: 69
End: 2024-02-28

## 2024-02-28 ENCOUNTER — TELEPHONE (OUTPATIENT)
Dept: WOMENS IMAGING | Age: 69
End: 2024-02-28

## 2024-02-28 ENCOUNTER — PRE-PROCEDURE TELEPHONE (OUTPATIENT)
Dept: WOMENS IMAGING | Age: 69
End: 2024-02-28

## 2024-02-28 ENCOUNTER — HOSPITAL ENCOUNTER (OUTPATIENT)
Dept: ULTRASOUND IMAGING | Age: 69
Discharge: HOME | End: 2024-02-28
Payer: MEDICARE

## 2024-02-28 ENCOUNTER — HOSPITAL ENCOUNTER (OUTPATIENT)
Dept: WOMENS IMAGING | Age: 69
Discharge: HOME | End: 2024-02-28
Payer: MEDICARE

## 2024-02-28 ENCOUNTER — TELEPHONE (OUTPATIENT)
Dept: CARDIOLOGY CLINIC | Age: 69
End: 2024-02-28

## 2024-02-28 VITALS — WEIGHT: 157 LBS | HEIGHT: 63 IN | BODY MASS INDEX: 27.82 KG/M2

## 2024-02-28 DIAGNOSIS — R92.8 ABNORMAL MAMMOGRAM: Primary | ICD-10-CM

## 2024-02-28 DIAGNOSIS — N64.4 MASTODYNIA: ICD-10-CM

## 2024-02-28 DIAGNOSIS — N64.9 DISORDER OF BREAST, UNSPECIFIED: ICD-10-CM

## 2024-02-28 DIAGNOSIS — N63.20 MASS OF LEFT BREAST, UNSPECIFIED QUADRANT: ICD-10-CM

## 2024-02-28 PROCEDURE — G0279 TOMOSYNTHESIS, MAMMO: HCPCS

## 2024-02-28 PROCEDURE — 76642 ULTRASOUND BREAST LIMITED: CPT

## 2024-02-28 NOTE — TELEPHONE ENCOUNTER
Submitted PA for Ozempic Via [facility]: [id number] STATUS: APPROVED 1/29/2024-2/27/2025.    If this requires a response please respond to the pool ( P MHCX PSC MEDICATION PRE-AUTH).      Thank you please advise patient.

## 2024-02-28 NOTE — PROGRESS NOTES
Dhara Navigator reviewed pre-procedure stereo breast biopsy patient education information in person and gave written instructions.  Reviewed medications and need to hold all blood thinners per instructions. Eliquis for 1 day.    Patient should take all other medications as prescribed.  Patient to eat and drink as normal prior to the procedure.  Wear a bra with good support and a two piece outfit for comfort.  Patient can bring someone with you but you can also drive yourself.  Plan on being at the breast center for 2-2 and a half hours.  Reviewed the process of a biopsy - sitting in a chair with your breast compressed similar to a mammogram with frequent images taken throughout the procedure.  The skin is cleaned and a local anesthetic is given to numb the area.  A small skin nick is made for the biopsy needle and once in place, samples are taken and then xrayed for confirmation.  A tiny tissue marker is then placed inside your breast at the site of the biopsy for future reference.    Then pressure is hold on the biopsy site to stop the bleeding and a bandage and waterproof dressing is applied.   A mammogram is then done to validate the tissue marker.  The tissue sample is sent to pathology. Results will come back in 2-3 business days and sent to your referring physician.  Either they or the nurse navigator will call you with the results and recommended  follow up needed  Patient verbalizes understanding.

## 2024-02-28 NOTE — TELEPHONE ENCOUNTER
Dr. Leiva,     Dr. Roseanne Howard is requesting advisement on patient's cardiac risk to undergo Breast biopsy on 03/04/2024. He is requesting a one day hold on Eliquis.     She was last seen in office on 02/19/2024 for management of atrial fibrillation.    Patient has a [id number] Score of 5 (age,gender,htn,chf,dm)   One episode of atrial fibrillation in setting of PNA 30 day monitor showed no atrial fibrillation or atrial flutter.    Please advise.    Thanks,  Susan Mejia RN

## 2024-02-28 NOTE — TELEPHONE ENCOUNTER
Another call from [employer] needing to talk to clinical staff regarding rx for Farxiga 5mg. Needing clarification    # [phone]  Ref # [phone]

## 2024-02-28 NOTE — TELEPHONE ENCOUNTER
Based on imaging done today please sign the following orders. She is scheduled this Monday, 3/4 for a left breast biopsy.  Thank you, Yoandy

## 2024-02-28 NOTE — TELEPHONE ENCOUNTER
Quin needs a PA. [facility] has started on CMM with key # [id number]    Patient is intolerant to metformin which is the listed formulary alternative.     Confirmed that patient is supposed to be on Farxiga and Ozempic.    [phone]- ES PA help desk

## 2024-02-28 NOTE — TELEPHONE ENCOUNTER
CARDIAC CLEARANCE     What type of procedure are you having?  Breast biopsy    Which physician is performing your procedure?  Dr. Roseanne Howard    When is your procedure scheduled for?  3/4 - Mon    Where are you having this procedure?  [facility]    Are you taking Blood Thinners?   If so what? (Name/dose/frequesncy)  Flor   Does the surgeon want you to stop your blood thinner?  If so for how long?  1 day prior    Phone Number and Contact Name for Physicians office:  [phone]  Fax number to send information:  [phone]

## 2024-02-29 ENCOUNTER — PREP FOR PROCEDURE (OUTPATIENT)
Dept: ENT CLINIC | Age: 69
End: 2024-02-29

## 2024-02-29 ENCOUNTER — OFFICE VISIT (OUTPATIENT)
Dept: PULMONOLOGY | Age: 69
End: 2024-02-29
Payer: MEDICARE

## 2024-02-29 VITALS
SYSTOLIC BLOOD PRESSURE: 100 MMHG | OXYGEN SATURATION: 97 % | BODY MASS INDEX: 29.2 KG/M2 | DIASTOLIC BLOOD PRESSURE: 52 MMHG | HEIGHT: 63 IN | HEART RATE: 76 BPM | WEIGHT: 164.8 LBS

## 2024-02-29 DIAGNOSIS — G47.33 OSA (OBSTRUCTIVE SLEEP APNEA): Primary | ICD-10-CM

## 2024-02-29 DIAGNOSIS — E66.3 OVERWEIGHT (BMI 25.0-29.9): ICD-10-CM

## 2024-02-29 DIAGNOSIS — I10 PRIMARY HYPERTENSION: ICD-10-CM

## 2024-02-29 DIAGNOSIS — F51.04 PSYCHOPHYSIOLOGICAL INSOMNIA: ICD-10-CM

## 2024-02-29 PROCEDURE — 1123F ACP DISCUSS/DSCN MKR DOCD: CPT | Performed by: INTERNAL MEDICINE

## 2024-02-29 PROCEDURE — G8427 DOCREV CUR MEDS BY ELIG CLIN: HCPCS | Performed by: INTERNAL MEDICINE

## 2024-02-29 PROCEDURE — 1090F PRES/ABSN URINE INCON ASSESS: CPT | Performed by: INTERNAL MEDICINE

## 2024-02-29 PROCEDURE — 99214 OFFICE O/P EST MOD 30 MIN: CPT | Performed by: INTERNAL MEDICINE

## 2024-02-29 PROCEDURE — G8484 FLU IMMUNIZE NO ADMIN: HCPCS | Performed by: INTERNAL MEDICINE

## 2024-02-29 PROCEDURE — 3078F DIAST BP <80 MM HG: CPT | Performed by: INTERNAL MEDICINE

## 2024-02-29 PROCEDURE — 3017F COLORECTAL CA SCREEN DOC REV: CPT | Performed by: INTERNAL MEDICINE

## 2024-02-29 PROCEDURE — G8399 PT W/DXA RESULTS DOCUMENT: HCPCS | Performed by: INTERNAL MEDICINE

## 2024-02-29 PROCEDURE — 3074F SYST BP LT 130 MM HG: CPT | Performed by: INTERNAL MEDICINE

## 2024-02-29 PROCEDURE — G8417 CALC BMI ABV UP PARAM F/U: HCPCS | Performed by: INTERNAL MEDICINE

## 2024-02-29 PROCEDURE — 1036F TOBACCO NON-USER: CPT | Performed by: INTERNAL MEDICINE

## 2024-02-29 ASSESSMENT — SLEEP AND FATIGUE QUESTIONNAIRES
HOW LIKELY ARE YOU TO NOD OFF OR FALL ASLEEP WHILE SITTING AND TALKING TO SOMEONE: 0
HOW LIKELY ARE YOU TO NOD OFF OR FALL ASLEEP WHILE SITTING INACTIVE IN A PUBLIC PLACE: 2
HOW LIKELY ARE YOU TO NOD OFF OR FALL ASLEEP WHILE SITTING QUIETLY AFTER LUNCH WITHOUT ALCOHOL: 3
HOW LIKELY ARE YOU TO NOD OFF OR FALL ASLEEP WHEN YOU ARE A PASSENGER IN A CAR FOR AN HOUR WITHOUT A BREAK: 3
ESS TOTAL SCORE: 18
HOW LIKELY ARE YOU TO NOD OFF OR FALL ASLEEP IN A CAR, WHILE STOPPED FOR A FEW MINUTES IN TRAFFIC: 1
HOW LIKELY ARE YOU TO NOD OFF OR FALL ASLEEP WHILE SITTING AND READING: 3
HOW LIKELY ARE YOU TO NOD OFF OR FALL ASLEEP WHILE WATCHING TV: 3

## 2024-02-29 NOTE — TELEPHONE ENCOUNTER
Please create letter and fax to requesting physician.    Letter should state:    It is my medical opinion that Judy Gallardo may proceed with breast biopsy from a cardiac standpoint. Ok to hold Eliquis for one day prior to procedure and resume as soon as safely possible.

## 2024-02-29 NOTE — PROGRESS NOTES
PULMONARY OFFICE FOLLOW-UP VISIT    CONSULTING PHYSICIAN:  Kemi Mejia MD , No ref. provider found     REASON FOR VISIT:   Chief Complaint   Patient presents with    Sleep Apnea         DATE OF VISIT: 2/29/2024    HISTORY OF PRESENT ILLNESS: 68 y.o. year old female comes into the pulmonary/sleep clinic for a follow-up.  Patient has not been able to use her BiPAP therapy at all.  She did undergo a drug-induced sleep endoscopy last year which showed that she has favorable throat anatomy for inspire [hypoglossal nerve stimulation] therapy.  She has been wearing for implantation.  Weight remains stable.      Previously: Patient reports that she has been struggling with her sleep-wake cycle.  With Ambien 10 mg dosage she started having sleep behaviors including cutting her hair without her knowledge, shopping at nighttime without her knowledge.  Thereafter she went back to 5 mg nightly and then eventually completely stopped it.  Due to this she has been having hard time staying asleep at nighttime.  Unconsciously takes mask off of her face at 2 to 3-hour andreas in the nighttime.  Weight remains stable.  She did like the under the nose fullface mask. She was again diagnosed to have moderate obstructive sleep apnea and was started on auto BiPAP therapy.  Patient started using it but has been having difficulty with the mask interface.  She has gone through 3 different masks all of which were either nasal pillow or nasal mask but has not been able to find any thing comfortable.  Mask leak and because disturbance of her sleep.  Patient is a mouth breather.  Has been taking Ambien 5 mg nightly but despite that she wakes up at nighttime within 2 hours of sleep onset.  Describes difficulty in both sleep onset and sleep maintenance insomnia.  She generally has to have some white noise in order to help her sleep.  She generally turns the television on prior to going to sleep.  She drinks iced tea and diet soda just before

## 2024-03-04 DIAGNOSIS — R92.8 ABNORMAL MAMMOGRAM: Primary | ICD-10-CM

## 2024-03-05 DIAGNOSIS — I25.10 CORONARY ARTERY DISEASE INVOLVING NATIVE CORONARY ARTERY OF NATIVE HEART WITHOUT ANGINA PECTORIS: ICD-10-CM

## 2024-03-05 DIAGNOSIS — N18.30 STAGE 3 CHRONIC KIDNEY DISEASE, UNSPECIFIED WHETHER STAGE 3A OR 3B CKD (HCC): ICD-10-CM

## 2024-03-05 DIAGNOSIS — I10 PRIMARY HYPERTENSION: ICD-10-CM

## 2024-03-05 DIAGNOSIS — G47.33 OSA (OBSTRUCTIVE SLEEP APNEA): ICD-10-CM

## 2024-03-05 DIAGNOSIS — E11.42 TYPE 2 DIABETES MELLITUS WITH POLYNEUROPATHY (HCC): ICD-10-CM

## 2024-03-05 LAB
ALBUMIN SERPL-MCNC: 4.2 G/DL (ref 3.4–5)
ALBUMIN/GLOB SERPL: 2 {RATIO} (ref 1.1–2.2)
ALP SERPL-CCNC: 114 U/L (ref 40–129)
ALT SERPL-CCNC: 9 U/L (ref 10–40)
ANION GAP SERPL CALCULATED.3IONS-SCNC: 12 MMOL/L (ref 3–16)
AST SERPL-CCNC: 13 U/L (ref 15–37)
BILIRUB SERPL-MCNC: <0.2 MG/DL (ref 0–1)
BUN SERPL-MCNC: 14 MG/DL (ref 7–20)
CALCIUM SERPL-MCNC: 9.1 MG/DL (ref 8.3–10.6)
CHLORIDE SERPL-SCNC: 101 MMOL/L (ref 99–110)
CHOLEST SERPL-MCNC: 162 MG/DL (ref 0–199)
CO2 SERPL-SCNC: 25 MMOL/L (ref 21–32)
CREAT SERPL-MCNC: 1.1 MG/DL (ref 0.6–1.2)
GFR SERPLBLD CREATININE-BSD FMLA CKD-EPI: 54 ML/MIN/{1.73_M2}
GLUCOSE SERPL-MCNC: 80 MG/DL (ref 70–99)
HDLC SERPL-MCNC: 53 MG/DL (ref 40–60)
LDLC SERPL CALC-MCNC: 89 MG/DL
POTASSIUM SERPL-SCNC: 4.4 MMOL/L (ref 3.5–5.1)
PROT SERPL-MCNC: 6.3 G/DL (ref 6.4–8.2)
SODIUM SERPL-SCNC: 138 MMOL/L (ref 136–145)
TRIGL SERPL-MCNC: 101 MG/DL (ref 0–150)
TSH SERPL DL<=0.005 MIU/L-ACNC: 1.58 UIU/ML (ref 0.27–4.2)
VLDLC SERPL CALC-MCNC: 20 MG/DL

## 2024-03-06 LAB
EST. AVERAGE GLUCOSE BLD GHB EST-MCNC: 134.1 MG/DL
HBA1C MFR BLD: 6.3 %

## 2024-03-07 ENCOUNTER — OFFICE VISIT (OUTPATIENT)
Dept: ENDOCRINOLOGY | Age: 69
End: 2024-03-07
Payer: COMMERCIAL

## 2024-03-07 ENCOUNTER — TELEPHONE (OUTPATIENT)
Dept: ENDOCRINOLOGY | Age: 69
End: 2024-03-07

## 2024-03-07 VITALS
WEIGHT: 166 LBS | RESPIRATION RATE: 14 BRPM | TEMPERATURE: 98 F | HEIGHT: 63 IN | SYSTOLIC BLOOD PRESSURE: 113 MMHG | DIASTOLIC BLOOD PRESSURE: 64 MMHG | HEART RATE: 77 BPM | BODY MASS INDEX: 29.41 KG/M2

## 2024-03-07 DIAGNOSIS — I10 PRIMARY HYPERTENSION: ICD-10-CM

## 2024-03-07 DIAGNOSIS — E11.42 TYPE 2 DIABETES MELLITUS WITH POLYNEUROPATHY (HCC): Primary | ICD-10-CM

## 2024-03-07 DIAGNOSIS — I25.10 CORONARY ARTERY DISEASE INVOLVING NATIVE CORONARY ARTERY OF NATIVE HEART WITHOUT ANGINA PECTORIS: ICD-10-CM

## 2024-03-07 DIAGNOSIS — N18.30 STAGE 3 CHRONIC KIDNEY DISEASE, UNSPECIFIED WHETHER STAGE 3A OR 3B CKD (HCC): ICD-10-CM

## 2024-03-07 PROCEDURE — 2022F DILAT RTA XM EVC RTNOPTHY: CPT | Performed by: INTERNAL MEDICINE

## 2024-03-07 PROCEDURE — 1090F PRES/ABSN URINE INCON ASSESS: CPT | Performed by: INTERNAL MEDICINE

## 2024-03-07 PROCEDURE — 1036F TOBACCO NON-USER: CPT | Performed by: INTERNAL MEDICINE

## 2024-03-07 PROCEDURE — 3044F HG A1C LEVEL LT 7.0%: CPT | Performed by: INTERNAL MEDICINE

## 2024-03-07 PROCEDURE — 3074F SYST BP LT 130 MM HG: CPT | Performed by: INTERNAL MEDICINE

## 2024-03-07 PROCEDURE — 3017F COLORECTAL CA SCREEN DOC REV: CPT | Performed by: INTERNAL MEDICINE

## 2024-03-07 PROCEDURE — G8427 DOCREV CUR MEDS BY ELIG CLIN: HCPCS | Performed by: INTERNAL MEDICINE

## 2024-03-07 PROCEDURE — 1123F ACP DISCUSS/DSCN MKR DOCD: CPT | Performed by: INTERNAL MEDICINE

## 2024-03-07 PROCEDURE — 95251 CONT GLUC MNTR ANALYSIS I&R: CPT | Performed by: INTERNAL MEDICINE

## 2024-03-07 PROCEDURE — G8484 FLU IMMUNIZE NO ADMIN: HCPCS | Performed by: INTERNAL MEDICINE

## 2024-03-07 PROCEDURE — 3078F DIAST BP <80 MM HG: CPT | Performed by: INTERNAL MEDICINE

## 2024-03-07 PROCEDURE — G8417 CALC BMI ABV UP PARAM F/U: HCPCS | Performed by: INTERNAL MEDICINE

## 2024-03-07 PROCEDURE — G8399 PT W/DXA RESULTS DOCUMENT: HCPCS | Performed by: INTERNAL MEDICINE

## 2024-03-07 PROCEDURE — 99214 OFFICE O/P EST MOD 30 MIN: CPT | Performed by: INTERNAL MEDICINE

## 2024-03-07 ASSESSMENT — ENCOUNTER SYMPTOMS: VISUAL CHANGE: 0

## 2024-03-07 NOTE — PROGRESS NOTES
Judy Gallardo is a 68 y.o. female is seen  for evaluation and management of type 2  Diabetes--- .Judy Gallardo was diagnosed with Diabetes mellitus in 2008 aprox   Pt always had issues with hypoglycemia since her childhood so she was under constant surveillance until she was diagnosed with diabetes  .Judy Gallardo got diabetic education in the past.  Comorbid conditions: Neuropathy  ---on her way back from [facility] on June 17th she developed chest pain and sweating and was taken by EMS   -she saw her PCP and was told that she had HOCM and started on B Blockers ---she feels improved.  Weight 171 in June 2022 .  Patient has stage III chronic kidney disease and follows with nephrology  Patient has hyperlipidemia and is on Lipitor her last LDL was at target  Patient also has arthritis and takes Celebrex off-and-on which can also cause kidney damage and also has reflux and is on omeprazole    INTERIM:    Diabetes  She presents for her follow-up diabetic visit. She has type 2 diabetes mellitus. No MedicAlert identification noted. The initial diagnosis of diabetes was made 10 years ago. Her disease course has been worsening. Hypoglycemia symptoms include sweats and tremors. Pertinent negatives for diabetes include no foot ulcerations, no polydipsia, no polyphagia, no polyuria, no visual change, no weakness and no weight loss. There are no hypoglycemic complications. Symptoms are worsening. Diabetic complications include peripheral neuropathy. Risk factors for coronary artery disease include post-menopausal, hypertension, diabetes mellitus and dyslipidemia. Current diabetic treatment includes insulin injections. She is compliant with treatment some of the time. Her weight is increasing rapidly. She is following a generally unhealthy diet. Meal planning includes avoidance of concentrated sweets. She has had a previous visit with a dietitian. She never participates in exercise. Her home blood glucose trend is

## 2024-03-08 NOTE — TELEPHONE ENCOUNTER
Submitted PA for Farxiga  [facility] Key[facility] [id number]    STATUS: Approved today  CaseId:[id number];Status:Approved;Review Type:Prior Auth;Coverage Start Date:02/07/2024;Coverage End Date:03/08/2025;

## 2024-03-21 ENCOUNTER — TELEPHONE (OUTPATIENT)
Dept: ENDOCRINOLOGY | Age: 69
End: 2024-03-21

## 2024-03-21 DIAGNOSIS — E11.42 TYPE 2 DIABETES MELLITUS WITH POLYNEUROPATHY (HCC): ICD-10-CM

## 2024-03-21 RX ORDER — BLOOD-GLUCOSE SENSOR
EACH MISCELLANEOUS
Qty: 2 EACH | Refills: 5 | Status: SHIPPED | OUTPATIENT
Start: 2024-03-21

## 2024-03-21 NOTE — TELEPHONE ENCOUNTER
Fax from [employer] new rx request for Freestyle Katheryn 3 sensor kit 14 day    LOV   3-7-24  FOV   8-21-24

## 2024-03-25 ENCOUNTER — HOSPITAL ENCOUNTER (OUTPATIENT)
Dept: WOMENS IMAGING | Age: 69
Discharge: HOME | End: 2024-03-25
Payer: MEDICARE

## 2024-03-25 DIAGNOSIS — Z98.890 STATUS POST LEFT BREAST BIOPSY: ICD-10-CM

## 2024-03-25 DIAGNOSIS — R92.8 ABNORMAL MAMMOGRAM: ICD-10-CM

## 2024-03-25 PROCEDURE — 88305 TISSUE EXAM BY PATHOLOGIST: CPT

## 2024-03-25 PROCEDURE — 2500000003 HC RX 250 WO HCPCS: Performed by: INTERNAL MEDICINE

## 2024-03-25 PROCEDURE — 88342 IMHCHEM/IMCYTCHM 1ST ANTB: CPT

## 2024-03-25 PROCEDURE — 19081 BX BREAST 1ST LESION STRTCTC: CPT

## 2024-03-25 PROCEDURE — 88341 IMHCHEM/IMCYTCHM EA ADD ANTB: CPT

## 2024-03-25 PROCEDURE — 77065 DX MAMMO INCL CAD UNI: CPT

## 2024-03-25 PROCEDURE — 76098 X-RAY EXAM SURGICAL SPECIMEN: CPT

## 2024-03-25 RX ORDER — LIDOCAINE HYDROCHLORIDE 10 MG/ML
5 INJECTION, SOLUTION INFILTRATION; PERINEURAL ONCE
Status: COMPLETED | OUTPATIENT
Start: 2024-03-25 | End: 2024-03-25

## 2024-03-25 RX ORDER — LIDOCAINE HYDROCHLORIDE AND EPINEPHRINE 10; 10 MG/ML; UG/ML
20 INJECTION, SOLUTION INFILTRATION; PERINEURAL ONCE
Status: COMPLETED | OUTPATIENT
Start: 2024-03-25 | End: 2024-03-25

## 2024-03-25 RX ADMIN — LIDOCAINE HYDROCHLORIDE ANHYDROUS 5 ML: 10 INJECTION, SOLUTION INFILTRATION at 11:30

## 2024-03-25 RX ADMIN — LIDOCAINE HYDROCHLORIDE,EPINEPHRINE BITARTRATE 20 ML: 10; .01 INJECTION, SOLUTION INFILTRATION; PERINEURAL at 11:35

## 2024-03-25 ASSESSMENT — PAIN SCALES - GENERAL
PAINLEVEL_OUTOF10: 0
PAINLEVEL_OUTOF10: 0

## 2024-03-25 NOTE — PROGRESS NOTES
Patient here for breast biopsy. NN reviewed the health history, allergies and medications.           Pt's  drove her.  Radiologist reviews procedure with patient, consent signed. Patient tolerates procedure well. Compression held. Site cleansed with chloraprep, steri strips and dry dressing applied. Ice pack in place. Reviewed discharge instructions with patient and signed copy. Patient verbalized understanding and agreed to contact Nurse Navigator with any questions. Patient A&Ox3, steady on feet and discharged home.

## 2024-03-27 ENCOUNTER — TELEPHONE (OUTPATIENT)
Dept: WOMENS IMAGING | Age: 69
End: 2024-03-27

## 2024-03-27 NOTE — TELEPHONE ENCOUNTER
Nurse Navigator reviewed results of breast biopsy which showed left breast biopsy demonstrates \"atypical ductal hyperplasia; a low-grade ductal carcinoma in-situ cannot be excluded.\" on the pathology report.  NN reviewed radiologist follow up recommendations as a surgical consult, and pt prefers Dr. Giron.   Pt verbalized understanding, all questions answered at this time.

## 2024-03-28 DIAGNOSIS — N63.20 MASS OF LEFT BREAST, UNSPECIFIED QUADRANT: Primary | ICD-10-CM

## 2024-03-28 NOTE — RESULT ENCOUNTER NOTE
Please let the patient know that results were acceptable but not conclusive I would like the patient to see Dr. Giron for further evaluation

## 2024-03-29 ENCOUNTER — PATIENT MESSAGE (OUTPATIENT)
Dept: ENT CLINIC | Age: 69
End: 2024-03-29

## 2024-03-29 NOTE — TELEPHONE ENCOUNTER
From: Judy Gallardo  To: Dr. Oracio Bowie  Sent: 3/29/2024 11:34 AM EDT  Subject: Surgery    My Inspire appt is scheduled for 4/10. I need to reschedule. Ple a se call me at [phone]. Thank you. Judy

## 2024-04-04 ENCOUNTER — TELEPHONE (OUTPATIENT)
Dept: CARDIOLOGY CLINIC | Age: 69
End: 2024-04-04

## 2024-04-04 ENCOUNTER — PROCEDURE VISIT (OUTPATIENT)
Dept: CARDIOLOGY CLINIC | Age: 69
End: 2024-04-04

## 2024-04-04 DIAGNOSIS — I42.1 HOCM (HYPERTROPHIC OBSTRUCTIVE CARDIOMYOPATHY) (HCC): ICD-10-CM

## 2024-04-04 DIAGNOSIS — R06.02 SOB (SHORTNESS OF BREATH): ICD-10-CM

## 2024-04-04 DIAGNOSIS — I51.7 LVH (LEFT VENTRICULAR HYPERTROPHY): ICD-10-CM

## 2024-04-04 DIAGNOSIS — I10 ESSENTIAL HYPERTENSION: ICD-10-CM

## 2024-04-04 DIAGNOSIS — I42.1 HOCM (HYPERTROPHIC OBSTRUCTIVE CARDIOMYOPATHY) (HCC): Primary | ICD-10-CM

## 2024-04-04 DIAGNOSIS — Q24.8 LEFT VENTRICULAR OUTFLOW TRACT OBSTRUCTION: ICD-10-CM

## 2024-04-04 NOTE — TELEPHONE ENCOUNTER
Spoke to pt after Echo and after speaking with YONI.   REMS completed.   EF 55%  LVOT 5mmHg  No new meds.  No HF admissions.   Per YONI, continue same dose of 5mg of Camzyos.  Refill are already at [facility].     Next Echo 6/27 to July 5th.  Please call to schedule pt.

## 2024-04-16 ENCOUNTER — TELEPHONE (OUTPATIENT)
Dept: CARDIOLOGY CLINIC | Age: 69
End: 2024-04-16

## 2024-04-16 NOTE — TELEPHONE ENCOUNTER
Pt called stating they will need appt after 5/5 for cardiac clearance for inspire implant rt neck and chest is scheduled 6/5 with Dr Bowie ENT, is there a date and time pt can get appt for cardiac clearance with YONI?    Pls advise thank you

## 2024-04-16 NOTE — TELEPHONE ENCOUNTER
Due to vacations, I do not have any appts in the time that she is requesting.  She should have kept the appt this week.  Now we are stuck trying to find a cancellation.  YONI

## 2024-04-17 NOTE — TELEPHONE ENCOUNTER
Spoke with pt.  Pt states that she just spoke with Dr. Bowie's office and the Inspire implant is occurring on 6/12  Pt has made an appt for 6/10 with YONI    I advised pt that going forward, she should talk to us and may only need an EKG which can be done as a lab visit.

## 2024-04-18 ENCOUNTER — OFFICE VISIT (OUTPATIENT)
Dept: INTERNAL MEDICINE CLINIC | Age: 69
End: 2024-04-18

## 2024-04-18 VITALS
WEIGHT: 172.6 LBS | BODY MASS INDEX: 31.76 KG/M2 | HEIGHT: 62 IN | HEART RATE: 83 BPM | OXYGEN SATURATION: 97 % | SYSTOLIC BLOOD PRESSURE: 100 MMHG | DIASTOLIC BLOOD PRESSURE: 62 MMHG

## 2024-04-18 DIAGNOSIS — M48.062 SPINAL STENOSIS OF LUMBAR REGION WITH NEUROGENIC CLAUDICATION: ICD-10-CM

## 2024-04-18 DIAGNOSIS — M70.52 PES ANSERINUS BURSITIS OF BOTH KNEES: ICD-10-CM

## 2024-04-18 DIAGNOSIS — M70.51 PES ANSERINUS BURSITIS OF BOTH KNEES: ICD-10-CM

## 2024-04-18 DIAGNOSIS — M79.2 NEUROPATHIC PAIN: ICD-10-CM

## 2024-04-18 DIAGNOSIS — E11.42 DIABETIC POLYNEUROPATHY ASSOCIATED WITH TYPE 2 DIABETES MELLITUS (HCC): Primary | ICD-10-CM

## 2024-04-18 NOTE — PROGRESS NOTES
Judy Gallardo (:  1955) is a 68 y.o. female,Established patient, here for evaluation of the following chief complaint(s):  Leg Pain (Bilateral legs )      Assessment & Plan   ASSESSMENT/PLAN:  1. Diabetic polyneuropathy associated with type 2 diabetes mellitus (HCC)  2. Spinal stenosis of lumbar region with neurogenic claudication  3. Neuropathic pain  4. Pes anserinus bursitis of both knees      Is I think that her problem is a combination of the spinous stenosis as well as the neuropathy resulting in decreased activity and muscle atrophy at this point we discussed different options and the patient wants to go back to swimming and exercising on regular basis we will see if that will help    On the other hand the patient does have findings consistent with anserine bursitis on both knees patient is to use topical anti-inflammatories heat 3-4 times a day 1 hour each time as well as exercises that would be provided for her if that does not work then she will come back for cortisone injection    Patient is to see breast surgery for further evaluation and consultation regarding her abnormal mammogram    Patient has a procedure lined up in  for inspire implantation        Return if symptoms worsen or fail to improve, for As scheduled.         Subjective   SUBJECTIVE/OBJECTIVE:    Lab Review   Lab Results   Component Value Date/Time     2024 01:46 PM     2023 08:56 AM     2023 11:15 AM    K 4.4 2024 01:46 PM    K 4.3 2023 08:56 AM    K 5.1 2023 11:15 AM    K 4.4 2022 05:13 AM    K 4.6 2022 03:20 PM    CO2 25 2024 01:46 PM    CO2 28 2023 08:56 AM    CO2 27 2023 11:15 AM    BUN 14 2024 01:46 PM    BUN 16 2023 08:56 AM    BUN 18 2023 11:15 AM    CREATININE 1.1 2024 01:46 PM    CREATININE 1.3 2023 08:56 AM    CREATININE 1.5 2023 11:15 AM    GLUCOSE 80 2024 01:46 PM    GLUCOSE 84

## 2024-04-24 NOTE — PROGRESS NOTES
patient was examined in the upright and supine position.  She has a \"D\" cup breast. Breasts are symmetrically ptotic.       Right: There were no discrete masses or changes in breast contour.  There were no skin changes of the breast or nipple areolar complex.  There was no nipple inversion or discharge.        Left: There were no discrete masses or changes in breast contour.  There were no skin changes of the breast or nipple areolar complex.  There was no nipple inversion or discharge.  There is no axillary lymphadenopathy palpated bilaterally.   Head: Normocephalic andatraumatic.   Eyes: EOM are normal. Pupils are equal, round, and reactive to light.   Neck: Neck supple. No tracheal deviation present.   Cardiovascular: regular rate.  Extremities appear well perfused.  Pulmonary: respirationsare non-labored and without audible distress  Lymphatics: no palpable axillary or cervical lymphadenopathy.  Skin: No rash noted. No erythema.   Neurologic: alert and oriented.        Assessment/Plan: Ms. Gallardo is a 68 y.o. 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 Gallardo her most recent breast imaging, physical exam findings and biopsy results.    The implications of Ms. Gallardo's biopsy results were reviewed with her.    We discussed the pathophysiology of her biopsy.  We reviewed both atypical ductal hyperplasia and papilloma however.  In regards to her atypical hyperplasia, we discussed the relative risk of a future cancer in comparison to the general population.  The upgrade rates vary but are generally accepted to be >20% (ASBrS, 2016).  Due to this upgrade risk of invasive cancer or noninvasive cancer in the area surrounding her biopsy cavity, we discussed excisional breast biopsy in the operating room. We discussed the technique of localization. This will be done by

## 2024-04-29 ENCOUNTER — INITIAL CONSULT (OUTPATIENT)
Dept: SURGERY | Age: 69
End: 2024-04-29
Payer: COMMERCIAL

## 2024-04-29 VITALS
BODY MASS INDEX: 30.3 KG/M2 | HEIGHT: 63 IN | DIASTOLIC BLOOD PRESSURE: 78 MMHG | SYSTOLIC BLOOD PRESSURE: 115 MMHG | OXYGEN SATURATION: 98 % | HEART RATE: 80 BPM | RESPIRATION RATE: 18 BRPM | WEIGHT: 171 LBS

## 2024-04-29 DIAGNOSIS — N60.92 ATYPICAL DUCTAL HYPERPLASIA OF LEFT BREAST: Primary | ICD-10-CM

## 2024-04-29 DIAGNOSIS — Z91.89 AT HIGH RISK FOR BREAST CANCER: ICD-10-CM

## 2024-04-29 DIAGNOSIS — Z80.41 FAMILY HISTORY OF OVARIAN CANCER: ICD-10-CM

## 2024-04-29 PROCEDURE — 3074F SYST BP LT 130 MM HG: CPT | Performed by: SURGERY

## 2024-04-29 PROCEDURE — 99204 OFFICE O/P NEW MOD 45 MIN: CPT | Performed by: SURGERY

## 2024-04-29 PROCEDURE — 1090F PRES/ABSN URINE INCON ASSESS: CPT | Performed by: SURGERY

## 2024-04-29 PROCEDURE — G8427 DOCREV CUR MEDS BY ELIG CLIN: HCPCS | Performed by: SURGERY

## 2024-04-29 PROCEDURE — 3078F DIAST BP <80 MM HG: CPT | Performed by: SURGERY

## 2024-04-29 PROCEDURE — G9899 SCRN MAM PERF RSLTS DOC: HCPCS | Performed by: SURGERY

## 2024-04-29 PROCEDURE — G8417 CALC BMI ABV UP PARAM F/U: HCPCS | Performed by: SURGERY

## 2024-04-29 RX ORDER — SODIUM CHLORIDE, SODIUM LACTATE, POTASSIUM CHLORIDE, CALCIUM CHLORIDE 600; 310; 30; 20 MG/100ML; MG/100ML; MG/100ML; MG/100ML
INJECTION, SOLUTION INTRAVENOUS CONTINUOUS
OUTPATIENT
Start: 2024-04-29

## 2024-04-29 RX ORDER — SODIUM CHLORIDE 9 MG/ML
INJECTION, SOLUTION INTRAVENOUS PRN
OUTPATIENT
Start: 2024-04-29

## 2024-04-29 RX ORDER — SODIUM CHLORIDE 0.9 % (FLUSH) 0.9 %
5-40 SYRINGE (ML) INJECTION EVERY 12 HOURS SCHEDULED
OUTPATIENT
Start: 2024-04-29

## 2024-04-29 RX ORDER — SODIUM CHLORIDE 0.9 % (FLUSH) 0.9 %
5-40 SYRINGE (ML) INJECTION PRN
OUTPATIENT
Start: 2024-04-29

## 2024-04-30 ENCOUNTER — TELEPHONE (OUTPATIENT)
Dept: WOMENS IMAGING | Age: 69
End: 2024-04-30

## 2024-04-30 ENCOUNTER — PREP FOR PROCEDURE (OUTPATIENT)
Dept: SURGERY | Age: 69
End: 2024-04-30

## 2024-04-30 DIAGNOSIS — Z91.89 AT HIGH RISK FOR BREAST CANCER: ICD-10-CM

## 2024-04-30 DIAGNOSIS — N60.92 ATYPICAL DUCTAL HYPERPLASIA OF LEFT BREAST: Primary | ICD-10-CM

## 2024-04-30 DIAGNOSIS — Z80.41 FAMILY HISTORY OF OVARIAN CANCER: ICD-10-CM

## 2024-04-30 DIAGNOSIS — N60.92 ATYPICAL DUCTAL HYPERPLASIA OF LEFT BREAST: ICD-10-CM

## 2024-04-30 NOTE — TELEPHONE ENCOUNTER
Nurse Navigator reviewed pre-procedure stereo breast Radio Frequency Identification Tag placement patient education information over the phone.  Reviewed medications and need to hold all blood thinners per instructions.    Patient should take all other medications as prescribed.  Patient to eat and drink as normal prior to the procedure.  Wear a bra with good support and a two piece outfit for comfort.  Patient can bring someone with you but you can also drive yourself.  Plan on being at the breast center for 2-2 and a half hours.  Reviewed the process of a rfid tag placement - sitting in a chair with your breast compressed similar to a mammogram with frequent images taken throughout the procedure.  The skin is cleaned and a local anesthetic is given to numb the area.  A small skin nick is made  A tiny tissue marker is then placed inside your breast at the site of the biopsy for future reference.    Then pressure is hold on the biopsy site to stop the bleeding and a bandage and waterproof dressing is applied.   A mammogram is then done to validate the tissue marker.  Patient verbalizes understanding.

## 2024-05-09 ENCOUNTER — TELEPHONE (OUTPATIENT)
Dept: SURGERY | Age: 69
End: 2024-05-09

## 2024-05-11 DIAGNOSIS — E78.2 MIXED HYPERLIPIDEMIA: ICD-10-CM

## 2024-05-11 DIAGNOSIS — F33.41 RECURRENT MAJOR DEPRESSIVE DISORDER, IN PARTIAL REMISSION (HCC): ICD-10-CM

## 2024-05-11 DIAGNOSIS — E11.42 TYPE 2 DIABETES MELLITUS WITH POLYNEUROPATHY (HCC): ICD-10-CM

## 2024-05-13 RX ORDER — TRAZODONE HYDROCHLORIDE 50 MG/1
50 TABLET ORAL NIGHTLY
Qty: 90 TABLET | Refills: 0 | Status: SHIPPED | OUTPATIENT
Start: 2024-05-13

## 2024-05-13 RX ORDER — DULOXETIN HYDROCHLORIDE 60 MG/1
60 CAPSULE, DELAYED RELEASE ORAL DAILY
Qty: 30 CAPSULE | Refills: 0 | Status: SHIPPED | OUTPATIENT
Start: 2024-05-13

## 2024-05-13 RX ORDER — ATORVASTATIN CALCIUM 20 MG/1
20 TABLET, FILM COATED ORAL DAILY
Qty: 90 TABLET | Refills: 1 | Status: SHIPPED | OUTPATIENT
Start: 2024-05-13

## 2024-05-13 NOTE — TELEPHONE ENCOUNTER
Last OV: 4/18/2024  Next OV: 6/10/2024    Next appointment due:around 2/13/2024     Last fill:2/12/24  Refills:0#90

## 2024-05-13 NOTE — TELEPHONE ENCOUNTER
Requested Prescriptions     Pending Prescriptions Disp Refills    atorvastatin (LIPITOR) 20 MG tablet [Pharmacy Med Name: ATORVASTATIN 20 MG TABLET] 90 tablet 1     Sig: TAKE 1 TABLET BY MOUTH DAILY       [facility] - [place], [place] - [address] -  [phone] - F [phone]  [address] [address]  Phone: [phone] Fax: [phone]    [id number] - [place], [place] - [address] [phone] - F [phone]  [address]  [place] [id number]  Phone: [phone] Fax: [phone]    [facility] [id number] [facility] [address] [phone] - F [phone]  [address]  [facility] [address]  Phone: [phone] Fax: [phone]

## 2024-05-14 ENCOUNTER — TELEPHONE (OUTPATIENT)
Dept: ENT CLINIC | Age: 69
End: 2024-05-14

## 2024-05-14 NOTE — TELEPHONE ENCOUNTER
Notified Judy of new surgery time on 6/18/24. Surgery will start @ 1145, arrival time is 1015. No other questions or concerns at this time.    ThanksYoanna

## 2024-05-20 ENCOUNTER — TELEPHONE (OUTPATIENT)
Dept: CARDIOLOGY CLINIC | Age: 69
End: 2024-05-20

## 2024-05-20 NOTE — TELEPHONE ENCOUNTER
CARDIAC CLEARANCE     What type of procedure are you having?  RFID Tag Insertion in L breast    Which physician is performing your procedure?  Dr. Roseanne Howard     When is your procedure scheduled for?  6/11    Where are you having this procedure?  [facility] [place]    Are you taking Blood Thinners?   If so what? (Name/dose/frequesncy)  Eliquis   Does the surgeon want you to stop your blood thinner?  If so for how long?  1 day prior    Phone Number and Contact Name for Physicians office:  Luis Eduardo: [phone]  Fax number to send information:  [phone]

## 2024-05-21 ENCOUNTER — TELEPHONE (OUTPATIENT)
Dept: INTERNAL MEDICINE CLINIC | Age: 69
End: 2024-05-21

## 2024-05-23 DIAGNOSIS — E11.42 TYPE 2 DIABETES MELLITUS WITH POLYNEUROPATHY (HCC): ICD-10-CM

## 2024-05-23 DIAGNOSIS — I10 PRIMARY HYPERTENSION: ICD-10-CM

## 2024-05-23 DIAGNOSIS — N18.30 STAGE 3 CHRONIC KIDNEY DISEASE, UNSPECIFIED WHETHER STAGE 3A OR 3B CKD (HCC): ICD-10-CM

## 2024-05-23 DIAGNOSIS — I25.10 CORONARY ARTERY DISEASE INVOLVING NATIVE CORONARY ARTERY OF NATIVE HEART WITHOUT ANGINA PECTORIS: ICD-10-CM

## 2024-05-23 LAB
ALBUMIN SERPL-MCNC: 3.9 G/DL (ref 3.4–5)
ALBUMIN/GLOB SERPL: 1.8 {RATIO} (ref 1.1–2.2)
ALP SERPL-CCNC: 137 U/L (ref 40–129)
ALT SERPL-CCNC: 11 U/L (ref 10–40)
ANION GAP SERPL CALCULATED.3IONS-SCNC: 7 MMOL/L (ref 3–16)
AST SERPL-CCNC: 14 U/L (ref 15–37)
BILIRUB SERPL-MCNC: <0.2 MG/DL (ref 0–1)
BUN SERPL-MCNC: 15 MG/DL (ref 7–20)
CALCIUM SERPL-MCNC: 8.9 MG/DL (ref 8.3–10.6)
CHLORIDE SERPL-SCNC: 104 MMOL/L (ref 99–110)
CHOLEST SERPL-MCNC: 140 MG/DL (ref 0–199)
CO2 SERPL-SCNC: 27 MMOL/L (ref 21–32)
CREAT SERPL-MCNC: 1.2 MG/DL (ref 0.6–1.2)
CREAT UR-MCNC: 143.9 MG/DL (ref 28–259)
GFR SERPLBLD CREATININE-BSD FMLA CKD-EPI: 49 ML/MIN/{1.73_M2}
GLUCOSE SERPL-MCNC: 115 MG/DL (ref 70–99)
HDLC SERPL-MCNC: 52 MG/DL (ref 40–60)
LDLC SERPL CALC-MCNC: 63 MG/DL
MICROALBUMIN UR DL<=1MG/L-MCNC: <1.2 MG/DL
MICROALBUMIN/CREAT UR: NORMAL MG/G (ref 0–30)
POTASSIUM SERPL-SCNC: 4.1 MMOL/L (ref 3.5–5.1)
PROT SERPL-MCNC: 6.1 G/DL (ref 6.4–8.2)
SODIUM SERPL-SCNC: 138 MMOL/L (ref 136–145)
TRIGL SERPL-MCNC: 127 MG/DL (ref 0–150)
TSH SERPL DL<=0.005 MIU/L-ACNC: 2.1 UIU/ML (ref 0.27–4.2)
VLDLC SERPL CALC-MCNC: 25 MG/DL

## 2024-05-24 LAB
EST. AVERAGE GLUCOSE BLD GHB EST-MCNC: 134.1 MG/DL
HBA1C MFR BLD: 6.3 %

## 2024-06-03 DIAGNOSIS — F33.41 RECURRENT MAJOR DEPRESSIVE DISORDER, IN PARTIAL REMISSION (HCC): ICD-10-CM

## 2024-06-03 RX ORDER — DULOXETIN HYDROCHLORIDE 60 MG/1
60 CAPSULE, DELAYED RELEASE ORAL DAILY
Qty: 30 CAPSULE | Refills: 0 | OUTPATIENT
Start: 2024-06-03

## 2024-06-03 NOTE — TELEPHONE ENCOUNTER
Last OV: 4/18/2024  Next OV: 6/10/2024    Next appointment due:around 2/13/2024     Last fill:5/13/24  Refills:0

## 2024-06-05 ENCOUNTER — TELEPHONE (OUTPATIENT)
Dept: ENT CLINIC | Age: 69
End: 2024-06-05

## 2024-06-08 DIAGNOSIS — F33.41 RECURRENT MAJOR DEPRESSIVE DISORDER, IN PARTIAL REMISSION (HCC): ICD-10-CM

## 2024-06-10 ENCOUNTER — OFFICE VISIT (OUTPATIENT)
Dept: CARDIOLOGY CLINIC | Age: 69
End: 2024-06-10

## 2024-06-10 ENCOUNTER — OFFICE VISIT (OUTPATIENT)
Dept: INTERNAL MEDICINE CLINIC | Age: 69
End: 2024-06-10
Payer: COMMERCIAL

## 2024-06-10 VITALS
WEIGHT: 168 LBS | OXYGEN SATURATION: 95 % | HEIGHT: 62 IN | SYSTOLIC BLOOD PRESSURE: 110 MMHG | HEART RATE: 90 BPM | BODY MASS INDEX: 30.91 KG/M2 | DIASTOLIC BLOOD PRESSURE: 66 MMHG

## 2024-06-10 VITALS
WEIGHT: 168.4 LBS | BODY MASS INDEX: 30.99 KG/M2 | HEART RATE: 93 BPM | HEIGHT: 62 IN | DIASTOLIC BLOOD PRESSURE: 72 MMHG | SYSTOLIC BLOOD PRESSURE: 128 MMHG | OXYGEN SATURATION: 95 %

## 2024-06-10 DIAGNOSIS — R06.02 SHORTNESS OF BREATH: ICD-10-CM

## 2024-06-10 DIAGNOSIS — N18.32 STAGE 3B CHRONIC KIDNEY DISEASE (HCC): ICD-10-CM

## 2024-06-10 DIAGNOSIS — E11.42 TYPE 2 DIABETES MELLITUS WITH DIABETIC POLYNEUROPATHY, WITH LONG-TERM CURRENT USE OF INSULIN (HCC): ICD-10-CM

## 2024-06-10 DIAGNOSIS — Z01.810 PREOP CARDIOVASCULAR EXAM: ICD-10-CM

## 2024-06-10 DIAGNOSIS — I10 PRIMARY HYPERTENSION: Primary | ICD-10-CM

## 2024-06-10 DIAGNOSIS — I25.10 CORONARY ARTERY DISEASE INVOLVING NATIVE CORONARY ARTERY OF NATIVE HEART WITHOUT ANGINA PECTORIS: ICD-10-CM

## 2024-06-10 DIAGNOSIS — G47.33 OSA (OBSTRUCTIVE SLEEP APNEA): ICD-10-CM

## 2024-06-10 DIAGNOSIS — I42.1 HOCM (HYPERTROPHIC OBSTRUCTIVE CARDIOMYOPATHY) (HCC): ICD-10-CM

## 2024-06-10 DIAGNOSIS — I42.1 HOCM (HYPERTROPHIC OBSTRUCTIVE CARDIOMYOPATHY) (HCC): Primary | ICD-10-CM

## 2024-06-10 DIAGNOSIS — E78.2 MIXED HYPERLIPIDEMIA: ICD-10-CM

## 2024-06-10 DIAGNOSIS — Q24.8 LEFT VENTRICULAR OUTFLOW TRACT OBSTRUCTION: ICD-10-CM

## 2024-06-10 DIAGNOSIS — R00.2 PALPITATION: ICD-10-CM

## 2024-06-10 DIAGNOSIS — I10 ESSENTIAL HYPERTENSION: ICD-10-CM

## 2024-06-10 DIAGNOSIS — R06.02 SOB (SHORTNESS OF BREATH): ICD-10-CM

## 2024-06-10 DIAGNOSIS — R07.9 CHEST PAIN, UNSPECIFIED TYPE: ICD-10-CM

## 2024-06-10 DIAGNOSIS — I48.91 ATRIAL FIBRILLATION, UNSPECIFIED TYPE (HCC): ICD-10-CM

## 2024-06-10 DIAGNOSIS — Z79.4 TYPE 2 DIABETES MELLITUS WITH DIABETIC POLYNEUROPATHY, WITH LONG-TERM CURRENT USE OF INSULIN (HCC): ICD-10-CM

## 2024-06-10 LAB
ANION GAP SERPL CALCULATED.3IONS-SCNC: 10 MMOL/L (ref 3–16)
BASOPHILS # BLD: 0 K/UL (ref 0–0.2)
BASOPHILS NFR BLD: 0.7 %
BUN SERPL-MCNC: 17 MG/DL (ref 7–20)
CALCIUM SERPL-MCNC: 10 MG/DL (ref 8.3–10.6)
CHLORIDE SERPL-SCNC: 105 MMOL/L (ref 99–110)
CK SERPL-CCNC: 64 U/L (ref 26–192)
CO2 SERPL-SCNC: 25 MMOL/L (ref 21–32)
CREAT SERPL-MCNC: 1.3 MG/DL (ref 0.6–1.2)
DEPRECATED RDW RBC AUTO: 13.7 % (ref 12.4–15.4)
EOSINOPHIL # BLD: 0.1 K/UL (ref 0–0.6)
EOSINOPHIL NFR BLD: 2.5 %
GFR SERPLBLD CREATININE-BSD FMLA CKD-EPI: 44 ML/MIN/{1.73_M2}
GLUCOSE SERPL-MCNC: 154 MG/DL (ref 70–99)
HCT VFR BLD AUTO: 40.9 % (ref 36–48)
HGB BLD-MCNC: 13.2 G/DL (ref 12–16)
LYMPHOCYTES # BLD: 2.1 K/UL (ref 1–5.1)
LYMPHOCYTES NFR BLD: 35.4 %
MCH RBC QN AUTO: 27.5 PG (ref 26–34)
MCHC RBC AUTO-ENTMCNC: 32.4 G/DL (ref 31–36)
MCV RBC AUTO: 85 FL (ref 80–100)
MONOCYTES # BLD: 0.2 K/UL (ref 0–1.3)
MONOCYTES NFR BLD: 3.5 %
NEUTROPHILS # BLD: 3.4 K/UL (ref 1.7–7.7)
NEUTROPHILS NFR BLD: 57.9 %
NT-PROBNP SERPL-MCNC: 103 PG/ML (ref 0–124)
PLATELET # BLD AUTO: 332 K/UL (ref 135–450)
PMV BLD AUTO: 9 FL (ref 5–10.5)
POTASSIUM SERPL-SCNC: 4.7 MMOL/L (ref 3.5–5.1)
RBC # BLD AUTO: 4.81 M/UL (ref 4–5.2)
SODIUM SERPL-SCNC: 140 MMOL/L (ref 136–145)
TROPONIN, HIGH SENSITIVITY: 12 NG/L (ref 0–14)
WBC # BLD AUTO: 5.8 K/UL (ref 4–11)

## 2024-06-10 PROCEDURE — 3044F HG A1C LEVEL LT 7.0%: CPT | Performed by: INTERNAL MEDICINE

## 2024-06-10 PROCEDURE — 3078F DIAST BP <80 MM HG: CPT | Performed by: INTERNAL MEDICINE

## 2024-06-10 PROCEDURE — 1123F ACP DISCUSS/DSCN MKR DOCD: CPT | Performed by: INTERNAL MEDICINE

## 2024-06-10 PROCEDURE — 99214 OFFICE O/P EST MOD 30 MIN: CPT | Performed by: INTERNAL MEDICINE

## 2024-06-10 PROCEDURE — G9899 SCRN MAM PERF RSLTS DOC: HCPCS | Performed by: INTERNAL MEDICINE

## 2024-06-10 PROCEDURE — 3017F COLORECTAL CA SCREEN DOC REV: CPT | Performed by: INTERNAL MEDICINE

## 2024-06-10 PROCEDURE — 3074F SYST BP LT 130 MM HG: CPT | Performed by: INTERNAL MEDICINE

## 2024-06-10 PROCEDURE — 1036F TOBACCO NON-USER: CPT | Performed by: INTERNAL MEDICINE

## 2024-06-10 PROCEDURE — 93000 ELECTROCARDIOGRAM COMPLETE: CPT | Performed by: INTERNAL MEDICINE

## 2024-06-10 PROCEDURE — G8417 CALC BMI ABV UP PARAM F/U: HCPCS | Performed by: INTERNAL MEDICINE

## 2024-06-10 PROCEDURE — G8427 DOCREV CUR MEDS BY ELIG CLIN: HCPCS | Performed by: INTERNAL MEDICINE

## 2024-06-10 PROCEDURE — 1090F PRES/ABSN URINE INCON ASSESS: CPT | Performed by: INTERNAL MEDICINE

## 2024-06-10 PROCEDURE — 2022F DILAT RTA XM EVC RTNOPTHY: CPT | Performed by: INTERNAL MEDICINE

## 2024-06-10 PROCEDURE — G8399 PT W/DXA RESULTS DOCUMENT: HCPCS | Performed by: INTERNAL MEDICINE

## 2024-06-10 RX ORDER — MORPHINE SULFATE 15 MG/1
15 TABLET ORAL EVERY 12 HOURS
COMMUNITY

## 2024-06-10 RX ORDER — DULOXETIN HYDROCHLORIDE 60 MG/1
60 CAPSULE, DELAYED RELEASE ORAL DAILY
Qty: 30 CAPSULE | Refills: 0 | Status: SHIPPED | OUTPATIENT
Start: 2024-06-10

## 2024-06-10 RX ORDER — CELECOXIB 200 MG/1
200 CAPSULE ORAL DAILY
COMMUNITY

## 2024-06-10 SDOH — ECONOMIC STABILITY: FOOD INSECURITY: WITHIN THE PAST 12 MONTHS, THE FOOD YOU BOUGHT JUST DIDN'T LAST AND YOU DIDN'T HAVE MONEY TO GET MORE.: NEVER TRUE

## 2024-06-10 SDOH — ECONOMIC STABILITY: INCOME INSECURITY: HOW HARD IS IT FOR YOU TO PAY FOR THE VERY BASICS LIKE FOOD, HOUSING, MEDICAL CARE, AND HEATING?: NOT HARD AT ALL

## 2024-06-10 SDOH — ECONOMIC STABILITY: FOOD INSECURITY: WITHIN THE PAST 12 MONTHS, YOU WORRIED THAT YOUR FOOD WOULD RUN OUT BEFORE YOU GOT MONEY TO BUY MORE.: NEVER TRUE

## 2024-06-10 ASSESSMENT — ENCOUNTER SYMPTOMS
CHEST TIGHTNESS: 0
SHORTNESS OF BREATH: 0

## 2024-06-10 NOTE — PROGRESS NOTES
Hypertension  This is a chronic problem. The current episode started more than 1 year ago. The problem is unchanged. Associated symptoms include chest pain and palpitations. Pertinent negatives include no peripheral edema, PND, shortness of breath or sweats.       Review of Systems   Constitutional:  Negative for weight gain.   Respiratory:  Negative for chest tightness and shortness of breath.    Cardiovascular:  Positive for chest pain and palpitations. Negative for leg swelling and PND.   Musculoskeletal:  Negative for muscle weakness.   Neurological:  Negative for dizziness.          Objective   Physical Exam  Vitals and nursing note reviewed.   Constitutional:       General: She is not in acute distress.     Appearance: Normal appearance.   HENT:      Head: Normocephalic and atraumatic.      Right Ear: Tympanic membrane normal.      Left Ear: Tympanic membrane normal.      Nose: Nose normal.   Eyes:      Extraocular Movements: Extraocular movements intact.      Conjunctiva/sclera: Conjunctivae normal.      Pupils: Pupils are equal, round, and reactive to light.   Neck:      Vascular: No carotid bruit.   Cardiovascular:      Rate and Rhythm: Normal rate and regular rhythm.      Pulses: Normal pulses.      Heart sounds: No murmur heard.  Pulmonary:      Effort: Pulmonary effort is normal. No respiratory distress.      Breath sounds: Normal breath sounds.   Abdominal:      General: Abdomen is flat. Bowel sounds are normal. There is no distension.      Palpations: Abdomen is soft.      Tenderness: There is no abdominal tenderness.   Musculoskeletal:         General: No swelling, tenderness or deformity.      Cervical back: Normal range of motion and neck supple. No rigidity or tenderness.      Right lower leg: No edema.      Left lower leg: No edema.   Lymphadenopathy:      Cervical: No cervical adenopathy.   Skin:     Coloration: Skin is not jaundiced.      Findings: No bruising, erythema or lesion.

## 2024-06-10 NOTE — PROGRESS NOTES
Name_______________________________________Printed:____________________  Date and time of surgery_6/14/2024____0730___________________Arrival Time:0600  masc_____/per office____   1. The instructions given regarding when and if a patient needs to stop oral intake prior to surgery varies.Follow the specific instructions you were given                  _x__Nothing to eat or to drink after Midnight the night before.                   ____Carbo loading or instructions will be given to select patients-if you have been given those instructions -please do the following                           The evening before your surgery after dinner before midnight drink 40 ounces of gatorade.If you are diabetic use sugar free.  The morning of surgery drink 40 ounces of water.This needs to be finished 3 hours prior to your surgery start time.    2. Take the following pills with a small sip of water on the morning of surgery_morphine, metoprolol,camzyos, omeprazole__________________________________________________                  Do not take blood pressure medications ending in pril or sartan the shant prior to surgery or the morning of surgery. Dr Way's patient are not to take any medications the AM of surgery.         3. Aspirin, Ibuprofen, Advil, Naproxen, Vitamin E and other Anti-inflammatory products and supplements should be stopped for 5 -7days before surgery or as directed by your physician.   4. Check with your Doctor regarding stopping Plavix, Coumadin,Eliquis, Lovenox,Effient,Pradaxa,Xarelto, Fragmin or other blood thinners and follow their instructions.   5. Do not smoke, and do not drink any alcoholic beverages 24 hours prior to surgery.  This includes NA Beer.Refrain from the usage of any recreational drugs.   6. You may brush your teeth and gargle the morning of surgery.  DO NOT SWALLOW WATER   7. You MUST make arrangements for a responsible adult to stay on site while you are here and take you home after your  given time.             18.  Please bring picture ID and insurance card.             19.  Visit our web site for additional information:  [email or web address]              20.During flu season no children under the age of 14 are permitted in the hospital for the safety of all patients.                              21. If you take a long acting insulin in the evening only  take half of your usual  dose the night  before your procedure              22. If you use a c-pap please bring DOS if staying overnight,             23.For your convenience [facility] has a pharmacy on site to fill your prescriptions.             24. If you use oxygen and have a portable tank please bring it  with you the DOS             25. Bring a complete list of all your medications with name and dose include any supplements.             26. Other__________________________________________   *Please call pre admission testing if you any further questions   [place]         [phone]   [place] [phone]   [place]            [phone]    [facility]  [phone]   [facility]   [phone]       VISITOR POLICY(subject to change)    Current policy is 2 visitors per patient. No children. Mask is  at the discretion of the facility. Visiting hours are 8a-8p. Overnight visitors will be at the discretion of the nurse. All policies subject to change.      All above information reviewed with patient in person or by phone.Patient verbalizes understanding.All questions and concerns addressed.                                                                                                 Patient/Rep_patient___________________                                                                                                                                    PRE OP INSTRUCTIONS

## 2024-06-10 NOTE — PROGRESS NOTES
[facility]  Advanced CHF/Pulmonary Hypertension   Cardiac Evaluation      Judy Gallardo  YOB: 1955    Date of Visit:  6/10/24    Chief Complaint   Patient presents with    Cardiac Clearance     Lumpectomy and Inspire     Shortness of Breath    Dizziness    Congestive Heart Failure     History of Present Illness:  Judy Gallardo is a 68 y.o. female who presents from referral from Dr. Anne for consultation and management of mild HOCM.    She has a past medical history of type 2 diabetes, CKD 3, HTN.  She was in [place] and came home on 6/24/22.  While in the airport, she began to have midsternal chest pressure 7/10 with SOB and diffuse diaphoresis and syncope and collapse  She was advised to go the ER and did not.  She flew back to [place] and has been having exertional dyspnea and chest pain associate with diaphoresis.  She had a did have a negative left heart cath September 2021.  She was started on Toprol 25mg on DC on 6/24/22    She had a 30 day monitor recommended along with genetic testing.     She grew up in an orphanage.  She was always told she could not play and made to read in the Orphanage because she would pass out.  She would pass out with activity.  States this has been a long standing problem.  It would happen if she would \"lift something\" or walk too long.  It would happen in the heat.  She states she stays dehydrated.  She states her BP would not stabilize and when she stood up.  Since being started on Toprol, she feels less dizzy.   She uses a rolling walker for balance.  She states, when the episodes happen, she cannot cool herself off.  She has a chair lift at home.      She knows info about her Mother's mother. No heart disease  She has no known information about her father.   She has a sister without heart problems  She has children.    Her Toprol was increased to 50mg daily on 8/1/22.      Genetic Testing [facility]: Received 8/24/22  Result: 8/30/22  No

## 2024-06-11 ENCOUNTER — HOSPITAL ENCOUNTER (OUTPATIENT)
Dept: WOMENS IMAGING | Age: 69
Discharge: HOME | End: 2024-06-11
Payer: COMMERCIAL

## 2024-06-11 ENCOUNTER — TELEPHONE (OUTPATIENT)
Dept: CARDIOLOGY CLINIC | Age: 69
End: 2024-06-11

## 2024-06-11 DIAGNOSIS — Z91.89 AT HIGH RISK FOR BREAST CANCER: ICD-10-CM

## 2024-06-11 DIAGNOSIS — N60.92 ATYPICAL DUCTAL HYPERPLASIA OF LEFT BREAST: ICD-10-CM

## 2024-06-11 DIAGNOSIS — N18.32 STAGE 3B CHRONIC KIDNEY DISEASE (HCC): ICD-10-CM

## 2024-06-11 DIAGNOSIS — N18.32 STAGE 3B CHRONIC KIDNEY DISEASE (HCC): Primary | ICD-10-CM

## 2024-06-11 DIAGNOSIS — Z80.41 FAMILY HISTORY OF OVARIAN CANCER: ICD-10-CM

## 2024-06-11 LAB
ANION GAP SERPL CALCULATED.3IONS-SCNC: -2 MMOL/L (ref 3–16)
BUN SERPL-MCNC: 19 MG/DL (ref 7–20)
CALCIUM SERPL-MCNC: 9.8 MG/DL (ref 8.3–10.6)
CHLORIDE SERPL-SCNC: 108 MMOL/L (ref 99–110)
CO2 SERPL-SCNC: 27 MMOL/L (ref 21–32)
CREAT SERPL-MCNC: 1.4 MG/DL (ref 0.6–1.2)
GFR SERPLBLD CREATININE-BSD FMLA CKD-EPI: 40 ML/MIN/{1.73_M2}
GLUCOSE SERPL-MCNC: 204 MG/DL (ref 70–99)
POTASSIUM SERPL-SCNC: 4.2 MMOL/L (ref 3.5–5.1)
SODIUM SERPL-SCNC: 133 MMOL/L (ref 136–145)

## 2024-06-11 PROCEDURE — 19281 PERQ DEVICE BREAST 1ST IMAG: CPT

## 2024-06-11 PROCEDURE — 2709999900 MAM NEEDLE BREAST LOCALIZATION LEFT

## 2024-06-11 PROCEDURE — 2500000003 HC RX 250 WO HCPCS: Performed by: RADIOLOGY

## 2024-06-11 RX ORDER — LIDOCAINE HYDROCHLORIDE 10 MG/ML
5 INJECTION, SOLUTION INFILTRATION; PERINEURAL ONCE
Status: COMPLETED | OUTPATIENT
Start: 2024-06-11 | End: 2024-06-11

## 2024-06-11 RX ADMIN — LIDOCAINE HYDROCHLORIDE ANHYDROUS 3.5 ML: 10 INJECTION, SOLUTION INFILTRATION at 11:36

## 2024-06-11 RX ADMIN — LIDOCAINE HYDROCHLORIDE 5 ML: 10 INJECTION, SOLUTION EPIDURAL; INFILTRATION; INTRACAUDAL; PERINEURAL at 11:35

## 2024-06-11 ASSESSMENT — PAIN SCALES - GENERAL
PAINLEVEL_OUTOF10: 0
PAINLEVEL_OUTOF10: 0

## 2024-06-11 NOTE — TELEPHONE ENCOUNTER
Calvin Kuhn RN  6/11/2024  5:19 PM EDT Back to Top      HF risk assessment form and EKG from 6/10/234 faxed to Dr. Bowie at [phone]. Confirmation received.    Nava Hu MD  6/11/2024  5:10 PM EDT       See below.  We can send in her cardiac clearance form.     Judy, your stress echo looks very normal.  Heart function is normal and no evidence of heart blockages.  It is okay to proceed with your surgery later this week!  Nava Hu     I did call Judy to let her know as well.

## 2024-06-11 NOTE — PROGRESS NOTES
Patient here for left breast Radiofrequency Identification Tag Placement.  . NN reviewed the health history, allergies and medications.  Patient held Eliquis.  , Gary came with patient.  Radiologist reviews procedure with patient, consent signed. Patient tolerates procedure well. Compression held. Site cleansed with chloraprep, steri strips and dry dressing applied. Ice pack in place. Reviewed discharge instructions with patient and signed copy. Patient verbalized understanding and agreed to contact Nurse Navigator with any questions. Patient A&Ox3, steady on feet and discharged home.

## 2024-06-11 NOTE — RESULT ENCOUNTER NOTE
Please let the patient know that results were acceptable with exception of her kidney function did change to have dropped some.  Patient is advised to hydrate well and repeat the test in 1 to 2 days

## 2024-06-12 DIAGNOSIS — N18.32 STAGE 3B CHRONIC KIDNEY DISEASE (HCC): ICD-10-CM

## 2024-06-12 DIAGNOSIS — N18.32 STAGE 3B CHRONIC KIDNEY DISEASE (HCC): Primary | ICD-10-CM

## 2024-06-12 LAB
ANION GAP SERPL CALCULATED.3IONS-SCNC: 8 MMOL/L (ref 3–16)
BUN SERPL-MCNC: 19 MG/DL (ref 7–20)
CALCIUM SERPL-MCNC: 9.8 MG/DL (ref 8.3–10.6)
CHLORIDE SERPL-SCNC: 101 MMOL/L (ref 99–110)
CO2 SERPL-SCNC: 27 MMOL/L (ref 21–32)
CREAT SERPL-MCNC: 1.3 MG/DL (ref 0.6–1.2)
GFR SERPLBLD CREATININE-BSD FMLA CKD-EPI: 44 ML/MIN/{1.73_M2}
GLUCOSE SERPL-MCNC: 130 MG/DL (ref 70–99)
POTASSIUM SERPL-SCNC: 5 MMOL/L (ref 3.5–5.1)
SODIUM SERPL-SCNC: 136 MMOL/L (ref 136–145)

## 2024-06-12 NOTE — RESULT ENCOUNTER NOTE
Please let the patient know that results are going the wrong direction and the kidney function has gotten somewhat worse, at this point I would like the patient to stop any medication can affect kidney function like Celebrex and to increase her fluid intake and repeat her blood test today if her kidney function does not improve she may need to have IV hydration please check with the patient make sure she is drinking more fluids and let me

## 2024-06-13 ENCOUNTER — PATIENT MESSAGE (OUTPATIENT)
Dept: INTERNAL MEDICINE CLINIC | Age: 69
End: 2024-06-13

## 2024-06-13 ENCOUNTER — PREP FOR PROCEDURE (OUTPATIENT)
Dept: OTOLARYNGOLOGY | Age: 69
End: 2024-06-13

## 2024-06-13 DIAGNOSIS — K21.9 GASTROESOPHAGEAL REFLUX DISEASE, UNSPECIFIED WHETHER ESOPHAGITIS PRESENT: ICD-10-CM

## 2024-06-13 RX ORDER — SODIUM CHLORIDE 0.9 % (FLUSH) 0.9 %
5-40 SYRINGE (ML) INJECTION PRN
Status: CANCELLED | OUTPATIENT
Start: 2024-06-13

## 2024-06-13 RX ORDER — SODIUM CHLORIDE 0.9 % (FLUSH) 0.9 %
5-40 SYRINGE (ML) INJECTION EVERY 12 HOURS SCHEDULED
Status: CANCELLED | OUTPATIENT
Start: 2024-06-13

## 2024-06-13 RX ORDER — SODIUM CHLORIDE 9 MG/ML
INJECTION, SOLUTION INTRAVENOUS PRN
Status: CANCELLED | OUTPATIENT
Start: 2024-06-13

## 2024-06-13 NOTE — RESULT ENCOUNTER NOTE
Please let the patient know that results were acceptable, the kidney function is improving please advise the patient to continue to hydrate

## 2024-06-14 ENCOUNTER — HOSPITAL ENCOUNTER (OUTPATIENT)
Age: 69
Setting detail: OUTPATIENT SURGERY
Discharge: HOME | End: 2024-06-14
Attending: STUDENT IN AN ORGANIZED HEALTH CARE EDUCATION/TRAINING PROGRAM | Admitting: STUDENT IN AN ORGANIZED HEALTH CARE EDUCATION/TRAINING PROGRAM
Payer: MEDICARE

## 2024-06-14 ENCOUNTER — ANESTHESIA (OUTPATIENT)
Dept: OPERATING ROOM | Age: 69
End: 2024-06-14
Payer: MEDICARE

## 2024-06-14 ENCOUNTER — APPOINTMENT (OUTPATIENT)
Dept: GENERAL RADIOLOGY | Age: 69
End: 2024-06-14
Attending: STUDENT IN AN ORGANIZED HEALTH CARE EDUCATION/TRAINING PROGRAM
Payer: MEDICARE

## 2024-06-14 ENCOUNTER — ANESTHESIA EVENT (OUTPATIENT)
Dept: OPERATING ROOM | Age: 69
End: 2024-06-14
Payer: MEDICARE

## 2024-06-14 VITALS
BODY MASS INDEX: 28.86 KG/M2 | OXYGEN SATURATION: 98 % | SYSTOLIC BLOOD PRESSURE: 154 MMHG | TEMPERATURE: 97.5 F | HEIGHT: 63 IN | HEART RATE: 89 BPM | DIASTOLIC BLOOD PRESSURE: 81 MMHG | WEIGHT: 162.9 LBS | RESPIRATION RATE: 22 BRPM

## 2024-06-14 DIAGNOSIS — G89.18 ACUTE POST-OPERATIVE PAIN: Primary | ICD-10-CM

## 2024-06-14 LAB
GLUCOSE BLD-MCNC: 117 MG/DL (ref 70–99)
GLUCOSE BLD-MCNC: 124 MG/DL (ref 70–99)
GLUCOSE BLD-MCNC: 150 MG/DL (ref 70–99)
PERFORMED ON: ABNORMAL

## 2024-06-14 PROCEDURE — 2709999900 HC NON-CHARGEABLE SUPPLY: Performed by: STUDENT IN AN ORGANIZED HEALTH CARE EDUCATION/TRAINING PROGRAM

## 2024-06-14 PROCEDURE — 2500000003 HC RX 250 WO HCPCS: Performed by: STUDENT IN AN ORGANIZED HEALTH CARE EDUCATION/TRAINING PROGRAM

## 2024-06-14 PROCEDURE — P9045 ALBUMIN (HUMAN), 5%, 250 ML: HCPCS | Performed by: NURSE ANESTHETIST, CERTIFIED REGISTERED

## 2024-06-14 PROCEDURE — 7100000011 HC PHASE II RECOVERY - ADDTL 15 MIN: Performed by: STUDENT IN AN ORGANIZED HEALTH CARE EDUCATION/TRAINING PROGRAM

## 2024-06-14 PROCEDURE — 2720000010 HC SURG SUPPLY STERILE: Performed by: STUDENT IN AN ORGANIZED HEALTH CARE EDUCATION/TRAINING PROGRAM

## 2024-06-14 PROCEDURE — 71045 X-RAY EXAM CHEST 1 VIEW: CPT

## 2024-06-14 PROCEDURE — 7100000010 HC PHASE II RECOVERY - FIRST 15 MIN: Performed by: STUDENT IN AN ORGANIZED HEALTH CARE EDUCATION/TRAINING PROGRAM

## 2024-06-14 PROCEDURE — 2580000003 HC RX 258: Performed by: NURSE ANESTHETIST, CERTIFIED REGISTERED

## 2024-06-14 PROCEDURE — 7100000001 HC PACU RECOVERY - ADDTL 15 MIN: Performed by: STUDENT IN AN ORGANIZED HEALTH CARE EDUCATION/TRAINING PROGRAM

## 2024-06-14 PROCEDURE — 3600000004 HC SURGERY LEVEL 4 BASE: Performed by: STUDENT IN AN ORGANIZED HEALTH CARE EDUCATION/TRAINING PROGRAM

## 2024-06-14 PROCEDURE — 70360 X-RAY EXAM OF NECK: CPT

## 2024-06-14 PROCEDURE — 2580000003 HC RX 258: Performed by: STUDENT IN AN ORGANIZED HEALTH CARE EDUCATION/TRAINING PROGRAM

## 2024-06-14 PROCEDURE — 3700000001 HC ADD 15 MINUTES (ANESTHESIA): Performed by: STUDENT IN AN ORGANIZED HEALTH CARE EDUCATION/TRAINING PROGRAM

## 2024-06-14 PROCEDURE — 7100000000 HC PACU RECOVERY - FIRST 15 MIN: Performed by: STUDENT IN AN ORGANIZED HEALTH CARE EDUCATION/TRAINING PROGRAM

## 2024-06-14 PROCEDURE — 3700000000 HC ANESTHESIA ATTENDED CARE: Performed by: STUDENT IN AN ORGANIZED HEALTH CARE EDUCATION/TRAINING PROGRAM

## 2024-06-14 PROCEDURE — 2500000003 HC RX 250 WO HCPCS: Performed by: NURSE ANESTHETIST, CERTIFIED REGISTERED

## 2024-06-14 PROCEDURE — 64582 OPN MPLTJ HPGLSL NSTM ARY PG: CPT | Performed by: STUDENT IN AN ORGANIZED HEALTH CARE EDUCATION/TRAINING PROGRAM

## 2024-06-14 PROCEDURE — C1778 LEAD, NEUROSTIMULATOR: HCPCS | Performed by: STUDENT IN AN ORGANIZED HEALTH CARE EDUCATION/TRAINING PROGRAM

## 2024-06-14 PROCEDURE — C1767 GENERATOR, NEURO NON-RECHARG: HCPCS | Performed by: STUDENT IN AN ORGANIZED HEALTH CARE EDUCATION/TRAINING PROGRAM

## 2024-06-14 PROCEDURE — 6360000002 HC RX W HCPCS: Performed by: NURSE ANESTHETIST, CERTIFIED REGISTERED

## 2024-06-14 PROCEDURE — 3600000014 HC SURGERY LEVEL 4 ADDTL 15MIN: Performed by: STUDENT IN AN ORGANIZED HEALTH CARE EDUCATION/TRAINING PROGRAM

## 2024-06-14 PROCEDURE — 6360000002 HC RX W HCPCS: Performed by: STUDENT IN AN ORGANIZED HEALTH CARE EDUCATION/TRAINING PROGRAM

## 2024-06-14 DEVICE — LEAD STIMULATION INSPIRE: Type: IMPLANTABLE DEVICE | Site: NECK | Status: FUNCTIONAL

## 2024-06-14 DEVICE — GENERATOR PULSE IMPLANTABLE INSPIRE: Type: IMPLANTABLE DEVICE | Site: CHEST | Status: FUNCTIONAL

## 2024-06-14 DEVICE — LEAD SENSING RESPIRATORY INSPIRE: Type: IMPLANTABLE DEVICE | Site: CHEST | Status: FUNCTIONAL

## 2024-06-14 RX ORDER — NALOXONE HYDROCHLORIDE 0.4 MG/ML
INJECTION, SOLUTION INTRAMUSCULAR; INTRAVENOUS; SUBCUTANEOUS PRN
Status: DISCONTINUED | OUTPATIENT
Start: 2024-06-14 | End: 2024-06-14 | Stop reason: HOSPADM

## 2024-06-14 RX ORDER — SODIUM CHLORIDE, SODIUM LACTATE, POTASSIUM CHLORIDE, CALCIUM CHLORIDE 600; 310; 30; 20 MG/100ML; MG/100ML; MG/100ML; MG/100ML
INJECTION, SOLUTION INTRAVENOUS CONTINUOUS PRN
Status: DISCONTINUED | OUTPATIENT
Start: 2024-06-14 | End: 2024-06-14 | Stop reason: SDUPTHER

## 2024-06-14 RX ORDER — SODIUM CHLORIDE 9 MG/ML
INJECTION, SOLUTION INTRAVENOUS PRN
Status: DISCONTINUED | OUTPATIENT
Start: 2024-06-14 | End: 2024-06-14 | Stop reason: HOSPADM

## 2024-06-14 RX ORDER — VASOPRESSIN 20 U/ML
INJECTION PARENTERAL PRN
Status: DISCONTINUED | OUTPATIENT
Start: 2024-06-14 | End: 2024-06-14 | Stop reason: SDUPTHER

## 2024-06-14 RX ORDER — ALBUMIN, HUMAN INJ 5% 5 %
SOLUTION INTRAVENOUS PRN
Status: DISCONTINUED | OUTPATIENT
Start: 2024-06-14 | End: 2024-06-14 | Stop reason: SDUPTHER

## 2024-06-14 RX ORDER — FENTANYL CITRATE 50 UG/ML
50 INJECTION, SOLUTION INTRAMUSCULAR; INTRAVENOUS EVERY 5 MIN PRN
Status: DISCONTINUED | OUTPATIENT
Start: 2024-06-14 | End: 2024-06-14 | Stop reason: HOSPADM

## 2024-06-14 RX ORDER — LABETALOL HYDROCHLORIDE 5 MG/ML
5 INJECTION, SOLUTION INTRAVENOUS
Status: DISCONTINUED | OUTPATIENT
Start: 2024-06-14 | End: 2024-06-14 | Stop reason: HOSPADM

## 2024-06-14 RX ORDER — CEPHALEXIN 500 MG/1
500 CAPSULE ORAL 3 TIMES DAILY
Qty: 15 CAPSULE | Refills: 0 | Status: SHIPPED | OUTPATIENT
Start: 2024-06-14 | End: 2024-06-19

## 2024-06-14 RX ORDER — ONDANSETRON 2 MG/ML
INJECTION INTRAMUSCULAR; INTRAVENOUS PRN
Status: DISCONTINUED | OUTPATIENT
Start: 2024-06-14 | End: 2024-06-14 | Stop reason: SDUPTHER

## 2024-06-14 RX ORDER — OXYCODONE HYDROCHLORIDE AND ACETAMINOPHEN 5; 325 MG/1; MG/1
1 TABLET ORAL EVERY 6 HOURS PRN
Qty: 16 TABLET | Refills: 0 | Status: SHIPPED | OUTPATIENT
Start: 2024-06-14 | End: 2024-06-18

## 2024-06-14 RX ORDER — PHENYLEPHRINE HCL IN 0.9% NACL 1 MG/10 ML
SYRINGE (ML) INTRAVENOUS PRN
Status: DISCONTINUED | OUTPATIENT
Start: 2024-06-14 | End: 2024-06-14 | Stop reason: SDUPTHER

## 2024-06-14 RX ORDER — SODIUM CHLORIDE 0.9 % (FLUSH) 0.9 %
5-40 SYRINGE (ML) INJECTION EVERY 12 HOURS SCHEDULED
Status: DISCONTINUED | OUTPATIENT
Start: 2024-06-14 | End: 2024-06-14 | Stop reason: HOSPADM

## 2024-06-14 RX ORDER — FENTANYL CITRATE 50 UG/ML
INJECTION, SOLUTION INTRAMUSCULAR; INTRAVENOUS PRN
Status: DISCONTINUED | OUTPATIENT
Start: 2024-06-14 | End: 2024-06-14 | Stop reason: SDUPTHER

## 2024-06-14 RX ORDER — GLYCOPYRROLATE 0.2 MG/ML
INJECTION INTRAMUSCULAR; INTRAVENOUS PRN
Status: DISCONTINUED | OUTPATIENT
Start: 2024-06-14 | End: 2024-06-14 | Stop reason: SDUPTHER

## 2024-06-14 RX ORDER — CALCIUM CHLORIDE 100 MG/ML
INJECTION INTRAVENOUS; INTRAVENTRICULAR PRN
Status: DISCONTINUED | OUTPATIENT
Start: 2024-06-14 | End: 2024-06-14 | Stop reason: SDUPTHER

## 2024-06-14 RX ORDER — REMIFENTANIL HYDROCHLORIDE 1 MG/ML
INJECTION, POWDER, LYOPHILIZED, FOR SOLUTION INTRAVENOUS CONTINUOUS PRN
Status: DISCONTINUED | OUTPATIENT
Start: 2024-06-14 | End: 2024-06-14 | Stop reason: SDUPTHER

## 2024-06-14 RX ORDER — FENTANYL CITRATE 50 UG/ML
25 INJECTION, SOLUTION INTRAMUSCULAR; INTRAVENOUS EVERY 5 MIN PRN
Status: DISCONTINUED | OUTPATIENT
Start: 2024-06-14 | End: 2024-06-14 | Stop reason: HOSPADM

## 2024-06-14 RX ORDER — PROPOFOL 10 MG/ML
INJECTION, EMULSION INTRAVENOUS PRN
Status: DISCONTINUED | OUTPATIENT
Start: 2024-06-14 | End: 2024-06-14 | Stop reason: SDUPTHER

## 2024-06-14 RX ORDER — SODIUM CHLORIDE 9 MG/ML
INJECTION, SOLUTION INTRAVENOUS CONTINUOUS
Status: DISCONTINUED | OUTPATIENT
Start: 2024-06-14 | End: 2024-06-14 | Stop reason: HOSPADM

## 2024-06-14 RX ORDER — LIDOCAINE HYDROCHLORIDE 10 MG/ML
1 INJECTION, SOLUTION EPIDURAL; INFILTRATION; INTRACAUDAL; PERINEURAL
Status: DISCONTINUED | OUTPATIENT
Start: 2024-06-14 | End: 2024-06-14 | Stop reason: HOSPADM

## 2024-06-14 RX ORDER — SODIUM CHLORIDE 0.9 % (FLUSH) 0.9 %
5-40 SYRINGE (ML) INJECTION PRN
Status: DISCONTINUED | OUTPATIENT
Start: 2024-06-14 | End: 2024-06-14 | Stop reason: HOSPADM

## 2024-06-14 RX ORDER — ONDANSETRON HYDROCHLORIDE 8 MG/1
8 TABLET, FILM COATED ORAL EVERY 8 HOURS PRN
Qty: 10 TABLET | Refills: 0 | Status: SHIPPED | OUTPATIENT
Start: 2024-06-14

## 2024-06-14 RX ORDER — ONDANSETRON 2 MG/ML
4 INJECTION INTRAMUSCULAR; INTRAVENOUS
Status: DISCONTINUED | OUTPATIENT
Start: 2024-06-14 | End: 2024-06-14 | Stop reason: HOSPADM

## 2024-06-14 RX ORDER — EPHEDRINE SULFATE 50 MG/ML
INJECTION INTRAVENOUS PRN
Status: DISCONTINUED | OUTPATIENT
Start: 2024-06-14 | End: 2024-06-14 | Stop reason: SDUPTHER

## 2024-06-14 RX ORDER — DEXAMETHASONE SODIUM PHOSPHATE 4 MG/ML
INJECTION, SOLUTION INTRA-ARTICULAR; INTRALESIONAL; INTRAMUSCULAR; INTRAVENOUS; SOFT TISSUE PRN
Status: DISCONTINUED | OUTPATIENT
Start: 2024-06-14 | End: 2024-06-14 | Stop reason: SDUPTHER

## 2024-06-14 RX ORDER — LIDOCAINE HCL/EPINEPHRINE/PF 2%-1:200K
VIAL (ML) INJECTION
Status: COMPLETED | OUTPATIENT
Start: 2024-06-14 | End: 2024-06-14

## 2024-06-14 RX ORDER — SUCCINYLCHOLINE/SOD CL,ISO/PF 200MG/10ML
SYRINGE (ML) INTRAVENOUS PRN
Status: DISCONTINUED | OUTPATIENT
Start: 2024-06-14 | End: 2024-06-14 | Stop reason: SDUPTHER

## 2024-06-14 RX ADMIN — PROPOFOL 150 MG: 10 INJECTION, EMULSION INTRAVENOUS at 07:39

## 2024-06-14 RX ADMIN — SODIUM CHLORIDE 1000 ML: 9 INJECTION, SOLUTION INTRAVENOUS at 06:32

## 2024-06-14 RX ADMIN — VASOPRESSIN 0.5 UNITS: 20 INJECTION INTRAVENOUS at 08:12

## 2024-06-14 RX ADMIN — DEXAMETHASONE SODIUM PHOSPHATE 8 MG: 4 INJECTION, SOLUTION INTRAMUSCULAR; INTRAVENOUS at 07:42

## 2024-06-14 RX ADMIN — VASOPRESSIN 0.5 UNITS: 20 INJECTION INTRAVENOUS at 08:19

## 2024-06-14 RX ADMIN — CALCIUM CHLORIDE 0.1 G: 100 INJECTION INTRAVENOUS; INTRAVENTRICULAR at 09:31

## 2024-06-14 RX ADMIN — ONDANSETRON 4 MG: 2 INJECTION INTRAMUSCULAR; INTRAVENOUS at 10:28

## 2024-06-14 RX ADMIN — CALCIUM CHLORIDE 0.2 G: 100 INJECTION INTRAVENOUS; INTRAVENTRICULAR at 10:10

## 2024-06-14 RX ADMIN — FENTANYL CITRATE 50 MCG: 50 INJECTION, SOLUTION INTRAMUSCULAR; INTRAVENOUS at 07:55

## 2024-06-14 RX ADMIN — Medication 300 MCG: at 08:10

## 2024-06-14 RX ADMIN — CALCIUM CHLORIDE 0.2 G: 100 INJECTION INTRAVENOUS; INTRAVENTRICULAR at 10:03

## 2024-06-14 RX ADMIN — CALCIUM CHLORIDE 0.2 G: 100 INJECTION INTRAVENOUS; INTRAVENTRICULAR at 09:43

## 2024-06-14 RX ADMIN — EPHEDRINE SULFATE 10 MG: 50 INJECTION, SOLUTION INTRAVENOUS at 09:10

## 2024-06-14 RX ADMIN — CEFAZOLIN 2000 MG: 2 INJECTION, POWDER, FOR SOLUTION INTRAMUSCULAR; INTRAVENOUS at 07:42

## 2024-06-14 RX ADMIN — CALCIUM CHLORIDE 0.2 G: 100 INJECTION INTRAVENOUS; INTRAVENTRICULAR at 09:57

## 2024-06-14 RX ADMIN — PROPOFOL 50 MG: 10 INJECTION, EMULSION INTRAVENOUS at 10:43

## 2024-06-14 RX ADMIN — VASOPRESSIN 0.5 UNITS: 20 INJECTION INTRAVENOUS at 08:35

## 2024-06-14 RX ADMIN — EPHEDRINE SULFATE 20 MG: 50 INJECTION, SOLUTION INTRAVENOUS at 08:08

## 2024-06-14 RX ADMIN — VASOPRESSIN 0.5 UNITS: 20 INJECTION INTRAVENOUS at 08:57

## 2024-06-14 RX ADMIN — Medication 100 MCG: at 08:44

## 2024-06-14 RX ADMIN — ALBUMIN (HUMAN) 12.5 G: 12.5 INJECTION, SOLUTION INTRAVENOUS at 09:26

## 2024-06-14 RX ADMIN — GLYCOPYRROLATE 0.1 MG: 0.2 INJECTION INTRAMUSCULAR; INTRAVENOUS at 08:14

## 2024-06-14 RX ADMIN — VASOPRESSIN 0.5 UNITS: 20 INJECTION INTRAVENOUS at 09:06

## 2024-06-14 RX ADMIN — FENTANYL CITRATE 50 MCG: 50 INJECTION, SOLUTION INTRAMUSCULAR; INTRAVENOUS at 10:42

## 2024-06-14 RX ADMIN — Medication 300 MCG: at 08:06

## 2024-06-14 RX ADMIN — Medication 100 MCG: at 08:23

## 2024-06-14 RX ADMIN — REMIFENTANIL HYDROCHLORIDE 0.15 MCG/KG/MIN: 1 INJECTION, POWDER, LYOPHILIZED, FOR SOLUTION INTRAVENOUS at 07:50

## 2024-06-14 RX ADMIN — Medication 100 MCG: at 08:01

## 2024-06-14 RX ADMIN — Medication 100 MCG: at 08:35

## 2024-06-14 RX ADMIN — VASOPRESSIN 0.5 UNITS: 20 INJECTION INTRAVENOUS at 08:25

## 2024-06-14 RX ADMIN — VASOPRESSIN 0.5 UNITS: 20 INJECTION INTRAVENOUS at 08:52

## 2024-06-14 RX ADMIN — Medication 100 MG: at 07:39

## 2024-06-14 RX ADMIN — SODIUM CHLORIDE: 9 INJECTION, SOLUTION INTRAVENOUS at 09:00

## 2024-06-14 RX ADMIN — SODIUM CHLORIDE, POTASSIUM CHLORIDE, SODIUM LACTATE AND CALCIUM CHLORIDE: 600; 310; 30; 20 INJECTION, SOLUTION INTRAVENOUS at 07:42

## 2024-06-14 RX ADMIN — CALCIUM CHLORIDE 0.1 G: 100 INJECTION INTRAVENOUS; INTRAVENTRICULAR at 09:26

## 2024-06-14 RX ADMIN — EPHEDRINE SULFATE 10 MG: 50 INJECTION, SOLUTION INTRAVENOUS at 08:05

## 2024-06-14 ASSESSMENT — PAIN - FUNCTIONAL ASSESSMENT: PAIN_FUNCTIONAL_ASSESSMENT: 0-10

## 2024-06-14 NOTE — PROGRESS NOTES
Discharge instructions review with patient and  Kerwin. Pt discharged via wheelchair. Pt discharged with [employer] and all other belongings. Kerwin taking stable pt home.

## 2024-06-14 NOTE — ANESTHESIA POSTPROCEDURE EVALUATION
Department of Anesthesiology  Postprocedure Note    Patient: Judy Gallardo  MRN: [id number]  YOB: 1955  Date of evaluation: 6/14/2024    Procedure Summary       Date: 06/14/24 Room / Location: [address]    Anesthesia Start: 0730 Anesthesia Stop: 1109    Procedure: INSPIRE IMPLANTATION, RIGHT NECK AND CHEST (Right: Chest) Diagnosis:       WATSON (obstructive sleep apnea)      (WATSON (obstructive sleep apnea) [G47.33])    Surgeons: Oracio Bowie DO Responsible Provider: Lei Villasenor MD    Anesthesia Type: general ASA Status: 4            Anesthesia Type: No value filed.    Diana Phase I: Diana Score: 10    Diana Phase II:      Anesthesia Post Evaluation    Patient location during evaluation: PACU  Patient participation: complete - patient participated  Level of consciousness: awake and alert  Airway patency: patent  Nausea & Vomiting: no nausea and no vomiting  Cardiovascular status: blood pressure returned to baseline  Respiratory status: acceptable  Hydration status: euvolemic  Multimodal analgesia pain management approach  Pain management: adequate    No notable events documented.

## 2024-06-14 NOTE — DISCHARGE INSTRUCTIONS
Inspire Post-Operative Instructions    DIET:  Resume normal diet    HYGIENE:  Please wait until 48 hours after surgery before getting incisions on neck and chest wet.  In the first 48 hours after surgery, will likely need to take sponge baths.    WOUND CARE:  Please leave pressure dressing on for 24 hours after surgery.  After 48 hours, you may get incisions wet with warm soap and water, but do not soak the incisions or let shower water hit it directly.  Pat area dry gently.   Take oral antibiotics as prescribed (Keflex)  If skin around incision starts to get red (> 1cm), swollen, and/or more painful, please call the office    ACTIVITY:  Try to avoid sleeping on the side of your surgery, to the extent possible.    You may walk for exercise starting the day after surgery.  For 2 weeks:  Do not  anything greater than 5 pounds with the hand/arm that's on the same side as the surgery.  After 2 weeks, you may increase weight to 10 pounds.    Remember to perform neck rolls (10 clockwise and 10 counterclockwise) at least 3x/day.  For 4 weeks, no strenuous activity (running, jogging, lifting weights, gardening, sports) or until cleared by physician.      PAIN MEDICATIONS:  You will be prescribed Norco for any severe pain.    If pain is not severe, consider taking Tylenol 650mg every 6 hours  Avoid aspirin for 7 days after surgery  Avoid direct heat (such as heating pads) to incision sites.    May gently place ice over surgery sites as needed.  Please place a thin clean towel over skin first and then place ice bag over towel.  Ice for 10 minutes at a time only.  A prescription for zofran was sent to pharmacy in case you experience post-anesthesia nausea    POST-OPERATIVE CLINIC APPOINTMENTS:  Around 2 weeks: wound check in the office.   1 month: device activation with Sleep Medicine and wound check in the office.  2.5 months: check in visit to assess usage.  3-4 months: titration sleep study based on usage of >4

## 2024-06-14 NOTE — H&P
Date of Surgery Update:  Judy Gallardo was seen, history and physical examination reviewed, and patient examined by me today. There have been no significant clinical changes since the completion of the previous history and physical previously performed.    The risk, benefits, and alternatives of the proposed procedure have been explained to the patient (or appropriate guardian) and understanding verbalized. All questions answered. Patient wishes to proceed.    Electronically signed by: Oracio Bowie DO,6/14/2024,7:21 AM

## 2024-06-14 NOTE — PROGRESS NOTES
Patient takes morphine daily. She tolerates percocet but not Norco. Percocet rxed for breakthrough pain if ibuprofen and tylenol not sufficient.

## 2024-06-14 NOTE — ANESTHESIA PRE PROCEDURE
Department of Anesthesiology  Preprocedure Note       Name:  Judy Gallardo   Age:  68 y.o.  :  1955                                          MRN:  [id number]         Date:  2024      Surgeon: Surgeon(s):  Oracio Bowie DO    Procedure: Procedure(s):  INSPIRE IMPLANTATION, RIGHT NECK AND CHEST    Medications prior to admission:   Prior to Admission medications    Medication Sig Start Date End Date Taking? Authorizing Provider   morphine (MSIR) 15 MG tablet Take 1 tablet by mouth in the morning and 1 tablet in the evening.   Yes Margie Joyce MD   DULoxetine (CYMBALTA) 60 MG extended release capsule TAKE 1 CAPSULE BY MOUTH DAILY 6/10/24   Kemi Mejia MD   celecoxib (CELEBREX) 200 MG capsule Take 1 capsule by mouth daily    Margie Joyce MD   Cyclobenzaprine HCl (FLEXERIL PO) Take 10 mg by mouth nightly    Margie Joyce MD   traZODone (DESYREL) 50 MG tablet TAKE ONE TABLET BY MOUTH ONCE NIGHTLY 24   Srini Preston MD   atorvastatin (LIPITOR) 20 MG tablet TAKE 1 TABLET BY MOUTH DAILY 24   Lisseth Wallace MD   Continuous Blood Gluc Sensor (FREESTYLE JEREMIAS 3 SENSOR) [facility] To check glucose ---please change every 14 days 3/21/24   Lisseth Wallace MD   omeprazole (PRILOSEC) 40 MG delayed release capsule Take 1 capsule by mouth daily 24   Kemi Mejia MD   dapagliflozin (FARXIGA) 5 MG tablet Take 1 tablet by mouth every morning 24   Lisseth Wallace MD   insulin glargine, 1 unit dial, (TOUJEO SOLOSTAR) 300 UNIT/ML concentrated injection pen INJECT 40 UNITS UNDER THE SKIN DAILY 24   Lisseth Wallace MD   Insulin Pen Needle ([employer] PEN NEEDLES 31G) 31G X 8 MM MISC USE  FOUR TIMES A DAY 24   Lisseth Wallace MD   semaglutide, 2 MG/DOSE, (OZEMPIC, 2 MG/DOSE,) 8 MG/3ML SOPN sc injection DIAL AND INJECT UNDER THE SKIN 2 MG WEEKLY  Patient taking differently: DIAL AND INJECT UNDER THE SKIN 2 MG WEEKLY  24   Lisseth Wallace MD [place] (CAMZYOS) 5 MG

## 2024-06-14 NOTE — OP NOTE
OPERATIVE NOTE    Patient Name: Judy Gallardo  YOB: 1955  Medical Record Number:  [id number]  Billing Number:  [id number]  Date of Procedure: 06/14/24   Time: 0730    Pre Operative Diagnoses:   Obstructive Sleep Apnea  Intolerance of CPAP    Post Operative Diagnoses: Same as above    Procedure:  Inspire implantation, right neck and upper chest (CPT - 13557)    Surgeon: Dr. Oracio Bowie,     OR Staff/ Assistant: Circulator: Rosanna Jauregui RN  Surgical Assistant: Margarita Pink  Scrsulma Person First: Shannon Zaman    Anesthesia: General anesthesia.     Operative Findings: Successful implantation of Inspire device. No infection present at time of surgery.     Operative Indications: 68 y.o. Female with a past history significant for obstructive sleep apnea, and intolerance to CPAP.  Sleep endoscopy demonstrated anterior posterior collapse.  Patient was approved by insurance company.  Risk benefits alternatives discussed with patient in detail.  All questions answered.     OPERATIVE PROCEDURE: The patient  was brought to the operating room and was anesthetized via general endotracheal anesthesia without complication. A shoulder roll was placed and the patient was prepped and draped in usual sterile fashion with the head turned to the left. Prior to prepping and draping, electrodes were placed in the genioglossus and styloglossus muscle and connected to the NIM box for intraoperative nerve monitoring.      A 5 cm incision was made in the right upper neck approximately 1 cm from midline midway between mandible and hyoid bone. Dissection was carried down through the subcutaneous tissue and platysma. The inferior border of the submandibular gland was identified as well as the digastric tendon. The submandibular gland and the overlying fascia with the marginal mandibular nerve were retracted superiorly and posteriorly. The digastric tendon was retracted inferiorly with vessel loops.

## 2024-06-14 NOTE — PROGRESS NOTES
Pt arrived from OR to PACU bay 7. Reported received from OR staff. Surgical incisions dressings in place to right neck and chest. Pt on simple mask with OPA, NSR, and VSS.

## 2024-06-14 NOTE — PROGRESS NOTES
Pt awake and alert at this time. Pt on RA, and VSS. Pt denies pain and nausea, tolerating PO. Skin warm, palpable pulses and able to wiggle right arm and fingers. Pt meets criteria to be discharged from Phase 1.

## 2024-06-17 NOTE — PROGRESS NOTES
Patient not reached.  Preop instructions left on voice mail. [facility]__[phone]_____________    -Date_6/18/24______time__1130_____arrival__1000__________  -Nothing to eat or drink after midnight  -Responsible adult 18 or older to stay on site while you are here and drive you home and stay with you after  -Follow any instructions your doctors office has given you  -Bring a complete list of all your medications and supplements  -If you normally take the following medications in the morning please do so with a small    sip of water-heart,blood pressure,seizure,breathing or thyroid-avoid water pilll Do not take blood pressure medications ending in \"alexandro\" or \"pril\" the AM of surgery or the shant prior  -You may use your inhalers  -Take half of your normal dose of any long acting insulins the night before-do not take    any diabetic medications in the morning  -Follow your doctors instructions regarding blood thinners  -Any questions call your surgeons office            VISITOR POLICY(subject to change)    Current policy is 2 visitors per patient. No children. Masks at discretion of facility. Visiting hours are 8a-8p. Overnight visitors will be at the discretion of the nurse. All policies are subject to change.

## 2024-06-18 ENCOUNTER — APPOINTMENT (OUTPATIENT)
Dept: WOMENS IMAGING | Age: 69
End: 2024-06-18
Attending: SURGERY
Payer: MEDICARE

## 2024-06-18 ENCOUNTER — ANESTHESIA (OUTPATIENT)
Dept: OPERATING ROOM | Age: 69
End: 2024-06-18
Payer: MEDICARE

## 2024-06-18 ENCOUNTER — ANESTHESIA EVENT (OUTPATIENT)
Dept: OPERATING ROOM | Age: 69
End: 2024-06-18
Payer: MEDICARE

## 2024-06-18 ENCOUNTER — HOSPITAL ENCOUNTER (OUTPATIENT)
Age: 69
Setting detail: OUTPATIENT SURGERY
Discharge: HOME | End: 2024-06-18
Attending: SURGERY | Admitting: SURGERY
Payer: MEDICARE

## 2024-06-18 ENCOUNTER — TELEPHONE (OUTPATIENT)
Dept: INTERNAL MEDICINE CLINIC | Age: 69
End: 2024-06-18

## 2024-06-18 VITALS
TEMPERATURE: 96.9 F | RESPIRATION RATE: 23 BRPM | SYSTOLIC BLOOD PRESSURE: 146 MMHG | HEIGHT: 63 IN | BODY MASS INDEX: 29.26 KG/M2 | WEIGHT: 165.13 LBS | OXYGEN SATURATION: 100 % | HEART RATE: 85 BPM | DIASTOLIC BLOOD PRESSURE: 74 MMHG

## 2024-06-18 DIAGNOSIS — G89.18 POST-OP PAIN: ICD-10-CM

## 2024-06-18 DIAGNOSIS — R92.8 ABNORMAL MAMMOGRAM: ICD-10-CM

## 2024-06-18 DIAGNOSIS — N60.92 ATYPICAL DUCTAL HYPERPLASIA OF LEFT BREAST: Primary | ICD-10-CM

## 2024-06-18 LAB
ABO + RH BLD: NORMAL
BLD GP AB SCN SERPL QL: NORMAL
GLUCOSE BLD-MCNC: 143 MG/DL (ref 70–99)
PERFORMED ON: ABNORMAL

## 2024-06-18 PROCEDURE — 2720000010 HC SURG SUPPLY STERILE: Performed by: SURGERY

## 2024-06-18 PROCEDURE — 2500000003 HC RX 250 WO HCPCS: Performed by: NURSE ANESTHETIST, CERTIFIED REGISTERED

## 2024-06-18 PROCEDURE — 88342 IMHCHEM/IMCYTCHM 1ST ANTB: CPT

## 2024-06-18 PROCEDURE — 7100000010 HC PHASE II RECOVERY - FIRST 15 MIN: Performed by: SURGERY

## 2024-06-18 PROCEDURE — 86900 BLOOD TYPING SEROLOGIC ABO: CPT

## 2024-06-18 PROCEDURE — 88307 TISSUE EXAM BY PATHOLOGIST: CPT

## 2024-06-18 PROCEDURE — 7100000000 HC PACU RECOVERY - FIRST 15 MIN: Performed by: SURGERY

## 2024-06-18 PROCEDURE — 3700000001 HC ADD 15 MINUTES (ANESTHESIA): Performed by: SURGERY

## 2024-06-18 PROCEDURE — 6360000002 HC RX W HCPCS: Performed by: NURSE ANESTHETIST, CERTIFIED REGISTERED

## 2024-06-18 PROCEDURE — 3700000000 HC ANESTHESIA ATTENDED CARE: Performed by: SURGERY

## 2024-06-18 PROCEDURE — 3600000004 HC SURGERY LEVEL 4 BASE: Performed by: SURGERY

## 2024-06-18 PROCEDURE — 6360000002 HC RX W HCPCS: Performed by: SURGERY

## 2024-06-18 PROCEDURE — 88341 IMHCHEM/IMCYTCHM EA ADD ANTB: CPT

## 2024-06-18 PROCEDURE — 76098 X-RAY EXAM SURGICAL SPECIMEN: CPT

## 2024-06-18 PROCEDURE — 2709999900 HC NON-CHARGEABLE SUPPLY: Performed by: SURGERY

## 2024-06-18 PROCEDURE — 3600000014 HC SURGERY LEVEL 4 ADDTL 15MIN: Performed by: SURGERY

## 2024-06-18 PROCEDURE — 7100000001 HC PACU RECOVERY - ADDTL 15 MIN: Performed by: SURGERY

## 2024-06-18 PROCEDURE — 86901 BLOOD TYPING SEROLOGIC RH(D): CPT

## 2024-06-18 PROCEDURE — 2580000003 HC RX 258: Performed by: SURGERY

## 2024-06-18 PROCEDURE — 7100000011 HC PHASE II RECOVERY - ADDTL 15 MIN: Performed by: SURGERY

## 2024-06-18 PROCEDURE — 19125 EXCISION BREAST LESION: CPT | Performed by: SURGERY

## 2024-06-18 PROCEDURE — 86850 RBC ANTIBODY SCREEN: CPT

## 2024-06-18 RX ORDER — ONDANSETRON 2 MG/ML
INJECTION INTRAMUSCULAR; INTRAVENOUS PRN
Status: DISCONTINUED | OUTPATIENT
Start: 2024-06-18 | End: 2024-06-18 | Stop reason: SDUPTHER

## 2024-06-18 RX ORDER — ROCURONIUM BROMIDE 10 MG/ML
INJECTION, SOLUTION INTRAVENOUS PRN
Status: DISCONTINUED | OUTPATIENT
Start: 2024-06-18 | End: 2024-06-18 | Stop reason: SDUPTHER

## 2024-06-18 RX ORDER — FENTANYL CITRATE 50 UG/ML
25 INJECTION, SOLUTION INTRAMUSCULAR; INTRAVENOUS EVERY 5 MIN PRN
Status: DISCONTINUED | OUTPATIENT
Start: 2024-06-18 | End: 2024-06-18 | Stop reason: HOSPADM

## 2024-06-18 RX ORDER — HYDRALAZINE HYDROCHLORIDE 20 MG/ML
10 INJECTION INTRAMUSCULAR; INTRAVENOUS
Status: DISCONTINUED | OUTPATIENT
Start: 2024-06-18 | End: 2024-06-18 | Stop reason: HOSPADM

## 2024-06-18 RX ORDER — BUPIVACAINE HYDROCHLORIDE 2.5 MG/ML
INJECTION, SOLUTION EPIDURAL; INFILTRATION; INTRACAUDAL
Status: COMPLETED | OUTPATIENT
Start: 2024-06-18 | End: 2024-06-18

## 2024-06-18 RX ORDER — SODIUM CHLORIDE 9 MG/ML
INJECTION, SOLUTION INTRAVENOUS PRN
Status: DISCONTINUED | OUTPATIENT
Start: 2024-06-18 | End: 2024-06-18 | Stop reason: HOSPADM

## 2024-06-18 RX ORDER — SODIUM CHLORIDE 0.9 % (FLUSH) 0.9 %
5-40 SYRINGE (ML) INJECTION PRN
Status: DISCONTINUED | OUTPATIENT
Start: 2024-06-18 | End: 2024-06-18 | Stop reason: HOSPADM

## 2024-06-18 RX ORDER — OXYCODONE HYDROCHLORIDE 5 MG/1
5 TABLET ORAL
Status: DISCONTINUED | OUTPATIENT
Start: 2024-06-18 | End: 2024-06-18 | Stop reason: HOSPADM

## 2024-06-18 RX ORDER — PROPOFOL 10 MG/ML
INJECTION, EMULSION INTRAVENOUS PRN
Status: DISCONTINUED | OUTPATIENT
Start: 2024-06-18 | End: 2024-06-18 | Stop reason: SDUPTHER

## 2024-06-18 RX ORDER — SODIUM CHLORIDE 0.9 % (FLUSH) 0.9 %
5-40 SYRINGE (ML) INJECTION EVERY 12 HOURS SCHEDULED
Status: DISCONTINUED | OUTPATIENT
Start: 2024-06-18 | End: 2024-06-18 | Stop reason: HOSPADM

## 2024-06-18 RX ORDER — SODIUM CHLORIDE, SODIUM LACTATE, POTASSIUM CHLORIDE, CALCIUM CHLORIDE 600; 310; 30; 20 MG/100ML; MG/100ML; MG/100ML; MG/100ML
INJECTION, SOLUTION INTRAVENOUS CONTINUOUS
Status: CANCELLED | OUTPATIENT
Start: 2024-06-18

## 2024-06-18 RX ORDER — HYDROMORPHONE HYDROCHLORIDE 2 MG/ML
0.5 INJECTION, SOLUTION INTRAMUSCULAR; INTRAVENOUS; SUBCUTANEOUS EVERY 5 MIN PRN
Status: DISCONTINUED | OUTPATIENT
Start: 2024-06-18 | End: 2024-06-18 | Stop reason: HOSPADM

## 2024-06-18 RX ORDER — DEXAMETHASONE SODIUM PHOSPHATE 4 MG/ML
INJECTION, SOLUTION INTRA-ARTICULAR; INTRALESIONAL; INTRAMUSCULAR; INTRAVENOUS; SOFT TISSUE PRN
Status: DISCONTINUED | OUTPATIENT
Start: 2024-06-18 | End: 2024-06-18 | Stop reason: SDUPTHER

## 2024-06-18 RX ORDER — SODIUM CHLORIDE 9 MG/ML
INJECTION, SOLUTION INTRAVENOUS PRN
Status: CANCELLED | OUTPATIENT
Start: 2024-06-18

## 2024-06-18 RX ORDER — SODIUM CHLORIDE, SODIUM LACTATE, POTASSIUM CHLORIDE, CALCIUM CHLORIDE 600; 310; 30; 20 MG/100ML; MG/100ML; MG/100ML; MG/100ML
INJECTION, SOLUTION INTRAVENOUS CONTINUOUS
Status: DISCONTINUED | OUTPATIENT
Start: 2024-06-18 | End: 2024-06-18 | Stop reason: HOSPADM

## 2024-06-18 RX ORDER — LIDOCAINE HYDROCHLORIDE 10 MG/ML
1 INJECTION, SOLUTION EPIDURAL; INFILTRATION; INTRACAUDAL; PERINEURAL
Status: CANCELLED | OUTPATIENT
Start: 2024-06-18 | End: 2024-06-19

## 2024-06-18 RX ORDER — OMEPRAZOLE 40 MG/1
40 CAPSULE, DELAYED RELEASE ORAL DAILY
Qty: 90 CAPSULE | Refills: 0 | Status: SHIPPED | OUTPATIENT
Start: 2024-06-18

## 2024-06-18 RX ORDER — SUCCINYLCHOLINE CHLORIDE 20 MG/ML
INJECTION INTRAMUSCULAR; INTRAVENOUS PRN
Status: DISCONTINUED | OUTPATIENT
Start: 2024-06-18 | End: 2024-06-18 | Stop reason: SDUPTHER

## 2024-06-18 RX ORDER — ONDANSETRON 2 MG/ML
4 INJECTION INTRAMUSCULAR; INTRAVENOUS
Status: DISCONTINUED | OUTPATIENT
Start: 2024-06-18 | End: 2024-06-18 | Stop reason: HOSPADM

## 2024-06-18 RX ORDER — FENTANYL CITRATE 50 UG/ML
INJECTION, SOLUTION INTRAMUSCULAR; INTRAVENOUS PRN
Status: DISCONTINUED | OUTPATIENT
Start: 2024-06-18 | End: 2024-06-18 | Stop reason: SDUPTHER

## 2024-06-18 RX ORDER — OXYCODONE HYDROCHLORIDE AND ACETAMINOPHEN 5; 325 MG/1; MG/1
1 TABLET ORAL
Qty: 35 TABLET | Refills: 0 | Status: SHIPPED | OUTPATIENT
Start: 2024-06-18 | End: 2024-06-25

## 2024-06-18 RX ORDER — SODIUM CHLORIDE 0.9 % (FLUSH) 0.9 %
5-40 SYRINGE (ML) INJECTION PRN
Status: CANCELLED | OUTPATIENT
Start: 2024-06-18

## 2024-06-18 RX ORDER — LIDOCAINE HYDROCHLORIDE 20 MG/ML
INJECTION, SOLUTION EPIDURAL; INFILTRATION; INTRACAUDAL; PERINEURAL PRN
Status: DISCONTINUED | OUTPATIENT
Start: 2024-06-18 | End: 2024-06-18 | Stop reason: SDUPTHER

## 2024-06-18 RX ORDER — PROCHLORPERAZINE EDISYLATE 5 MG/ML
5 INJECTION INTRAMUSCULAR; INTRAVENOUS
Status: DISCONTINUED | OUTPATIENT
Start: 2024-06-18 | End: 2024-06-18 | Stop reason: HOSPADM

## 2024-06-18 RX ORDER — LABETALOL HYDROCHLORIDE 5 MG/ML
10 INJECTION, SOLUTION INTRAVENOUS
Status: DISCONTINUED | OUTPATIENT
Start: 2024-06-18 | End: 2024-06-18 | Stop reason: HOSPADM

## 2024-06-18 RX ORDER — SODIUM CHLORIDE 0.9 % (FLUSH) 0.9 %
5-40 SYRINGE (ML) INJECTION EVERY 12 HOURS SCHEDULED
Status: CANCELLED | OUTPATIENT
Start: 2024-06-18

## 2024-06-18 RX ADMIN — SUCCINYLCHOLINE CHLORIDE 100 MG: 20 INJECTION, SOLUTION INTRAMUSCULAR; INTRAVENOUS at 11:39

## 2024-06-18 RX ADMIN — SODIUM CHLORIDE 1000 ML: 9 INJECTION, SOLUTION INTRAVENOUS at 10:31

## 2024-06-18 RX ADMIN — DEXAMETHASONE SODIUM PHOSPHATE 8 MG: 4 INJECTION, SOLUTION INTRAMUSCULAR; INTRAVENOUS at 11:53

## 2024-06-18 RX ADMIN — CEFAZOLIN 2000 MG: 2 INJECTION, POWDER, FOR SOLUTION INTRAMUSCULAR; INTRAVENOUS at 11:44

## 2024-06-18 RX ADMIN — ROCURONIUM BROMIDE 30 MG: 10 INJECTION, SOLUTION INTRAVENOUS at 11:53

## 2024-06-18 RX ADMIN — LIDOCAINE HYDROCHLORIDE 50 MG: 20 INJECTION, SOLUTION EPIDURAL; INFILTRATION; INTRACAUDAL; PERINEURAL at 11:39

## 2024-06-18 RX ADMIN — PROPOFOL 200 MG: 10 INJECTION, EMULSION INTRAVENOUS at 11:39

## 2024-06-18 RX ADMIN — SUGAMMADEX 200 MG: 100 INJECTION, SOLUTION INTRAVENOUS at 12:42

## 2024-06-18 RX ADMIN — ONDANSETRON 4 MG: 2 INJECTION INTRAMUSCULAR; INTRAVENOUS at 11:53

## 2024-06-18 RX ADMIN — FENTANYL CITRATE 25 MCG: 50 INJECTION, SOLUTION INTRAMUSCULAR; INTRAVENOUS at 12:08

## 2024-06-18 RX ADMIN — FENTANYL CITRATE 50 MCG: 50 INJECTION, SOLUTION INTRAMUSCULAR; INTRAVENOUS at 11:39

## 2024-06-18 ASSESSMENT — PAIN - FUNCTIONAL ASSESSMENT: PAIN_FUNCTIONAL_ASSESSMENT: 0-10

## 2024-06-18 NOTE — PROGRESS NOTES
Pt discharged via wheelchair. Pt discharged with instructions and all belongings. Pt  taking stable pt home.

## 2024-06-18 NOTE — ANESTHESIA POSTPROCEDURE EVALUATION
Department of Anesthesiology  Postprocedure Note    Patient: Judy Gallardo  MRN: [id number]  YOB: 1955  Date of evaluation: 6/18/2024    Procedure Summary       Date: 06/18/24 Room / Location: [address]    Anesthesia Start: 1133 Anesthesia Stop: 1252    Procedure: LOCALIZED LEFT EXCISIONAL BREAST BIOPSY (Left: Breast) Diagnosis:       Atypical ductal hyperplasia of left breast      (Atypical ductal hyperplasia of left breast [N60.92])    Surgeons: Jasmin Giron MD Responsible Provider: John Villalpando MD    Anesthesia Type: general ASA Status: 3            Anesthesia Type: No value filed.    Diana Phase I: Diana Score: 10    Diana Phase II:      Anesthesia Post Evaluation    Patient location during evaluation: PACU  Patient participation: complete - patient participated  Level of consciousness: awake and alert  Airway patency: patent  Nausea & Vomiting: no nausea and no vomiting  Cardiovascular status: hemodynamically stable  Respiratory status: acceptable  Hydration status: stable  Multimodal analgesia pain management approach  Pain management: adequate    No notable events documented.

## 2024-06-18 NOTE — DISCHARGE INSTRUCTIONS
Postoperative Instructions for Breast Surgery  Jasmin Giron MD [address], [address]  [place] [address]  ([phone])    These are general guidelines to help assist in recovery after breast surgery. Please keep in mind all patients recover differently, so please call the office with any questions ([phone]).    General guidelines   Rest when you feel tired  You will have a surgical bra on when you wake up. Please wear this day and night until your postoperative appointment. It can be removed for laundering and for showers. This helps immobilize the breast to alleviate discomfort as well as maintain pressure to avoid postoperative seroma.  If you had a sentinel lymph node procedure, it is common to have an interval of blue urine and/or stool and blue skin changes of the breast.   Final pathology will take 3-5 days to result. The breast surgery office will call you with the results when they are known.    Pain management  You may feel mild to moderate discomfort when anesthesia wears off  You will be given a prescription for a narcotic pain medication  Some patients experience very little discomfort and they prefer not to use the narcotic  You may take Tylenol or extra strength Tylenol instead of the narcotic  Many narcotics also contained Tylenol so you should not take both  AVOID aspirin, fish oil, Excedrin, Motrin, ibuprofen, and other NSAIDS immediately after surgery for at least 48-72 hours.  Anticoagulation such as warfarin can typically resume 48 hours after surgery.  This should be discussed with your surgeon and prescribing physician.  Narcotic medication may cause constipation. Make sure to keep well hydrated, ambulate, and you may eat high-fiber foods to help avoid constipation. You may use over-the-counter medications for constipation.  You should not consume alcohol while taking narcotics  You should not drive while taking narcotics  Pain should improve, not worsen as days pass. If

## 2024-06-18 NOTE — TELEPHONE ENCOUNTER
Office has been notified that pt is requiring Prior Authorization for the following medication:  -- Omeprazole     Please initiate this request through CoverMyMeds, contacting the following Payor/Insurance:  -- [employer] [place] PPO    Please see below, or the documentation attached to this encounter for any additional information that may assist in processing PA:  -- K21.9    Thank you!

## 2024-06-18 NOTE — ANESTHESIA PRE PROCEDURE
procedure:ECHOCARDIOGRAM LIMITED.     Procedure Date  Date: 04/04/2024 Start: 01:48 PM     Study Location: [facility]  Technical Quality: Adequate visualization     Indications:Cardiomyopathy, hypertrophic.     Additional Indications:HOCM  CAMZYOS protocol week 82.     Patient Status: Routine     Height: 63 inches Weight: 166 pounds BSA: 1.79 m2 BMI: 29.41 kg/m2     BP: 113/64 mmHg      Conclusions      Summary   -Limited echocardiogram for CAMZYOS protocol week 82 ( 5mg dose)   -Normal left ventricle size, wall thickness, and systolic function with an   estimated ejection fraction of 55%.   -No regional wall motion abnormalities are seen.   -LVOT gradients/velocities were obtained with and without Valsalva maneuvers   per CAMZYOS protocol and results are reported below.   -Rest peak velocity of 0.9 m/s or peak gradient of 3 mmHg.   -Valsalva peak velocity of 1 m/s or peak gradient of 5 mmHg.   -No evidence of LVOT obstruction.   -Mild mitral regurgitation.   -Mild aortic regurgitation.   -Trivial tricuspid regurgitation.   -Estimated pulmonary artery systolic pressure is normal at 27 mmHg assuming   a right atrial pressure of 3 mmHg.      Signature      ------------------------------------------------------------------   Electronically signed by Mehran Sagastume MD (Interpreting   physician) on 04/04/2024 at 04:24 PM   ------------------------------------------------------------------      Findings      Left Ventricle   Normal left ventricle size, wall thickness, and systolic function with an   estimated ejection fraction of 55%. No regional wall motion abnormalities   are seen.   LVOT gradients/velocities were obtained with and without Valsalva maneuvers      H/o HOCM managed with meds       Neuro/Psych:      (-) TIA and CVA            ROS comment: Cervical stenosis.   GI/Hepatic/Renal:        (-) GERD       Endo/Other:    (+) Diabetes, hypothyroidism::..

## 2024-06-18 NOTE — OP NOTE
Operative Note      Postoperative Note    Judy Gallardo  YOB: 1955  [id number]    Pre-operative Diagnosis: Atypical ductal hyperplasia of left breast [N60.92]    Post-operative Diagnosis: Same    Procedure: left localized excisional breast biopsy    Anesthesia: General     Surgeons/Assistants: Jasmin Giron  Assistant: Lena Neves    Estimated Blood Loss: less than 50     Drains: none    Complications: none apparent by completion of procedure    Specimens: left breast tissue    Findings: specimen radiograph shows capture of lesion in question    Post-Op Condition: Stable    Disposition: to recovery room    Description of Procedure:   Ms. Gallardo is a 68 y.o. woman with left breast atypical ductal hyperplasia of left breast.  Pathology from biopsy could not rule out DCIS.  She has elected to proceed with left excisional breast biopsy to assess possibility of upgrade diagnosis and attention will be paid to margin status given this concern.  The indications for the planned procedure, along with the potential benefits and risks which include but are not limited to the risk of anesthesia, bleeding, infection, possible failed operation, possible need for additional surgery pending final pathologic assessment, sensation changes, and unappealing cosmetics were reviewed. All questions were answered and she agrees to proceed.    Ms. Gallardo was met by me in the preoperative area.  The surgical site was identified. The patient's surgical site was marked. Consent was obtained. The appropriate breast imaging was reviewed.    She underwent an image guided localization procedure preoperatively. The left lesion was again noted with the biopsy clip. The localizer is in good position. The original biopsy marker has migrated.    She was brought to the operating room and placed supine with her arms extended on boards. She was appropriately positioned and padded. Compression stockings were placed.

## 2024-06-18 NOTE — TELEPHONE ENCOUNTER
Last OV: 6/10/2024  Next OV: Visit date not found    Next appointment due:na    Last fill:2/23/24  Refills:0#[phone]

## 2024-06-18 NOTE — INTERVAL H&P NOTE
H&P Update    The patient's most recent H&P, office notes, breast imaging, and pathology were reviewed.    Patient examined and laterality marked.    There has been no changes.  We will plan to proceed with a left excisional breast biopsy.    Jasmin Giron MD

## 2024-06-19 NOTE — TELEPHONE ENCOUNTER
Submitted PA for Omeprazole 40MG dr capsule, via FAX to [facility]. STATUS: PENDING     Follow up done daily; if no decision with in three days we will refax.  If another three days goes by with no decision will call the insurance for status.

## 2024-06-27 ENCOUNTER — TELEPHONE (OUTPATIENT)
Dept: CARDIOLOGY CLINIC | Age: 69
End: 2024-06-27

## 2024-06-27 DIAGNOSIS — R06.02 SHORTNESS OF BREATH: ICD-10-CM

## 2024-06-27 DIAGNOSIS — I42.1 HYPERTROPHIC OBSTRUCTIVE CARDIOMYOPATHY (HCC): Primary | ICD-10-CM

## 2024-06-27 DIAGNOSIS — Q24.8 LEFT VENTRICULAR OUTFLOW TRACT OBSTRUCTION: ICD-10-CM

## 2024-06-27 NOTE — TELEPHONE ENCOUNTER
Pt should not take Omeprazole with Camzyos due to Drug to Drug interaction.  She can take Prevacid instead.     Major  omeprazole  mavacamten  Applies to: Prilosec (omeprazole), Camzyos (mavacamten)  Using mavacamten together with omeprazole may alter the blood levels of both medications. Specifically, the blood levels of mavacamten may increase, which may lead to increased risk of heart failure.        lansoprazole  mavacamten  Applies to: Prevacid (lansoprazole), Camzyos (mavacamten)  Mavacamten may reduce the blood levels and effects of lansoprazole.           Attempted to reach pt after med list review.  Left message with pt and her  Kerwin as well. Echo today looks good.  EF 55% and LVOT 3mmHg.

## 2024-06-27 NOTE — TELEPHONE ENCOUNTER
Spoke to pt.    She denies any hospital admissions for heart failure.   She states she was on Keflex post -op.  No interaction found.  Pt educated to advise her Providers that she is on Camzyos and there is a possibility of Drug to Drug Interactions.     Pt verbalizes understanding and will let her MD's know before medications are prescribed.   She was also on Percocet post -op which has Major side effect as well. This has been completed.  Pt understands the importance.       REMS completed.   Next Limited Echo week #108 Sept 19 thru Sept 27.  Please call pt to schedule.

## 2024-07-08 ENCOUNTER — OFFICE VISIT (OUTPATIENT)
Dept: ENT CLINIC | Age: 69
End: 2024-07-08

## 2024-07-08 VITALS
HEART RATE: 92 BPM | SYSTOLIC BLOOD PRESSURE: 117 MMHG | OXYGEN SATURATION: 96 % | DIASTOLIC BLOOD PRESSURE: 74 MMHG | TEMPERATURE: 96.6 F | WEIGHT: 173 LBS | BODY MASS INDEX: 30.65 KG/M2

## 2024-07-08 DIAGNOSIS — G47.33 OSA (OBSTRUCTIVE SLEEP APNEA): Primary | ICD-10-CM

## 2024-07-08 PROCEDURE — 99024 POSTOP FOLLOW-UP VISIT: CPT | Performed by: STUDENT IN AN ORGANIZED HEALTH CARE EDUCATION/TRAINING PROGRAM

## 2024-07-08 NOTE — PROGRESS NOTES
Left breast atypical ductal hyperplasia  Left breast papilloma  S/p EBB,       Ms. Dominguez is a 68 y.o.-year-old woman who is now s/p left excisional breast biopsy with axillary lymph node dissection for bilateral breast cancer.  She underwent this procedure on  2024  and tolerated it well.  She is doing quite well postoperatively and her pain is continuing to improve.      INTERVAL HISTORY:    On 2024 she underwent a left excisional breast biopsy.  Pathology identified intraductal papilloma and changes consistent with atypical ductal hyperplasia.  Biopsy site changes were noted. [id number]    Pathology:  Department of Pathology   FINAL SURGICAL PATHOLOGY REPORT   Patient Name:  JUDY DOMINGUEZ            Accession No:  [id number]    Age Sex:   1955    68 Y / F       Location:      [facility]   Account No:    [id number]                  Collected:     2024   Med Rec No:    [id number]                 Received:      2024   Attend Phys:   MOISÉS TRUONG             Completed:     2024   Perform Phys:  MOISÉS TRUONG             FINAL DIAGNOSIS:     Left breast, excision:      - Intraductal papilloma with florid usual ductal hyperplasia, and        focal change compatible with atypical ductal hyperplasia- See        Comment.      - Biopsy site change.     COMMENT: CK5/6 stain shows reduced staining in focal area of interest.   P63 and P40 stains show presence of myoepithelial layer. No evidence of   carcinoma is identified. The atypical ductal hyperplasia is focal and not   present on resection margin.     JINMI/JINMI       Preoperative Diagnosis: Atypical ductal hyperplasia   Postoperative Diagnosis:  : Atypical ductal hyperplasia     SPECIMEN:   LEFT BREAST     GROSS DESCRIPTION:   Received in formalin on a grid, labeled with the   patient's name \"Judy Dominguez\" and designated \"left breast tissue\" is   a 26 gram portion of breast tissue that measures 6.7 cm

## 2024-07-08 NOTE — PROGRESS NOTES
[facility]  DIVISION OF OTOLARYNGOLOGY- HEAD & NECK SURGERY  CLINIC POST-OPERATIVE VISIT      Patient Name: Judy Gallardo  Medical Record Number:  [id number]  Primary Care Physician:  Kemi Mejia MD    ChiefComplaint     Post-op visit    History of Present Illness     Judy Gallardo is an 68 y.o. female s/p right neck inspire implantation on 6/14/2024.    Doing well, no surgical issues. Pain controlled.  She had surgery with Dr. Giron on her left breast soon after her inspire implantation which went well without complication.    Past Medical History     Past Medical History:   Diagnosis Date    Anxiety     Asthma     vocal fold     Ataxia     Cervical stenosis of spinal canal 10/07/2021    Claustrophobia     Coronary artery disease involving native coronary artery of native heart without angina pectoris 09/09/2021    Depression     Diabetes mellitus (HCC)     Hyperlipidemia     Hypertension     Hypertrophic cardiomyopathy (HCC)     Hypothyroidism     Obesity     Osteoarthritis     Paradoxical vocal fold motion disorder     Postmenopausal 05/15/2018    Sleep apnea     Stage 2 chronic kidney disease 05/07/2019       Past Surgical History     Past Surgical History:   Procedure Laterality Date    BREAST SURGERY Left     BREAST SURGERY Left 6/18/2024    LOCALIZED LEFT EXCISIONAL BREAST BIOPSY performed by Jasmin Giron MD at [facility] OR    CARPAL TUNNEL RELEASE      RORY    EXAMINATION UNDER ANESTHESIA N/A 08/25/2023    DRUG INDUCED SLEEP ENDOSCOPY performed by Oracio Bowie DO at [facility] OR    EYE SURGERY Bilateral     cataract    HYSTERECTOMY, TOTAL ABDOMINAL (CERVIX REMOVED)      Age 34    SUJATHA STEREO BREAST BX ADD LESION LEFT Left 03/25/2024    SUJATHA STEREO BREAST BX ADD LESION LEFT 3/25/2024 [facility]    [place] NJX AA&/STRD TFRML EPI LUMBAR/SACRAL 1 LEVEL Bilateral 12/03/2018    BILATERAL L4 LUMBAR TRANSFORAMINAL EPIDURAL STEROID INJECTION WITH FLUOROSCOPY performed by Krissy Morales MD

## 2024-07-11 ENCOUNTER — OFFICE VISIT (OUTPATIENT)
Dept: SURGERY | Age: 69
End: 2024-07-11

## 2024-07-11 VITALS
SYSTOLIC BLOOD PRESSURE: 128 MMHG | HEART RATE: 71 BPM | RESPIRATION RATE: 16 BRPM | HEIGHT: 63 IN | BODY MASS INDEX: 30.48 KG/M2 | DIASTOLIC BLOOD PRESSURE: 82 MMHG | OXYGEN SATURATION: 98 % | WEIGHT: 172 LBS

## 2024-07-11 DIAGNOSIS — D24.2 PAPILLOMA OF LEFT BREAST: ICD-10-CM

## 2024-07-11 DIAGNOSIS — N60.92 ATYPICAL DUCTAL HYPERPLASIA OF LEFT BREAST: Primary | ICD-10-CM

## 2024-07-11 DIAGNOSIS — F33.41 RECURRENT MAJOR DEPRESSIVE DISORDER, IN PARTIAL REMISSION (HCC): ICD-10-CM

## 2024-07-11 PROCEDURE — 99024 POSTOP FOLLOW-UP VISIT: CPT | Performed by: SURGERY

## 2024-07-11 RX ORDER — DULOXETIN HYDROCHLORIDE 60 MG/1
60 CAPSULE, DELAYED RELEASE ORAL DAILY
Qty: 30 CAPSULE | Refills: 0 | OUTPATIENT
Start: 2024-07-11

## 2024-07-11 NOTE — TELEPHONE ENCOUNTER
Last OV: 6/10/2024  Next OV: Visit date not found    Next appointment due:around 2/13/2024     Last fill:6/10/24  Refills:0

## 2024-07-12 DIAGNOSIS — F33.41 RECURRENT MAJOR DEPRESSIVE DISORDER, IN PARTIAL REMISSION (HCC): ICD-10-CM

## 2024-07-12 RX ORDER — DULOXETIN HYDROCHLORIDE 60 MG/1
60 CAPSULE, DELAYED RELEASE ORAL DAILY
Qty: 90 CAPSULE | Refills: 0 | Status: SHIPPED | OUTPATIENT
Start: 2024-07-12 | End: 2024-10-10

## 2024-07-14 DIAGNOSIS — F33.41 RECURRENT MAJOR DEPRESSIVE DISORDER, IN PARTIAL REMISSION (HCC): ICD-10-CM

## 2024-07-15 RX ORDER — BUPROPION HYDROCHLORIDE 150 MG/1
TABLET, EXTENDED RELEASE ORAL
Qty: 45 TABLET | Refills: 3 | Status: SHIPPED | OUTPATIENT
Start: 2024-07-15

## 2024-07-15 NOTE — TELEPHONE ENCOUNTER
Last OV: 6/10/2024  Next OV: Visit date not found    Next appointment due:na    Last fill:7/14/23  Refills:3

## 2024-07-16 DIAGNOSIS — Z91.89 AT HIGH RISK FOR BREAST CANCER: ICD-10-CM

## 2024-07-16 DIAGNOSIS — Z87.42 HISTORY OF ATYPICAL HYPERPLASIA OF BREAST: Primary | ICD-10-CM

## 2024-07-16 DIAGNOSIS — Z80.41 FAMILY HISTORY OF OVARIAN CANCER: ICD-10-CM

## 2024-07-18 NOTE — PROGRESS NOTES
Cardiac Electrophysiology Consultation   Date: 7/19/2024   Reason for Consultation: Atrial Fibrillation   Consult Requesting Physician: Kemi Mejia MD     Chief Complaint:   Chief Complaint   Patient presents with    Atrial Fibrillation     No cardiac symptoms present.     Follow-up      HPI: Judy Gallardo is a 68 y.o. patient with a history of atrial fibrillation, anxiety, CAD, depression, diabetes mellitus, hyperlipidemia, HOCM, hypertension, syncope, obesity, and WATSON.     Noted to [place]. Instructed to be evaluated in ED and did not.     Established in clinic as a new patient with Dr. Hu 08/01/2022 for management of HOCM. Patient admitted to being told she could not play as she would pass out. Reports she would pass out with activity, lifting, walking long distance, and in the heat. Genetic testing ordered and 30 day monitor placed.     08/01/22 Monitor worn and noted sinus rhythm.     Started on Camzyos 09/01/2022.    Seen in clinic with Dr. Mejia 12/01/23 for complaints of cough with sputum production and chest pain. ECG noted atrial fibrillation with rapid rate. Instructed to proceed to ED. Chest CT noted infectious/inflammatory airways process in LLL. Following fluid resuscitation, HR improved. Discharged home with antibiotics.     Interval History:  Today, patient presents to the office today for follow up. She is accompanied with her . ECG today shows sinus rhythm. She is unsure when she goes into atrial fibrillation. Reports a few weeks ago, she had episode \"where everything was out of wack\", but resolved on its own. Tolerating current medications at this time.     Past Medical History:   Diagnosis Date    Anxiety     Asthma     vocal fold     Ataxia     Cervical stenosis of spinal canal 10/07/2021    Claustrophobia     Coronary artery disease involving native coronary artery of native heart without angina pectoris 09/09/2021

## 2024-07-19 ENCOUNTER — OFFICE VISIT (OUTPATIENT)
Dept: CARDIOLOGY CLINIC | Age: 69
End: 2024-07-19
Payer: COMMERCIAL

## 2024-07-19 ENCOUNTER — HOSPITAL ENCOUNTER (OUTPATIENT)
Age: 69
Discharge: HOME | End: 2024-07-19
Payer: COMMERCIAL

## 2024-07-19 ENCOUNTER — HOSPITAL ENCOUNTER (OUTPATIENT)
Dept: GENERAL RADIOLOGY | Age: 69
Discharge: HOME | End: 2024-07-19
Payer: COMMERCIAL

## 2024-07-19 VITALS
HEART RATE: 88 BPM | DIASTOLIC BLOOD PRESSURE: 68 MMHG | OXYGEN SATURATION: 99 % | BODY MASS INDEX: 30.51 KG/M2 | HEIGHT: 63 IN | SYSTOLIC BLOOD PRESSURE: 106 MMHG | WEIGHT: 172.2 LBS

## 2024-07-19 DIAGNOSIS — I50.42 CHRONIC COMBINED SYSTOLIC AND DIASTOLIC CONGESTIVE HEART FAILURE (HCC): Primary | ICD-10-CM

## 2024-07-19 DIAGNOSIS — R52 PAIN: ICD-10-CM

## 2024-07-19 PROCEDURE — 1036F TOBACCO NON-USER: CPT | Performed by: INTERNAL MEDICINE

## 2024-07-19 PROCEDURE — 3017F COLORECTAL CA SCREEN DOC REV: CPT | Performed by: INTERNAL MEDICINE

## 2024-07-19 PROCEDURE — 73560 X-RAY EXAM OF KNEE 1 OR 2: CPT

## 2024-07-19 PROCEDURE — 3078F DIAST BP <80 MM HG: CPT | Performed by: INTERNAL MEDICINE

## 2024-07-19 PROCEDURE — 93000 ELECTROCARDIOGRAM COMPLETE: CPT | Performed by: INTERNAL MEDICINE

## 2024-07-19 PROCEDURE — 99215 OFFICE O/P EST HI 40 MIN: CPT | Performed by: INTERNAL MEDICINE

## 2024-07-19 PROCEDURE — 3074F SYST BP LT 130 MM HG: CPT | Performed by: INTERNAL MEDICINE

## 2024-07-19 PROCEDURE — 1090F PRES/ABSN URINE INCON ASSESS: CPT | Performed by: INTERNAL MEDICINE

## 2024-07-19 PROCEDURE — G9899 SCRN MAM PERF RSLTS DOC: HCPCS | Performed by: INTERNAL MEDICINE

## 2024-07-19 PROCEDURE — 1123F ACP DISCUSS/DSCN MKR DOCD: CPT | Performed by: INTERNAL MEDICINE

## 2024-07-19 PROCEDURE — G8399 PT W/DXA RESULTS DOCUMENT: HCPCS | Performed by: INTERNAL MEDICINE

## 2024-07-19 PROCEDURE — G8417 CALC BMI ABV UP PARAM F/U: HCPCS | Performed by: INTERNAL MEDICINE

## 2024-07-19 PROCEDURE — G8427 DOCREV CUR MEDS BY ELIG CLIN: HCPCS | Performed by: INTERNAL MEDICINE

## 2024-07-22 DIAGNOSIS — E11.42 TYPE 2 DIABETES MELLITUS WITH POLYNEUROPATHY (HCC): ICD-10-CM

## 2024-07-22 RX ORDER — SEMAGLUTIDE 2.68 MG/ML
INJECTION, SOLUTION SUBCUTANEOUS
Qty: 9 ML | Refills: 1 | Status: SHIPPED | OUTPATIENT
Start: 2024-07-22

## 2024-07-29 DIAGNOSIS — E11.42 TYPE 2 DIABETES MELLITUS WITH POLYNEUROPATHY (HCC): ICD-10-CM

## 2024-07-29 RX ORDER — DAPAGLIFLOZIN 5 MG/1
5 TABLET, FILM COATED ORAL EVERY MORNING
Qty: 90 TABLET | Refills: 3 | Status: SHIPPED | OUTPATIENT
Start: 2024-07-29

## 2024-07-31 ENCOUNTER — TELEPHONE (OUTPATIENT)
Dept: PULMONOLOGY | Age: 69
End: 2024-07-31

## 2024-07-31 NOTE — TELEPHONE ENCOUNTER
Spoke with patient.  Reminded patient to download and set-up the sleep sync issa so she will be ready for the Inspire activation on Friday.

## 2024-08-02 ENCOUNTER — OFFICE VISIT (OUTPATIENT)
Dept: PULMONOLOGY | Age: 69
End: 2024-08-02
Payer: COMMERCIAL

## 2024-08-02 VITALS
OXYGEN SATURATION: 95 % | SYSTOLIC BLOOD PRESSURE: 110 MMHG | HEART RATE: 90 BPM | DIASTOLIC BLOOD PRESSURE: 64 MMHG | HEIGHT: 63 IN | WEIGHT: 173.2 LBS | BODY MASS INDEX: 30.69 KG/M2

## 2024-08-02 DIAGNOSIS — G47.33 OSA (OBSTRUCTIVE SLEEP APNEA): Primary | ICD-10-CM

## 2024-08-02 DIAGNOSIS — I10 PRIMARY HYPERTENSION: ICD-10-CM

## 2024-08-02 DIAGNOSIS — E66.3 OVERWEIGHT (BMI 25.0-29.9): ICD-10-CM

## 2024-08-02 DIAGNOSIS — Z96.82 S/P INSERTION OF HYPOGLOSSAL NERVE STIMULATOR: ICD-10-CM

## 2024-08-02 DIAGNOSIS — F51.04 PSYCHOPHYSIOLOGICAL INSOMNIA: ICD-10-CM

## 2024-08-02 PROCEDURE — G8399 PT W/DXA RESULTS DOCUMENT: HCPCS | Performed by: INTERNAL MEDICINE

## 2024-08-02 PROCEDURE — G9899 SCRN MAM PERF RSLTS DOC: HCPCS | Performed by: INTERNAL MEDICINE

## 2024-08-02 PROCEDURE — 3074F SYST BP LT 130 MM HG: CPT | Performed by: INTERNAL MEDICINE

## 2024-08-02 PROCEDURE — 3078F DIAST BP <80 MM HG: CPT | Performed by: INTERNAL MEDICINE

## 2024-08-02 PROCEDURE — 99214 OFFICE O/P EST MOD 30 MIN: CPT | Performed by: INTERNAL MEDICINE

## 2024-08-02 PROCEDURE — G8417 CALC BMI ABV UP PARAM F/U: HCPCS | Performed by: INTERNAL MEDICINE

## 2024-08-02 PROCEDURE — G8427 DOCREV CUR MEDS BY ELIG CLIN: HCPCS | Performed by: INTERNAL MEDICINE

## 2024-08-02 PROCEDURE — 3017F COLORECTAL CA SCREEN DOC REV: CPT | Performed by: INTERNAL MEDICINE

## 2024-08-02 PROCEDURE — 1123F ACP DISCUSS/DSCN MKR DOCD: CPT | Performed by: INTERNAL MEDICINE

## 2024-08-02 PROCEDURE — 1090F PRES/ABSN URINE INCON ASSESS: CPT | Performed by: INTERNAL MEDICINE

## 2024-08-02 PROCEDURE — 1036F TOBACCO NON-USER: CPT | Performed by: INTERNAL MEDICINE

## 2024-08-02 ASSESSMENT — SLEEP AND FATIGUE QUESTIONNAIRES
HOW LIKELY ARE YOU TO NOD OFF OR FALL ASLEEP WHILE WATCHING TV: HIGH CHANCE OF DOZING
HOW LIKELY ARE YOU TO NOD OFF OR FALL ASLEEP WHEN YOU ARE A PASSENGER IN A CAR FOR AN HOUR WITHOUT A BREAK: HIGH CHANCE OF DOZING
ESS TOTAL SCORE: 12
HOW LIKELY ARE YOU TO NOD OFF OR FALL ASLEEP WHILE SITTING INACTIVE IN A PUBLIC PLACE: MODERATE CHANCE OF DOZING
HOW LIKELY ARE YOU TO NOD OFF OR FALL ASLEEP WHILE SITTING AND READING: SLIGHT CHANCE OF DOZING
HOW LIKELY ARE YOU TO NOD OFF OR FALL ASLEEP WHILE SITTING AND TALKING TO SOMEONE: WOULD NEVER DOZE
HOW LIKELY ARE YOU TO NOD OFF OR FALL ASLEEP WHILE LYING DOWN TO REST IN THE AFTERNOON WHEN CIRCUMSTANCES PERMIT: HIGH CHANCE OF DOZING
HOW LIKELY ARE YOU TO NOD OFF OR FALL ASLEEP WHILE SITTING QUIETLY AFTER LUNCH WITHOUT ALCOHOL: WOULD NEVER DOZE
HOW LIKELY ARE YOU TO NOD OFF OR FALL ASLEEP IN A CAR, WHILE STOPPED FOR A FEW MINUTES IN TRAFFIC: WOULD NEVER DOZE

## 2024-08-02 NOTE — PROGRESS NOTES
MEDICAL HISTORY:   Past Medical History:   Diagnosis Date    Anxiety     Asthma     vocal fold     Ataxia     Cervical stenosis of spinal canal 10/07/2021    Claustrophobia     Coronary artery disease involving native coronary artery of native heart without angina pectoris 09/09/2021    Depression     Diabetes mellitus (HCC)     Hyperlipidemia     Hypertension     Hypertrophic cardiomyopathy (HCC)     Hypothyroidism     Obesity     Osteoarthritis     Paradoxical vocal fold motion disorder     Postmenopausal 05/15/2018    Sleep apnea     Stage 2 chronic kidney disease 05/07/2019       PAST SURGICAL HISTORY:   Past Surgical History:   Procedure Laterality Date    BREAST SURGERY Left     BREAST SURGERY Left 6/18/2024    LOCALIZED LEFT EXCISIONAL BREAST BIOPSY performed by Jasmin Giron MD at [facility] OR    CARPAL TUNNEL RELEASE      RORY    EXAMINATION UNDER ANESTHESIA N/A 08/25/2023    DRUG INDUCED SLEEP ENDOSCOPY performed by Oracio Bowie DO at [facility] OR    EYE SURGERY Bilateral     cataract    HYSTERECTOMY, TOTAL ABDOMINAL (CERVIX REMOVED)      Age 34    SUJATHA STEREO BREAST BX ADD LESION LEFT Left 03/25/2024    SUJATHA STEREO BREAST BX ADD LESION LEFT 3/25/2024 [facility]    [place] NJX AA&/STRD TFRML EPI LUMBAR/SACRAL 1 LEVEL Bilateral 12/03/2018    BILATERAL L4 LUMBAR TRANSFORAMINAL EPIDURAL STEROID INJECTION WITH FLUOROSCOPY performed by Krissy Morales MD at [facility]    STIMULATOR SURGERY Right 6/14/2024    INSPIRE IMPLANTATION, RIGHT NECK AND CHEST performed by Oracio Bowie DO at [facility] OR    THROAT SURGERY      FOR SLEEP APNEA    TONSILLECTOMY         SOCIAL HISTORY:   Social History     Tobacco Use    Smoking status: Never     Passive exposure: Never    Smokeless tobacco: Never   Vaping Use    Vaping Use: Never used   Substance Use Topics    Alcohol use: No     Alcohol/week: 0.0 standard drinks of alcohol    Drug use: No       FAMILY HISTORY:   Family History   Problem Relation Age of

## 2024-08-07 RX ORDER — TRAZODONE HYDROCHLORIDE 50 MG/1
50 TABLET ORAL NIGHTLY
Qty: 90 TABLET | Refills: 0 | Status: SHIPPED | OUTPATIENT
Start: 2024-08-07

## 2024-08-07 RX ORDER — METOPROLOL SUCCINATE 50 MG/1
50 TABLET, EXTENDED RELEASE ORAL DAILY
Qty: 90 TABLET | Refills: 3 | Status: SHIPPED | OUTPATIENT
Start: 2024-08-07

## 2024-08-07 NOTE — TELEPHONE ENCOUNTER
Prescription refill    Last OV:06/10/2024    Last Refill:08/07/2023    Labs:06/11/2024    Future Appt:10/11/2024

## 2024-08-08 DIAGNOSIS — F33.41 RECURRENT MAJOR DEPRESSIVE DISORDER, IN PARTIAL REMISSION (HCC): ICD-10-CM

## 2024-08-08 RX ORDER — BUPROPION HYDROCHLORIDE 300 MG/1
TABLET ORAL
Qty: 45 TABLET | Refills: 3 | Status: SHIPPED | OUTPATIENT
Start: 2024-08-08

## 2024-08-09 ENCOUNTER — TELEPHONE (OUTPATIENT)
Dept: PULMONOLOGY | Age: 69
End: 2024-08-09

## 2024-08-09 NOTE — TELEPHONE ENCOUNTER
Patient called stating that she recently had her Inspire put in and she has misplaced the remote to turn it on and off. Patient wants to know is it a way to operate it via the issa?

## 2024-08-09 NOTE — TELEPHONE ENCOUNTER
Spoke with patient informed her that she really needs to find her remote.  A new one will cost $300 from [employer]

## 2024-08-21 ENCOUNTER — OFFICE VISIT (OUTPATIENT)
Dept: ENDOCRINOLOGY | Age: 69
End: 2024-08-21
Payer: COMMERCIAL

## 2024-08-21 VITALS
SYSTOLIC BLOOD PRESSURE: 85 MMHG | HEART RATE: 81 BPM | DIASTOLIC BLOOD PRESSURE: 62 MMHG | BODY MASS INDEX: 31.18 KG/M2 | RESPIRATION RATE: 16 BRPM | HEIGHT: 63 IN | WEIGHT: 176 LBS

## 2024-08-21 DIAGNOSIS — E11.42 TYPE 2 DIABETES MELLITUS WITH POLYNEUROPATHY (HCC): Primary | ICD-10-CM

## 2024-08-21 DIAGNOSIS — E78.2 MIXED HYPERLIPIDEMIA: ICD-10-CM

## 2024-08-21 DIAGNOSIS — N18.30 STAGE 3 CHRONIC KIDNEY DISEASE, UNSPECIFIED WHETHER STAGE 3A OR 3B CKD (HCC): ICD-10-CM

## 2024-08-21 PROCEDURE — 3078F DIAST BP <80 MM HG: CPT | Performed by: INTERNAL MEDICINE

## 2024-08-21 PROCEDURE — 2022F DILAT RTA XM EVC RTNOPTHY: CPT | Performed by: INTERNAL MEDICINE

## 2024-08-21 PROCEDURE — 1090F PRES/ABSN URINE INCON ASSESS: CPT | Performed by: INTERNAL MEDICINE

## 2024-08-21 PROCEDURE — 3017F COLORECTAL CA SCREEN DOC REV: CPT | Performed by: INTERNAL MEDICINE

## 2024-08-21 PROCEDURE — 1123F ACP DISCUSS/DSCN MKR DOCD: CPT | Performed by: INTERNAL MEDICINE

## 2024-08-21 PROCEDURE — 1036F TOBACCO NON-USER: CPT | Performed by: INTERNAL MEDICINE

## 2024-08-21 PROCEDURE — G8399 PT W/DXA RESULTS DOCUMENT: HCPCS | Performed by: INTERNAL MEDICINE

## 2024-08-21 PROCEDURE — G8417 CALC BMI ABV UP PARAM F/U: HCPCS | Performed by: INTERNAL MEDICINE

## 2024-08-21 PROCEDURE — 3044F HG A1C LEVEL LT 7.0%: CPT | Performed by: INTERNAL MEDICINE

## 2024-08-21 PROCEDURE — 95251 CONT GLUC MNTR ANALYSIS I&R: CPT | Performed by: INTERNAL MEDICINE

## 2024-08-21 PROCEDURE — G9899 SCRN MAM PERF RSLTS DOC: HCPCS | Performed by: INTERNAL MEDICINE

## 2024-08-21 PROCEDURE — 99214 OFFICE O/P EST MOD 30 MIN: CPT | Performed by: INTERNAL MEDICINE

## 2024-08-21 PROCEDURE — 3074F SYST BP LT 130 MM HG: CPT | Performed by: INTERNAL MEDICINE

## 2024-08-21 PROCEDURE — G8427 DOCREV CUR MEDS BY ELIG CLIN: HCPCS | Performed by: INTERNAL MEDICINE

## 2024-08-21 RX ORDER — PEN NEEDLE, DIABETIC 31 GX5/16"
NEEDLE, DISPOSABLE MISCELLANEOUS
Qty: 200 EACH | Refills: 5 | Status: SHIPPED | OUTPATIENT
Start: 2024-08-21

## 2024-08-21 RX ORDER — INSULIN GLARGINE 300 U/ML
INJECTION, SOLUTION SUBCUTANEOUS
Qty: 12 ML | Refills: 3 | Status: SHIPPED | OUTPATIENT
Start: 2024-08-21

## 2024-08-21 RX ORDER — TIRZEPATIDE 10 MG/.5ML
10 INJECTION, SOLUTION SUBCUTANEOUS WEEKLY
Qty: 4 ADJUSTABLE DOSE PRE-FILLED PEN SYRINGE | Refills: 4 | Status: SHIPPED | OUTPATIENT
Start: 2024-08-21

## 2024-08-21 RX ORDER — BLOOD-GLUCOSE SENSOR
EACH MISCELLANEOUS
Qty: 2 EACH | Refills: 5 | Status: SHIPPED | OUTPATIENT
Start: 2024-08-21

## 2024-08-21 ASSESSMENT — ENCOUNTER SYMPTOMS: VISUAL CHANGE: 0

## 2024-08-21 NOTE — PROGRESS NOTES
2019   We will check  at follow-up as she did not get blood work done9      Reviewed and/or ordered clinical lab results yes   Reviewed and/or ordered radiology tests Yes  Reviewed and/or ordered other diagnostic tests yes   Made a decision to obtain old records yes   Reviewed and summarized old records yes     Judy Gallardo was counseled regarding symptoms of current diagnosis, course and complications of disease if inadequately treated, side effects of medications, diagnosis, treatment options, and prognosis, risks, benefits, complications, and alternatives of treatment, labs, imaging and other studies and treatment targets and goals.    These diagnosis were discussed and reviewed with the patient including the advantages of drug therapy. She was counseled at this visit on the following: diabetes complication prevention and foot care.     Diabetes Continuous Glucose Monitoring Report         Reason for Study:     - improve diabetic control without risk of hypoglycemia       Current Medication regimen:      basal insulin     CGMS Report     CGMS data collection was performed on August 21 , 2024   patient provided information on her  diet, activities and insulin dosing  during this period.   Data was available for  14  days     Sensor Data Report:   - 12 AM to 6 AM: Overnight blood glucose pattern shows stable glycemia   - 6   AM to 10 AM:  Post breakfast  is noted  --10AM to 5 PM : No hypoglycemia observed during this time.  - 5   PM to 8 PM: Post meal  is noted       Average reading 150 mg/dL   Co eff variation 27.5   % of time <70 mg/dL 0 %   % of time >180  mg/dL 21 %   % of time within range 79 %  Number of hypoglycemia episodes noted: 0     Impression:   CGMS shows stable glycemia      Recommendation:      Patient was advised to make the following changes in her diabetic regimen  Reduce insulin from 40 units Toujeo to 30 units  Cautioned about hypoglycemia with GLP-1 agonist and insulin use and advised to

## 2024-08-27 ENCOUNTER — OFFICE VISIT (OUTPATIENT)
Dept: INTERNAL MEDICINE CLINIC | Age: 69
End: 2024-08-27

## 2024-08-27 VITALS
WEIGHT: 175.8 LBS | DIASTOLIC BLOOD PRESSURE: 58 MMHG | HEIGHT: 63 IN | SYSTOLIC BLOOD PRESSURE: 98 MMHG | HEART RATE: 76 BPM | BODY MASS INDEX: 31.15 KG/M2 | OXYGEN SATURATION: 96 %

## 2024-08-27 DIAGNOSIS — I50.42 CHRONIC COMBINED SYSTOLIC AND DIASTOLIC CONGESTIVE HEART FAILURE (HCC): ICD-10-CM

## 2024-08-27 DIAGNOSIS — E11.42 TYPE 2 DIABETES MELLITUS WITH POLYNEUROPATHY (HCC): ICD-10-CM

## 2024-08-27 DIAGNOSIS — Z00.00 MEDICARE ANNUAL WELLNESS VISIT, SUBSEQUENT: Primary | ICD-10-CM

## 2024-08-27 DIAGNOSIS — J30.9 ALLERGIC SINUSITIS: ICD-10-CM

## 2024-08-27 DIAGNOSIS — I10 PRIMARY HYPERTENSION: ICD-10-CM

## 2024-08-27 RX ORDER — MONTELUKAST SODIUM 10 MG/1
TABLET ORAL
Qty: 90 TABLET | Refills: 3 | Status: SHIPPED | OUTPATIENT
Start: 2024-08-27

## 2024-08-27 ASSESSMENT — PATIENT HEALTH QUESTIONNAIRE - PHQ9
3. TROUBLE FALLING OR STAYING ASLEEP: NEARLY EVERY DAY
SUM OF ALL RESPONSES TO PHQ QUESTIONS 1-9: 5
4. FEELING TIRED OR HAVING LITTLE ENERGY: MORE THAN HALF THE DAYS
SUM OF ALL RESPONSES TO PHQ QUESTIONS 1-9: 5
SUM OF ALL RESPONSES TO PHQ QUESTIONS 1-9: 5
5. POOR APPETITE OR OVEREATING: NOT AT ALL
8. MOVING OR SPEAKING SO SLOWLY THAT OTHER PEOPLE COULD HAVE NOTICED. OR THE OPPOSITE, BEING SO FIGETY OR RESTLESS THAT YOU HAVE BEEN MOVING AROUND A LOT MORE THAN USUAL: NOT AT ALL
SUM OF ALL RESPONSES TO PHQ QUESTIONS 1-9: 5
1. LITTLE INTEREST OR PLEASURE IN DOING THINGS: NOT AT ALL
7. TROUBLE CONCENTRATING ON THINGS, SUCH AS READING THE NEWSPAPER OR WATCHING TELEVISION: NOT AT ALL
2. FEELING DOWN, DEPRESSED OR HOPELESS: NOT AT ALL
6. FEELING BAD ABOUT YOURSELF - OR THAT YOU ARE A FAILURE OR HAVE LET YOURSELF OR YOUR FAMILY DOWN: NOT AT ALL
10. IF YOU CHECKED OFF ANY PROBLEMS, HOW DIFFICULT HAVE THESE PROBLEMS MADE IT FOR YOU TO DO YOUR WORK, TAKE CARE OF THINGS AT HOME, OR GET ALONG WITH OTHER PEOPLE: NOT DIFFICULT AT ALL
SUM OF ALL RESPONSES TO PHQ9 QUESTIONS 1 & 2: 0
9. THOUGHTS THAT YOU WOULD BE BETTER OFF DEAD, OR OF HURTING YOURSELF: NOT AT ALL

## 2024-08-27 ASSESSMENT — ENCOUNTER SYMPTOMS
ABDOMINAL PAIN: 0
VISUAL CHANGE: 0

## 2024-08-27 NOTE — PROGRESS NOTES
year?: (!) No  Interventions:   Patient encouraged to make appointment with their eye specialist                    Objective   Vitals:    08/27/24 1409   BP: (!) 98/58   Pulse: 76   SpO2: 96%   Weight: 79.7 kg (175 lb 12.8 oz)   Height: 1.6 m (5' 3\")      Body mass index is 31.14 kg/m².        General Appearance: alert and oriented to person, place and time, well developed and well- nourished, in no acute distress  Skin: warm and dry, no rash or erythema  Head: normocephalic and atraumatic  Eyes: pupils equal, round, and reactive to light, extraocular eye movements intact, conjunctivae normal  ENT: tympanic membrane, external ear and ear canal normal bilaterally, nose without deformity, nasal mucosa and turbinates normal without polyps  Neck: supple and non-tender without mass, no thyromegaly or thyroid nodules, no cervical lymphadenopathy  Pulmonary/Chest: clear to auscultation bilaterally- no wheezes, rales or rhonchi, normal air movement, no respiratory distress  Cardiovascular: normal rate, regular rhythm, normal S1 and S2, no murmurs, rubs, clicks, or gallops, distal pulses intact, no carotid bruits  Abdomen: soft, non-tender, non-distended, normal bowel sounds, no masses or organomegaly  Extremities: no cyanosis, clubbing or edema  Musculoskeletal: normal range of motion, no joint swelling, deformity or tenderness  Neurologic: reflexes normal and symmetric, no cranial nerve deficit, gait, coordination and speech normal            Allergies   Allergen Reactions    Actos [Pioglitazone Hydrochloride]      Causes mouth blisters    Albuterol      YEAST INFECTION IN MOUTH    Dilaudid [Hydromorphone Hcl] Headaches    Fluticasone-Salmeterol      YEAST INFECTIONS IN MOUTH    Glyburide      Causes mouth blisters    Lortab [Hydrocodone-Acetaminophen]      headache    Metformin      Causes mouth blisters    Symbicort [Budesonide-Formoterol Fumarate]      Causes mouth blisters     Prior to Visit Medications    Medication  no chest pain, no visual change, no weakness and no weight loss.   Hypertension  This is a chronic problem. The current episode started more than 1 year ago. The problem is unchanged. The problem is controlled. Pertinent negatives include no chest pain.   Congestive Heart Failure  Presents for follow-up visit. Pertinent negatives include no abdominal pain, chest pain, chest pressure, claudication, near-syncope, nocturia or orthopnea.       Review of Systems   Constitutional:  Negative for weight loss.   Cardiovascular:  Negative for chest pain, claudication and near-syncope.   Gastrointestinal:  Negative for abdominal pain.   Genitourinary:  Negative for nocturia.   Neurological:  Negative for weakness.          Objective   Physical Exam  Vitals and nursing note reviewed.   Constitutional:       General: She is not in acute distress.     Appearance: Normal appearance.   HENT:      Head: Normocephalic and atraumatic.      Right Ear: Tympanic membrane normal.      Left Ear: Tympanic membrane normal.      Nose: Nose normal.   Eyes:      Extraocular Movements: Extraocular movements intact.      Conjunctiva/sclera: Conjunctivae normal.      Pupils: Pupils are equal, round, and reactive to light.   Neck:      Vascular: No carotid bruit.   Cardiovascular:      Rate and Rhythm: Normal rate and regular rhythm.      Pulses: Normal pulses.      Heart sounds: No murmur heard.  Pulmonary:      Effort: Pulmonary effort is normal. No respiratory distress.      Breath sounds: Normal breath sounds.   Abdominal:      General: Abdomen is flat. Bowel sounds are normal. There is no distension.      Palpations: Abdomen is soft.      Tenderness: There is no abdominal tenderness.   Musculoskeletal:         General: No swelling, tenderness or deformity.      Cervical back: Normal range of motion and neck supple. No rigidity or tenderness.      Right lower leg: No edema.      Left lower leg: No edema.   Lymphadenopathy:      Cervical: No

## 2024-08-27 NOTE — PATIENT INSTRUCTIONS
brain.  Refraction and color tests  A refraction test is done to find the right prescription for glasses and contact lenses.  A color vision test is done to check for color blindness.  Color vision is often tested as part of a routine exam. You may also have this test when you apply for a job where recognizing different colors is important, such as , electronics, or the .  How are vision tests done?  Visual acuity test   You cover one eye at a time.  You read aloud from a wall chart across the room.  You read aloud from a small card that you hold in your hand.  Refraction   You look into a special device.  The device puts lenses of different strengths in front of each eye to see how strong your glasses or contact lenses need to be.  Visual field tests   Your doctor may have you look through special machines.  Or your doctor may simply have you stare straight ahead while they move a finger into and out of your field of vision.  Color vision test   You look at pieces of printed test patterns in various colors. You say what number or symbol you see.  Your doctor may have you trace the number or symbol using a pointer.  How do these tests feel?  There is very little chance of having a problem from this test. If dilating drops are used for a vision test, they may make the eyes sting and cause a medicine taste in the mouth.  Follow-up care is a key part of your treatment and safety. Be sure to make and go to all appointments, and call your doctor if you are having problems. It's also a good idea to know your test results and keep a list of the medicines you take.  Where can you learn more?  Go to https://www.[employer].net/patientEd and enter G551 to learn more about \"Learning About Vision Tests.\"  Current as of: June 5, 2023  Content Version: 14.1  © 4034-8784 Healthwise, Incorporated.   Care instructions adapted under license by [employer]. If you have questions about a medical condition or this  doctor if you are having problems. It's also a good idea to know your test results and keep a list of the medicines you take.  How can you care for yourself at home?  Diet    Use less salt when you cook and eat. This helps lower your blood pressure. Taste food before salting. Add only a little salt when you think you need it. With time, your taste buds will adjust to less salt.     Eat fewer snack items, fast foods, canned soups, and other high-salt, high-fat, processed foods.     Read food labels and try to avoid saturated and trans fats. They increase your risk of heart disease by raising cholesterol levels.     Limit the amount of solid fat--butter, margarine, and shortening--you eat. Use olive, peanut, or canola oil when you cook. Bake, broil, and steam foods instead of frying them.     Eat a variety of fruit and vegetables every day. Dark green, deep orange, red, or yellow fruits and vegetables are especially good for you. Examples include spinach, carrots, peaches, and berries.     Foods high in fiber can reduce your cholesterol and provide important vitamins and minerals. High-fiber foods include whole-grain cereals and breads, oatmeal, beans, brown rice, citrus fruits, and apples.     Eat lean proteins. Heart-healthy proteins include seafood, lean meats and poultry, eggs, beans, peas, nuts, seeds, and soy products.     Limit drinks and foods with added sugar. These include candy, desserts, and soda pop.   Heart-healthy lifestyle    If your doctor recommends it, get more exercise. For many people, walking is a good choice. Or you may want to swim, bike, or do other activities. Bit by bit, increase the time you're active every day. Try for at least 30 minutes on most days of the week.     Try to quit or cut back on using tobacco and other nicotine products. This includes smoking and vaping. If you need help quitting, talk to your doctor about stop-smoking programs and medicines. These can increase your chances

## 2024-09-09 ENCOUNTER — TELEPHONE (OUTPATIENT)
Dept: PULMONOLOGY | Age: 69
End: 2024-09-09

## 2024-09-23 ENCOUNTER — TELEPHONE (OUTPATIENT)
Dept: CARDIOLOGY CLINIC | Age: 69
End: 2024-09-23

## 2024-09-23 NOTE — TELEPHONE ENCOUNTER
Echo 9/23/24  Left message for pt with her EF of 50-55%   And her LVOT gradient of 4mmHg with Valsalva    Requesting pt call back to complete REMS  Has she started any new medications?  And HF hospitalizations?       Pt had Inspire implantation on 6/14/24 per Dr. Bowie.  MARLEN same day.   Inspire [hypoglossal nerve stimulation] therapy was activated on  8/2/2024     Pt had left breast biopsy on 6/18/24 Dr. Giron. MARLEN same day.       It appears pt has started on Singulair on 8/27/24 for Allergic sinusitis per PCP   Mavacamten may reduce the blood levels and effects of montelukast.     No Drug to Drug interactions with azelastine   No Drug to Drug interactions with dapagliflozin   No drug to drug interactions with Duloxetine        It appears pt has started on Trazodone on 8/7/24 per Dr. Preston (refill)   Mavacamten may reduce the blood levels and effects of traZODone.

## 2024-09-24 ENCOUNTER — TELEPHONE (OUTPATIENT)
Dept: CARDIOLOGY CLINIC | Age: 69
End: 2024-09-24

## 2024-09-24 DIAGNOSIS — I42.1 HYPERTROPHIC OBSTRUCTIVE CARDIOMYOPATHY (HCC): Primary | ICD-10-CM

## 2024-09-24 NOTE — TELEPHONE ENCOUNTER
Spoke to pt.  She called in and verified medications     Pt has been taking Singulair for a long time.   Pt states she will be starting Nexium.  Looked up Drug to Drug with CAMZYOS and it is Contraindicated. See Below.     Major esomeprazole  mavacamten  Applies to: Nexium (esomeprazole), Camzyos (mavacamten)  Using mavacamten together with esomeprazole may alter the blood levels of both medications. Specifically, the blood levels of mavacamten may increase, which may lead to increased risk of heart failure,      Pt instructed to call PCP for the Pepcid instead. No Drug to Drug issues with Pepcid and Camzyos.   No drug ? drug interactions were found between the drugs in your list.    Camzyos (mavacamten)  Pepcid (famotidine)     Pt also instructed to call HF office before starting any new medications per REMS agreement.     Will submit REMS report.

## 2024-09-25 ENCOUNTER — OFFICE VISIT (OUTPATIENT)
Dept: PULMONOLOGY | Age: 69
End: 2024-09-25
Payer: COMMERCIAL

## 2024-09-25 VITALS
SYSTOLIC BLOOD PRESSURE: 116 MMHG | HEIGHT: 63 IN | OXYGEN SATURATION: 97 % | WEIGHT: 175.6 LBS | BODY MASS INDEX: 31.11 KG/M2 | HEART RATE: 84 BPM | DIASTOLIC BLOOD PRESSURE: 72 MMHG

## 2024-09-25 DIAGNOSIS — Z96.82 S/P INSERTION OF HYPOGLOSSAL NERVE STIMULATOR: ICD-10-CM

## 2024-09-25 DIAGNOSIS — E66.3 OVERWEIGHT (BMI 25.0-29.9): ICD-10-CM

## 2024-09-25 DIAGNOSIS — F51.04 PSYCHOPHYSIOLOGICAL INSOMNIA: ICD-10-CM

## 2024-09-25 DIAGNOSIS — G47.33 OSA (OBSTRUCTIVE SLEEP APNEA): Primary | ICD-10-CM

## 2024-09-25 DIAGNOSIS — E66.09 CLASS 1 OBESITY DUE TO EXCESS CALORIES WITH SERIOUS COMORBIDITY AND BODY MASS INDEX (BMI) OF 30.0 TO 30.9 IN ADULT: ICD-10-CM

## 2024-09-25 DIAGNOSIS — I10 PRIMARY HYPERTENSION: ICD-10-CM

## 2024-09-25 PROCEDURE — G8417 CALC BMI ABV UP PARAM F/U: HCPCS | Performed by: INTERNAL MEDICINE

## 2024-09-25 PROCEDURE — 99214 OFFICE O/P EST MOD 30 MIN: CPT | Performed by: INTERNAL MEDICINE

## 2024-09-25 PROCEDURE — 3078F DIAST BP <80 MM HG: CPT | Performed by: INTERNAL MEDICINE

## 2024-09-25 PROCEDURE — 1123F ACP DISCUSS/DSCN MKR DOCD: CPT | Performed by: INTERNAL MEDICINE

## 2024-09-25 PROCEDURE — 3074F SYST BP LT 130 MM HG: CPT | Performed by: INTERNAL MEDICINE

## 2024-09-25 PROCEDURE — 1090F PRES/ABSN URINE INCON ASSESS: CPT | Performed by: INTERNAL MEDICINE

## 2024-09-25 PROCEDURE — G9899 SCRN MAM PERF RSLTS DOC: HCPCS | Performed by: INTERNAL MEDICINE

## 2024-09-25 PROCEDURE — G8427 DOCREV CUR MEDS BY ELIG CLIN: HCPCS | Performed by: INTERNAL MEDICINE

## 2024-09-25 PROCEDURE — 1036F TOBACCO NON-USER: CPT | Performed by: INTERNAL MEDICINE

## 2024-09-25 PROCEDURE — 3017F COLORECTAL CA SCREEN DOC REV: CPT | Performed by: INTERNAL MEDICINE

## 2024-09-25 PROCEDURE — G8399 PT W/DXA RESULTS DOCUMENT: HCPCS | Performed by: INTERNAL MEDICINE

## 2024-09-25 RX ORDER — TRAZODONE HYDROCHLORIDE 50 MG/1
75 TABLET, FILM COATED ORAL NIGHTLY
Qty: 90 TABLET | Refills: 3 | Status: SHIPPED | OUTPATIENT
Start: 2024-09-25

## 2024-09-25 ASSESSMENT — SLEEP AND FATIGUE QUESTIONNAIRES
HOW LIKELY ARE YOU TO NOD OFF OR FALL ASLEEP WHILE WATCHING TV: MODERATE CHANCE OF DOZING
HOW LIKELY ARE YOU TO NOD OFF OR FALL ASLEEP WHILE SITTING AND TALKING TO SOMEONE: MODERATE CHANCE OF DOZING
ESS TOTAL SCORE: 17
HOW LIKELY ARE YOU TO NOD OFF OR FALL ASLEEP WHILE SITTING INACTIVE IN A PUBLIC PLACE: MODERATE CHANCE OF DOZING
HOW LIKELY ARE YOU TO NOD OFF OR FALL ASLEEP IN A CAR, WHILE STOPPED FOR A FEW MINUTES IN TRAFFIC: SLIGHT CHANCE OF DOZING
HOW LIKELY ARE YOU TO NOD OFF OR FALL ASLEEP WHEN YOU ARE A PASSENGER IN A CAR FOR AN HOUR WITHOUT A BREAK: HIGH CHANCE OF DOZING
HOW LIKELY ARE YOU TO NOD OFF OR FALL ASLEEP WHILE SITTING AND READING: MODERATE CHANCE OF DOZING
HOW LIKELY ARE YOU TO NOD OFF OR FALL ASLEEP WHILE SITTING QUIETLY AFTER LUNCH WITHOUT ALCOHOL: MODERATE CHANCE OF DOZING
HOW LIKELY ARE YOU TO NOD OFF OR FALL ASLEEP WHILE LYING DOWN TO REST IN THE AFTERNOON WHEN CIRCUMSTANCES PERMIT: HIGH CHANCE OF DOZING

## 2024-09-26 ENCOUNTER — TELEPHONE (OUTPATIENT)
Dept: CARDIOLOGY CLINIC | Age: 69
End: 2024-09-26

## 2024-10-10 NOTE — PROGRESS NOTES
regurgitation.   -The aortic valve appears sclerotic but opens well.   -Mild aortic regurgitation.   -Mild tricuspid regurgitation.   -Trivial pulmonic regurgitation present.   -Estimated pulmonary artery systolic pressure is normal at 27 mmHg assuming   a right atrial pressure of 3 mmHg.    EKG 8/18/23  Sinus  Rhythm   Low voltage in precordial leads.    -possible Old inferior infarct.     ECHO 7/31/23   [facility].   Week 48 at 5mg daily dose.   Normal left ventricle size, wall thickness and systolic function with an   estimated ejection fraction of 55%. No evidence of obstruction.   LVOT interrogation was performed with and without Valsalva manuevers per   CAMZYOS protocol (week 48 at 5mg daily dose) and results are recorded below.   There is no evidence of obstruction at rest or with valsalva,   Rest Peak velocity is 86cm/s or peak gradient of 2mmHg.   Peak valsalva velocity of 86cm/s or peak gradient of 2mmHg.   Mild to moderate mitral regurgitation   Mild aortic regurgitation.   Mild to moderate tricuspid regurgitation. RVSP 29mmHg.   No evidence of any pericardial effusion.    ECHO 5/8/23  -Limited echocardiogram wa sperformed for CAMZYOS protocol.   -Week 36 at 5 mg daily dose.   -Left ventricular cavity size is normal.   -There is asymmetric hypertrophy of the basal septum (1.3 CM).   -Overall left ventricular systolic function appears normal.   -Ejection fraction is visually estimated to be 55-60%.   -No regional wall motion abnormalities are noted.   -LVOT interrogation was performed with and without Valsalva maneuvers per   CAMZYOS protocol ( week 36 at 5 mg daily dose) and results are recorded   below.   -There is not evidence of LVOT obstruction with and without Valsalva.   -Rest peak velocity of 80 cm/s or peak gradient 3 mmHg .   -Peak Valsalva velocity of 93 cm/s or 3 mmHg.   -Mild to moderate mitral regurgitation.   -Mild aortic regurgitation.   -Mild

## 2024-10-11 ENCOUNTER — OFFICE VISIT (OUTPATIENT)
Dept: CARDIOLOGY CLINIC | Age: 69
End: 2024-10-11

## 2024-10-11 VITALS
BODY MASS INDEX: 31.18 KG/M2 | HEIGHT: 63 IN | OXYGEN SATURATION: 94 % | HEART RATE: 92 BPM | SYSTOLIC BLOOD PRESSURE: 94 MMHG | DIASTOLIC BLOOD PRESSURE: 52 MMHG | WEIGHT: 176 LBS

## 2024-10-11 DIAGNOSIS — I10 ESSENTIAL HYPERTENSION: ICD-10-CM

## 2024-10-11 DIAGNOSIS — Q24.8 LEFT VENTRICULAR OUTFLOW TRACT OBSTRUCTION: ICD-10-CM

## 2024-10-11 DIAGNOSIS — R06.02 SHORTNESS OF BREATH: ICD-10-CM

## 2024-10-11 DIAGNOSIS — E78.2 MIXED HYPERLIPIDEMIA: ICD-10-CM

## 2024-10-11 DIAGNOSIS — G47.33 OSA (OBSTRUCTIVE SLEEP APNEA): ICD-10-CM

## 2024-10-11 DIAGNOSIS — I50.42 CHRONIC COMBINED SYSTOLIC AND DIASTOLIC CONGESTIVE HEART FAILURE (HCC): ICD-10-CM

## 2024-10-11 DIAGNOSIS — I42.1 HYPERTROPHIC OBSTRUCTIVE CARDIOMYOPATHY (HCC): ICD-10-CM

## 2024-10-11 DIAGNOSIS — I42.1 HYPERTROPHIC OBSTRUCTIVE CARDIOMYOPATHY (HCC): Primary | ICD-10-CM

## 2024-10-11 LAB
DEPRECATED RDW RBC AUTO: 14.5 % (ref 12.4–15.4)
HCT VFR BLD AUTO: 38.9 % (ref 36–48)
HGB BLD-MCNC: 12.8 G/DL (ref 12–16)
MCH RBC QN AUTO: 27.4 PG (ref 26–34)
MCHC RBC AUTO-ENTMCNC: 32.9 G/DL (ref 31–36)
MCV RBC AUTO: 83.1 FL (ref 80–100)
NT-PROBNP SERPL-MCNC: 111 PG/ML (ref 0–124)
PLATELET # BLD AUTO: 311 K/UL (ref 135–450)
PMV BLD AUTO: 8.2 FL (ref 5–10.5)
RBC # BLD AUTO: 4.68 M/UL (ref 4–5.2)
WBC # BLD AUTO: 6.7 K/UL (ref 4–11)

## 2024-10-11 NOTE — PATIENT INSTRUCTIONS
Plan:  Labs soon. CBC and BNP to add to the labs you are doing for Dr. Wallace  Continue same medications.   See Dr. Hu in 4 months   Continue Echo Protocol with Camzyos.

## 2024-10-28 ENCOUNTER — TELEPHONE (OUTPATIENT)
Dept: PULMONOLOGY | Age: 69
End: 2024-10-28

## 2024-10-28 NOTE — TELEPHONE ENCOUNTER
LM on VM informing pt I will call back on Wed    Pt states that around [id number] the pt started to have a pressure in between her shoulder blades.  The pressure then spread to her chest.  Pt does not describe it as a pain.  There was nausea associated.  Pt took BP at home and noted that it was high.  Pt attempted to go to [facility], but they were closed.  She then went to [facility].  Pt initially was at a 9/10 for pressure. They gave her 324 ASA and in route she received 2 sublingual nitro that resolved the pressure.

## 2024-10-30 DIAGNOSIS — Z96.82 S/P INSERTION OF HYPOGLOSSAL NERVE STIMULATOR: Primary | ICD-10-CM

## 2024-10-30 NOTE — TELEPHONE ENCOUNTER
Spoke with patient.  She states she is doing well with the Inspire.  She is currently on Level 7 (1.4V).  Compliance in chart.  She feels she is getting subjective benefits from the Inspire.     Dr. Preston, please order sleep study with Inspire if you feel pt is ready.

## 2024-11-04 ENCOUNTER — TELEPHONE (OUTPATIENT)
Dept: SLEEP CENTER | Age: 69
End: 2024-11-04

## 2024-11-04 NOTE — TELEPHONE ENCOUNTER
Called to schedule a INSPIRE PSG per Kary pereira for the pt to return my call     [facility] insurance

## 2024-11-08 DIAGNOSIS — F33.41 RECURRENT MAJOR DEPRESSIVE DISORDER, IN PARTIAL REMISSION (HCC): ICD-10-CM

## 2024-11-08 RX ORDER — APIXABAN 5 MG/1
5 TABLET, FILM COATED ORAL 2 TIMES DAILY
Qty: 180 TABLET | Refills: 2 | Status: SHIPPED | OUTPATIENT
Start: 2024-11-08

## 2024-11-08 NOTE — TELEPHONE ENCOUNTER
Rx has been sent. Please ensure patient knows that she should not take Celebrex daily with Eliquis as it increases risks of bleeding

## 2024-11-11 RX ORDER — DULOXETIN HYDROCHLORIDE 60 MG/1
60 CAPSULE, DELAYED RELEASE ORAL DAILY
Qty: 90 CAPSULE | Refills: 3 | Status: SHIPPED | OUTPATIENT
Start: 2024-11-11

## 2024-11-19 DIAGNOSIS — E11.42 TYPE 2 DIABETES MELLITUS WITH POLYNEUROPATHY (HCC): ICD-10-CM

## 2024-11-19 DIAGNOSIS — N18.30 STAGE 3 CHRONIC KIDNEY DISEASE, UNSPECIFIED WHETHER STAGE 3A OR 3B CKD (HCC): ICD-10-CM

## 2024-11-19 DIAGNOSIS — E78.2 MIXED HYPERLIPIDEMIA: ICD-10-CM

## 2024-11-19 LAB
ALBUMIN SERPL-MCNC: 4.3 G/DL (ref 3.4–5)
ALBUMIN/GLOB SERPL: 2 {RATIO} (ref 1.1–2.2)
ALP SERPL-CCNC: 139 U/L (ref 40–129)
ALT SERPL-CCNC: 11 U/L (ref 10–40)
ANION GAP SERPL CALCULATED.3IONS-SCNC: 9 MMOL/L (ref 3–16)
AST SERPL-CCNC: 17 U/L (ref 15–37)
BILIRUB SERPL-MCNC: 0.3 MG/DL (ref 0–1)
BUN SERPL-MCNC: 16 MG/DL (ref 7–20)
CALCIUM SERPL-MCNC: 9.5 MG/DL (ref 8.3–10.6)
CHLORIDE SERPL-SCNC: 104 MMOL/L (ref 99–110)
CHOLEST SERPL-MCNC: 143 MG/DL (ref 0–199)
CO2 SERPL-SCNC: 27 MMOL/L (ref 21–32)
CREAT SERPL-MCNC: 1.4 MG/DL (ref 0.6–1.2)
CREAT UR-MCNC: 181 MG/DL (ref 28–259)
EST. AVERAGE GLUCOSE BLD GHB EST-MCNC: 154.2 MG/DL
GFR SERPLBLD CREATININE-BSD FMLA CKD-EPI: 40 ML/MIN/{1.73_M2}
GLUCOSE SERPL-MCNC: 115 MG/DL (ref 70–99)
HBA1C MFR BLD: 7 %
HDLC SERPL-MCNC: 45 MG/DL (ref 40–60)
LDLC SERPL CALC-MCNC: 52 MG/DL
MICROALBUMIN UR DL<=1MG/L-MCNC: <1.2 MG/DL
MICROALBUMIN/CREAT UR: NORMAL MG/G (ref 0–30)
POTASSIUM SERPL-SCNC: 4.8 MMOL/L (ref 3.5–5.1)
PROT SERPL-MCNC: 6.5 G/DL (ref 6.4–8.2)
SODIUM SERPL-SCNC: 140 MMOL/L (ref 136–145)
TRIGL SERPL-MCNC: 229 MG/DL (ref 0–150)
TSH SERPL DL<=0.005 MIU/L-ACNC: 2.73 UIU/ML (ref 0.27–4.2)
VLDLC SERPL CALC-MCNC: 46 MG/DL

## 2024-11-20 ENCOUNTER — OFFICE VISIT (OUTPATIENT)
Dept: ENDOCRINOLOGY | Age: 69
End: 2024-11-20

## 2024-11-20 VITALS
DIASTOLIC BLOOD PRESSURE: 62 MMHG | SYSTOLIC BLOOD PRESSURE: 94 MMHG | RESPIRATION RATE: 14 BRPM | WEIGHT: 170 LBS | HEART RATE: 82 BPM | HEIGHT: 63 IN | TEMPERATURE: 98 F | BODY MASS INDEX: 30.12 KG/M2

## 2024-11-20 DIAGNOSIS — E11.42 TYPE 2 DIABETES MELLITUS WITH POLYNEUROPATHY (HCC): Primary | ICD-10-CM

## 2024-11-20 DIAGNOSIS — E78.2 MIXED HYPERLIPIDEMIA: ICD-10-CM

## 2024-11-20 DIAGNOSIS — N18.30 STAGE 3 CHRONIC KIDNEY DISEASE, UNSPECIFIED WHETHER STAGE 3A OR 3B CKD (HCC): ICD-10-CM

## 2024-11-20 RX ORDER — TIRZEPATIDE 12.5 MG/.5ML
INJECTION, SOLUTION SUBCUTANEOUS
Qty: 2 ML | Refills: 3 | Status: SHIPPED | OUTPATIENT
Start: 2024-11-20

## 2024-11-20 RX ORDER — TIRZEPATIDE 10 MG/.5ML
10 INJECTION, SOLUTION SUBCUTANEOUS WEEKLY
Qty: 6 ML | Refills: 1 | Status: CANCELLED | OUTPATIENT
Start: 2024-11-20

## 2024-11-20 RX ORDER — ACYCLOVIR 800 MG/1
TABLET ORAL
Qty: 6 EACH | Refills: 3 | Status: SHIPPED | OUTPATIENT
Start: 2024-11-20

## 2024-11-20 ASSESSMENT — ENCOUNTER SYMPTOMS: VISUAL CHANGE: 0

## 2024-11-20 NOTE — PROGRESS NOTES
Judy Gallardo is a 69 y.o. female is seen  for evaluation and management of type 2  Diabetes--- .Judy Gallardo was diagnosed with Diabetes mellitus in 2008 aprox   Pt always had issues with hypoglycemia since her childhood so she was under constant surveillance until she was diagnosed with diabetes  .Judy Gallardo got diabetic education in the past.  Comorbid conditions: Neuropathy  ---on her way back from [facility] on June 17th she developed chest pain and sweating and was taken by EMS   -she saw her PCP and was told that she had HOCM and started on B Blockers ---she feels improved.  Weight 171 in June 2022 .  Patient has stage III chronic kidney disease and follows with nephrology  Patient has hyperlipidemia and is on Lipitor her last LDL was at target  Patient also has arthritis and takes Celebrex off-and-on which can also cause kidney damage and also has reflux and is on omeprazole    INTERIM:    Diabetes  She presents for her follow-up diabetic visit. She has type 2 diabetes mellitus. No MedicAlert identification noted. The initial diagnosis of diabetes was made 10 years ago. Her disease course has been worsening. Hypoglycemia symptoms include sweats and tremors. Pertinent negatives for diabetes include no foot ulcerations, no polydipsia, no polyphagia, no polyuria, no visual change, no weakness and no weight loss. There are no hypoglycemic complications. Symptoms are worsening. Diabetic complications include peripheral neuropathy. Risk factors for coronary artery disease include post-menopausal, hypertension, diabetes mellitus and dyslipidemia. Current diabetic treatment includes insulin injections. She is compliant with treatment some of the time. Her weight is increasing rapidly. She is following a generally unhealthy diet. Meal planning includes avoidance of concentrated sweets. She has had a previous visit with a dietitian. She never participates in exercise. Her home blood glucose trend is

## 2024-11-21 ENCOUNTER — TELEPHONE (OUTPATIENT)
Dept: ENDOCRINOLOGY | Age: 69
End: 2024-11-21

## 2024-12-02 DIAGNOSIS — E11.65 UNCONTROLLED TYPE 2 DIABETES MELLITUS WITH HYPERGLYCEMIA (HCC): Primary | ICD-10-CM

## 2024-12-02 NOTE — TELEPHONE ENCOUNTER
Pt calling and states she just had a recent visit with Dr Wallace and all her rx's that were sent in on that day to [facility] need to be for a 3 month supply    CB# [phone]

## 2024-12-02 NOTE — TELEPHONE ENCOUNTER
Mounjaro and CGM sensors sent at office visit. Sensors for 90 days already, but Mounjaro needs to be resent for 90. Pended, please approve.

## 2024-12-03 RX ORDER — TIRZEPATIDE 12.5 MG/.5ML
INJECTION, SOLUTION SUBCUTANEOUS
Qty: 6 ML | Refills: 0 | Status: SHIPPED | OUTPATIENT
Start: 2024-12-03

## 2024-12-03 NOTE — TELEPHONE ENCOUNTER
Approved on December 2 by [employer]  CaseId:[id number];Status:Approved;Review Type:Prior Auth;Coverage Start Date:11/02/2024;Coverage End Date:12/02/2025;    If this requires a response please respond to the pool ( P MHCX PSC MEDICATION PRE-AUTH).      Thank you please advise patient.

## 2024-12-05 NOTE — TELEPHONE ENCOUNTER
Fax from [facility]    We show no history of this pt the lower doses, please clarify    Re: Mounjaro 12.5mg

## 2024-12-06 ENCOUNTER — HOSPITAL ENCOUNTER (OUTPATIENT)
Dept: SLEEP CENTER | Age: 69
Discharge: HOME | End: 2024-12-06

## 2024-12-06 DIAGNOSIS — Z96.82 S/P INSERTION OF HYPOGLOSSAL NERVE STIMULATOR: ICD-10-CM

## 2024-12-10 ENCOUNTER — TELEPHONE (OUTPATIENT)
Dept: CARDIOLOGY CLINIC | Age: 69
End: 2024-12-10

## 2024-12-10 DIAGNOSIS — R07.9 CHEST PAIN, UNSPECIFIED TYPE: ICD-10-CM

## 2024-12-10 DIAGNOSIS — R07.9 CHEST PAIN, UNSPECIFIED TYPE: Primary | ICD-10-CM

## 2024-12-10 DIAGNOSIS — R06.02 SOB (SHORTNESS OF BREATH): ICD-10-CM

## 2024-12-10 NOTE — TELEPHONE ENCOUNTER
Rec ED eval as well, if she will not then can we get some labs BNP, BMP and have her f/u with someone in general cards about chest pain. PC did her LHC 2 or 3 years ago. ROMINA

## 2024-12-10 NOTE — TELEPHONE ENCOUNTER
The patient phoned stating that she has been having SOB and a heavy feeling in her chest that started yesterday.  She states that she has not had this feeling for awhile since she started on Camzyos.  Please advise. Thank you

## 2024-12-10 NOTE — TELEPHONE ENCOUNTER
Called pt to discuss how they are feeling. Chest feels heavy. In the middle of her chest. Feels like pressure, like if it's real humid. Elephant on her chest. Does not radiate. Pain has lasted for an hour and a half on this current episode.  Told her she needs to go to the ER and she refused. She states she did that last time and got a $3700 bill. I explained the importance. She still refused. I told her to go to ED if any worsening in symptoms and let her know I would pass the information along to providers.    SOB:  yes, started yesterday  Diet changes: no  Medication changes: no, but went 4 days without Camzyos. Started medicine Saturday again  Diuretics: taking as prescribed  Swelling: no  Weight change: no  Energy level: yes, less energy week and a hlaf she has had much less energy

## 2024-12-10 NOTE — TELEPHONE ENCOUNTER
Called pt to let her know JONASV added some blood work for her to get drawn ASAP and then recommends she see a general cardiologist about the ongoing chest pain, after her refusal to go to ED.

## 2024-12-11 ENCOUNTER — TELEPHONE (OUTPATIENT)
Dept: CARDIOLOGY CLINIC | Age: 69
End: 2024-12-11

## 2024-12-11 LAB
ANION GAP SERPL CALCULATED.3IONS-SCNC: 10 MMOL/L (ref 3–16)
BUN SERPL-MCNC: 21 MG/DL (ref 7–20)
CALCIUM SERPL-MCNC: 9.2 MG/DL (ref 8.3–10.6)
CHLORIDE SERPL-SCNC: 95 MMOL/L (ref 99–110)
CO2 SERPL-SCNC: 26 MMOL/L (ref 21–32)
CREAT SERPL-MCNC: 1.5 MG/DL (ref 0.6–1.2)
GFR SERPLBLD CREATININE-BSD FMLA CKD-EPI: 37 ML/MIN/{1.73_M2}
GLUCOSE SERPL-MCNC: 234 MG/DL (ref 70–99)
NT-PROBNP SERPL-MCNC: 78 PG/ML (ref 0–124)
POTASSIUM SERPL-SCNC: 4.7 MMOL/L (ref 3.5–5.1)
SODIUM SERPL-SCNC: 131 MMOL/L (ref 136–145)

## 2024-12-11 NOTE — TELEPHONE ENCOUNTER
----- Message from ALESHA Negro CNP sent at 12/11/2024  8:48 AM EST -----  Labs do not show fluid overload, maybe a little dry, have her increase her fluid intake a little bit please. [id number]    Spoke to patient she verbalized understanding.

## 2024-12-12 ENCOUNTER — TELEPHONE (OUTPATIENT)
Dept: CARDIOLOGY CLINIC | Age: 69
End: 2024-12-12

## 2024-12-12 ENCOUNTER — TELEPHONE (OUTPATIENT)
Dept: ADMINISTRATIVE | Age: 69
End: 2024-12-12

## 2024-12-12 NOTE — TELEPHONE ENCOUNTER
Call out to pt after meeting with [facility] who states pt is on the List of [facility] patients who would need a Formulary Exception Request Form completed if she stays with [facility] for 2025.  [facility] is no longer covering [facility] for 2025.    Left a message for pt to call the office back.

## 2024-12-12 NOTE — TELEPHONE ENCOUNTER
Call out to pt after meeting with [facility] who states pt is on the List of [facility] patients who would need a Formulary Exception Request Form completed if she stays with [facility] for 2025.  [facility] is no longer covering [facility] for 2025.     Left a message for pt to call the office back.

## 2024-12-12 NOTE — TELEPHONE ENCOUNTER
Sent to [facility] per office that is her insurance    Submitted PA for camzyos  Via CMM Key: [id number]  STATUS: PENDING.    Follow up done daily; if no decision with in three days we will refax.  If another three days goes by with no decision will call the insurance for status.

## 2024-12-12 NOTE — TELEPHONE ENCOUNTER
Submitted PA for Camzyos 5MG capsules  Via CMM Key: [id number] STATUS: Message from [facility]: Drug is not covered by plan     Follow up done daily; if no decision with in three days we will refax.  If another three days goes by with no decision will call the insurance for status.

## 2024-12-13 ENCOUNTER — TELEPHONE (OUTPATIENT)
Dept: PULMONOLOGY | Age: 69
End: 2024-12-13

## 2024-12-13 ENCOUNTER — OFFICE VISIT (OUTPATIENT)
Dept: CARDIOLOGY CLINIC | Age: 69
End: 2024-12-13
Payer: COMMERCIAL

## 2024-12-13 VITALS
SYSTOLIC BLOOD PRESSURE: 80 MMHG | HEART RATE: 87 BPM | OXYGEN SATURATION: 98 % | HEIGHT: 62 IN | DIASTOLIC BLOOD PRESSURE: 52 MMHG | BODY MASS INDEX: 31.28 KG/M2 | WEIGHT: 170 LBS

## 2024-12-13 DIAGNOSIS — I10 ESSENTIAL HYPERTENSION: ICD-10-CM

## 2024-12-13 DIAGNOSIS — I42.1 HYPERTROPHIC OBSTRUCTIVE CARDIOMYOPATHY (HCC): ICD-10-CM

## 2024-12-13 DIAGNOSIS — R07.89 OTHER CHEST PAIN: Primary | ICD-10-CM

## 2024-12-13 DIAGNOSIS — E78.2 MIXED HYPERLIPIDEMIA: ICD-10-CM

## 2024-12-13 PROCEDURE — G8484 FLU IMMUNIZE NO ADMIN: HCPCS | Performed by: NURSE PRACTITIONER

## 2024-12-13 PROCEDURE — 1123F ACP DISCUSS/DSCN MKR DOCD: CPT | Performed by: NURSE PRACTITIONER

## 2024-12-13 PROCEDURE — G8399 PT W/DXA RESULTS DOCUMENT: HCPCS | Performed by: NURSE PRACTITIONER

## 2024-12-13 PROCEDURE — G8417 CALC BMI ABV UP PARAM F/U: HCPCS | Performed by: NURSE PRACTITIONER

## 2024-12-13 PROCEDURE — 1036F TOBACCO NON-USER: CPT | Performed by: NURSE PRACTITIONER

## 2024-12-13 PROCEDURE — 3017F COLORECTAL CA SCREEN DOC REV: CPT | Performed by: NURSE PRACTITIONER

## 2024-12-13 PROCEDURE — G8427 DOCREV CUR MEDS BY ELIG CLIN: HCPCS | Performed by: NURSE PRACTITIONER

## 2024-12-13 PROCEDURE — G9899 SCRN MAM PERF RSLTS DOC: HCPCS | Performed by: NURSE PRACTITIONER

## 2024-12-13 PROCEDURE — 3078F DIAST BP <80 MM HG: CPT | Performed by: NURSE PRACTITIONER

## 2024-12-13 PROCEDURE — 1090F PRES/ABSN URINE INCON ASSESS: CPT | Performed by: NURSE PRACTITIONER

## 2024-12-13 PROCEDURE — 99214 OFFICE O/P EST MOD 30 MIN: CPT | Performed by: NURSE PRACTITIONER

## 2024-12-13 PROCEDURE — 3074F SYST BP LT 130 MM HG: CPT | Performed by: NURSE PRACTITIONER

## 2024-12-13 NOTE — PROGRESS NOTES
Paradoxical vocal fold motion disorder     Postmenopausal 05/15/2018    Sleep apnea     Stage 2 chronic kidney disease 05/07/2019     Social History:    Social History     Tobacco Use   Smoking Status Never    Passive exposure: Never   Smokeless Tobacco Never     Current Medications:  Current Outpatient Medications   Medication Sig Dispense Refill    Tirzepatide (MOUNJARO) 12.5 MG/0.5ML SOAJ Take weekly 6 mL 0    DULoxetine (CYMBALTA) 60 MG extended release capsule TAKE 1 CAPSULE DAILY 90 capsule 3    ELIQUIS 5 MG TABS tablet TAKE 1 TABLET TWICE A  tablet 2    traZODone (DESYREL) 50 MG tablet Take 1.5 tablets by mouth nightly 90 tablet 3    montelukast (SINGULAIR) 10 MG tablet TAKE ONE TABLET BY MOUTH ONCE NIGHTLY 90 tablet 3    buPROPion (WELLBUTRIN XL) 300 MG extended release tablet TAKE ONE TABLET BY MOUTH EVERY OTHER DAY ALTERNATING WITH 150MG 45 tablet 3    metoprolol succinate (TOPROL XL) 50 MG extended release tablet TAKE 1 TABLET BY MOUTH DAILY 90 tablet 3    dapagliflozin (FARXIGA) 5 MG tablet TAKE 1 TABLET EVERY MORNING 90 tablet 3    buPROPion (WELLBUTRIN SR) 150 MG extended release tablet TAKE 1 TABLET BY MOUTH EVERY OTHER DAY ALTERNATING WITH 300MG 45 tablet 3    celecoxib (CELEBREX) 200 MG capsule Take 1 capsule by mouth daily      Cyclobenzaprine HCl (FLEXERIL PO) Take 10 mg by mouth in the morning and at bedtime      morphine (MSIR) 15 MG tablet Take 1 tablet by mouth in the morning and 1 tablet in the evening.      atorvastatin (LIPITOR) 20 MG tablet TAKE 1 TABLET BY MOUTH DAILY 90 tablet 1    Mavacamten (CAMZYOS) 5 MG CAPS Take 1 capsule by mouth daily 30 capsule 11    azelastine (ASTELIN) 0.1 % nasal spray 2 sprays by Nasal route 2 times daily Use in each nostril as directed (Patient taking differently: 2 sprays by Nasal route 2 times daily as needed Use in each nostril as directed) 120 mL 1    Continuous Glucose Sensor (FREESTYLE JEREMIAS 3 SENSOR) [facility] To check glucose ---please change every

## 2024-12-13 NOTE — TELEPHONE ENCOUNTER
Spoke with patient.  Discussed PSG with Inspire.  Instructed patient to increase 2-3 more levels (1.9-2.0V).  Patient states when she returned home from sleep study she checked her Inspire and she is still on 0.8V-1.8V and she is at the highest level already.  I confirmed that with [facility] (report in chart)    Instructed patient to keep appt on Wed and we can reprogram her range.     YAMILE Hassan

## 2024-12-16 ENCOUNTER — TELEPHONE (OUTPATIENT)
Dept: CARDIOLOGY CLINIC | Age: 69
End: 2024-12-16

## 2024-12-16 DIAGNOSIS — I42.1 HOCM (HYPERTROPHIC OBSTRUCTIVE CARDIOMYOPATHY) (HCC): ICD-10-CM

## 2024-12-16 DIAGNOSIS — I42.1 HYPERTROPHIC OBSTRUCTIVE CARDIOMYOPATHY (HCC): Primary | ICD-10-CM

## 2024-12-16 RX ORDER — MAVACAMTEN 5 MG/1
1 CAPSULE, GELATIN COATED ORAL DAILY
Qty: 30 CAPSULE | Refills: 11 | Status: SHIPPED | OUTPATIENT
Start: 2024-12-16

## 2024-12-16 NOTE — TELEPHONE ENCOUNTER
Called pt and spoke to Judy. No new medications. No recent hospitalizations. REMS completed and refill sent. Confirmed her upcoming appointment 2/10/25.

## 2024-12-16 NOTE — TELEPHONE ENCOUNTER
Spoke to pt.  Pt will stay with [employer] and will need Formulary Exception Request Form completed.

## 2024-12-18 ENCOUNTER — OFFICE VISIT (OUTPATIENT)
Dept: PULMONOLOGY | Age: 69
End: 2024-12-18

## 2024-12-18 VITALS
WEIGHT: 169.4 LBS | SYSTOLIC BLOOD PRESSURE: 118 MMHG | DIASTOLIC BLOOD PRESSURE: 72 MMHG | HEIGHT: 63 IN | OXYGEN SATURATION: 98 % | BODY MASS INDEX: 30.02 KG/M2 | HEART RATE: 94 BPM

## 2024-12-18 DIAGNOSIS — E66.811 CLASS 1 OBESITY DUE TO EXCESS CALORIES WITH SERIOUS COMORBIDITY AND BODY MASS INDEX (BMI) OF 30.0 TO 30.9 IN ADULT: ICD-10-CM

## 2024-12-18 DIAGNOSIS — I10 PRIMARY HYPERTENSION: ICD-10-CM

## 2024-12-18 DIAGNOSIS — Z96.82 S/P INSERTION OF HYPOGLOSSAL NERVE STIMULATOR: ICD-10-CM

## 2024-12-18 DIAGNOSIS — E66.09 CLASS 1 OBESITY DUE TO EXCESS CALORIES WITH SERIOUS COMORBIDITY AND BODY MASS INDEX (BMI) OF 30.0 TO 30.9 IN ADULT: ICD-10-CM

## 2024-12-18 DIAGNOSIS — G47.33 OSA (OBSTRUCTIVE SLEEP APNEA): Primary | ICD-10-CM

## 2024-12-18 DIAGNOSIS — F51.04 PSYCHOPHYSIOLOGICAL INSOMNIA: ICD-10-CM

## 2024-12-18 ASSESSMENT — SLEEP AND FATIGUE QUESTIONNAIRES
HOW LIKELY ARE YOU TO NOD OFF OR FALL ASLEEP WHILE SITTING QUIETLY AFTER LUNCH WITHOUT ALCOHOL: SLIGHT CHANCE OF DOZING
HOW LIKELY ARE YOU TO NOD OFF OR FALL ASLEEP WHEN YOU ARE A PASSENGER IN A CAR FOR AN HOUR WITHOUT A BREAK: HIGH CHANCE OF DOZING
HOW LIKELY ARE YOU TO NOD OFF OR FALL ASLEEP WHILE SITTING INACTIVE IN A PUBLIC PLACE: SLIGHT CHANCE OF DOZING
HOW LIKELY ARE YOU TO NOD OFF OR FALL ASLEEP WHILE SITTING AND TALKING TO SOMEONE: MODERATE CHANCE OF DOZING
HOW LIKELY ARE YOU TO NOD OFF OR FALL ASLEEP WHILE WATCHING TV: HIGH CHANCE OF DOZING
HOW LIKELY ARE YOU TO NOD OFF OR FALL ASLEEP IN A CAR, WHILE STOPPED FOR A FEW MINUTES IN TRAFFIC: MODERATE CHANCE OF DOZING
HOW LIKELY ARE YOU TO NOD OFF OR FALL ASLEEP WHILE LYING DOWN TO REST IN THE AFTERNOON WHEN CIRCUMSTANCES PERMIT: HIGH CHANCE OF DOZING
HOW LIKELY ARE YOU TO NOD OFF OR FALL ASLEEP WHILE SITTING AND READING: SLIGHT CHANCE OF DOZING
ESS TOTAL SCORE: 16

## 2024-12-18 NOTE — PROGRESS NOTES
PULMONARY OFFICE FOLLOW-UP VISIT    CONSULTING PHYSICIAN:  Kemi Mejia MD     REASON FOR VISIT:   Chief Complaint   Patient presents with    Sleep Apnea     Inspire f/u, PSG 12/06/2024         DATE OF VISIT: 12/18/2024    HISTORY OF PRESENT ILLNESS: 69 y.o. year old female comes into the pulmonary/sleep clinic for a follow-up.  Continues to use her inspire [hypoglossal nerve stimulation] therapy regularly.  She is currently 1.8 V.  Underwent a titration study since the last clinic visit which showed patient may need even higher stimulation strength to get better control over her sleep disordered breathing.  Reporting stable breathing which she feels has improved.  Still taking trazodone 75 mg every night.  Weight remains stable.    Previously: Regularly using her inspire [hypoglossal nerve stimulation] therapy.  Reports that at 1.1 V she was sleeping much better.  At 1.2 V she does feel that the stimulation is little stronger.  Still sleeping overall better than before..  Still using trazodone 50 mg every night.  Weight remains overall stable. She recently had inspire [hypoglossal nerve stimulation] therapy implanted.  Postimplantation.  Was event less.  She recovered well.  Denies any tenderness at the skin site.  No difficulty in speech.  Always had some difficulty in swallowing food and water.  This has stayed stable.  Weight remains stable.  Patient still taking trazodone 50 mg p.o. nightly.Patient has not been able to use her BiPAP therapy at all.  She did undergo a drug-induced sleep endoscopy last year which showed that she has favorable throat anatomy for inspire [hypoglossal nerve stimulation] therapy.  She has been wearing for implantation.  Weight remains stable.   Patient reports that she has been struggling with her sleep-wake cycle.  With Ambien 10 mg dosage she started having sleep behaviors including cutting her hair without her knowledge, shopping at nighttime without her knowledge.

## 2024-12-24 NOTE — TELEPHONE ENCOUNTER
The Form for Formulary Exception is ready to be Faxed on 1/2/24.  It is in the RN inbasket Bin Next to Carlos.  It is signed by YONI and francie.

## 2025-01-06 ENCOUNTER — TELEPHONE (OUTPATIENT)
Dept: CARDIOLOGY CLINIC | Age: 70
End: 2025-01-06

## 2025-01-06 NOTE — TELEPHONE ENCOUNTER
Submitted PA for Camzyos 5MG capsules  Via CM Key: [id number] STATUS: The member benefit does not include pharmacy drug coverage. Eligible drugs may be covered under the medical benefit.     Follow up done daily; if no decision with in three days we will refax.  If another three days goes by with no decision will call the insurance for status.

## 2025-01-07 ENCOUNTER — TELEPHONE (OUTPATIENT)
Dept: ENDOCRINOLOGY | Age: 70
End: 2025-01-07

## 2025-01-07 NOTE — TELEPHONE ENCOUNTER
----- Message from Dr. Lisseth Wallace MD sent at 11/20/2024  3:52 PM EST -----  Plz download kari

## 2025-01-09 NOTE — TELEPHONE ENCOUNTER
The member benefit does not include pharmacy drug coverage. Eligible drugs may be covered under the medical benefit.

## 2025-01-15 ENCOUNTER — TELEPHONE (OUTPATIENT)
Dept: PULMONOLOGY | Age: 70
End: 2025-01-15

## 2025-01-20 NOTE — TELEPHONE ENCOUNTER
Left message for pt requesting a call back regarding her Drug Coverage for Camzyos. She was going to call Kadeem on 1/10/25 and then call us back.     She had a shipment on 1/8/25 from [employer]. (See Media)

## 2025-01-21 NOTE — TELEPHONE ENCOUNTER
The mailbox is now full.  Will close this message since pt received a shipment this year 1/8/25 from [employer].

## 2025-01-29 ENCOUNTER — OFFICE VISIT (OUTPATIENT)
Dept: INTERNAL MEDICINE CLINIC | Age: 70
End: 2025-01-29

## 2025-01-29 VITALS
OXYGEN SATURATION: 98 % | HEIGHT: 63 IN | DIASTOLIC BLOOD PRESSURE: 84 MMHG | WEIGHT: 166.8 LBS | SYSTOLIC BLOOD PRESSURE: 120 MMHG | HEART RATE: 75 BPM | BODY MASS INDEX: 29.55 KG/M2

## 2025-01-29 DIAGNOSIS — M79.2 NEUROPATHIC PAIN: Primary | ICD-10-CM

## 2025-01-29 DIAGNOSIS — N18.32 STAGE 3B CHRONIC KIDNEY DISEASE (HCC): ICD-10-CM

## 2025-01-29 DIAGNOSIS — F33.1 MODERATE EPISODE OF RECURRENT MAJOR DEPRESSIVE DISORDER (HCC): ICD-10-CM

## 2025-01-29 DIAGNOSIS — I48.91 ATRIAL FIBRILLATION, UNSPECIFIED TYPE (HCC): ICD-10-CM

## 2025-01-29 DIAGNOSIS — F33.41 RECURRENT MAJOR DEPRESSIVE DISORDER, IN PARTIAL REMISSION (HCC): ICD-10-CM

## 2025-01-29 DIAGNOSIS — I50.42 CHRONIC COMBINED SYSTOLIC AND DIASTOLIC CONGESTIVE HEART FAILURE (HCC): ICD-10-CM

## 2025-01-29 DIAGNOSIS — M48.062 SPINAL STENOSIS OF LUMBAR REGION WITH NEUROGENIC CLAUDICATION: ICD-10-CM

## 2025-01-29 DIAGNOSIS — E11.42 TYPE 2 DIABETES MELLITUS WITH POLYNEUROPATHY (HCC): ICD-10-CM

## 2025-01-29 DIAGNOSIS — Z00.00 MEDICARE ANNUAL WELLNESS VISIT, SUBSEQUENT: ICD-10-CM

## 2025-01-29 RX ORDER — MIRTAZAPINE 15 MG/1
15 TABLET, FILM COATED ORAL NIGHTLY
Qty: 90 TABLET | Refills: 3 | Status: SHIPPED | OUTPATIENT
Start: 2025-01-29

## 2025-01-29 SDOH — ECONOMIC STABILITY: FOOD INSECURITY: WITHIN THE PAST 12 MONTHS, YOU WORRIED THAT YOUR FOOD WOULD RUN OUT BEFORE YOU GOT MONEY TO BUY MORE.: NEVER TRUE

## 2025-01-29 SDOH — ECONOMIC STABILITY: FOOD INSECURITY: WITHIN THE PAST 12 MONTHS, THE FOOD YOU BOUGHT JUST DIDN'T LAST AND YOU DIDN'T HAVE MONEY TO GET MORE.: NEVER TRUE

## 2025-01-29 ASSESSMENT — PATIENT HEALTH QUESTIONNAIRE - PHQ9
5. POOR APPETITE OR OVEREATING: NOT AT ALL
SUM OF ALL RESPONSES TO PHQ QUESTIONS 1-9: 15
7. TROUBLE CONCENTRATING ON THINGS, SUCH AS READING THE NEWSPAPER OR WATCHING TELEVISION: NOT AT ALL
6. FEELING BAD ABOUT YOURSELF - OR THAT YOU ARE A FAILURE OR HAVE LET YOURSELF OR YOUR FAMILY DOWN: NOT AT ALL
2. FEELING DOWN, DEPRESSED OR HOPELESS: NEARLY EVERY DAY
9. THOUGHTS THAT YOU WOULD BE BETTER OFF DEAD, OR OF HURTING YOURSELF: NOT AT ALL
SUM OF ALL RESPONSES TO PHQ9 QUESTIONS 1 & 2: 6
10. IF YOU CHECKED OFF ANY PROBLEMS, HOW DIFFICULT HAVE THESE PROBLEMS MADE IT FOR YOU TO DO YOUR WORK, TAKE CARE OF THINGS AT HOME, OR GET ALONG WITH OTHER PEOPLE: VERY DIFFICULT
8. MOVING OR SPEAKING SO SLOWLY THAT OTHER PEOPLE COULD HAVE NOTICED. OR THE OPPOSITE, BEING SO FIGETY OR RESTLESS THAT YOU HAVE BEEN MOVING AROUND A LOT MORE THAN USUAL: NEARLY EVERY DAY
1. LITTLE INTEREST OR PLEASURE IN DOING THINGS: NEARLY EVERY DAY
SUM OF ALL RESPONSES TO PHQ QUESTIONS 1-9: 15
3. TROUBLE FALLING OR STAYING ASLEEP: NEARLY EVERY DAY
SUM OF ALL RESPONSES TO PHQ QUESTIONS 1-9: 15
4. FEELING TIRED OR HAVING LITTLE ENERGY: NEARLY EVERY DAY
SUM OF ALL RESPONSES TO PHQ QUESTIONS 1-9: 15

## 2025-01-29 ASSESSMENT — ENCOUNTER SYMPTOMS
BLOOD IN STOOL: 0
ABDOMINAL PAIN: 0
SHORTNESS OF BREATH: 0
CHEST TIGHTNESS: 0
BACK PAIN: 1
COUGH: 0
WHEEZING: 0
SINUS PAIN: 0
CONSTIPATION: 0
COLOR CHANGE: 0

## 2025-01-29 ASSESSMENT — LIFESTYLE VARIABLES
HOW MANY STANDARD DRINKS CONTAINING ALCOHOL DO YOU HAVE ON A TYPICAL DAY: PATIENT DOES NOT DRINK
HOW OFTEN DO YOU HAVE A DRINK CONTAINING ALCOHOL: NEVER

## 2025-01-29 NOTE — PROGRESS NOTES
Judy Gallardo (:  1955) is a 69 y.o. female,Established patient, here for evaluation of the following chief complaint(s):  Follow-up and Medicare AW      Assessment & Plan   ASSESSMENT/PLAN:  1. Neuropathic pain  -     [facility] - Physical Therapy  -     [facility] - [facility]  2. Atrial fibrillation, unspecified type ([facility])  3. Chronic combined systolic and diastolic congestive heart failure (HCC)  4. Moderate episode of recurrent major depressive disorder ([facility])  5. Type 2 diabetes mellitus with polyneuropathy ([facility])  6. Stage 3b chronic kidney disease ([facility])  7. Spinal stenosis of lumbar region with neurogenic claudication  -     [facility] - Physical Therapy  -     [facility] Occupational Therapy - [facility]  8. Recurrent major depressive disorder, in partial remission (HCC)  -     mirtazapine (REMERON) 15 MG tablet; Take 1 tablet by mouth nightly, Disp-90 tablet, R-3Normal  9. Medicare annual wellness visit, subsequent  Patient has been having issues with her pain and she felt that the morphine was not working very well she decided to gradually wean it off and she is off it completely now and she is not feeling much different so she decided to stay off it she wants to continue to take Celebrex and asked to be switched over to us prescribing it which we agreed to and she can call me when she needed prescribed    Patient is having hard time sleeping partially because of her inspire device but I think that her depressive symptoms are not very well-controlled we will have her discontinue trazodone and try Remeron and see if that would be beneficial    The patient is having difficulty with ambulation balance and she will benefit from physical and Occupational Therapy that would be ordered for her and we will see how she does with that    Patient blood sugar is slightly higher on her last blood test I wonder if that is related to his to

## 2025-01-29 NOTE — PROGRESS NOTES
[facility]  Advanced CHF/Pulmonary Hypertension   Cardiac Evaluation      Judy Gallardo  YOB: 1955    Date of Visit:  2/10/25    Chief Complaint   Patient presents with    Cardiomyopathy    Shortness of Breath    Dizziness     History of Present Illness:  Judy Gallardo is a 69 y.o. female who presents from referral from Dr. Anne for consultation and management of mild HOCM.    She has a past medical history of type 2 diabetes, CKD 3, HTN.  She was in [place] and came home on 6/24/22.  While in the airport, she began to have midsternal chest pressure 7/10 with SOB and diffuse diaphoresis and syncope and collapse  She was advised to go the ER and did not.  She flew back to [place] and has been having exertional dyspnea and chest pain associate with diaphoresis.  She had a did have a negative left heart cath September 2021.  She was started on Toprol 25mg on DC on 6/24/22    She had a 30 day monitor recommended along with genetic testing.     She grew up in an orphanage.  She was always told she could not play and made to read in the Orphanage because she would pass out.  She would pass out with activity.  States this has been a long standing problem.  It would happen if she would \"lift something\" or walk too long.  It would happen in the heat.  She states she stays dehydrated.  She states her BP would not stabilize and when she stood up.  Since being started on Toprol, she feels less dizzy.   She uses a rolling walker for balance.  She states, when the episodes happen, she cannot cool herself off.  She has a chair lift at home.      She knows info about her Mother's mother. No heart disease  She has no known information about her father.   She has a sister without heart problems  She has children.    Her Toprol was increased to 50mg daily on 8/1/22.      Genetic Testing NMT Medical: Received 8/24/22  Result: 8/30/22  No Clinically Significant Variants Detected.     8/29/22

## 2025-01-29 NOTE — PATIENT INSTRUCTIONS
or contact lenses need to be.  Visual field tests   Your doctor may have you look through special machines.  Or your doctor may simply have you stare straight ahead while they move a finger into and out of your field of vision.  Color vision test   You look at pieces of printed test patterns in various colors. You say what number or symbol you see.  Your doctor may have you trace the number or symbol using a pointer.  How do these tests feel?  There is very little chance of having a problem from this test. If dilating drops are used for a vision test, they may make the eyes sting and cause a medicine taste in the mouth.  Follow-up care is a key part of your treatment and safety. Be sure to make and go to all appointments, and call your doctor if you are having problems. It's also a good idea to know your test results and keep a list of the medicines you take.  Where can you learn more?  Go to https://[email or web address].[employer]/patientEd and enter G551 to learn more about \"Learning About Vision Tests.\"  Current as of: July 31, 2024  Content Version: 14.3  © 2024 [employer].   Care instructions adapted under license by [employer]. If you have questions about a medical condition or this instruction, always ask your healthcare professional. [employer], disclaims any warranty or liability for your use of this information.         Learning About Activities of Daily Living  What are activities of daily living?     Activities of daily living (ADLs) are the basic self-care tasks you do every day. These include eating, bathing, dressing, and moving around.  As you age, and if you have health problems, you may find that it's harder to do some of these tasks. If so, your doctor can suggest ideas that may help.  To measure what kind of help you may need, your doctor will ask how well you are able to do ADLs. Let your doctor know if there are any tasks that you are having trouble doing. This is an important

## 2025-02-04 ENCOUNTER — TELEPHONE (OUTPATIENT)
Dept: INTERNAL MEDICINE CLINIC | Age: 70
End: 2025-02-04

## 2025-02-04 ENCOUNTER — OFFICE VISIT (OUTPATIENT)
Dept: INTERNAL MEDICINE CLINIC | Age: 70
End: 2025-02-04
Payer: COMMERCIAL

## 2025-02-04 VITALS
SYSTOLIC BLOOD PRESSURE: 112 MMHG | HEART RATE: 68 BPM | OXYGEN SATURATION: 99 % | WEIGHT: 162 LBS | TEMPERATURE: 97.6 F | DIASTOLIC BLOOD PRESSURE: 74 MMHG | BODY MASS INDEX: 28.7 KG/M2

## 2025-02-04 DIAGNOSIS — J06.9 URTI (ACUTE UPPER RESPIRATORY INFECTION): ICD-10-CM

## 2025-02-04 DIAGNOSIS — F33.41 RECURRENT MAJOR DEPRESSIVE DISORDER, IN PARTIAL REMISSION (HCC): ICD-10-CM

## 2025-02-04 DIAGNOSIS — J06.9 URTI (ACUTE UPPER RESPIRATORY INFECTION): Primary | ICD-10-CM

## 2025-02-04 DIAGNOSIS — R05.1 ACUTE COUGH: ICD-10-CM

## 2025-02-04 LAB
INFLUENZA A ANTIBODY: NORMAL
INFLUENZA B ANTIBODY: NORMAL
Lab: NORMAL
QC PASS/FAIL: NORMAL
SARS-COV-2 RDRP RESP QL NAA+PROBE: NEGATIVE

## 2025-02-04 PROCEDURE — G8417 CALC BMI ABV UP PARAM F/U: HCPCS | Performed by: INTERNAL MEDICINE

## 2025-02-04 PROCEDURE — 99214 OFFICE O/P EST MOD 30 MIN: CPT | Performed by: INTERNAL MEDICINE

## 2025-02-04 PROCEDURE — 3074F SYST BP LT 130 MM HG: CPT | Performed by: INTERNAL MEDICINE

## 2025-02-04 PROCEDURE — G9899 SCRN MAM PERF RSLTS DOC: HCPCS | Performed by: INTERNAL MEDICINE

## 2025-02-04 PROCEDURE — 3017F COLORECTAL CA SCREEN DOC REV: CPT | Performed by: INTERNAL MEDICINE

## 2025-02-04 PROCEDURE — 1090F PRES/ABSN URINE INCON ASSESS: CPT | Performed by: INTERNAL MEDICINE

## 2025-02-04 PROCEDURE — 1036F TOBACCO NON-USER: CPT | Performed by: INTERNAL MEDICINE

## 2025-02-04 PROCEDURE — 87804 INFLUENZA ASSAY W/OPTIC: CPT | Performed by: INTERNAL MEDICINE

## 2025-02-04 PROCEDURE — G8427 DOCREV CUR MEDS BY ELIG CLIN: HCPCS | Performed by: INTERNAL MEDICINE

## 2025-02-04 PROCEDURE — 3078F DIAST BP <80 MM HG: CPT | Performed by: INTERNAL MEDICINE

## 2025-02-04 PROCEDURE — 1123F ACP DISCUSS/DSCN MKR DOCD: CPT | Performed by: INTERNAL MEDICINE

## 2025-02-04 PROCEDURE — 87635 SARS-COV-2 COVID-19 AMP PRB: CPT | Performed by: INTERNAL MEDICINE

## 2025-02-04 PROCEDURE — G8399 PT W/DXA RESULTS DOCUMENT: HCPCS | Performed by: INTERNAL MEDICINE

## 2025-02-04 RX ORDER — AZITHROMYCIN 250 MG/1
250 TABLET, FILM COATED ORAL SEE ADMIN INSTRUCTIONS
Qty: 6 TABLET | Refills: 0 | Status: SHIPPED | OUTPATIENT
Start: 2025-02-04 | End: 2025-02-09

## 2025-02-04 RX ORDER — AZITHROMYCIN 250 MG/1
250 TABLET, FILM COATED ORAL SEE ADMIN INSTRUCTIONS
Qty: 6 TABLET | Refills: 0 | Status: SHIPPED | OUTPATIENT
Start: 2025-02-04 | End: 2025-02-04 | Stop reason: SDUPTHER

## 2025-02-04 RX ORDER — BUPROPION HYDROCHLORIDE 150 MG/1
TABLET, EXTENDED RELEASE ORAL
Qty: 45 TABLET | Refills: 3 | Status: SHIPPED | OUTPATIENT
Start: 2025-02-04

## 2025-02-04 RX ORDER — PREDNISONE 20 MG/1
20 TABLET ORAL 2 TIMES DAILY
Qty: 10 TABLET | Refills: 0 | Status: SHIPPED | OUTPATIENT
Start: 2025-02-04 | End: 2025-02-04 | Stop reason: SDUPTHER

## 2025-02-04 RX ORDER — BENZONATATE 200 MG/1
200 CAPSULE ORAL 3 TIMES DAILY PRN
Qty: 30 CAPSULE | Refills: 0 | Status: SHIPPED | OUTPATIENT
Start: 2025-02-04 | End: 2025-02-04 | Stop reason: SDUPTHER

## 2025-02-04 RX ORDER — PREDNISONE 20 MG/1
20 TABLET ORAL 2 TIMES DAILY
Qty: 10 TABLET | Refills: 0 | Status: SHIPPED | OUTPATIENT
Start: 2025-02-04 | End: 2025-02-09

## 2025-02-04 RX ORDER — BENZONATATE 200 MG/1
200 CAPSULE ORAL 3 TIMES DAILY PRN
Qty: 30 CAPSULE | Refills: 0 | Status: SHIPPED | OUTPATIENT
Start: 2025-02-04 | End: 2025-02-11

## 2025-02-04 ASSESSMENT — ENCOUNTER SYMPTOMS
COUGH: 1
ABDOMINAL PAIN: 0
RHINORRHEA: 0
SHORTNESS OF BREATH: 1

## 2025-02-04 NOTE — PROGRESS NOTES
Judy Gallardo (:  1955) is a 69 y.o. female,Established patient, here for evaluation of the following chief complaint(s):  Shortness of Breath (STARTED , difficulty walking talking, cold. ), Other (Mucus DM otc taking.), and Cough (sneeze)      Assessment & Plan   ASSESSMENT/PLAN:  1. URTI (acute upper respiratory infection)  -     benzonatate (TESSALON) 200 MG capsule; Take 1 capsule by mouth 3 times daily as needed for Cough, Disp-30 capsule, R-0Normal  -     predniSONE (DELTASONE) 20 MG tablet; Take 1 tablet by mouth 2 times daily for 5 days, Disp-10 tablet, R-0Normal  -     azithromycin (ZITHROMAX) 250 MG tablet; Take 1 tablet by mouth See Admin Instructions for 5 days 500mg on day 1 followed by 250mg on days 2 - 5, Disp-6 tablet, R-0Normal  2. Recurrent major depressive disorder, in partial remission (HCC)  -     buPROPion (WELLBUTRIN SR) 150 MG extended release tablet; TAKE 1 TABLET BY MOUTH EVERY OTHER DAY ALTERNATING WITH 300MG, Disp-45 tablet, R-3Normal  3. Acute cough  -     POCT COVID-19 Rapid, NAAT  -     POCT Influenza A/B    Patient symptoms are very suggestive of a bad sinus infection versus a bronchitis at this point we will abel start the patient oral antibiotics and steroids as well as a cough medicine she is advised to increase her fluid intake and to rest and drink a lot of fluids and multivitamins    Patient depression is doing fairly well she is on bupropion and we will continue with the same medication and refill the prescription today  Return if symptoms worsen or fail to improve, for As scheduled.         Subjective   SUBJECTIVE/OBJECTIVE:    Lab Review   Lab Results   Component Value Date/Time     12/10/2024 01:58 PM     2024 09:39 AM     2024 03:58 PM    K 4.7 12/10/2024 01:58 PM    K 4.8 2024 09:39 AM    K 5.0 2024 03:58 PM    K 4.4 2022 05:13 AM    K 4.6 2022 03:20 PM    CO2 26 12/10/2024 01:58 PM    CO2 27

## 2025-02-04 NOTE — TELEPHONE ENCOUNTER
Antibiotic, steroid and cough medication sent to mail order today. Pt needs them to go to [facility]. Rxs sent.

## 2025-02-05 DIAGNOSIS — E11.65 UNCONTROLLED TYPE 2 DIABETES MELLITUS WITH HYPERGLYCEMIA (HCC): ICD-10-CM

## 2025-02-05 NOTE — TELEPHONE ENCOUNTER
Medication:   Requested Prescriptions     Pending Prescriptions Disp Refills    Tirzepatide (MOUNJARO) 12.5 MG/0.5ML SOAJ [Pharmacy Med Name: MOUNJARO PEN 0.5ML 4'S 12.5MG] 6 mL 0     Sig: INJECT WEEKLY       Last Filled:      Patient Phone Number: [phone] (home) [phone] (work)    Last appt: 11/20/2024   Next appt: 3/26/2025    Last Labs DM:   Lab Results   Component Value Date/Time    LABA1C 7.0 11/19/2024 09:39 AM

## 2025-02-06 RX ORDER — TIRZEPATIDE 12.5 MG/.5ML
INJECTION, SOLUTION SUBCUTANEOUS
Qty: 6 ML | Refills: 0 | Status: SHIPPED | OUTPATIENT
Start: 2025-02-06

## 2025-02-07 ENCOUNTER — TELEPHONE (OUTPATIENT)
Dept: CARDIOLOGY CLINIC | Age: 70
End: 2025-02-07

## 2025-02-07 NOTE — TELEPHONE ENCOUNTER
Left message on machine for pt to call the office. Called mobile number and was able to leave a message.     Called pt's work number and was able to speak with her. She states she will call [facility] today.

## 2025-02-10 ENCOUNTER — OFFICE VISIT (OUTPATIENT)
Dept: CARDIOLOGY CLINIC | Age: 70
End: 2025-02-10

## 2025-02-10 VITALS
OXYGEN SATURATION: 95 % | HEART RATE: 103 BPM | BODY MASS INDEX: 29.23 KG/M2 | WEIGHT: 165 LBS | DIASTOLIC BLOOD PRESSURE: 80 MMHG | SYSTOLIC BLOOD PRESSURE: 118 MMHG | HEIGHT: 63 IN

## 2025-02-10 DIAGNOSIS — I42.1 HOCM (HYPERTROPHIC OBSTRUCTIVE CARDIOMYOPATHY) (HCC): Primary | ICD-10-CM

## 2025-02-10 DIAGNOSIS — Q24.8 LEFT VENTRICULAR OUTFLOW TRACT OBSTRUCTION: ICD-10-CM

## 2025-02-10 DIAGNOSIS — E78.2 MIXED HYPERLIPIDEMIA: ICD-10-CM

## 2025-02-10 DIAGNOSIS — R06.02 SOB (SHORTNESS OF BREATH): ICD-10-CM

## 2025-02-10 DIAGNOSIS — G47.33 OSA (OBSTRUCTIVE SLEEP APNEA): ICD-10-CM

## 2025-02-10 NOTE — PATIENT INSTRUCTIONS
Continue current medications, no changes  Routine labs in 2 weeks: BNP, CBC, and CMP  Follow up with Nava Hu MD in 4 months  Call my office for any worsening symptoms such as shortness of breath, chest pain, sudden weight gain or loss, or any increased [phone]

## 2025-02-11 ENCOUNTER — TELEPHONE (OUTPATIENT)
Dept: INTERNAL MEDICINE CLINIC | Age: 70
End: 2025-02-11

## 2025-02-11 NOTE — TELEPHONE ENCOUNTER
No the patient is to take the short form every other day since that is working for her   Enbrel Counseling:  I discussed with the patient the risks of etanercept including but not limited to myelosuppression, immunosuppression, autoimmune hepatitis, demyelinating diseases, lymphoma, and infections.  The patient understands that monitoring is required including a PPD at baseline and must alert us or the primary physician if symptoms of infection or other concerning signs are noted.

## 2025-02-11 NOTE — TELEPHONE ENCOUNTER
Merly from [facility] calling in about Wellbutrin  mg QOD.    The SR tab is a 12 hrs tab and XL is 24 hr. She is wanting clarification on that - should that pill be the XL 24 hr?    [phone], ref # for this patient call is [id number].    Please advise, thanks.

## 2025-02-24 ENCOUNTER — TELEPHONE (OUTPATIENT)
Dept: ENDOCRINOLOGY | Age: 70
End: 2025-02-24

## 2025-02-24 NOTE — TELEPHONE ENCOUNTER
Submitted PA for Dapagliflozin Propanediol 5MG tablets   [facility]ia [facility] Key: [id number] STATUS: Approved today by [facility] 2017  CaseId:[id number];Status:Approved;Review Type:Prior Auth;Coverage Start Date:01/25/2025;Coverage End Date:02/24/2026;    If this requires a response please respond to the pool ( P MHCX PSC MEDICATION PRE-AUTH).      Thank you please advise patient.

## 2025-02-27 ENCOUNTER — OFFICE VISIT (OUTPATIENT)
Dept: INTERNAL MEDICINE CLINIC | Age: 70
End: 2025-02-27

## 2025-02-27 VITALS
HEART RATE: 79 BPM | BODY MASS INDEX: 29.3 KG/M2 | OXYGEN SATURATION: 97 % | DIASTOLIC BLOOD PRESSURE: 64 MMHG | HEIGHT: 63 IN | SYSTOLIC BLOOD PRESSURE: 104 MMHG | WEIGHT: 165.4 LBS

## 2025-02-27 DIAGNOSIS — I48.91 ATRIAL FIBRILLATION, UNSPECIFIED TYPE (HCC): Primary | ICD-10-CM

## 2025-02-27 DIAGNOSIS — I50.42 CHRONIC COMBINED SYSTOLIC AND DIASTOLIC CONGESTIVE HEART FAILURE (HCC): ICD-10-CM

## 2025-02-27 DIAGNOSIS — E11.42 TYPE 2 DIABETES MELLITUS WITH DIABETIC POLYNEUROPATHY, WITH LONG-TERM CURRENT USE OF INSULIN (HCC): ICD-10-CM

## 2025-02-27 DIAGNOSIS — E87.1 HYPONATREMIA: ICD-10-CM

## 2025-02-27 DIAGNOSIS — I10 PRIMARY HYPERTENSION: ICD-10-CM

## 2025-02-27 DIAGNOSIS — Z79.4 TYPE 2 DIABETES MELLITUS WITH DIABETIC POLYNEUROPATHY, WITH LONG-TERM CURRENT USE OF INSULIN (HCC): ICD-10-CM

## 2025-02-27 DIAGNOSIS — E78.2 MIXED HYPERLIPIDEMIA: ICD-10-CM

## 2025-02-27 DIAGNOSIS — E11.42 TYPE 2 DIABETES MELLITUS WITH POLYNEUROPATHY (HCC): ICD-10-CM

## 2025-02-27 LAB
ANION GAP SERPL CALCULATED.3IONS-SCNC: 10 MMOL/L (ref 3–16)
BUN SERPL-MCNC: 18 MG/DL (ref 7–20)
CALCIUM SERPL-MCNC: 9.4 MG/DL (ref 8.3–10.6)
CHLORIDE SERPL-SCNC: 100 MMOL/L (ref 99–110)
CO2 SERPL-SCNC: 25 MMOL/L (ref 21–32)
CREAT SERPL-MCNC: 1.4 MG/DL (ref 0.6–1.2)
GFR SERPLBLD CREATININE-BSD FMLA CKD-EPI: 40 ML/MIN/{1.73_M2}
GLUCOSE SERPL-MCNC: 192 MG/DL (ref 70–99)
POTASSIUM SERPL-SCNC: 5.3 MMOL/L (ref 3.5–5.1)
SODIUM SERPL-SCNC: 135 MMOL/L (ref 136–145)

## 2025-02-27 ASSESSMENT — ENCOUNTER SYMPTOMS
BLOOD IN STOOL: 0
ABDOMINAL PAIN: 0
COUGH: 0
WHEEZING: 0
SHORTNESS OF BREATH: 0
CONSTIPATION: 0
COLOR CHANGE: 0
SINUS PAIN: 0
CHEST TIGHTNESS: 0

## 2025-02-27 NOTE — PROGRESS NOTES
Judy Gallardo (:  1955) is a 69 y.o. female,Established patient, here for evaluation of the following chief complaint(s):  6 Month Follow-Up      Assessment & Plan   ASSESSMENT/PLAN:  1. Atrial fibrillation, unspecified type (HCC)  2. Chronic combined systolic and diastolic congestive heart failure (HCC)  3. Type 2 diabetes mellitus with diabetic polyneuropathy, with long-term current use of insulin (HCC)  4. Primary hypertension  5. Mixed hyperlipidemia  6. Type 2 diabetes mellitus with polyneuropathy (HCC)  7. Hyponatremia  -     Basic Metabolic Panel; Future  Overall the patient is doing very well her diabetic control is gradually changing hopefully the next reading will be good patient does have an appointment with her endocrinologist next month and having blood test done just before that    The patient blood pressure is controlled and she is not having any issues with that    Patient's sodium was slightly reduced on her last test we can have her repeat that today and make sure it has not changed significantly.    The patient is advised to have her eye examination she has not had one for 4 years now and she should be scheduling 1 given her underlying diabetes.        Return in about 6 months (around 2025).         Subjective   SUBJECTIVE/OBJECTIVE:    Lab Review   Lab Results   Component Value Date/Time     12/10/2024 01:58 PM     2024 09:39 AM     2024 03:58 PM    K 4.7 12/10/2024 01:58 PM    K 4.8 2024 09:39 AM    K 5.0 2024 03:58 PM    K 4.4 2022 05:13 AM    K 4.6 2022 03:20 PM    CO2 26 12/10/2024 01:58 PM    CO2 27 2024 09:39 AM    CO2 27 2024 03:58 PM    BUN 21 12/10/2024 01:58 PM    BUN 16 2024 09:39 AM    BUN 19 2024 03:58 PM    CREATININE 1.5 12/10/2024 01:58 PM    CREATININE 1.4 2024 09:39 AM    CREATININE 1.3 2024 03:58 PM    GLUCOSE 234 12/10/2024 01:58 PM    GLUCOSE 115 2024 09:39 AM

## 2025-02-27 NOTE — PLAN OF CARE
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   Job Duties/Demands: Prolonged Standing    Hand Dominance: Right    Sport/ Recreation/ Leisure/ Hobbies: reading, travel,     Review Of Systems (ROS):  [x] Performed Review of systems (Integumentary, CardioPulmonary, Neurological) by intake and observation. Intake form is in the medical record. Patient has been instructed to contact their primary care physician regarding ROS issues if not already being addressed at this time.    [x] Patient history, allergies, meds reviewed. Medical chart reviewed. See intake form.     OBJECTIVE EXAMINATION     2/27/25  ROM/Strength: (Blank cells denote NT)      Mvmt (norm) AROM L AROM R Notes PROM L PROM R Notes     LUMBAR Flex (90) 100 deg        Ext (25) 30 deg         SB (25) WFL WFL        Rotation (30) WFL WFL           HIP Flex (120) 123 deg 130 deg        Abd (45)          ER (50)          IR (45)          Ext (20)         KNEE Flex (140)          Ext (0) Lacking 6  deg Lacking  5 deg          ANKLE DF (20)          PF (50)          Inversion (30)          Eversion (20)             MMT L          MMT  R Notes     LUMBAR  Flexion       Extension       Lateral flexion        Rotation          MMT L MMT R Notes       HIP  Flexion 4 4      Abduction 3 3      ER        IR        Extension 3 3    KNEE  Flexion 4 minor quad pain 4      Extension 5 5      ANKLE  DF        PF        Inversion        Eversion        Repeated Movements: [] Normal  [] Abnormal [] N/A    Palpation:     Location:  L patellar hypomobility in all directions     Posture:     B knee varus

## 2025-02-28 ENCOUNTER — HOSPITAL ENCOUNTER (OUTPATIENT)
Dept: PHYSICAL THERAPY | Age: 70
Setting detail: THERAPIES SERIES
Discharge: HOME | End: 2025-02-28
Payer: COMMERCIAL

## 2025-02-28 DIAGNOSIS — M25.662 DECREASED RANGE OF MOTION OF BOTH LOWER EXTREMITIES: ICD-10-CM

## 2025-02-28 DIAGNOSIS — R26.89 IMPAIRMENT OF BALANCE: ICD-10-CM

## 2025-02-28 DIAGNOSIS — M25.661 DECREASED RANGE OF MOTION OF BOTH LOWER EXTREMITIES: ICD-10-CM

## 2025-02-28 DIAGNOSIS — R26.81 GAIT INSTABILITY: ICD-10-CM

## 2025-02-28 DIAGNOSIS — R53.1 WEAKNESS: Primary | ICD-10-CM

## 2025-02-28 PROCEDURE — 97530 THERAPEUTIC ACTIVITIES: CPT

## 2025-02-28 PROCEDURE — 97161 PT EVAL LOW COMPLEX 20 MIN: CPT

## 2025-03-07 ENCOUNTER — HOSPITAL ENCOUNTER (OUTPATIENT)
Dept: PHYSICAL THERAPY | Age: 70
Setting detail: THERAPIES SERIES
End: 2025-03-07
Payer: COMMERCIAL

## 2025-03-10 ENCOUNTER — HOSPITAL ENCOUNTER (OUTPATIENT)
Dept: PHYSICAL THERAPY | Age: 70
Setting detail: THERAPIES SERIES
Discharge: HOME | End: 2025-03-10
Payer: COMMERCIAL

## 2025-03-10 PROCEDURE — 97110 THERAPEUTIC EXERCISES: CPT

## 2025-03-12 ENCOUNTER — OFFICE VISIT (OUTPATIENT)
Dept: PULMONOLOGY | Age: 70
End: 2025-03-12
Payer: COMMERCIAL

## 2025-03-12 VITALS
BODY MASS INDEX: 30.33 KG/M2 | HEIGHT: 63 IN | SYSTOLIC BLOOD PRESSURE: 130 MMHG | WEIGHT: 171.2 LBS | HEART RATE: 61 BPM | DIASTOLIC BLOOD PRESSURE: 80 MMHG | OXYGEN SATURATION: 96 %

## 2025-03-12 DIAGNOSIS — F51.04 PSYCHOPHYSIOLOGICAL INSOMNIA: ICD-10-CM

## 2025-03-12 DIAGNOSIS — E66.09 CLASS 1 OBESITY DUE TO EXCESS CALORIES WITH SERIOUS COMORBIDITY AND BODY MASS INDEX (BMI) OF 30.0 TO 30.9 IN ADULT: ICD-10-CM

## 2025-03-12 DIAGNOSIS — E66.811 CLASS 1 OBESITY DUE TO EXCESS CALORIES WITH SERIOUS COMORBIDITY AND BODY MASS INDEX (BMI) OF 30.0 TO 30.9 IN ADULT: ICD-10-CM

## 2025-03-12 DIAGNOSIS — I10 PRIMARY HYPERTENSION: ICD-10-CM

## 2025-03-12 DIAGNOSIS — Z96.82 S/P INSERTION OF HYPOGLOSSAL NERVE STIMULATOR: ICD-10-CM

## 2025-03-12 DIAGNOSIS — G47.33 OSA (OBSTRUCTIVE SLEEP APNEA): Primary | ICD-10-CM

## 2025-03-12 PROCEDURE — 3079F DIAST BP 80-89 MM HG: CPT | Performed by: INTERNAL MEDICINE

## 2025-03-12 PROCEDURE — G2211 COMPLEX E/M VISIT ADD ON: HCPCS | Performed by: INTERNAL MEDICINE

## 2025-03-12 PROCEDURE — 1090F PRES/ABSN URINE INCON ASSESS: CPT | Performed by: INTERNAL MEDICINE

## 2025-03-12 PROCEDURE — 3017F COLORECTAL CA SCREEN DOC REV: CPT | Performed by: INTERNAL MEDICINE

## 2025-03-12 PROCEDURE — G9899 SCRN MAM PERF RSLTS DOC: HCPCS | Performed by: INTERNAL MEDICINE

## 2025-03-12 PROCEDURE — G8417 CALC BMI ABV UP PARAM F/U: HCPCS | Performed by: INTERNAL MEDICINE

## 2025-03-12 PROCEDURE — G8399 PT W/DXA RESULTS DOCUMENT: HCPCS | Performed by: INTERNAL MEDICINE

## 2025-03-12 PROCEDURE — 3075F SYST BP GE 130 - 139MM HG: CPT | Performed by: INTERNAL MEDICINE

## 2025-03-12 PROCEDURE — 1036F TOBACCO NON-USER: CPT | Performed by: INTERNAL MEDICINE

## 2025-03-12 PROCEDURE — G8427 DOCREV CUR MEDS BY ELIG CLIN: HCPCS | Performed by: INTERNAL MEDICINE

## 2025-03-12 PROCEDURE — 1123F ACP DISCUSS/DSCN MKR DOCD: CPT | Performed by: INTERNAL MEDICINE

## 2025-03-12 PROCEDURE — 99215 OFFICE O/P EST HI 40 MIN: CPT | Performed by: INTERNAL MEDICINE

## 2025-03-12 ASSESSMENT — SLEEP AND FATIGUE QUESTIONNAIRES
HOW LIKELY ARE YOU TO NOD OFF OR FALL ASLEEP WHILE SITTING INACTIVE IN A PUBLIC PLACE: MODERATE CHANCE OF DOZING
HOW LIKELY ARE YOU TO NOD OFF OR FALL ASLEEP WHEN YOU ARE A PASSENGER IN A CAR FOR AN HOUR WITHOUT A BREAK: HIGH CHANCE OF DOZING
ESS TOTAL SCORE: 14
HOW LIKELY ARE YOU TO NOD OFF OR FALL ASLEEP WHILE SITTING QUIETLY AFTER LUNCH WITHOUT ALCOHOL: SLIGHT CHANCE OF DOZING
HOW LIKELY ARE YOU TO NOD OFF OR FALL ASLEEP WHILE SITTING AND READING: MODERATE CHANCE OF DOZING
HOW LIKELY ARE YOU TO NOD OFF OR FALL ASLEEP WHILE LYING DOWN TO REST IN THE AFTERNOON WHEN CIRCUMSTANCES PERMIT: MODERATE CHANCE OF DOZING
HOW LIKELY ARE YOU TO NOD OFF OR FALL ASLEEP IN A CAR, WHILE STOPPED FOR A FEW MINUTES IN TRAFFIC: SLIGHT CHANCE OF DOZING
HOW LIKELY ARE YOU TO NOD OFF OR FALL ASLEEP WHILE WATCHING TV: MODERATE CHANCE OF DOZING
HOW LIKELY ARE YOU TO NOD OFF OR FALL ASLEEP WHILE SITTING AND TALKING TO SOMEONE: SLIGHT CHANCE OF DOZING

## 2025-03-12 NOTE — PROGRESS NOTES
mg/dL   52    VLDL calc Not Established mg/dL   46    A1c See comment %   7.0    TSH 0.27 - 4.20 uIU/mL   2.73        All labs were personally reviewed by me any my interpretation is: CBC is normal. Chem 8 is hyperglycemia.     IMAGING:    Narrative   EXAMINATION:   ONE XRAY VIEW OF THE CHEST       6/23/2022 3:11 pm       COMPARISON:   08/30/2021       HISTORY:   ORDERING SYSTEM PROVIDED HISTORY: Chest pain with exertion   TECHNOLOGIST PROVIDED HISTORY:   Reason for exam:->Chest pain with exertion   Reason for Exam: chest pain with exertion       FINDINGS:   The lungs are without acute focal process.  There is no effusion or   pneumothorax. The cardiomediastinal silhouette is stable. The osseous   structures are stable.           Impression   No acute process.             Pulmonary Functions Testing Results:    Baseline polysomnography done on 11/13/2022  Overall AHI: 15.7  REM AHI: 25.9  Supine AHI: 17.9  Lowest oxygen saturation: 87%  Time spent when oxygen saturation of 88%: 5.5 minutes      CPAP titration done on 1/28/2023  Adequate therapy: Auto BiPAP with EPAP min of 8, IPAP max of 15, pressure support of 4 with small AirFit N30 I      Postimplantation titration study done on 12/6/2024    Overall AHI: 19.3  REM AHI: 7.8  Supine AHI: 20.1  Lowest oxygen saturation: 86%  Time spent below an oxygen saturation of 89%: 2.3 minutes    Interpretation: Patient had some control of her sleep disordered breathing the setting of 1.8 V.  Settings may have to be increased close to 1.9 V to 2.0 V to get better control.    CPAP compliance summary     8/10/2023 -2/21/2024 4/13/2023 -8/8/2023 2/21/2023-/5/2023   Days with device usage 2/196 days 40/118 days 28/44 days   Average Usage 1 hour 31 minutes 2 hours 56 minutes 3 hours 41 minutes   Days with device usage >4hrs 0% 5% 32%   Large Leak per night 38.4 L/min 50.2 L/min 53.4 L/min   AHI 2.3 12.6 20.1   CPAP settings Auto BiPAP Auto BiPAP Auto BiPAP   90th percentile pressure

## 2025-03-13 ENCOUNTER — RESULTS FOLLOW-UP (OUTPATIENT)
Dept: CARDIOLOGY CLINIC | Age: 70
End: 2025-03-13

## 2025-03-13 DIAGNOSIS — I42.1 HYPERTROPHIC OBSTRUCTIVE CARDIOMYOPATHY (HCC): Primary | ICD-10-CM

## 2025-03-13 NOTE — TELEPHONE ENCOUNTER
Me   DU    3/13/25  5:26 PM  Result Note  REMS completed, document filed. Camzyos refill not sent as current Rx good through 12/15/25. Attempted to call Judy but no answer, unable to leave VM. Will try tomorrow.  will schedule next ECHO. See tel encounter for updates.  Echo (TTE) limited (PRN contrast/bubble/strain/3D)  Nava Hu MD to [facility]        3/13/25  3:36 PM  Result Note  See below.  Please do the REMS.  YONI  Judy, your echo looks very good.  Your heart function is normal, ejection fraction 55-60%.  Pressure in the heart is 3.  Very good news!  We will continue the same dosage of Camzyos.  Nava Hu

## 2025-03-14 ENCOUNTER — HOSPITAL ENCOUNTER (OUTPATIENT)
Dept: PHYSICAL THERAPY | Age: 70
Setting detail: THERAPIES SERIES
Discharge: HOME | End: 2025-03-14
Payer: COMMERCIAL

## 2025-03-14 ENCOUNTER — TELEPHONE (OUTPATIENT)
Dept: CARDIOLOGY CLINIC | Age: 70
End: 2025-03-14

## 2025-03-14 PROCEDURE — 97140 MANUAL THERAPY 1/> REGIONS: CPT

## 2025-03-14 PROCEDURE — 97110 THERAPEUTIC EXERCISES: CPT

## 2025-03-14 NOTE — FLOWSHEET NOTE
[facility] - Outpatient Rehabilitation and Therapy: [address], [place] [address] office: [phone] fax: [phone]     Physical Therapy: TREATMENT/PROGRESS NOTE   Patient: Judy Gallardo (69 y.o. female)   Examination Date: 2025   :  1955 MRN: [id number]   Visit #: 3 /12  Insurance Allowable Auth Needed   BCBS and [employer] Medicare - 60 V, no auth  []Yes    [x]No    Insurance: Payor: [place] BCBS / Plan: BCBS - [place] PPO / Product Type: *No Product type* /   Insurance ID: [id number] - ([place] BCBS)  Secondary Insurance (if applicable): HU[employer] MEDICARE   Treatment Diagnosis:     ICD-10-CM    1. Weakness  R53.1       2. Gait instability  R26.81       3. Decreased range of motion of both lower extremities  M25.661     M25.662       4. Impairment of balance  R26.89          Medical Diagnosis:  Neuralgia and neuritis, unspecified [M79.2]  Spinal stenosis, lumbar region with neurogenic claudication [M48.062]   Referring Physician: Kemi Mejia MD  PCP: Kemi Mejia MD     Plan of care signed (Y/N):     Date of Patient follow up with Physician:      Plan of Care Report: NO  POC update due: (10 visits /OR AUTH LIMITS, whichever is less)  3/28/2025                                             Medical History:  Comorbidities:    Relevant Medical History: T2 diabetes, CKD 3, HTN , negative left heart cath 2021, Last EF 55% on 24,  24 The stress ECG was negative for ischemia., hx of stable hypertrophic obstructive cardiomyopahty, WATSON, mixed hyperlipiedemia, SOB< L ventricular outflow tract obstruction, HTN, depression, afib, stage 3b chonric kidney disease                                         Precautions/ Contra-indications:           Latex allergy:  NA  Pacemaker:    NO  Contraindications for Manipulation: None  Date of Surgery: NA  Other:    Red Flags:  None    Suicide Screening:   The patient did not verbalize a primary behavioral concern, suicidal

## 2025-03-14 NOTE — TELEPHONE ENCOUNTER
We received a faxed notification from [employer] (ph [phone]) that they have been trying to reach Judy as she \"may be late\" for next shipment.     I called Judy and she said she has had no issues with getting her Camzyos. She calls them when she needs a shipment and has plenty on had currently. I encouraged her to be sure to reach out to them soon citing their concern. She has their phone number.    I also reminded her of stable ECHO and to remain on current 5mg dosing.

## 2025-03-18 ENCOUNTER — HOSPITAL ENCOUNTER (OUTPATIENT)
Dept: PHYSICAL THERAPY | Age: 70
Setting detail: THERAPIES SERIES
Discharge: HOME | End: 2025-03-18
Payer: COMMERCIAL

## 2025-03-18 PROCEDURE — 97110 THERAPEUTIC EXERCISES: CPT

## 2025-03-18 PROCEDURE — 97140 MANUAL THERAPY 1/> REGIONS: CPT

## 2025-03-18 NOTE — FLOWSHEET NOTE
increase in lateral trunk lean    Assistive Device Used:  walking stick     Balance:  [x] WNL      [] NT       [] Dysfunction noted  Comment:   SL balance: L LE 13 seconds, RLE: 2 seconds    Falls Risk Assessment (30 days):   NA  Time Up and Go (TUG):   Not Assessed        Exercises/Interventions     Therapeutic Ex (43464)  resistance Sets/time Reps Notes/Cues/Progressions   bike 2 5 min     IB  HR/TR  30\"  2 2  12    Quad set        LAQ    bridges    PPT iso press outs  PPT marches    PPT heel slides    BFKO    SKTC    LTR    Prone HS curl       TG  3/14   Seated EOM LAQ Imperial TB    Seated EOM HS curl Imperial TB    SL hip abd        Standing 3 way        SLR  2 12 3/18   Supine hip abd   2 10 3/18: attempted in SL but unable to complete d/t pain                  Manual Intervention (80076)  TIME     L knee extension mobilizations, R knee extension mobilizations   L knee patellar mobilizations, L knee flex/ext PROM  X15 min  3/14  - more restriction noted on L>R   -mod to sev hpyomiblity on L patellar, creptius and pain noted with leg extension                                NMR re-education (31852) resistance Sets/time Reps CUES NEEDED   Seated on SB PPT progressions                                   Therapeutic Activity (60358)  Sets/time     Gait with walking stick   3/10                                 Modalities:    No modalities applied this session    Education/Home Exercise Program: Patient HEP program created electronically.  Refer to [employer] access code: [email or web address]  Access Code: [email or web address]  URL: [email or web address]  Date: 03/14/2025  Prepared by: Daniel Driscoll    Exercises  - Long Sitting Superior Patellar Glide  - 1 x daily - 7 x weekly - 3 sets - 10 reps  - Long Sitting Inferior Patellar Glide  - 1 x daily - 7 x weekly - 3 sets - 10 reps  - Sidelying Hip Abduction  - 1 x daily - 7 x weekly - 3 sets - 10 reps  Access Code: [email or web address]  URL: [email or web address]  Date: 03/10/2025  Prepared by:

## 2025-03-19 DIAGNOSIS — N18.30 STAGE 3 CHRONIC KIDNEY DISEASE, UNSPECIFIED WHETHER STAGE 3A OR 3B CKD (HCC): ICD-10-CM

## 2025-03-19 DIAGNOSIS — E11.42 TYPE 2 DIABETES MELLITUS WITH POLYNEUROPATHY (HCC): ICD-10-CM

## 2025-03-19 DIAGNOSIS — E78.2 MIXED HYPERLIPIDEMIA: ICD-10-CM

## 2025-03-19 LAB
ALBUMIN SERPL-MCNC: 4 G/DL (ref 3.4–5)
ALBUMIN/GLOB SERPL: 1.7 {RATIO} (ref 1.1–2.2)
ALP SERPL-CCNC: 208 U/L (ref 40–129)
ALT SERPL-CCNC: 29 U/L (ref 10–40)
ANION GAP SERPL CALCULATED.3IONS-SCNC: 13 MMOL/L (ref 3–16)
AST SERPL-CCNC: 24 U/L (ref 15–37)
BILIRUB SERPL-MCNC: <0.2 MG/DL (ref 0–1)
BUN SERPL-MCNC: 23 MG/DL (ref 7–20)
CALCIUM SERPL-MCNC: 9.4 MG/DL (ref 8.3–10.6)
CHLORIDE SERPL-SCNC: 97 MMOL/L (ref 99–110)
CHOLEST SERPL-MCNC: 274 MG/DL (ref 0–199)
CO2 SERPL-SCNC: 22 MMOL/L (ref 21–32)
CREAT SERPL-MCNC: 1.4 MG/DL (ref 0.6–1.2)
CREAT UR-MCNC: 50.6 MG/DL (ref 28–259)
EST. AVERAGE GLUCOSE BLD GHB EST-MCNC: 220.2 MG/DL
GFR SERPLBLD CREATININE-BSD FMLA CKD-EPI: 40 ML/MIN/{1.73_M2}
GLUCOSE SERPL-MCNC: 386 MG/DL (ref 70–99)
HBA1C MFR BLD: 9.3 %
HDLC SERPL-MCNC: 60 MG/DL (ref 40–60)
LDLC SERPL CALC-MCNC: ABNORMAL MG/DL
LDLC SERPL-MCNC: 159 MG/DL
MICROALBUMIN UR DL<=1MG/L-MCNC: <1.2 MG/DL
MICROALBUMIN/CREAT UR: NORMAL MG/G (ref 0–30)
POTASSIUM SERPL-SCNC: 5.2 MMOL/L (ref 3.5–5.1)
PROT SERPL-MCNC: 6.3 G/DL (ref 6.4–8.2)
SODIUM SERPL-SCNC: 132 MMOL/L (ref 136–145)
TRIGL SERPL-MCNC: 384 MG/DL (ref 0–150)
TSH SERPL DL<=0.005 MIU/L-ACNC: 2.39 UIU/ML (ref 0.27–4.2)
VLDLC SERPL CALC-MCNC: ABNORMAL MG/DL

## 2025-03-19 NOTE — TELEPHONE ENCOUNTER
Layla, the date range is actually between 5/29-6/6/25.  Can you please call to push back the ECHO date? It is currently scheduled for 5/23/25. Thank you.

## 2025-03-20 DIAGNOSIS — E11.42 TYPE 2 DIABETES MELLITUS WITH POLYNEUROPATHY (HCC): ICD-10-CM

## 2025-03-20 RX ORDER — ACYCLOVIR 800 MG/1
TABLET ORAL
Qty: 2 EACH | Refills: 5 | Status: SHIPPED | OUTPATIENT
Start: 2025-03-20

## 2025-03-20 NOTE — TELEPHONE ENCOUNTER
Medication:   Requested Prescriptions     Pending Prescriptions Disp Refills    Continuous Glucose Sensor (FREESTYLE JEREMIAS 3 SENSOR) MISC [Pharmacy Med Name: FREESTYLE JEREMIAS 3 SENSOR]       Sig: CHANGE EVERY 14 DAYS     Last appt: 11/20/2024   Next appt: 3/26/2025    Last Labs DM:   Lab Results   Component Value Date/Time    LABA1C 9.3 03/19/2025 02:34 PM     Last Lipid:   Lab Results   Component Value Date/Time    CHOL 274 03/19/2025 02:34 PM    TRIG 384 03/19/2025 02:34 PM    HDL 60 03/19/2025 02:34 PM    HDL 54 09/21/2010 10:58 AM     Last PSA: No results found for: \"PSA\"  Last Thyroid:   Lab Results   Component Value Date/Time    TSH 2.39 03/19/2025 02:34 PM    TSH 2.19 10/10/2022 03:11 PM    Y7TNVJC 0.96 07/13/2011 03:50 PM    H3AMJRF 0.96 07/13/2011 03:50 PM    T4FREE 0.67 07/13/2011 03:50 PM

## 2025-03-21 ENCOUNTER — HOSPITAL ENCOUNTER (OUTPATIENT)
Dept: PHYSICAL THERAPY | Age: 70
Setting detail: THERAPIES SERIES
Discharge: HOME | End: 2025-03-21
Payer: COMMERCIAL

## 2025-03-21 PROCEDURE — 97530 THERAPEUTIC ACTIVITIES: CPT

## 2025-03-21 PROCEDURE — 97112 NEUROMUSCULAR REEDUCATION: CPT

## 2025-03-21 PROCEDURE — 97140 MANUAL THERAPY 1/> REGIONS: CPT

## 2025-03-21 NOTE — FLOWSHEET NOTE
Myofascial Release  Modalities as needed that may include: Cryotherapy, Electrical Stimulation, and Thermal Agents  Patient education on joint protection, postural re-education, activity modification, and progression of HEP    Plan: Cont POC- Continue emphasis/focus on exercise progression, improving proper muscle recruitment and activation/motor control patterns, modulating pain, static and dynamic balance, and improving postural awareness. Next visit plan to continue current phase     Electronically Signed by Daniel Driscoll PT, DPT, CNS Date: 03/21/2025     Note: Portions of this note have been templated and/or copied from initial evaluation, reassessments and prior notes for documentation efficiency.    Note: If patient does not return for scheduled/recommended follow up visits, this note will serve as a discharge from care along with the most recent update on progress.    Ortho Evaluation

## 2025-03-25 ENCOUNTER — APPOINTMENT (OUTPATIENT)
Dept: PHYSICAL THERAPY | Age: 70
End: 2025-03-25
Payer: COMMERCIAL

## 2025-03-26 ENCOUNTER — OFFICE VISIT (OUTPATIENT)
Dept: ENDOCRINOLOGY | Age: 70
End: 2025-03-26
Payer: COMMERCIAL

## 2025-03-26 VITALS
TEMPERATURE: 98 F | SYSTOLIC BLOOD PRESSURE: 103 MMHG | DIASTOLIC BLOOD PRESSURE: 68 MMHG | WEIGHT: 179 LBS | HEART RATE: 90 BPM | BODY MASS INDEX: 31.71 KG/M2 | HEIGHT: 63 IN | RESPIRATION RATE: 14 BRPM

## 2025-03-26 DIAGNOSIS — I10 PRIMARY HYPERTENSION: ICD-10-CM

## 2025-03-26 DIAGNOSIS — I25.10 CORONARY ARTERY DISEASE INVOLVING NATIVE CORONARY ARTERY OF NATIVE HEART WITHOUT ANGINA PECTORIS: ICD-10-CM

## 2025-03-26 DIAGNOSIS — E11.42 TYPE 2 DIABETES MELLITUS WITH POLYNEUROPATHY (HCC): ICD-10-CM

## 2025-03-26 DIAGNOSIS — G47.33 OSA (OBSTRUCTIVE SLEEP APNEA): ICD-10-CM

## 2025-03-26 DIAGNOSIS — E78.2 MIXED HYPERLIPIDEMIA: ICD-10-CM

## 2025-03-26 DIAGNOSIS — E11.65 UNCONTROLLED TYPE 2 DIABETES MELLITUS WITH HYPERGLYCEMIA (HCC): Primary | ICD-10-CM

## 2025-03-26 DIAGNOSIS — N18.30 STAGE 3 CHRONIC KIDNEY DISEASE, UNSPECIFIED WHETHER STAGE 3A OR 3B CKD (HCC): ICD-10-CM

## 2025-03-26 PROCEDURE — G8417 CALC BMI ABV UP PARAM F/U: HCPCS | Performed by: INTERNAL MEDICINE

## 2025-03-26 PROCEDURE — 1123F ACP DISCUSS/DSCN MKR DOCD: CPT | Performed by: INTERNAL MEDICINE

## 2025-03-26 PROCEDURE — 3074F SYST BP LT 130 MM HG: CPT | Performed by: INTERNAL MEDICINE

## 2025-03-26 PROCEDURE — G8427 DOCREV CUR MEDS BY ELIG CLIN: HCPCS | Performed by: INTERNAL MEDICINE

## 2025-03-26 PROCEDURE — 1090F PRES/ABSN URINE INCON ASSESS: CPT | Performed by: INTERNAL MEDICINE

## 2025-03-26 PROCEDURE — G9899 SCRN MAM PERF RSLTS DOC: HCPCS | Performed by: INTERNAL MEDICINE

## 2025-03-26 PROCEDURE — 99214 OFFICE O/P EST MOD 30 MIN: CPT | Performed by: INTERNAL MEDICINE

## 2025-03-26 PROCEDURE — 3017F COLORECTAL CA SCREEN DOC REV: CPT | Performed by: INTERNAL MEDICINE

## 2025-03-26 PROCEDURE — 3046F HEMOGLOBIN A1C LEVEL >9.0%: CPT | Performed by: INTERNAL MEDICINE

## 2025-03-26 PROCEDURE — G2211 COMPLEX E/M VISIT ADD ON: HCPCS | Performed by: INTERNAL MEDICINE

## 2025-03-26 PROCEDURE — 2022F DILAT RTA XM EVC RTNOPTHY: CPT | Performed by: INTERNAL MEDICINE

## 2025-03-26 PROCEDURE — 3078F DIAST BP <80 MM HG: CPT | Performed by: INTERNAL MEDICINE

## 2025-03-26 PROCEDURE — 1036F TOBACCO NON-USER: CPT | Performed by: INTERNAL MEDICINE

## 2025-03-26 PROCEDURE — G8399 PT W/DXA RESULTS DOCUMENT: HCPCS | Performed by: INTERNAL MEDICINE

## 2025-03-26 RX ORDER — TIRZEPATIDE 12.5 MG/.5ML
INJECTION, SOLUTION SUBCUTANEOUS
Qty: 6 ML | Refills: 0 | Status: CANCELLED | OUTPATIENT
Start: 2025-03-26

## 2025-03-26 RX ORDER — DAPAGLIFLOZIN 5 MG/1
5 TABLET, FILM COATED ORAL EVERY MORNING
Qty: 90 TABLET | Refills: 1 | Status: SHIPPED | OUTPATIENT
Start: 2025-03-26

## 2025-03-26 NOTE — PROGRESS NOTES
Judy Gallardo is a 69 y.o. female is seen  for evaluation and management of type 2  Diabetes--- .Judy Gallardo was diagnosed with Diabetes mellitus in 2008 aprox   Pt always had issues with hypoglycemia since her childhood so she was under constant surveillance until she was diagnosed with diabetes  .Judy Gallardo got diabetic education in the past.  Comorbid conditions: Neuropathy  ---on her way back from [facility] on June 17th she developed chest pain and sweating and was taken by EMS   -she saw her PCP and was told that she had HOCM and started on B Blockers ---she feels improved.  Weight 171 in June 2022 .  Patient has stage III chronic kidney disease and follows with nephrology  Patient has hyperlipidemia and is on Lipitor her last LDL was at target  Patient also has arthritis and takes Celebrex off-and-on which can also cause kidney damage and also has reflux and is on omeprazole    INTERIM:      -- she has gained 9 lbs since last visit, she has been eating poorly and admits to use severe dietary noncompliance she has not taken insulin for months continues to take Mounjaro 12.5 mg weekly and does not notice appetite suppression with this  But admits that poor dietary choices are to blame    Past Medical History:   Diagnosis Date    Anxiety     Asthma     vocal fold     Ataxia     Cervical stenosis of spinal canal 10/07/2021    Claustrophobia     Coronary artery disease involving native coronary artery of native heart without angina pectoris 09/09/2021    Depression     Diabetes mellitus (HCC)     Hyperlipidemia     Hypertension     Hypertrophic cardiomyopathy (HCC)     Hypothyroidism     Obesity     Osteoarthritis     Paradoxical vocal fold motion disorder     Postmenopausal 05/15/2018    Sleep apnea     Stage 2 chronic kidney disease 05/07/2019      Patient Active Problem List   Diagnosis    HTN (hypertension)    Diabetes mellitus (HCC)    Hyperlipidemia    Insomnia    Depression

## 2025-03-27 ENCOUNTER — TELEPHONE (OUTPATIENT)
Dept: ENDOCRINOLOGY | Age: 70
End: 2025-03-27

## 2025-03-27 NOTE — TELEPHONE ENCOUNTER
Submitted PA for Mounjaro  Via [facility] Key: [id number] STATUS: PENDING.    Follow up done daily; if no decision with in three days we will refax.  If another three days goes by with no decision will call the insurance for status.

## 2025-03-27 NOTE — TELEPHONE ENCOUNTER
CaseId:[id number];Status:Approved;Review Type:Prior Auth;Coverage Start Date:02/25/2025;Coverage End Date:03/27/2026;. Authorization Expiration Date: March 27, 2026     If this requires a response please respond to the pool ( P MHCX PSC MEDICATION PRE-AUTH).      Thank you please advise patient.

## 2025-03-28 ENCOUNTER — HOSPITAL ENCOUNTER (OUTPATIENT)
Dept: PHYSICAL THERAPY | Age: 70
Setting detail: THERAPIES SERIES
End: 2025-03-28
Payer: COMMERCIAL

## 2025-03-30 DIAGNOSIS — E11.42 TYPE 2 DIABETES MELLITUS WITH POLYNEUROPATHY (HCC): ICD-10-CM

## 2025-03-30 DIAGNOSIS — E78.2 MIXED HYPERLIPIDEMIA: ICD-10-CM

## 2025-03-31 DIAGNOSIS — E11.65 UNCONTROLLED TYPE 2 DIABETES MELLITUS WITH HYPERGLYCEMIA (HCC): ICD-10-CM

## 2025-03-31 RX ORDER — ATORVASTATIN CALCIUM 20 MG/1
20 TABLET, FILM COATED ORAL DAILY
Qty: 90 TABLET | Refills: 3 | Status: SHIPPED | OUTPATIENT
Start: 2025-03-31

## 2025-03-31 NOTE — TELEPHONE ENCOUNTER
Mounjaro refill went to [facility] and should have gone to [facility]. Please resubmit order.

## 2025-03-31 NOTE — TELEPHONE ENCOUNTER
Medication:   Requested Prescriptions     Pending Prescriptions Disp Refills    atorvastatin (LIPITOR) 20 MG tablet [Pharmacy Med Name: ATORVASTATIN TABS 20MG] 90 tablet 3     Sig: TAKE 1 TABLET DAILY         Last appt: 3/26/2025   Next appt: 8/7/2025    Last Labs DM:   Lab Results   Component Value Date/Time    LABA1C 9.3 03/19/2025 02:34 PM     Last Lipid:   Lab Results   Component Value Date/Time    CHOL 274 03/19/2025 02:34 PM    TRIG 384 03/19/2025 02:34 PM    HDL 60 03/19/2025 02:34 PM    HDL 54 09/21/2010 10:58 AM     Last PSA: No results found for: \"PSA\"  Last Thyroid:   Lab Results   Component Value Date/Time    TSH 2.39 03/19/2025 02:34 PM    TSH 2.19 10/10/2022 03:11 PM    M3JAWWH 0.96 07/13/2011 03:50 PM    K6GCCQB 0.96 07/13/2011 03:50 PM    T4FREE 0.67 07/13/2011 03:50 PM

## 2025-04-16 ENCOUNTER — TELEPHONE (OUTPATIENT)
Dept: CARDIOLOGY CLINIC | Age: 70
End: 2025-04-16

## 2025-04-17 ENCOUNTER — TELEPHONE (OUTPATIENT)
Dept: CARDIOLOGY CLINIC | Age: 70
End: 2025-04-17

## 2025-04-17 NOTE — TELEPHONE ENCOUNTER
Prateek Rich, Ms. Gallardo has an OV with Dr. Hu on 6/2/25 at  and a Camzyos ECHO on 6/4 at [place]. Can you call her and see if she would want to come to [place] on 6/5 for ECHO at 1030 (Renetta said she has an opening) and OV at 11 (ok with YONI)?

## 2025-04-30 RX ORDER — CYCLOBENZAPRINE HCL 10 MG
10 TABLET ORAL 2 TIMES DAILY PRN
Qty: 30 TABLET | Refills: 1 | Status: SHIPPED | OUTPATIENT
Start: 2025-04-30

## 2025-05-13 ENCOUNTER — TELEPHONE (OUTPATIENT)
Dept: CARDIOLOGY CLINIC | Age: 70
End: 2025-05-13

## 2025-05-13 NOTE — TELEPHONE ENCOUNTER
Left VM for patient to call and reschedule echo from 07/03. Per [facility] protocol, patient needs to have echo completed between 05/29 and 06/06.    Thank you.

## 2025-05-15 ENCOUNTER — TELEPHONE (OUTPATIENT)
Dept: PULMONOLOGY | Age: 70
End: 2025-05-15

## 2025-05-15 NOTE — TELEPHONE ENCOUNTER
I spoke to the patient re: inspire she is unsure of what Level she is currently on. She states she is struggling using the inspire she said when she turns it on at night it feels like someone is wrapping their hands around her neck and strangling her.   There is a compliance report in her chart ending on date 04/11/25

## 2025-05-16 NOTE — TELEPHONE ENCOUNTER
Left 2nd VM for patient to reschedule Echo.   Per Camzyos protocol, she needs to have the echo completed between 05/29 and 6/06.     Thank you.

## 2025-05-27 RX ORDER — INSULIN GLARGINE 300 U/ML
INJECTION, SOLUTION SUBCUTANEOUS
Qty: 9 ML | Refills: 4 | Status: SHIPPED | OUTPATIENT
Start: 2025-05-27

## 2025-05-29 ENCOUNTER — OFFICE VISIT (OUTPATIENT)
Dept: INTERNAL MEDICINE CLINIC | Age: 70
End: 2025-05-29
Payer: COMMERCIAL

## 2025-05-29 VITALS
WEIGHT: 175.8 LBS | DIASTOLIC BLOOD PRESSURE: 70 MMHG | BODY MASS INDEX: 32.35 KG/M2 | HEIGHT: 62 IN | SYSTOLIC BLOOD PRESSURE: 112 MMHG | HEART RATE: 105 BPM | OXYGEN SATURATION: 98 %

## 2025-05-29 DIAGNOSIS — I48.91 ATRIAL FIBRILLATION, UNSPECIFIED TYPE (HCC): ICD-10-CM

## 2025-05-29 DIAGNOSIS — J01.00 ACUTE NON-RECURRENT MAXILLARY SINUSITIS: Primary | ICD-10-CM

## 2025-05-29 PROCEDURE — 1036F TOBACCO NON-USER: CPT | Performed by: NURSE PRACTITIONER

## 2025-05-29 PROCEDURE — 3017F COLORECTAL CA SCREEN DOC REV: CPT | Performed by: NURSE PRACTITIONER

## 2025-05-29 PROCEDURE — G8399 PT W/DXA RESULTS DOCUMENT: HCPCS | Performed by: NURSE PRACTITIONER

## 2025-05-29 PROCEDURE — 3074F SYST BP LT 130 MM HG: CPT | Performed by: NURSE PRACTITIONER

## 2025-05-29 PROCEDURE — 1090F PRES/ABSN URINE INCON ASSESS: CPT | Performed by: NURSE PRACTITIONER

## 2025-05-29 PROCEDURE — G8427 DOCREV CUR MEDS BY ELIG CLIN: HCPCS | Performed by: NURSE PRACTITIONER

## 2025-05-29 PROCEDURE — 1123F ACP DISCUSS/DSCN MKR DOCD: CPT | Performed by: NURSE PRACTITIONER

## 2025-05-29 PROCEDURE — 3078F DIAST BP <80 MM HG: CPT | Performed by: NURSE PRACTITIONER

## 2025-05-29 PROCEDURE — 99214 OFFICE O/P EST MOD 30 MIN: CPT | Performed by: NURSE PRACTITIONER

## 2025-05-29 PROCEDURE — G8417 CALC BMI ABV UP PARAM F/U: HCPCS | Performed by: NURSE PRACTITIONER

## 2025-05-29 PROCEDURE — G9899 SCRN MAM PERF RSLTS DOC: HCPCS | Performed by: NURSE PRACTITIONER

## 2025-05-29 RX ORDER — CEFDINIR 300 MG/1
300 CAPSULE ORAL 2 TIMES DAILY
Qty: 20 CAPSULE | Refills: 0 | Status: SHIPPED | OUTPATIENT
Start: 2025-05-29 | End: 2025-06-08

## 2025-05-29 NOTE — PROGRESS NOTES
5/29/25     Chief Complaint   Patient presents with   • Facial Pain     Bilateral cheek pain x 2 weeks    • Rash     Rash on arms x 1 week   Denies itching and pain    • Dizziness     Dizziness when standing, \"chest pressure\", and heart racing x a few weeks        History of Present Illness  The patient presents for evaluation of facial pain, bruising, dizziness, chest pressure, and diabetes.    She has been experiencing persistent facial pain for several weeks, which she describes as nerve-like in nature. The pain is bilateral, affecting both cheeks, and is a new symptom for her. She also reports chronic sinus congestion, which has not worsened recently. Accompanying symptoms include rhinorrhea, postnasal drip, and ear pain. She had a headache, which she attributes to her sinuses, but it has since resolved. She has no issues with mastication and uses dentures for her upper teeth. She has not sought any treatment for these symptoms. The pain is constant, although it may occasionally diminish slightly. She has previously experienced similar symptoms, but this episode is more severe. She reports no fever or chills. She uses Flonase nasal spray and occasionally Astelin.    She has been experiencing spontaneous bruising, which she attributes to her use of Eliquis. She has reduced her dosage by half due to associated lightheadedness upon standing, which has resulted in some improvement but not complete resolution of the symptom.    She experiences dizziness upon standing and walking a few steps, which she manages by bending over at the waist and resting her head until the symptom subsides. She does not monitor her blood pressure during these episodes but notes significant fluctuations between sitting, lying down, and standing positions. She has been experiencing lightheadedness for an extended period, particularly after meals when she stands up. She becomes unsteady and must remain still for a minute to regain her

## 2025-05-30 ENCOUNTER — HOSPITAL ENCOUNTER (OUTPATIENT)
Age: 70
Discharge: HOME | End: 2025-05-30
Attending: INTERNAL MEDICINE
Payer: COMMERCIAL

## 2025-05-30 ENCOUNTER — RESULTS FOLLOW-UP (OUTPATIENT)
Dept: CARDIOLOGY CLINIC | Age: 70
End: 2025-05-30

## 2025-05-30 DIAGNOSIS — R06.02 SHORTNESS OF BREATH: ICD-10-CM

## 2025-05-30 DIAGNOSIS — I42.1 HYPERTROPHIC OBSTRUCTIVE CARDIOMYOPATHY (HCC): Primary | ICD-10-CM

## 2025-05-30 DIAGNOSIS — Q24.8 LEFT VENTRICULAR OUTFLOW TRACT OBSTRUCTION: ICD-10-CM

## 2025-05-30 DIAGNOSIS — I42.1 HYPERTROPHIC OBSTRUCTIVE CARDIOMYOPATHY (HCC): ICD-10-CM

## 2025-05-30 LAB
ECHO AV AREA PEAK VELOCITY: 2.8 CM2
ECHO AV AREA VTI: 2.5 CM2
ECHO AV MEAN GRADIENT: 1 MMHG
ECHO AV MEAN VELOCITY: 0.6 M/S
ECHO AV PEAK GRADIENT: 3 MMHG
ECHO AV PEAK VELOCITY: 0.8 M/S
ECHO AV VELOCITY RATIO: 0.88
ECHO AV VTI: 16 CM
ECHO EST RA PRESSURE: 3 MMHG
ECHO LA AREA 4C: 12.1 CM2
ECHO LA DIAMETER: 3 CM
ECHO LA MAJOR AXIS: 4.3 CM
ECHO LA VOL MOD A4C: 28 ML (ref 22–52)
ECHO LV EF PHYSICIAN: 55 %
ECHO LV EJECTION FRACTION BIPLANE: 55 % (ref 55–100)
ECHO LV FRACTIONAL SHORTENING: 38 % (ref 28–44)
ECHO LV INTERNAL DIMENSION DIASTOLIC: 3.7 CM (ref 3.9–5.3)
ECHO LV INTERNAL DIMENSION SYSTOLIC: 2.3 CM
ECHO LV IVSD: 1.2 CM (ref 0.6–0.9)
ECHO LV MASS 2D: 104.6 G (ref 67–162)
ECHO LV POSTERIOR WALL DIASTOLIC: 0.7 CM (ref 0.6–0.9)
ECHO LV RELATIVE WALL THICKNESS RATIO: 0.38
ECHO LVOT AREA: 3.1 CM2
ECHO LVOT AV VTI INDEX: 0.79
ECHO LVOT DIAM: 2 CM
ECHO LVOT MEAN GRADIENT: 1 MMHG
ECHO LVOT PEAK GRADIENT: 2 MMHG
ECHO LVOT PEAK VELOCITY: 0.7 M/S
ECHO LVOT SV: 39.6 ML
ECHO LVOT VTI: 12.6 CM
ECHO RV FREE WALL PEAK S': 7.5 CM/S
ECHO RV INTERNAL DIMENSION: 2.9 CM
ECHO RV TAPSE: 1.3 CM (ref 1.7–?)

## 2025-05-30 PROCEDURE — 93306 TTE W/DOPPLER COMPLETE: CPT | Performed by: INTERNAL MEDICINE

## 2025-05-30 PROCEDURE — 93308 TTE F-UP OR LMTD: CPT

## 2025-06-02 NOTE — TELEPHONE ENCOUNTER
----- Message from Dr. Nava Hu MD sent at 5/30/2025  2:54 PM EDT -----  See below.  Please do the REMS.  Continue same dosage of Camzyos.  YONI Kim, your echo looks good.  Heart function is normal at 55-60%.  And pressure is very low at 3.  Good news!  Continue same dosage of Camzyos.  YONI

## 2025-06-02 NOTE — TELEPHONE ENCOUNTER
Spoke to pt regarding Camzyos and REMS.   No new medications OTC or prescription.   No hospitalizations.     REMS completed.   Will continue the same dose. 5mg    She states she is feeling good.    She is not working over the summer.     Informed her next Echo will not need to be done until 11/26/25 due to new REMS protocol. 11/26/25 to 12/4/25.  Echo ordered.     Pt very happy about that.

## 2025-06-03 NOTE — TELEPHONE ENCOUNTER
Informed her next Echo will not need to be done until 11/26/25 due to new REMS protocol. 11/26/25 to 12/4/25.  Echo ordered.

## 2025-06-25 ENCOUNTER — OFFICE VISIT (OUTPATIENT)
Dept: INTERNAL MEDICINE CLINIC | Age: 70
End: 2025-06-25

## 2025-06-25 ENCOUNTER — TELEPHONE (OUTPATIENT)
Dept: INTERNAL MEDICINE CLINIC | Age: 70
End: 2025-06-25

## 2025-06-25 ENCOUNTER — OFFICE VISIT (OUTPATIENT)
Dept: PULMONOLOGY | Age: 70
End: 2025-06-25
Payer: COMMERCIAL

## 2025-06-25 VITALS
OXYGEN SATURATION: 97 % | WEIGHT: 176 LBS | SYSTOLIC BLOOD PRESSURE: 130 MMHG | HEIGHT: 62 IN | HEART RATE: 87 BPM | BODY MASS INDEX: 32.39 KG/M2 | DIASTOLIC BLOOD PRESSURE: 70 MMHG

## 2025-06-25 VITALS
DIASTOLIC BLOOD PRESSURE: 68 MMHG | SYSTOLIC BLOOD PRESSURE: 118 MMHG | WEIGHT: 177.2 LBS | HEART RATE: 96 BPM | BODY MASS INDEX: 32.61 KG/M2 | OXYGEN SATURATION: 97 % | HEIGHT: 62 IN

## 2025-06-25 DIAGNOSIS — G47.33 OSA (OBSTRUCTIVE SLEEP APNEA): Primary | ICD-10-CM

## 2025-06-25 DIAGNOSIS — G47.33 OSA (OBSTRUCTIVE SLEEP APNEA): ICD-10-CM

## 2025-06-25 DIAGNOSIS — M48.062 SPINAL STENOSIS OF LUMBAR REGION WITH NEUROGENIC CLAUDICATION: ICD-10-CM

## 2025-06-25 DIAGNOSIS — E66.811 CLASS 1 OBESITY DUE TO EXCESS CALORIES WITH SERIOUS COMORBIDITY AND BODY MASS INDEX (BMI) OF 30.0 TO 30.9 IN ADULT: ICD-10-CM

## 2025-06-25 DIAGNOSIS — Z79.4 TYPE 2 DIABETES MELLITUS WITH DIABETIC POLYNEUROPATHY, WITH LONG-TERM CURRENT USE OF INSULIN (HCC): Primary | ICD-10-CM

## 2025-06-25 DIAGNOSIS — I10 PRIMARY HYPERTENSION: ICD-10-CM

## 2025-06-25 DIAGNOSIS — N18.32 STAGE 3B CHRONIC KIDNEY DISEASE (HCC): ICD-10-CM

## 2025-06-25 DIAGNOSIS — E11.42 TYPE 2 DIABETES MELLITUS WITH DIABETIC POLYNEUROPATHY, WITH LONG-TERM CURRENT USE OF INSULIN (HCC): Primary | ICD-10-CM

## 2025-06-25 DIAGNOSIS — E55.9 VITAMIN D DEFICIENCY: ICD-10-CM

## 2025-06-25 DIAGNOSIS — F51.04 PSYCHOPHYSIOLOGICAL INSOMNIA: ICD-10-CM

## 2025-06-25 DIAGNOSIS — Z96.82 S/P INSERTION OF HYPOGLOSSAL NERVE STIMULATOR: ICD-10-CM

## 2025-06-25 DIAGNOSIS — E66.09 CLASS 1 OBESITY DUE TO EXCESS CALORIES WITH SERIOUS COMORBIDITY AND BODY MASS INDEX (BMI) OF 30.0 TO 30.9 IN ADULT: ICD-10-CM

## 2025-06-25 DIAGNOSIS — I25.10 CORONARY ARTERY DISEASE INVOLVING NATIVE CORONARY ARTERY OF NATIVE HEART WITHOUT ANGINA PECTORIS: ICD-10-CM

## 2025-06-25 LAB
CHP ED QC CHECK: NORMAL
GLUCOSE BLD-MCNC: 351 MG/DL
HBA1C MFR BLD: 9 %

## 2025-06-25 PROCEDURE — G8427 DOCREV CUR MEDS BY ELIG CLIN: HCPCS | Performed by: INTERNAL MEDICINE

## 2025-06-25 PROCEDURE — 99215 OFFICE O/P EST HI 40 MIN: CPT | Performed by: INTERNAL MEDICINE

## 2025-06-25 PROCEDURE — G2211 COMPLEX E/M VISIT ADD ON: HCPCS | Performed by: INTERNAL MEDICINE

## 2025-06-25 PROCEDURE — 1036F TOBACCO NON-USER: CPT | Performed by: INTERNAL MEDICINE

## 2025-06-25 PROCEDURE — G9899 SCRN MAM PERF RSLTS DOC: HCPCS | Performed by: INTERNAL MEDICINE

## 2025-06-25 PROCEDURE — 1123F ACP DISCUSS/DSCN MKR DOCD: CPT | Performed by: INTERNAL MEDICINE

## 2025-06-25 PROCEDURE — 3075F SYST BP GE 130 - 139MM HG: CPT | Performed by: INTERNAL MEDICINE

## 2025-06-25 PROCEDURE — 3078F DIAST BP <80 MM HG: CPT | Performed by: INTERNAL MEDICINE

## 2025-06-25 PROCEDURE — 1090F PRES/ABSN URINE INCON ASSESS: CPT | Performed by: INTERNAL MEDICINE

## 2025-06-25 PROCEDURE — G8399 PT W/DXA RESULTS DOCUMENT: HCPCS | Performed by: INTERNAL MEDICINE

## 2025-06-25 PROCEDURE — G8417 CALC BMI ABV UP PARAM F/U: HCPCS | Performed by: INTERNAL MEDICINE

## 2025-06-25 PROCEDURE — 3017F COLORECTAL CA SCREEN DOC REV: CPT | Performed by: INTERNAL MEDICINE

## 2025-06-25 RX ORDER — ESZOPICLONE 1 MG/1
1 TABLET, FILM COATED ORAL NIGHTLY
Qty: 30 TABLET | Refills: 0 | Status: SHIPPED | OUTPATIENT
Start: 2025-06-25 | End: 2025-06-25 | Stop reason: ALTCHOICE

## 2025-06-25 RX ORDER — CYCLOBENZAPRINE HCL 10 MG
10 TABLET ORAL 2 TIMES DAILY PRN
Qty: 180 TABLET | Refills: 0 | Status: SHIPPED | OUTPATIENT
Start: 2025-06-25

## 2025-06-25 RX ORDER — CELECOXIB 200 MG/1
200 CAPSULE ORAL DAILY
Qty: 90 CAPSULE | Refills: 1 | Status: SHIPPED | OUTPATIENT
Start: 2025-06-25

## 2025-06-25 ASSESSMENT — SLEEP AND FATIGUE QUESTIONNAIRES
HOW LIKELY ARE YOU TO NOD OFF OR FALL ASLEEP WHILE LYING DOWN TO REST IN THE AFTERNOON WHEN CIRCUMSTANCES PERMIT: MODERATE CHANCE OF DOZING
HOW LIKELY ARE YOU TO NOD OFF OR FALL ASLEEP WHILE WATCHING TV: HIGH CHANCE OF DOZING
HOW LIKELY ARE YOU TO NOD OFF OR FALL ASLEEP IN A CAR, WHILE STOPPED FOR A FEW MINUTES IN TRAFFIC: MODERATE CHANCE OF DOZING
HOW LIKELY ARE YOU TO NOD OFF OR FALL ASLEEP WHILE SITTING AND TALKING TO SOMEONE: SLIGHT CHANCE OF DOZING
HOW LIKELY ARE YOU TO NOD OFF OR FALL ASLEEP WHILE SITTING INACTIVE IN A PUBLIC PLACE: HIGH CHANCE OF DOZING
HOW LIKELY ARE YOU TO NOD OFF OR FALL ASLEEP WHILE SITTING AND READING: SLIGHT CHANCE OF DOZING
ESS TOTAL SCORE: 16
HOW LIKELY ARE YOU TO NOD OFF OR FALL ASLEEP WHEN YOU ARE A PASSENGER IN A CAR FOR AN HOUR WITHOUT A BREAK: HIGH CHANCE OF DOZING
HOW LIKELY ARE YOU TO NOD OFF OR FALL ASLEEP WHILE SITTING QUIETLY AFTER LUNCH WITHOUT ALCOHOL: SLIGHT CHANCE OF DOZING

## 2025-06-25 ASSESSMENT — ENCOUNTER SYMPTOMS
CHEST TIGHTNESS: 0
COUGH: 0
ABDOMINAL PAIN: 0
CONSTIPATION: 0
SINUS PAIN: 0
COLOR CHANGE: 0
WHEEZING: 0
SHORTNESS OF BREATH: 0
BLOOD IN STOOL: 0

## 2025-06-25 NOTE — TELEPHONE ENCOUNTER
I have sent the prescription for the patient but please call the patient and verify with her that she is taking it especially that she is taking also Eliquis and there is the potential for her to have increased risk of gastrointestinal bleeding

## 2025-06-25 NOTE — PROGRESS NOTES
Judy Gallardo (:  1955) is a 69 y.o. female,Established patient, here for evaluation of the following chief complaint(s):  Follow-up (Lab for for calcium check )      Assessment & Plan   ASSESSMENT/PLAN:  1. Type 2 diabetes mellitus with diabetic polyneuropathy, with long-term current use of insulin (HCC)  -     POCT glycosylated hemoglobin (Hb A1C)  -     POCT Glucose  2. Stage 3b chronic kidney disease (HCC)  3. Spinal stenosis of lumbar region with neurogenic claudication  4. WATSON (obstructive sleep apnea)  5. Primary hypertension  6. Coronary artery disease involving native coronary artery of native heart without angina pectoris  7. Vitamin D deficiency  -     Vitamin D 25 Hydroxy; Future    Assessment & Plan  1. Fall.  - Informed about the potential risk of subdural hematoma due to her current medication regimen, which includes Eliquis.  - Advised to monitor for any new symptoms such as severe headaches, blurred vision, or twitching that may manifest within a few weeks post-trauma.  - If any of these symptoms occur, she should seek immediate medical attention.  - Ice was applied immediately to reduce swelling.    2. Osteoarthritis.  - Presence of [place] nodules noted, indicative of osteoarthritis.  - Counseled on the importance of maintaining a full range of motion to prevent joint stiffness.  - Advised to continue Celebrex regimen but with caution due to the potential risk of gastrointestinal bleeding, especially since she is on Eliquis.  - Discussed the relative safety of Celebrex compared to ibuprofen or naproxen.    3. Diabetes mellitus.  - A1c levels have increased from well-controlled in 2024 to poorly controlled, currently at 9.  - Advised to reduce intake of 2% milk due to its high sugar content.  - Discussed the possibility of developing tolerance to her current medication, Mounjaro, and the potential benefits of reverting to Ozempic.  - Counseled on the importance of dietary

## 2025-06-25 NOTE — PROGRESS NOTES
has to have some white noise in order to help her sleep.  She generally turns the television on prior to going to sleep.  She drinks iced tea and diet soda just before bedtime.  Weight remains stable. Patient reports that she was diagnosed to have obstructive sleep apnea in 25 years ago was started on CPAP therapy.  She used it for about a year after which she underwent a UPPP surgery, lost some weight and stopped using CPAP therapy.  Over the years her sleep remained consistent but as the time progressed, her sleep has become poor again.  She finds it difficult to sleep and staying asleep.  Her sleep pattern is completely disturbed.  She stays awake during the night and then feels sleepy throughout the day.  During the sleep she tosses and turns and has drenching sweats.  Her weight has been fluctuating.  Still continues to have occasional snoring.  Unsure if she is having any breathing pauses.  Has had episodes of palpitation and chest pain recently.  As of now she is taking half tablet of 5 mg Ambien, Flexeril, morphine and duloxetine in order to help her sleep at nighttime.        6/25/2025    11:04 AM 3/12/2025     1:20 PM 12/18/2024     1:08 PM 9/25/2024     1:54 PM 8/2/2024     1:35 PM 2/29/2024     2:31 PM 8/10/2023     2:36 PM   Sleep Medicine   Sitting and reading 1 2 1 2 1 3 2   Watching TV 3 2 3 2 3 3 1   Sitting, inactive in a public place (e.g. a theatre or a meeting) 3 2 1 2 2 2 3   As a passenger in a car for an hour without a break 3 3 3 3 3 3 3   Lying down to rest in the afternoon when circumstances permit 2 2 3 3 3 3 3   Sitting and talking to someone 1 1 2 2 0 0 0   Sitting quietly after a lunch without alcohol 1 1 1 2 0 3 0   In a car, while stopped for a few minutes in traffic 2 1 2 1 0 1 0   [place] Sleepiness Score 16 14 16 17 12 18 12         REVIEW OF SYSTEMS:   Point review of system is negative except as mentioned in the HPI  PAST MEDICAL HISTORY:   Past Medical History:   Diagnosis Date

## 2025-06-25 NOTE — TELEPHONE ENCOUNTER
Medication: Celebrex    Last Filled: Unknown - never been filled by     Pharmacy: [facility]    Last appt: 5/29/2025   Next appt: Visit date not found    Last OARRS:       10/16/2019     8:20 AM   RX Monitoring   Periodic Controlled Substance Monitoring Possible medication side effects, risk of tolerance/dependence & alternative treatments discussed.;No signs of potential drug abuse or diversion identified.

## 2025-06-25 NOTE — TELEPHONE ENCOUNTER
Spoke with patient who stated Celebrex was being prescribed by  in pain management but she no longer sees pain management.  will discuss with patient at todays visit.

## 2025-06-26 ENCOUNTER — TELEPHONE (OUTPATIENT)
Dept: INTERNAL MEDICINE CLINIC | Age: 70
End: 2025-06-26

## 2025-06-26 DIAGNOSIS — N18.30 STAGE 3 CHRONIC KIDNEY DISEASE, UNSPECIFIED WHETHER STAGE 3A OR 3B CKD (HCC): ICD-10-CM

## 2025-06-26 DIAGNOSIS — I25.10 CORONARY ARTERY DISEASE INVOLVING NATIVE CORONARY ARTERY OF NATIVE HEART WITHOUT ANGINA PECTORIS: ICD-10-CM

## 2025-06-26 DIAGNOSIS — E55.9 VITAMIN D DEFICIENCY: ICD-10-CM

## 2025-06-26 DIAGNOSIS — E11.65 UNCONTROLLED TYPE 2 DIABETES MELLITUS WITH HYPERGLYCEMIA (HCC): ICD-10-CM

## 2025-06-26 DIAGNOSIS — I10 PRIMARY HYPERTENSION: ICD-10-CM

## 2025-06-26 DIAGNOSIS — E78.2 MIXED HYPERLIPIDEMIA: ICD-10-CM

## 2025-06-26 DIAGNOSIS — G47.33 OSA (OBSTRUCTIVE SLEEP APNEA): ICD-10-CM

## 2025-06-26 LAB
25(OH)D3 SERPL-MCNC: 30.6 NG/ML
ALBUMIN SERPL-MCNC: 4.3 G/DL (ref 3.4–5)
ALBUMIN/GLOB SERPL: 1.9 {RATIO} (ref 1.1–2.2)
ALP SERPL-CCNC: 167 U/L (ref 40–129)
ALT SERPL-CCNC: 39 U/L (ref 10–40)
ANION GAP SERPL CALCULATED.3IONS-SCNC: 11 MMOL/L (ref 3–16)
AST SERPL-CCNC: 28 U/L (ref 15–37)
BILIRUB SERPL-MCNC: <0.2 MG/DL (ref 0–1)
BUN SERPL-MCNC: 14 MG/DL (ref 7–20)
CALCIUM SERPL-MCNC: 9.4 MG/DL (ref 8.3–10.6)
CHLORIDE SERPL-SCNC: 104 MMOL/L (ref 99–110)
CHOLEST SERPL-MCNC: 193 MG/DL (ref 0–199)
CO2 SERPL-SCNC: 25 MMOL/L (ref 21–32)
CREAT SERPL-MCNC: 1.3 MG/DL (ref 0.6–1.2)
EST. AVERAGE GLUCOSE BLD GHB EST-MCNC: 214.5 MG/DL
GFR SERPLBLD CREATININE-BSD FMLA CKD-EPI: 44 ML/MIN/{1.73_M2}
GLUCOSE SERPL-MCNC: 204 MG/DL (ref 70–99)
HBA1C MFR BLD: 9.1 %
HDLC SERPL-MCNC: 69 MG/DL (ref 40–60)
LDLC SERPL CALC-MCNC: 84 MG/DL
POTASSIUM SERPL-SCNC: 5 MMOL/L (ref 3.5–5.1)
PROT SERPL-MCNC: 6.6 G/DL (ref 6.4–8.2)
SODIUM SERPL-SCNC: 140 MMOL/L (ref 136–145)
TRIGL SERPL-MCNC: 198 MG/DL (ref 0–150)
TSH SERPL DL<=0.005 MIU/L-ACNC: 1.63 UIU/ML (ref 0.27–4.2)
VLDLC SERPL CALC-MCNC: 40 MG/DL

## 2025-06-26 NOTE — TELEPHONE ENCOUNTER
Patient states she discussed with provider at her appointment, informed patient to notify office with any changes and notified of increased risk for  GI bleed with taking both medications.

## 2025-06-27 ENCOUNTER — TELEPHONE (OUTPATIENT)
Dept: PULMONOLOGY | Age: 70
End: 2025-06-27

## 2025-06-27 ENCOUNTER — TELEPHONE (OUTPATIENT)
Dept: INTERNAL MEDICINE CLINIC | Age: 70
End: 2025-06-27

## 2025-06-27 ENCOUNTER — RESULTS FOLLOW-UP (OUTPATIENT)
Dept: INTERNAL MEDICINE CLINIC | Age: 70
End: 2025-06-27

## 2025-06-27 DIAGNOSIS — F51.04 PSYCHOPHYSIOLOGICAL INSOMNIA: ICD-10-CM

## 2025-06-27 RX ORDER — ESZOPICLONE 1 MG/1
1 TABLET, FILM COATED ORAL NIGHTLY
Qty: 30 TABLET | Refills: 0 | Status: SHIPPED | OUTPATIENT
Start: 2025-06-27 | End: 2025-07-27

## 2025-06-27 NOTE — TELEPHONE ENCOUNTER
Patient calling, states she saw Dr. Mejia on 6/25 and is asking why the sleep medication Lunesta was discontinued. Patient states she is not sleeping now without the medication as it has been a couple days now and the patient does not want to go into the weekend without being able to sleep. Please call patient to discuss and advise why medication was discontinued.

## 2025-06-27 NOTE — TELEPHONE ENCOUNTER
1:1 with patient pending pcp review per patient states she did start taking the lunesta and Dr Hernandez is out of country.

## 2025-06-27 NOTE — TELEPHONE ENCOUNTER
Kimberley with [facility] lvm to confirm everything that is on pts med list that she uses to sleep, no further details left on vm

## 2025-06-27 NOTE — TELEPHONE ENCOUNTER
Spoke with patient and advised Lunesta was sent to [facility] pharmacy for a 30 day supply and future refills are to be handles by sleep medicine, she v/u

## 2025-06-27 NOTE — TELEPHONE ENCOUNTER
Clinician called [facility] Dr. Srini Preston to review prescribed sleep medicines on patient med list and verify patient was to be taking Eszopiclone, spoke to Emi sevilla and she states  is out of town for next 3 weeks.     Then called patient to review how long she had been taking Eszopiclone and she reported  dc her trazodone at her 6/25/25 visit and wrote new rx for Eszopiclone 1mg nightly but when she arrived to see  that same day the medication was dc in error due to patient stating she was not taking the medication not realizing it was the new medication just prescribed by  earlier that morning.      Forwarding to  for review.

## 2025-07-01 NOTE — PROGRESS NOTES
[facility] [place]  Advanced CHF/Pulmonary Hypertension   Cardiac Evaluation      Judy Gallardo  YOB: 1955    Date of Visit:  7/3/25    Chief Complaint   Patient presents with    Cardiomyopathy    Shortness of Breath    Dizziness    Edema    Tachycardia     History of Present Illness:  Judy Gallardo is a 69 y.o. female who presents from referral from Dr. Anne for consultation and management of mild HOCM.    She has a past medical history of type 2 diabetes, CKD 3, HTN.  She was in [place] and came home on 6/24/22.  While in the airport, she began to have midsternal chest pressure 7/10 with SOB and diffuse diaphoresis and syncope and collapse  She was advised to go the ER and did not.  She flew back to [place] and has been having exertional dyspnea and chest pain associate with diaphoresis.  She had a did have a negative left heart cath September 2021.  She was started on Toprol 25mg on DC on 6/24/22    She had a 30 day monitor recommended along with genetic testing.     She grew up in an orphanage.  She was always told she could not play and made to read in the [place]phan[place] because she would pass out.  She would pass out with activity.  States this has been a long standing problem.  It would happen if she would \"lift something\" or walk too long.  It would happen in the heat.  She states she stays dehydrated.  She states her BP would not stabilize and when she stood up.  Since being started on Toprol, she feels less dizzy.   She uses a rolling walker for balance.  She states, when the episodes happen, she cannot cool herself off.  She has a chair lift at home.      She knows info about her Mother's mother. No heart disease  She has no known information about her father.   She has a sister without heart problems  She has children.    Noted to have syncope and collapse while in the airport 06/24/22. Instructed to be evaluated in ED and did not.   8/1/22 Monitor worn and noted sinus rhythm.

## 2025-07-03 ENCOUNTER — OFFICE VISIT (OUTPATIENT)
Dept: CARDIOLOGY CLINIC | Age: 70
End: 2025-07-03
Payer: COMMERCIAL

## 2025-07-03 VITALS
OXYGEN SATURATION: 96 % | WEIGHT: 176 LBS | HEIGHT: 62 IN | HEART RATE: 106 BPM | BODY MASS INDEX: 32.39 KG/M2 | SYSTOLIC BLOOD PRESSURE: 118 MMHG | DIASTOLIC BLOOD PRESSURE: 76 MMHG

## 2025-07-03 DIAGNOSIS — I42.1 HYPERTROPHIC OBSTRUCTIVE CARDIOMYOPATHY (HCC): Primary | ICD-10-CM

## 2025-07-03 DIAGNOSIS — R06.02 SOB (SHORTNESS OF BREATH): ICD-10-CM

## 2025-07-03 DIAGNOSIS — G47.33 OSA (OBSTRUCTIVE SLEEP APNEA): ICD-10-CM

## 2025-07-03 DIAGNOSIS — R06.02 SHORTNESS OF BREATH: ICD-10-CM

## 2025-07-03 DIAGNOSIS — Q24.8 LEFT VENTRICULAR OUTFLOW TRACT OBSTRUCTION: ICD-10-CM

## 2025-07-03 DIAGNOSIS — R00.0 TACHYCARDIA: ICD-10-CM

## 2025-07-03 DIAGNOSIS — E78.2 MIXED HYPERLIPIDEMIA: ICD-10-CM

## 2025-07-03 PROCEDURE — G8399 PT W/DXA RESULTS DOCUMENT: HCPCS | Performed by: INTERNAL MEDICINE

## 2025-07-03 PROCEDURE — G8417 CALC BMI ABV UP PARAM F/U: HCPCS | Performed by: INTERNAL MEDICINE

## 2025-07-03 PROCEDURE — 3074F SYST BP LT 130 MM HG: CPT | Performed by: INTERNAL MEDICINE

## 2025-07-03 PROCEDURE — 3078F DIAST BP <80 MM HG: CPT | Performed by: INTERNAL MEDICINE

## 2025-07-03 PROCEDURE — 1123F ACP DISCUSS/DSCN MKR DOCD: CPT | Performed by: INTERNAL MEDICINE

## 2025-07-03 PROCEDURE — 1036F TOBACCO NON-USER: CPT | Performed by: INTERNAL MEDICINE

## 2025-07-03 PROCEDURE — 99215 OFFICE O/P EST HI 40 MIN: CPT | Performed by: INTERNAL MEDICINE

## 2025-07-03 PROCEDURE — G8427 DOCREV CUR MEDS BY ELIG CLIN: HCPCS | Performed by: INTERNAL MEDICINE

## 2025-07-03 PROCEDURE — G9899 SCRN MAM PERF RSLTS DOC: HCPCS | Performed by: INTERNAL MEDICINE

## 2025-07-03 PROCEDURE — 93000 ELECTROCARDIOGRAM COMPLETE: CPT | Performed by: INTERNAL MEDICINE

## 2025-07-03 PROCEDURE — 1090F PRES/ABSN URINE INCON ASSESS: CPT | Performed by: INTERNAL MEDICINE

## 2025-07-03 PROCEDURE — G2211 COMPLEX E/M VISIT ADD ON: HCPCS | Performed by: INTERNAL MEDICINE

## 2025-07-03 PROCEDURE — 3017F COLORECTAL CA SCREEN DOC REV: CPT | Performed by: INTERNAL MEDICINE

## 2025-07-03 NOTE — PATIENT INSTRUCTIONS
Drink extra water before working out in the heat & humidity.    Take an extra metoprolol when HR >120    Call my office for any worsening symptoms such as shortness of breath, chest pain, sudden weight gain or loss, or any increased [phone].

## 2025-07-14 DIAGNOSIS — J30.9 ALLERGIC SINUSITIS: ICD-10-CM

## 2025-07-14 RX ORDER — APIXABAN 5 MG/1
5 TABLET, FILM COATED ORAL 2 TIMES DAILY
Qty: 180 TABLET | Refills: 3 | Status: SHIPPED | OUTPATIENT
Start: 2025-07-14

## 2025-07-14 RX ORDER — MONTELUKAST SODIUM 10 MG/1
10 TABLET ORAL NIGHTLY
Qty: 90 TABLET | Refills: 1 | Status: SHIPPED | OUTPATIENT
Start: 2025-07-14

## 2025-07-14 NOTE — TELEPHONE ENCOUNTER
Last OV: 6/25/2025  Next OV: Visit date not found    Next appointment due:na    Last fill:8/27/24  Refills:3 #90

## 2025-07-14 NOTE — TELEPHONE ENCOUNTER
Received refill request for ELIQUIS 5 MG TABS tablet  from [facility] pharmacy.     Last OV: 7/3/25    Next OV: 7/25/25    Last Labs:     Last Filled: 11/8/24

## 2025-07-17 ENCOUNTER — OFFICE VISIT (OUTPATIENT)
Dept: ENDOCRINOLOGY | Age: 70
End: 2025-07-17
Payer: COMMERCIAL

## 2025-07-17 VITALS
HEIGHT: 62 IN | WEIGHT: 176.8 LBS | OXYGEN SATURATION: 98 % | TEMPERATURE: 98 F | RESPIRATION RATE: 16 BRPM | DIASTOLIC BLOOD PRESSURE: 68 MMHG | HEART RATE: 89 BPM | SYSTOLIC BLOOD PRESSURE: 115 MMHG | BODY MASS INDEX: 32.54 KG/M2

## 2025-07-17 DIAGNOSIS — E11.42 TYPE 2 DIABETES MELLITUS WITH POLYNEUROPATHY (HCC): Primary | ICD-10-CM

## 2025-07-17 DIAGNOSIS — E11.65 UNCONTROLLED TYPE 2 DIABETES MELLITUS WITH HYPERGLYCEMIA (HCC): ICD-10-CM

## 2025-07-17 DIAGNOSIS — I25.10 CORONARY ARTERY DISEASE INVOLVING NATIVE CORONARY ARTERY OF NATIVE HEART WITHOUT ANGINA PECTORIS: ICD-10-CM

## 2025-07-17 DIAGNOSIS — N18.30 STAGE 3 CHRONIC KIDNEY DISEASE, UNSPECIFIED WHETHER STAGE 3A OR 3B CKD (HCC): ICD-10-CM

## 2025-07-17 PROCEDURE — G2211 COMPLEX E/M VISIT ADD ON: HCPCS | Performed by: INTERNAL MEDICINE

## 2025-07-17 PROCEDURE — 3046F HEMOGLOBIN A1C LEVEL >9.0%: CPT | Performed by: INTERNAL MEDICINE

## 2025-07-17 PROCEDURE — 3078F DIAST BP <80 MM HG: CPT | Performed by: INTERNAL MEDICINE

## 2025-07-17 PROCEDURE — G8417 CALC BMI ABV UP PARAM F/U: HCPCS | Performed by: INTERNAL MEDICINE

## 2025-07-17 PROCEDURE — G8427 DOCREV CUR MEDS BY ELIG CLIN: HCPCS | Performed by: INTERNAL MEDICINE

## 2025-07-17 PROCEDURE — G9899 SCRN MAM PERF RSLTS DOC: HCPCS | Performed by: INTERNAL MEDICINE

## 2025-07-17 PROCEDURE — 3074F SYST BP LT 130 MM HG: CPT | Performed by: INTERNAL MEDICINE

## 2025-07-17 PROCEDURE — 1090F PRES/ABSN URINE INCON ASSESS: CPT | Performed by: INTERNAL MEDICINE

## 2025-07-17 PROCEDURE — 2022F DILAT RTA XM EVC RTNOPTHY: CPT | Performed by: INTERNAL MEDICINE

## 2025-07-17 PROCEDURE — 1123F ACP DISCUSS/DSCN MKR DOCD: CPT | Performed by: INTERNAL MEDICINE

## 2025-07-17 PROCEDURE — 99214 OFFICE O/P EST MOD 30 MIN: CPT | Performed by: INTERNAL MEDICINE

## 2025-07-17 PROCEDURE — 1036F TOBACCO NON-USER: CPT | Performed by: INTERNAL MEDICINE

## 2025-07-17 PROCEDURE — 3017F COLORECTAL CA SCREEN DOC REV: CPT | Performed by: INTERNAL MEDICINE

## 2025-07-17 PROCEDURE — G8399 PT W/DXA RESULTS DOCUMENT: HCPCS | Performed by: INTERNAL MEDICINE

## 2025-07-17 RX ORDER — HYDROCHLOROTHIAZIDE 12.5 MG/1
CAPSULE ORAL
Qty: 2 EACH | Refills: 5 | Status: SHIPPED | OUTPATIENT
Start: 2025-07-17

## 2025-07-17 RX ORDER — DAPAGLIFLOZIN 5 MG/1
5 TABLET, FILM COATED ORAL EVERY MORNING
Qty: 90 TABLET | Refills: 3 | Status: SHIPPED | OUTPATIENT
Start: 2025-07-17

## 2025-07-17 NOTE — PROGRESS NOTES
Judy Gallardo is a 69 y.o. female is seen  for evaluation and management of type 2  Diabetes--- .Judy Gallardo was diagnosed with Diabetes mellitus in 2008 aprox   Pt always had issues with hypoglycemia since her childhood so she was under constant surveillance until she was diagnosed with diabetes  .Judy Gallardo got diabetic education in the past.  Comorbid conditions: Neuropathy  ---on her way back from [facility] on June 17th she developed chest pain and sweating and was taken by EMS   -she saw her PCP and was told that she had HOCM and started on B Blockers ---she feels improved.  Weight 171 in June 2022 .  Patient has stage III chronic kidney disease and follows with nephrology  Patient has hyperlipidemia and is on Lipitor her last LDL was at target  Patient also has arthritis and takes Celebrex off-and-on which can also cause kidney damage and also has reflux and is on omeprazole    INTERIM:      -- she has gained 9 lbs since last visit, she has been eating poorly and admits to use severe dietary noncompliance she has not taken insulin for months continues to take Mounjaro 12.5 mg weekly and does not notice appetite suppression with this  But admits that poor dietary choices are to blame    Past Medical History:   Diagnosis Date    Anxiety     Asthma     vocal fold     Ataxia     Cervical stenosis of spinal canal 10/07/2021    Claustrophobia     Coronary artery disease involving native coronary artery of native heart without angina pectoris 09/09/2021    Depression     Diabetes mellitus (HCC)     Hyperlipidemia     Hypertension     Hypertrophic cardiomyopathy (HCC)     Hypothyroidism     Obesity     Osteoarthritis     Paradoxical vocal fold motion disorder     Postmenopausal 05/15/2018    Sleep apnea     Stage 2 chronic kidney disease 05/07/2019      Patient Active Problem List   Diagnosis    HTN (hypertension)    Diabetes mellitus (HCC)    Hyperlipidemia    Insomnia    Depression

## 2025-07-22 ENCOUNTER — TELEPHONE (OUTPATIENT)
Dept: PULMONOLOGY | Age: 70
End: 2025-07-22

## 2025-07-22 DIAGNOSIS — F51.04 PSYCHOPHYSIOLOGICAL INSOMNIA: ICD-10-CM

## 2025-07-23 RX ORDER — ESZOPICLONE 1 MG/1
1 TABLET, FILM COATED ORAL NIGHTLY
Qty: 30 TABLET | Refills: 0 | Status: SHIPPED | OUTPATIENT
Start: 2025-07-23 | End: 2025-08-22

## 2025-07-23 NOTE — PROGRESS NOTES
Cardiac Electrophysiology Consultation   Date: 7/25/2025   Reason for Consultation: Atrial Fibrillation   Consult Requesting Physician: Kemi Mejia MD     Chief Complaint:   Chief Complaint   Patient presents with    Follow-up      HPI: Judy Gallardo is a 69 y.o. patient with a history of atrial fibrillation, anxiety, CAD, depression, diabetes mellitus, hyperlipidemia, HOCM, hypertension, syncope, obesity, and WATSON.     Noted to have syncope and collapse while in the airport 06[place]24[place]22. Instructed to be evaluated in ED and did not.     Established in clinic as a new patient with Dr. Hu 08/01/2022 for management of HOCM. Patient admitted to being told she could not play as she would pass out. Reports she would pass out with activity, lifting, walking long distance, and in the heat. Genetic testing ordered and 30 day monitor placed.     08/01/22 Monitor worn and noted sinus rhythm.     Started on Camzyos 09/01/2022.    Seen in clinic with Dr. Mejia 12/01/23 for complaints of cough with sputum production and chest pain. ECG noted atrial fibrillation with rapid rate. Instructed to proceed to ED. Chest CT noted infectious/inflammatory airways process in LLL. Following fluid resuscitation, HR improved. Discharged home with antibiotics.     Seen in clinic with Dr. Hu 07/03/2025 and reported an elevated HR. Reported while being active HR will jump to 130s and she would experience tired and short winded. ECG noted sinus rhythm. Instructed to take an extra Toprol when HR >120.     Interval History:  Today, patient presents to the office today for follow up. ECG today show sinus rhythm. Reports when HR begins to fluctuate, she will begin to experience chest pressure and diaphoresis.     Past Medical History:   Diagnosis Date    Anxiety     Asthma     vocal fold     Ataxia     Cervical stenosis of spinal canal 10/07/2021    Claustrophobia     Coronary artery disease involving native coronary artery of native

## 2025-07-24 ENCOUNTER — CLINICAL SUPPORT (OUTPATIENT)
Dept: CARDIOLOGY CLINIC | Age: 70
End: 2025-07-24

## 2025-07-24 ENCOUNTER — TELEPHONE (OUTPATIENT)
Dept: CARDIOLOGY CLINIC | Age: 70
End: 2025-07-24

## 2025-07-24 DIAGNOSIS — I42.1 HYPERTROPHIC OBSTRUCTIVE CARDIOMYOPATHY (HCC): Primary | ICD-10-CM

## 2025-07-24 NOTE — PROGRESS NOTES
Patient coming in today for a 6 minute hallwalk.    Upon retrieving from waiting room patient was pale, stumbling, and dizzy.  Walked slowly to nearby exam room.  Let pt sit to recover for a few minutes    Explained hallwalk to patient who is willing to start chapa walk.    Initial sitting b/p was 94/60, HR 92, 02 96%  Standing 86/54, patient diaphoretic, pale and dizzy.  Consulted YONI  Gave pt two cups of water to drink  Blanca MICHAELS RN in room for assessment of patient.    Discovered patient is having these episodes of dizziness, chest pressure, diaphoresis, syncope, falls and erratic heart rates and blood pressures VERY FREQUENTLY.  Pt states her blood pressure has been as low as 50-80's systolic.  Recent fall in her garden hitting her head on concrete block.  Pt uses her Pulse/OX frequently and the rate is     Patient agreeable to begin hallwalk.  Jay LOYA will monitor the walk     Pre Walk--sitting Pre Walk--standing Immediate Post walk 5 min Post Walk   Blood Pressure 110/64 108/66 148/66 124/80   Heart Rate 94  103 90   Pulse Ox 98%  98% 98%     6 minute hallwalk  Camzyos 5mg QD  Toprol XL 75mg QD  Pt walked entire 6 minutes     16 labs x60ft/lap = 960 total feet walked  960 total feet x 0.305 = 292.8 total meters walked     Zero rests  Actual minutes walked = 6 minutes  Additional Comments:  Walked with cane

## 2025-07-24 NOTE — TELEPHONE ENCOUNTER
Please review the Nurse/MA visit of 7/24.  You are seeing pt Friday, 7/25  Want you to be aware of symptoms pt is having frequently.

## 2025-07-25 ENCOUNTER — OFFICE VISIT (OUTPATIENT)
Dept: CARDIOLOGY CLINIC | Age: 70
End: 2025-07-25
Payer: COMMERCIAL

## 2025-07-25 VITALS
SYSTOLIC BLOOD PRESSURE: 116 MMHG | DIASTOLIC BLOOD PRESSURE: 70 MMHG | HEIGHT: 62 IN | OXYGEN SATURATION: 96 % | HEART RATE: 98 BPM | BODY MASS INDEX: 32.39 KG/M2 | WEIGHT: 176 LBS

## 2025-07-25 DIAGNOSIS — I51.7 LVH (LEFT VENTRICULAR HYPERTROPHY): ICD-10-CM

## 2025-07-25 DIAGNOSIS — I48.91 ATRIAL FIBRILLATION, UNSPECIFIED TYPE (HCC): Primary | ICD-10-CM

## 2025-07-25 PROCEDURE — 1036F TOBACCO NON-USER: CPT | Performed by: INTERNAL MEDICINE

## 2025-07-25 PROCEDURE — 3017F COLORECTAL CA SCREEN DOC REV: CPT | Performed by: INTERNAL MEDICINE

## 2025-07-25 PROCEDURE — G2211 COMPLEX E/M VISIT ADD ON: HCPCS | Performed by: INTERNAL MEDICINE

## 2025-07-25 PROCEDURE — 3074F SYST BP LT 130 MM HG: CPT | Performed by: INTERNAL MEDICINE

## 2025-07-25 PROCEDURE — 93000 ELECTROCARDIOGRAM COMPLETE: CPT | Performed by: INTERNAL MEDICINE

## 2025-07-25 PROCEDURE — 99214 OFFICE O/P EST MOD 30 MIN: CPT | Performed by: INTERNAL MEDICINE

## 2025-07-25 PROCEDURE — 1090F PRES/ABSN URINE INCON ASSESS: CPT | Performed by: INTERNAL MEDICINE

## 2025-07-25 PROCEDURE — 3078F DIAST BP <80 MM HG: CPT | Performed by: INTERNAL MEDICINE

## 2025-07-25 PROCEDURE — G9899 SCRN MAM PERF RSLTS DOC: HCPCS | Performed by: INTERNAL MEDICINE

## 2025-07-25 PROCEDURE — G8427 DOCREV CUR MEDS BY ELIG CLIN: HCPCS | Performed by: INTERNAL MEDICINE

## 2025-07-25 PROCEDURE — G8417 CALC BMI ABV UP PARAM F/U: HCPCS | Performed by: INTERNAL MEDICINE

## 2025-07-25 PROCEDURE — 1123F ACP DISCUSS/DSCN MKR DOCD: CPT | Performed by: INTERNAL MEDICINE

## 2025-07-25 PROCEDURE — G8399 PT W/DXA RESULTS DOCUMENT: HCPCS | Performed by: INTERNAL MEDICINE

## 2025-07-28 NOTE — TELEPHONE ENCOUNTER
Pt called wanting to speak with Romy Russ regarding her paperwork for her med camzyos. Please advise.   Thank you [Negative] : Endocrine

## 2025-08-04 RX ORDER — METOPROLOL SUCCINATE 50 MG/1
50 TABLET, EXTENDED RELEASE ORAL DAILY
Qty: 90 TABLET | Refills: 3 | Status: SHIPPED | OUTPATIENT
Start: 2025-08-04

## 2025-08-07 ENCOUNTER — TELEPHONE (OUTPATIENT)
Dept: PULMONOLOGY | Age: 70
End: 2025-08-07

## 2025-08-08 DIAGNOSIS — F51.04 PSYCHOPHYSIOLOGICAL INSOMNIA: ICD-10-CM

## 2025-08-08 RX ORDER — ESZOPICLONE 1 MG/1
3 TABLET, FILM COATED ORAL NIGHTLY
Qty: 90 TABLET | Refills: 1 | Status: SHIPPED | OUTPATIENT
Start: 2025-08-08 | End: 2025-09-07

## 2025-08-21 ENCOUNTER — HOSPITAL ENCOUNTER (OUTPATIENT)
Dept: WOMENS IMAGING | Age: 70
Discharge: HOME | End: 2025-08-21
Payer: COMMERCIAL

## 2025-08-21 ENCOUNTER — OFFICE VISIT (OUTPATIENT)
Dept: SURGERY | Age: 70
End: 2025-08-21
Payer: COMMERCIAL

## 2025-08-21 VITALS
OXYGEN SATURATION: 97 % | HEIGHT: 63 IN | BODY MASS INDEX: 31.5 KG/M2 | RESPIRATION RATE: 16 BRPM | HEART RATE: 84 BPM | WEIGHT: 177.8 LBS

## 2025-08-21 VITALS — WEIGHT: 170 LBS | BODY MASS INDEX: 30.12 KG/M2 | HEIGHT: 63 IN

## 2025-08-21 DIAGNOSIS — Z87.42 HISTORY OF ATYPICAL HYPERPLASIA OF BREAST: ICD-10-CM

## 2025-08-21 DIAGNOSIS — Z80.41 FAMILY HISTORY OF OVARIAN CANCER: ICD-10-CM

## 2025-08-21 DIAGNOSIS — Z98.890 HISTORY OF BENIGN BREAST BIOPSY: Primary | ICD-10-CM

## 2025-08-21 DIAGNOSIS — N60.92 ATYPICAL DUCTAL HYPERPLASIA OF LEFT BREAST: ICD-10-CM

## 2025-08-21 DIAGNOSIS — Z09 SURGICAL FOLLOW-UP CARE: ICD-10-CM

## 2025-08-21 DIAGNOSIS — Z12.39 SCREENING BREAST EXAMINATION: ICD-10-CM

## 2025-08-21 PROCEDURE — G0279 TOMOSYNTHESIS, MAMMO: HCPCS

## 2025-08-21 PROCEDURE — 1090F PRES/ABSN URINE INCON ASSESS: CPT | Performed by: SURGERY

## 2025-08-21 PROCEDURE — 1036F TOBACCO NON-USER: CPT | Performed by: SURGERY

## 2025-08-21 PROCEDURE — 3017F COLORECTAL CA SCREEN DOC REV: CPT | Performed by: SURGERY

## 2025-08-21 PROCEDURE — 99214 OFFICE O/P EST MOD 30 MIN: CPT | Performed by: SURGERY

## 2025-08-21 PROCEDURE — G8417 CALC BMI ABV UP PARAM F/U: HCPCS | Performed by: SURGERY

## 2025-08-21 PROCEDURE — G8399 PT W/DXA RESULTS DOCUMENT: HCPCS | Performed by: SURGERY

## 2025-08-21 PROCEDURE — 1123F ACP DISCUSS/DSCN MKR DOCD: CPT | Performed by: SURGERY

## 2025-08-21 PROCEDURE — G8427 DOCREV CUR MEDS BY ELIG CLIN: HCPCS | Performed by: SURGERY

## 2025-08-21 PROCEDURE — G9899 SCRN MAM PERF RSLTS DOC: HCPCS | Performed by: SURGERY

## 2025-08-26 DIAGNOSIS — E11.42 TYPE 2 DIABETES MELLITUS WITH POLYNEUROPATHY (HCC): ICD-10-CM

## 2025-08-27 RX ORDER — HYDROCHLOROTHIAZIDE 12.5 MG/1
CAPSULE ORAL
Qty: 6 EACH | Refills: 0 | Status: SHIPPED | OUTPATIENT
Start: 2025-08-27

## 2025-08-27 RX ORDER — DAPAGLIFLOZIN 5 MG/1
5 TABLET, FILM COATED ORAL EVERY MORNING
Qty: 90 TABLET | Refills: 1 | Status: SHIPPED | OUTPATIENT
Start: 2025-08-27

## 2025-08-27 RX ORDER — HYDROCHLOROTHIAZIDE 12.5 MG/1
CAPSULE ORAL
Qty: 6 EACH | Refills: 0 | Status: SHIPPED | OUTPATIENT
Start: 2025-08-27 | End: 2025-08-27

## (undated) DEVICE — SUTURE PERMAHAND SZ 2-0 L30IN NONABSORBABLE BLK L17MM RB-1 K873H

## (undated) DEVICE — Device: Brand: JELCO

## (undated) DEVICE — 3M™ TEGADERM™ TRANSPARENT FILM DRESSING FRAME STYLE, 1624W, 2-3/8 IN X 2-3/4 IN (6 CM X 7 CM), 100/CT 4CT/CASE: Brand: 3M™ TEGADERM™

## (undated) DEVICE — STERILE POLYISOPRENE POWDER-FREE SURGICAL GLOVES: Brand: PROTEXIS

## (undated) DEVICE — PORT VLV 2 W NDL FREE SMRTSITE

## (undated) DEVICE — PROVE COVER: Brand: UNBRANDED

## (undated) DEVICE — ELECTRODE 8227304 5PK PRASS PR 18MM ROHS

## (undated) DEVICE — SPECIMEN ORIENTATION CHARMS, SIX DISTINCTLY SHAPED STERILE 10MM CHARMS: Brand: MARGINMAP

## (undated) DEVICE — ENT MAJOR: Brand: MEDLINE INDUSTRIES, INC.

## (undated) DEVICE — KIT,ANTI FOG,W/SPONGE & FLUID,SOFT PACK: Brand: MEDLINE

## (undated) DEVICE — SPONGE LAP W18XL18IN WHT COT 4 PLY FLD STRUNG RADPQ DISP ST 2 PER PACK

## (undated) DEVICE — Device

## (undated) DEVICE — SUTURE VICRYL + SZ 3-0 L18IN ABSRB UD SH 1/2 CIR TAPERCUT NDL VCP864D

## (undated) DEVICE — MEDIA CONTRAST RX ISOVUE-300 61% 30ML VIALS

## (undated) DEVICE — YANKAUER,BULB TIP,W/O VENT,RIGID,STERILE: Brand: MEDLINE

## (undated) DEVICE — DEVICE SPEC PERF COMPR PLT FOR SPEC RADIOGRAPHY TRANSPEC

## (undated) DEVICE — SUTURE VICRYL + SZ 4-0  L18IN RB 1  CR ABSRB VCP714D

## (undated) DEVICE — SUTURE PERMA-HAND SZ 3-0 L18IN NONABSORBABLE BLK RB-1 L17MM C053D

## (undated) DEVICE — DRAPE,CHEST,FENES,15X10,STERIL: Brand: MEDLINE

## (undated) DEVICE — GOWN,AURORA,NONREINF,RAGLAN,XXL,STERILE: Brand: MEDLINE

## (undated) DEVICE — PEN: MARKING STD 100/CS: Brand: MEDICAL ACTION INDUSTRIES

## (undated) DEVICE — LOOP,VESSEL,MAXI,BLUE,2/PK,STERILE: Brand: MEDLINE

## (undated) DEVICE — SUTURE PERMAHAND SZ 3-0 L18IN NONABSORBABLE BLK SILK BRAID A184H

## (undated) DEVICE — SUTURE MONOCRYL + SZ 4-0 L27IN ABSRB UD L19MM PS-2 3/8 CIR MCP426H

## (undated) DEVICE — GOWN SIRUS NONREIN LG W/TWL: Brand: MEDLINE INDUSTRIES, INC.

## (undated) DEVICE — LIQUIBAND RAPID ADHESIVE 36/CS 0.8ML: Brand: MEDLINE

## (undated) DEVICE — UNIVERSAL BLOCK TRAY: Brand: AVANOS*

## (undated) DEVICE — GLOVE SURG SZ 65 THK91MIL LTX FREE SYN POLYISOPRENE

## (undated) DEVICE — APPLIER CLP L9.38IN M LIG TI DISP STR RNG HNDL LIGACLP

## (undated) DEVICE — PROBE 8225401 5PK SD-SD BIPOL STIM ROHS

## (undated) DEVICE — GLOVE SURG SZ 7 L12IN THK7.5MIL DK GRN LTX FREE MSG6570] MEDLINE INDUSTRIES INC]

## (undated) DEVICE — SOLUTION IRRIG 500ML 0.9% SOD CHLO USP POUR PLAS BTL

## (undated) DEVICE — GAUZE,SPONGE,4"X4",8PLY,STRL,LF,10/TRAY: Brand: MEDLINE

## (undated) DEVICE — SYRINGE MED 3ML CLR PLAS STD N CTRL LUERLOCK TIP DISP

## (undated) DEVICE — SINGLE USE FLEXIBLE RHINOLARYNGOSCOPE

## (undated) DEVICE — 3M™ IOBAN™ 2 ANTIMICROBIAL INCISE DRAPE 6650EZ: Brand: IOBAN™ 2

## (undated) DEVICE — CATHETER IV 20 GAX1 IN N WNG KINK RESIST INSYT AUTOGRD

## (undated) DEVICE — WET SKIN PREP TRAY: Brand: MEDLINE INDUSTRIES, INC.

## (undated) DEVICE — SUTURE MONOCRYL + SZ 3-0 L27IN ABSRB UD L26MM SH 1/2 CIR MCP416H

## (undated) DEVICE — BLANKET WRM W40.2XL55.9IN IORT LO BODY + MISTRAL AIR

## (undated) DEVICE — INTENDED USE FOR SURGICAL MARKING ON INTACT SKIN, ALSO PROVIDES A PERMANENT METHOD OF IDENTIFYING OBJECTS IN THE OPERATING ROOM: Brand: WRITESITE® PLUS MINI PREP RESISTANT MARKER

## (undated) DEVICE — INTENDED TO BE USED TO OCCLUDE, RETRACT AND IDENTIFY ARTERIES, VEINS, TENDONS AND NERVES IN SURGICAL PROCEDURES: Brand: STERION®  VESSEL LOOP

## (undated) DEVICE — PROBE SET W/DRAPE

## (undated) DEVICE — GLOVE SURG SZ 6.5 L11.2IN FNGR THK9.8MIL STRW LTX POLYMER

## (undated) DEVICE — BLADE ES ELASTOMERIC COAT INSUL DURABLE BEND UPTO 90DEG

## (undated) DEVICE — GLOVE ORANGE PI 8 1/2   MSG9085

## (undated) DEVICE — MASC MINOR: Brand: MEDLINE INDUSTRIES, INC.

## (undated) DEVICE — STANDARD HYPODERMIC NEEDLE,POLYPROPYLENE HUB: Brand: MONOJECT

## (undated) DEVICE — PENCIL SMK EVAC TELSCP 3 M TBNG

## (undated) DEVICE — CHLORAPREP 26ML ORANGE

## (undated) DEVICE — ELECTRODE PT RET AD L9FT HI MOIST COND ADH HYDRGEL CORDED

## (undated) DEVICE — SYRINGE,CONTROL,LL,FINGER,GRIP: Brand: MEDLINE INDUSTRIES, INC.

## (undated) DEVICE — SPONGE,PEANUT,XRAY,ST,SM,3/8",5/CARD: Brand: MEDLINE INDUSTRIES, INC.

## (undated) DEVICE — DISPOSABLE OR TOWEL: Brand: CARDINAL HEALTH

## (undated) DEVICE — BLADE, TONGUE, 6", STERILE: Brand: MEDLINE

## (undated) DEVICE — NEEDLE,22GX1.5",REG,BEVEL: Brand: MEDLINE

## (undated) DEVICE — STAPLER SKIN H3.9MM WIRE DIA0.58MM CRWN 6.9MM 35 STPL ROT

## (undated) DEVICE — NEEDLE SPNL 22GA L3.5IN BLK HUB S STL REG WALL FIT STYL W/

## (undated) DEVICE — SKIN MARKER,REGULAR TIP WITH RULER AND LABELS: Brand: DEVON

## (undated) DEVICE — 3M™ STERI-DRAPE™ FLUOROSCOPE DRAPE, 10 PER CARTON / 4 CARTONS PER CASE, 1012: Brand: STERI-DRAPE™